# Patient Record
Sex: FEMALE | Race: BLACK OR AFRICAN AMERICAN | Employment: FULL TIME | URBAN - METROPOLITAN AREA
[De-identification: names, ages, dates, MRNs, and addresses within clinical notes are randomized per-mention and may not be internally consistent; named-entity substitution may affect disease eponyms.]

---

## 2020-07-27 ENCOUNTER — OFFICE VISIT (OUTPATIENT)
Dept: FAMILY MEDICINE CLINIC | Age: 60
End: 2020-07-27
Payer: COMMERCIAL

## 2020-07-27 ENCOUNTER — TELEPHONE (OUTPATIENT)
Dept: FAMILY MEDICINE CLINIC | Age: 60
End: 2020-07-27

## 2020-07-27 VITALS
BODY MASS INDEX: 33.37 KG/M2 | SYSTOLIC BLOOD PRESSURE: 120 MMHG | TEMPERATURE: 96.4 F | DIASTOLIC BLOOD PRESSURE: 76 MMHG | HEART RATE: 64 BPM | WEIGHT: 212.6 LBS | HEIGHT: 67 IN | RESPIRATION RATE: 20 BRPM

## 2020-07-27 PROBLEM — E78.5 DYSLIPIDEMIA: Status: ACTIVE | Noted: 2020-07-27

## 2020-07-27 PROBLEM — E66.811 OBESITY (BMI 30.0-34.9): Status: ACTIVE | Noted: 2020-07-27

## 2020-07-27 PROBLEM — I10 ESSENTIAL HYPERTENSION: Status: ACTIVE | Noted: 2020-07-27

## 2020-07-27 PROBLEM — Z86.19 HISTORY OF HEPATITIS C: Status: ACTIVE | Noted: 2020-07-27

## 2020-07-27 PROBLEM — E66.9 OBESITY (BMI 30.0-34.9): Status: ACTIVE | Noted: 2020-07-27

## 2020-07-27 PROBLEM — G47.33 OSA (OBSTRUCTIVE SLEEP APNEA): Status: ACTIVE | Noted: 2020-07-27

## 2020-07-27 PROBLEM — E55.9 VITAMIN D DEFICIENCY: Status: ACTIVE | Noted: 2020-07-27

## 2020-07-27 PROCEDURE — 3017F COLORECTAL CA SCREEN DOC REV: CPT | Performed by: FAMILY MEDICINE

## 2020-07-27 PROCEDURE — 99204 OFFICE O/P NEW MOD 45 MIN: CPT | Performed by: FAMILY MEDICINE

## 2020-07-27 PROCEDURE — G8417 CALC BMI ABV UP PARAM F/U: HCPCS | Performed by: FAMILY MEDICINE

## 2020-07-27 PROCEDURE — G8427 DOCREV CUR MEDS BY ELIG CLIN: HCPCS | Performed by: FAMILY MEDICINE

## 2020-07-27 PROCEDURE — 4004F PT TOBACCO SCREEN RCVD TLK: CPT | Performed by: FAMILY MEDICINE

## 2020-07-27 RX ORDER — CARVEDILOL 6.25 MG/1
6.25 TABLET ORAL 2 TIMES DAILY
COMMUNITY
Start: 2020-06-16 | End: 2020-08-12 | Stop reason: SDUPTHER

## 2020-07-27 RX ORDER — TRIAMCINOLONE ACETONIDE 1 MG/G
CREAM TOPICAL
Qty: 15 G | Refills: 1 | Status: SHIPPED | OUTPATIENT
Start: 2020-07-27 | End: 2020-07-27 | Stop reason: ALTCHOICE

## 2020-07-27 RX ORDER — CHLORTHALIDONE 25 MG/1
25 TABLET ORAL DAILY
COMMUNITY
Start: 2020-06-16 | End: 2020-11-30 | Stop reason: SDUPTHER

## 2020-07-27 RX ORDER — FLUTICASONE PROPIONATE 0.05 %
CREAM (GRAM) TOPICAL
Qty: 15 G | Refills: 1 | Status: SHIPPED | OUTPATIENT
Start: 2020-07-27 | End: 2020-08-26

## 2020-07-27 SDOH — ECONOMIC STABILITY: INCOME INSECURITY: HOW HARD IS IT FOR YOU TO PAY FOR THE VERY BASICS LIKE FOOD, HOUSING, MEDICAL CARE, AND HEATING?: SOMEWHAT HARD

## 2020-07-27 SDOH — ECONOMIC STABILITY: FOOD INSECURITY: WITHIN THE PAST 12 MONTHS, YOU WORRIED THAT YOUR FOOD WOULD RUN OUT BEFORE YOU GOT MONEY TO BUY MORE.: SOMETIMES TRUE

## 2020-07-27 SDOH — ECONOMIC STABILITY: FOOD INSECURITY: WITHIN THE PAST 12 MONTHS, THE FOOD YOU BOUGHT JUST DIDN'T LAST AND YOU DIDN'T HAVE MONEY TO GET MORE.: SOMETIMES TRUE

## 2020-07-27 ASSESSMENT — ENCOUNTER SYMPTOMS
ABDOMINAL PAIN: 0
CHEST TIGHTNESS: 0
BLOOD IN STOOL: 0
SHORTNESS OF BREATH: 0

## 2020-07-27 ASSESSMENT — PATIENT HEALTH QUESTIONNAIRE - PHQ9
SUM OF ALL RESPONSES TO PHQ9 QUESTIONS 1 & 2: 2
2. FEELING DOWN, DEPRESSED OR HOPELESS: 1
1. LITTLE INTEREST OR PLEASURE IN DOING THINGS: 1
SUM OF ALL RESPONSES TO PHQ QUESTIONS 1-9: 2
SUM OF ALL RESPONSES TO PHQ QUESTIONS 1-9: 2

## 2020-07-27 NOTE — PROGRESS NOTES
Subjective:      Patient ID: Kira Rose is a 61 y.o. female. Visit Information    Have you changed or started any medications since your last visit including any over-the-counter medicines, vitamins, or herbal medicines? no   Are you having any side effects from any of your medications? -  yes - TIGHTNESS IN ANKLE  Have you stopped taking any of your medications? Is so, why? -  NO    Have you seen any other physician or provider since your last visit? No  Have you had any other diagnostic tests since your last visit? No  Have you been seen in the emergency room and/or had an admission to a hospital since we last saw you? No  Have you had your routine dental cleaning in the past 6 months? no    Have you activated your Salir.com account? If not, what are your barriers? No:      Patient Care Team:  Julia Thakkar MD as PCP - General (Family Medicine)    Medical History Review  Past Medical, Family, and Social History reviewed and does contribute to the patient presenting condition    Health Maintenance   Topic Date Due    Potassium monitoring  1960    Creatinine monitoring  1960    Hepatitis C screen  1960    HIV screen  11/15/1975    DTaP/Tdap/Td vaccine (1 - Tdap) 11/15/1979    Lipid screen  11/15/2000    Breast cancer screen  11/15/2010    Shingles Vaccine (1 of 2) 11/15/2010    Colon cancer screen colonoscopy  11/15/2010    Flu vaccine (1) 09/01/2020    Hepatitis A vaccine  Aged Out    Hepatitis B vaccine  Aged Out    Hib vaccine  Aged Out    Meningococcal (ACWY) vaccine  Aged Out    Pneumococcal 0-64 years Vaccine  Aged Out     HPI    Patient is a 58-year-old obese black female who presents for her initial office visit here with multiple problems including hypertension, dyslipidemia, vitamin D deficiency, AURELIANO, history of hepatitis C.   She also complains of having an itchy rash on the dorsum of her hands,on her upper eyelids and the soles of her feet since April of this (KENALOG) 0.1 % cream     Sig: Apply topically 2 times daily.      Dispense:  15 g     Refill:  1     Continue routine medication  Patient referred GYN, GI, cardiology, dermatology, pulmonology  Follow-up in 4 to 5 months

## 2020-07-27 NOTE — TELEPHONE ENCOUNTER
Patient states the kenalog cream you had prescribe does not help. She already has it at home. Can you send something else?

## 2020-07-27 NOTE — PATIENT INSTRUCTIONS
FOOD RESOURCES      RESOURCE SERVICE PHONE [employer]   Outreach of Jean Schwarz 442 [phone]  Administrative www.[email or web address] org   Mustard [employer]o [phone]  Bradley Gonzales 47 - and Fax N/A   Payton aRmos of [facility] [phone] 4700 Lady Mayela Rosas [phone] [address] / Jessie Villagran [phone]  [place] [address]70 Assistance Programs [phone]  Collene Flavio. [place] [address] [place] [address] Feed Your Neighbor ([phone]) [phone]  Jaci Mcclellan 178 [phone]  [address] [facility] [address] [phone] [address] [phone]  Service-Intake www.lisaationarmynwohio. 5353 [facility] Emergency Food Pantry ([phone]) [phone]  Service-Intake www.[facility]. [facility] [address]. ([phone]) [phone]  Service-Intake www.stpaulscommunitycenter. [address] [place] [address] [phone]) [phone]  Administrative www. [facility] HillOpenera. [facility] [address] [address] Bank Back Pack Program [phone]) [phone]  Hortensia Reynolds. [address] Meals Program [phone] 450 [facility]3] and Shelter for men www.[facility]. [facility] FOR SURGERY for Social and 351 [facility] [phone] www. providencecentertoledo. [address]250 [address] [place] [address] [phone]  [facility] Ayan Cotton 187. com    Pent[employer]alism Women Big Lots Emergency Assistance [phone] [facility] Feed Your Neighbor [phone] Administrative N/A   [facility] 65 22 [phone]  [address] / [facility] [phone]  112 [facility] [phone]  300 [facility] Pantry / Meals [phone]  [address]  www. [facility]. [facility] 50 Pantry / [employer] [phone]  Administrative www. danisha. [facility] [phone]  [address] of 1501 W [facility] / [place] [phone]  1840 Wealthy [facility] Pantry and Grooming Supplies [phone]  [address]  [email or web address]    300 South [place]on [place]rough [phone]  1300 [facility] PASSAVANT-CRANBERRY-ER Positive Living Program (152) [phone] ext: 52 1783 [facility] www. Ikwa OrientaÃƒÂ§ÃƒÂ£o Profissional. Practice Ignition    Food for [address] 42-74-72-85 www. feedtoledo. [place] 50 Pantry ([phone] [phone]  [address] Assembly of [address] [phone] http://www. StaplestheTradeYaion. hdtMEDIA    775 [facility] [phone]  [address] [place] [address] Family Pantry and [place] [phone] nightingales-harvest.org    201 [facility] [phone]  Ul. [facility] www. [email or web address]    [facility] [phone]  Administrative factoledo. [facility] [address],15Th Floor [phone]  Toll Free www. CoworkingON    Body of 3 [facility] for [place] 1 Pantry (065) 110 [facility] [phone] www. [facility]. [employer]     Living [address] [phone]  [address] Morning Blessings [phone]  Administrative N/A   [facility]  of [facility]3 [facility] Feed Your Neighbor [phone]  [address] for the Poor Food Pantry [phone]  5 [facility]. org/   [facility] 30 Pantry  [phone] Morena Jacobs 13 Emergency Food and Hygiene Pantry [phone]  Administrative www. friendlyGreene Memorial [facility]. [facility] [address] ([phone]  2000 [facility] [phone]  [address] tv    Helping Hands of [facility] [address] / Pamla Sandhoff / Akanksha Dickerson [phone]  607 [facility]. [address] Pantry [facility] [phone]  [place] [address] [phone]  [address] [place] [address] Pant  [phone]  Usabilla.pt    125 [facility] Pantry [phone] [email or web address] Regional [email or web address]   400 [facility] Pantry  [phone] N/A   Food for 31 [facility] Hospital Pantry [phone] N/A     Updated 1/30/20

## 2020-07-27 NOTE — PROGRESS NOTES
FOOD RESOURCES      RESOURCE SERVICE PHONE [employer]   Outreach of Jean Schwarz 442 [phone]  Administrative www.[email or web address]. org   Mustard [employer]o [phone]  LenaAravind Christian 47 - and Fax N/A   Tonio Ochoa Count includes the [facility] [phone] 4700 Lady Mayela Rosas [phone] [address] / Lurena Old [phone]  [place] 2369 Assistance Programs [phone]  Dmitry Calderon. [place] [address] [place] [address] Feed Your Neighbor [phone]  Jaci Mcclellan 178 [phone]  [address] [phone]  [address] [phone]  Service-Intake www.lisaationarmynwohlan. 5353 [facility] Emergency Food Pantry ([phone]) [phone]  Service-Intake www.[facility]. [facility] Summer U. 91. ([phone]) [phone]  Service-Intake www.stpaulscommunitycenter. [address] [place] [address] [phone]) [phone]  Administrative www. [facility] Regional Healthascencion. [address] Bank Back Pack Program [phone]  [facility] Ashlee. [address] Meals Program [phone]  [address] [place] [address] and Shelter for men www.[facility]. [facility] FOR SURGERY for Social and 351 [facility] [phone] www. [facility] Community Hospitalecentertoezrao. 2250 [address] [place] [address] [phone]  [facility] Ayan Cotton 187. com    Baptist Women Big Lots Emergency Assistance [phone] [address] Feed Your Neighbor [phone] Administrative N/A   [facility] 65 22 [phone]  [address] / Karen Earnest [phone]  112 [facility] [phone]  300 [facility] Pantry / Meals [phone]  [address]  www. tenzinTippah [facility] Hospital. [facility] 50 Pantry / [employer] [phone]  Administrative www. danisha. [facility] [phone]  [address] of [address] [facility] / Joaquim [facility] Hospitalah[facility] [phone]  1840 Wealthy  Se Pantry and Grooming Supplies [phone]  [address]  http://[email or web address]/    300 South Mayo Zenon [phone]  1300 [facility] PASSAVANT-CRANBERRY-ER Positive Living Program [phone]) [phone] ext: 39 2543 [facility] www. New Travelcoo. Profyle    Food for [address] 42-30-72-78 www. feedtoledo. [place] 50 Pantry [phone]  [address] Assembly of [address] [phone] http://www. AscentistheShaveLogicion. Airbrite    779 [facility] [phone]  227 [facility] Family Pantry and [place] [phone] nightingales-harvest.org    201 [place] [facility] [phone]  Ul. [facility] www. [email or web address]    The Institute of Living [phone]  Administrative factoledo. 375 [facility],15Th Floor [phone]  Toll Free www. The Ratnakar Bank    Body of 3 [facility] for [place] 1 Pantry (563) 110 [facility] [place]asio [phone] www. [facility] Hospitalem[facility]. [employer]     Living [address] [phone]  [address] Morning Blessings [phone]  Administrative N/A   [facility]  of [facility]3 [facility] Feed Your Neighbor [phone] 100 [facility] for the Poor Food Pantry [phone]  852 [facility]. org/   [facility]iG[facility] 30 Pantry  [phone] Morena Aliceaen 13 Emergency Food and Hygiene Pantry [phone]  Administrative www. [facility]. [facility] [address] [phone]  2000 [facility] [phone]  [address] tv    Helping Hands of [address] / Henderson Golas / [place] [phone]  [address] [place] [address]. [address] [facility] [address] Pantry [facility] [phone]  [place] [address] [phone]  [address] [place] [address]  [phone]  Prior Knowledge.pt    125 [facility] Pantry [phone] http://[email or web address] University Medical [email or web address]   400 [facility] Pantry  [phone] N/A   Food for 31 [facility] Pantry [phone] N/A     Updated 1/30/20

## 2020-07-30 ENCOUNTER — TELEPHONE (OUTPATIENT)
Dept: ADMINISTRATIVE | Age: 60
End: 2020-07-30

## 2020-07-30 NOTE — TELEPHONE ENCOUNTER
Future Appointments   Date Time Provider Elizabeth Jenelle   8/27/2020  2:00 PM George Hernandez MD [facility]   10/5/2020 10:45 AM David Saini MD  derm [facility]   11/19/2020 10:30 AM Sabina Lai MD [facility]

## 2020-07-30 NOTE — TELEPHONE ENCOUNTER
Patient has never been seen here, can not advise on her condition - please follow office protocol for patients who need to be seen sooner - and please instruct [facility] to follow protocol as well,    Thanks,    Hunter Garza

## 2020-07-30 NOTE — TELEPHONE ENCOUNTER
Patient called to schedule new patient appointment with Dr Manisha Fu. Referral on file states dermatitis however, she is experiencing itching on her hands, ams, eyelids, rash on ring finger, eyelids puffy, swollen and peeling. Please contact her at earliest convenience to discuss. Thank you.

## 2020-07-30 NOTE — TELEPHONE ENCOUNTER
Patient called to schedule a np appt for both eye lids are puffy and itchy,  We have referral from Dr. Conner Smith, please advise

## 2020-08-01 ENCOUNTER — HOSPITAL ENCOUNTER (OUTPATIENT)
Age: 60
Discharge: HOME | End: 2020-08-01
Payer: COMMERCIAL

## 2020-08-01 LAB
ABSOLUTE EOS #: 0.25 K/UL (ref 0–0.4)
ABSOLUTE IMMATURE GRANULOCYTE: ABNORMAL K/UL (ref 0–0.3)
ABSOLUTE LYMPH #: 2.35 K/UL (ref 1–4.8)
ABSOLUTE MONO #: 0.29 K/UL (ref 0.1–1.3)
ALBUMIN SERPL-MCNC: 4 G/DL (ref 3.5–5.2)
ALBUMIN/GLOBULIN RATIO: ABNORMAL (ref 1–2.5)
ALP BLD-CCNC: 75 U/L (ref 35–104)
ALT SERPL-CCNC: 12 U/L (ref 5–33)
ANION GAP SERPL CALCULATED.3IONS-SCNC: 10 MMOL/L (ref 9–17)
AST SERPL-CCNC: 18 U/L
BASOPHILS # BLD: 0 % (ref 0–2)
BASOPHILS ABSOLUTE: 0 K/UL (ref 0–0.2)
BILIRUB SERPL-MCNC: 0.55 MG/DL (ref 0.3–1.2)
BUN BLDV-MCNC: 15 MG/DL (ref 6–20)
BUN/CREAT BLD: ABNORMAL (ref 9–20)
CALCIUM SERPL-MCNC: 9.4 MG/DL (ref 8.6–10.4)
CHLORIDE BLD-SCNC: 99 MMOL/L (ref 98–107)
CHOLESTEROL/HDL RATIO: 4.1
CHOLESTEROL: 178 MG/DL
CO2: 29 MMOL/L (ref 20–31)
CREAT SERPL-MCNC: 1 MG/DL (ref 0.5–0.9)
DIFFERENTIAL TYPE: ABNORMAL
EOSINOPHILS RELATIVE PERCENT: 5 % (ref 0–4)
GFR AFRICAN AMERICAN: >60 ML/MIN
GFR NON-AFRICAN AMERICAN: 57 ML/MIN
GFR SERPL CREATININE-BSD FRML MDRD: ABNORMAL ML/MIN/{1.73_M2}
GFR SERPL CREATININE-BSD FRML MDRD: ABNORMAL ML/MIN/{1.73_M2}
GLUCOSE BLD-MCNC: 96 MG/DL (ref 70–99)
HCT VFR BLD CALC: 41.8 % (ref 36–46)
HDLC SERPL-MCNC: 43 MG/DL
HEMOGLOBIN: 13.9 G/DL (ref 12–16)
IMMATURE GRANULOCYTES: ABNORMAL %
LDL CHOLESTEROL: 114 MG/DL (ref 0–130)
LYMPHOCYTES # BLD: 48 % (ref 24–44)
MAGNESIUM: 2 MG/DL (ref 1.6–2.6)
MCH RBC QN AUTO: 28.4 PG (ref 26–34)
MCHC RBC AUTO-ENTMCNC: 33.2 G/DL (ref 31–37)
MCV RBC AUTO: 85.6 FL (ref 80–100)
MONOCYTES # BLD: 6 % (ref 1–7)
MORPHOLOGY: NORMAL
NRBC AUTOMATED: ABNORMAL PER 100 WBC
PDW BLD-RTO: 13.6 % (ref 11.5–14.9)
PLATELET # BLD: 261 K/UL (ref 150–450)
PLATELET ESTIMATE: ABNORMAL
PMV BLD AUTO: 7.3 FL (ref 6–12)
POTASSIUM SERPL-SCNC: 3.8 MMOL/L (ref 3.7–5.3)
RBC # BLD: 4.88 M/UL (ref 4–5.2)
RBC # BLD: ABNORMAL 10*6/UL
SEG NEUTROPHILS: 41 % (ref 36–66)
SEGMENTED NEUTROPHILS ABSOLUTE COUNT: 2.01 K/UL (ref 1.3–9.1)
SODIUM BLD-SCNC: 138 MMOL/L (ref 135–144)
TOTAL PROTEIN: 8.2 G/DL (ref 6.4–8.3)
TRIGL SERPL-MCNC: 104 MG/DL
TSH SERPL DL<=0.05 MIU/L-ACNC: 2.34 MIU/L (ref 0.3–5)
VITAMIN D 25-HYDROXY: 35.9 NG/ML (ref 30–100)
VLDLC SERPL CALC-MCNC: NORMAL MG/DL (ref 1–30)
WBC # BLD: 4.9 K/UL (ref 3.5–11)
WBC # BLD: ABNORMAL 10*3/UL

## 2020-08-01 PROCEDURE — 82306 VITAMIN D 25 HYDROXY: CPT

## 2020-08-01 PROCEDURE — 84443 ASSAY THYROID STIM HORMONE: CPT

## 2020-08-01 PROCEDURE — 80061 LIPID PANEL: CPT

## 2020-08-01 PROCEDURE — 85025 COMPLETE CBC W/AUTO DIFF WBC: CPT

## 2020-08-01 PROCEDURE — 36415 COLL VENOUS BLD VENIPUNCTURE: CPT

## 2020-08-01 PROCEDURE — 83735 ASSAY OF MAGNESIUM: CPT

## 2020-08-01 PROCEDURE — 80053 COMPREHEN METABOLIC PANEL: CPT

## 2020-08-03 ENCOUNTER — TELEPHONE (OUTPATIENT)
Dept: FAMILY MEDICINE CLINIC | Age: 60
End: 2020-08-03

## 2020-08-03 NOTE — TELEPHONE ENCOUNTER
Patient called stating that when she went to get labs done on Saturday, she stumbled and not her right foot hurts in arch. Can I do a referral to Dr. Fadi Hines for her without being seen? Please advise.

## 2020-08-03 NOTE — TELEPHONE ENCOUNTER
Referring to Dr. Peterson Chang. Patient cannot get into Dr. Mateusz Canas office until October. Patient given Dr. Kendall Orosco information to call. Referral placed to Dr. Kendall Orosco.

## 2020-08-11 ENCOUNTER — TELEPHONE (OUTPATIENT)
Dept: FAMILY MEDICINE CLINIC | Age: 60
End: 2020-08-11

## 2020-08-11 NOTE — TELEPHONE ENCOUNTER
I called and discussed patient's lab results with her.   Please refer patient to GI for history of hepatitis C.

## 2020-08-12 ENCOUNTER — OFFICE VISIT (OUTPATIENT)
Dept: OBGYN CLINIC | Age: 60
End: 2020-08-12
Payer: COMMERCIAL

## 2020-08-12 VITALS
TEMPERATURE: 97.1 F | BODY MASS INDEX: 34.55 KG/M2 | DIASTOLIC BLOOD PRESSURE: 78 MMHG | WEIGHT: 215 LBS | HEIGHT: 66 IN | SYSTOLIC BLOOD PRESSURE: 124 MMHG

## 2020-08-12 PROCEDURE — 99214 OFFICE O/P EST MOD 30 MIN: CPT | Performed by: OBSTETRICS & GYNECOLOGY

## 2020-08-12 RX ORDER — CARVEDILOL 6.25 MG/1
6.25 TABLET ORAL 2 TIMES DAILY
COMMUNITY
Start: 2020-03-12

## 2020-08-12 RX ORDER — CHLORTHALIDONE 25 MG/1
25 TABLET ORAL DAILY
COMMUNITY
Start: 2020-01-20 | End: 2020-08-12 | Stop reason: SDUPTHER

## 2020-08-12 NOTE — PROGRESS NOTES
History and Physical  [address], [address] [facility] [address], [address] [facility] [address] [phone]   Fax [phone]  Swati Doran  2020              61 y.o. Chief Complaint   Patient presents with   Darylene Knuckles New Doctor    Annual Exam       No LMP recorded. Primary Care Physician: Baldo Barajas MD    HPI : Swati Doran is a 61 y.o. female     Patient had TLH/BSO in 2016 for endometriosis and continued abdominal and pelvic pain after menopause. States she has been having weight gain issues since menopause.    No vaginal bleeding since hysterectomy  Is due for mammogram  No complaints or issues today besides weight gain.   ________________________________________________________________________  OB History    Para Term  AB Living   1 0 0 0 1 0   SAB TAB Ectopic Molar Multiple Live Births   1 0 0 0 0 0      # Outcome Date GA Lbr Remigio/2nd Weight Sex Delivery Anes PTL Lv   1 SAB              Past Medical History:   Diagnosis Date    Hematuria     Hepatitis-C     Hypertension     Mitral valve regurgitation     Sleep apnea                                                                    Past Surgical History:   Procedure Laterality Date    CARPAL TUNNEL RELEASE Bilateral     COLONOSCOPY  2017    TUBULAR ADENOMA    HYSTERECTOMY, TOTAL ABDOMINAL       Family History   Problem Relation Age of Onset    High Blood Pressure Mother     Other Mother         Rhenda Oak    Cancer Father         BONE AND PROSTRATE    Prostate Cancer Brother      Social History     Socioeconomic History    Marital status: Single     Spouse name: Not on file    Number of children: Not on file    Years of education: Not on file    Highest education level: Not on file   Occupational History    Not on file   Social Needs    Financial resource strain: Somewhat hard    Food insecurity     Worry: Sometimes true     Inability: Sometimes true   Isa Garcia vision, Headache, Dizziness or trauma in last 12 months  ENT ROS: No hearing, Tinnitis, sinus or taste problems  Hematological and Lymphatic ROS:No Lymphoma, Von Willebrand's, Hemophillia or Bleeding History  Psych ROS: No Depression, Homicidal thoughts,suicidal thoughts, or anxiety  Breast ROS: No prior breast abnormalities or lumps  Respiratory ROS: No SOB, Pneumoniae,Cough, or Pulmonary Embolism History  Cardiovascular ROS: No Chest Pain with Exertion, Palpitations, Syncope, Edema, Arrhythmia  Gastrointestinal ROS: No Indigestion, Heartburn, Nausea, vomiting, Diarrhea, Constipation,or Bowel Changes; No Bloody Stools or melena  Genito-Urinary ROS: No Dysuria, Hematuria or Nocturia. No Urinary Incontinence or Vaginal Discharge  Musculoskeletal ROS: No Arthralgia, Arthritis,Gout,Osteoporosis or Rheumatism  Neurological ROS: No CVA, Migraines, Epilepsy, Seizure Hx, or Limb Weakness  Dermatological ROS: No Rash, Itching, Hives, Mole Changes or Cancer                                                                                                                                                                                                                                  PHYSICAL Exam:     Constitutional:  Vitals:    08/12/20 1101   BP: 124/78   Site: Right Upper Arm   Position: Sitting   Cuff Size: Large Adult   Temp: 97.1 °F (36.2 °C)   Weight: 215 lb (97.5 kg)   Height: 5' 6\" (1.676 m)         General Appearance: This  is a well Developed, well Nourished, well groomed female. Skin:  There was a Normal Inspection of the skin without rashes or lesions. Extremities: The patients extremities were without calf tenderness, edema, or varicosities. Abdomen: The abdomen was soft and non-tender. There were good bowel sounds in all quadrants and there was no guarding, rebound or rigidity. Psych:   The patient had a normal Orientation to: Time, Place, Person, and Situation  Breast:  (Chest)  normal appearance, no masses or tenderness  Pelvic Exam:  Vulva and vagina appear normal. Bimanual exam reveals normal vaginal cuff. Musculosk:  Normal Gait and station was noted. ASSESSMENT:      61 y.o. Annual   Diagnosis Orders   1. Well woman exam     2. Hematuria, unspecified type     3.  Encounter for screening mammogram for malignant neoplasm of breast  OLIVIER DIGITAL SCREEN W OR WO CAD BILATERAL          Chief Complaint   Patient presents with   Marissa Pierce Doctor    Annual Exam          Past Medical History:   Diagnosis Date    Hematuria     Hepatitis-C     Hypertension     Mitral valve regurgitation     Sleep apnea          Patient Active Problem List    Diagnosis Date Noted    Hematuria     Essential hypertension 07/27/2020    Dyslipidemia 07/27/2020    Obesity (BMI 30.0-34.9) 07/27/2020    Vitamin D deficiency 07/27/2020    AURELIANO (obstructive sleep apnea) 07/27/2020    History of hepatitis C 07/27/2020             PLAN:  Annual exam performed  rx mammogram  Discussed phytoestrogens and OTC weight management options  RTO one year annual exam    Orders Placed This Encounter   Procedures    OLIVIER DIGITAL SCREEN W OR WO CAD BILATERAL     Standing Status:   Future     Standing Expiration Date:   10/10/2021     Order Specific Question:   Reason for exam:     Answer:   screening for malignant neoplasm of breast

## 2020-08-14 ENCOUNTER — NURSE ONLY (OUTPATIENT)
Dept: FAMILY MEDICINE CLINIC | Age: 60
End: 2020-08-14

## 2020-08-14 VITALS
BODY MASS INDEX: 34.7 KG/M2 | WEIGHT: 215 LBS | SYSTOLIC BLOOD PRESSURE: 147 MMHG | TEMPERATURE: 96.8 F | DIASTOLIC BLOOD PRESSURE: 84 MMHG

## 2020-08-14 NOTE — PROGRESS NOTES
Pt is here today for a BP check. Vitals are as follows. Sitting   132/84 left arm-in office rakesh           Pt denies CP, HA or SOB. Pt cuff left arm: 147/84    Discussed with patient to make sure she is relaxed before taking BP at home. Document BP at home and bring to appointment in November. Patient voiced understanding.

## 2020-08-19 ENCOUNTER — HOSPITAL ENCOUNTER (OUTPATIENT)
Dept: WOMENS IMAGING | Age: 60
Discharge: HOME | End: 2020-08-21
Payer: COMMERCIAL

## 2020-08-19 PROCEDURE — 77063 BREAST TOMOSYNTHESIS BI: CPT

## 2020-08-20 ENCOUNTER — OFFICE VISIT (OUTPATIENT)
Dept: PODIATRY | Age: 60
End: 2020-08-20
Payer: COMMERCIAL

## 2020-08-20 VITALS — HEIGHT: 66 IN | WEIGHT: 215 LBS | BODY MASS INDEX: 34.55 KG/M2

## 2020-08-20 PROCEDURE — G8427 DOCREV CUR MEDS BY ELIG CLIN: HCPCS | Performed by: PODIATRIST

## 2020-08-20 PROCEDURE — 99203 OFFICE O/P NEW LOW 30 MIN: CPT | Performed by: PODIATRIST

## 2020-08-20 PROCEDURE — 1036F TOBACCO NON-USER: CPT | Performed by: PODIATRIST

## 2020-08-20 PROCEDURE — G8417 CALC BMI ABV UP PARAM F/U: HCPCS | Performed by: PODIATRIST

## 2020-08-20 PROCEDURE — 3017F COLORECTAL CA SCREEN DOC REV: CPT | Performed by: PODIATRIST

## 2020-08-20 ASSESSMENT — ENCOUNTER SYMPTOMS
DIARRHEA: 0
BACK PAIN: 0
SHORTNESS OF BREATH: 0
NAUSEA: 0
COLOR CHANGE: 0

## 2020-08-20 NOTE — PROGRESS NOTES
Ramos Rose is a 61 y.o. female who presents to the office today with chief complaint of pain to the arch of the right foot. Chief Complaint   Patient presents with    Foot Pain     right foot painful in arch from tripping 8/8/20    Ankle Pain     lump in the back of left ankle sometimes painful    Symptoms began about 2 week(s) ago. Patient complains of injury to the right foot. Patient states that she tripped two weeks ago and landed hard on her right foot. Patient states that she has had pain to the arch of the right foot ever since. Patient states that her pain is getting progressively better. Pain is rated 1 out of 10 at it's worst and is described as intermittent. Treatments prior to today's visit include: None. Patient states that she also has pain to the left achilles tendon. Patient rates this pain as an 8 out of 10 at it's worst. Patient denies any injury to the left achilles tendon. Patient went to a Podiatrist about one year ago for this and was in a CAM walker for awhile. Patient states that this helped, but her pain is back. Allergies   Allergen Reactions    Pcn [Penicillins] Hives       Past Medical History:   Diagnosis Date    Hematuria     Hepatitis-C     Hypertension     Mitral valve regurgitation     Sleep apnea        Prior to Admission medications    Medication Sig Start Date End Date Taking? Authorizing Provider   ciclopirox (PENLAC) 8 % solution Apply topically nightly. Remove once weekly with alcohol or nail polish remover. 8/20/20  Yes Mis Pike DPM   carvedilol (COREG) 6.25 MG tablet Take 6.25 mg by mouth 2 times daily 3/12/20  Yes Historical Provider, MD   chlorthalidone (HYGROTON) 25 MG tablet 25 mg daily 6/16/20  Yes Historical Provider, MD   fluticasone (CUTIVATE) 0.05 % cream Apply topically 2 times daily as needed.  7/27/20 8/26/20 Yes Africa Rouse MD       Past Surgical History:   Procedure Laterality Date    CARPAL TUNNEL RELEASE Bilateral     COLONOSCOPY  2017    TUBULAR ADENOMA    HYSTERECTOMY, TOTAL ABDOMINAL         Family History   Problem Relation Age of Onset    High Blood Pressure Mother     Other Mother         [place] Shape Cancer Father         BONE AND PROSTRATE    Prostate Cancer Brother        Social History     Tobacco Use    Smoking status: Former Smoker     Packs/day: 1.00     Years: 30.00     Pack years: 30.00     Types: Cigarettes     Last attempt to quit: 10/27/2019     Years since quittin.8    Smokeless tobacco: Never Used   Substance Use Topics    Alcohol use: Not Currently       Review of Systems   Constitutional: Negative for activity change, appetite change, chills, diaphoresis, fatigue and fever. Respiratory: Negative for shortness of breath. Cardiovascular: Negative for leg swelling. Gastrointestinal: Negative for diarrhea and nausea. Endocrine: Negative for cold intolerance, heat intolerance and polyuria. Musculoskeletal: Positive for arthralgias. Negative for back pain, gait problem, joint swelling and myalgias. Skin: Negative for color change, pallor, rash and wound. Allergic/Immunologic: Negative for environmental allergies and food allergies. Neurological: Negative for dizziness, weakness, light-headedness and numbness. Hematological: Does not bruise/bleed easily. Psychiatric/Behavioral: Negative for behavioral problems, confusion and self-injury. The patient is not nervous/anxious. Vitals: There were no vitals filed for this visit. General: AAO x 3 in NAD. Integument: There are no rashes, ulcers, or breaks in the skin noted to the bilateral lower extremities. There is no induration, subcutaneous nodules, or tightening of the skin noted to the bilateral.     Toenails 1-5 of the right foot do present with thickness, discoloration, brittleness, subungual debris. Toenails 1-5 of the left foot do present with thickness, discoloration, brittleness, subungual debris. There is pain with palpation of toenails 1-5 of the right foot and 1-5 of the left foot. Interdigital maceration absent to web spaces 1-4, Bilateral.     There are no preulcerative lesions noted to the right foot. There are no preulcerative lesions noted to the left foot. The skin to the bilateral feet is not thin and shiny. The skin to the bilateral feet is  warm, supple, and dry. Vascular: DP pulse of the right foot is  palpable. DP pulse of the left foot is  palpable. PT pulse of the right foot is  palpable. PT pulse of the left foot is  palpable. CFT is less than 3 secs to the digits of the right foot. CFT is less than 3 secs to the digits of the left foot. There is no edema noted to the bilateral foot or ankle. There is hair growth noted to the digits of the bilateral feet. There are no varicosities noted to the right foot/ankle. There are no varicosities noted to the left foot/ankle. Erythema is absent to the bilateral feet. Neurological: Reflexes are present to the right plantar foot and to the Achilles tendon. Reflexes are present to the left plantar foot and to the Achilles tendon. Epicritic sensation is  intact to the right foot. Epicritic sensation is  intact to the left foot. Musculoskeletal:  Muscle strength is +5/5 to all four muscle groups of the right lower extremity and +5/5 to all four muscle groups of the left lower extremity. There is mild pain with muscle strength evaluation of the left posterior muscle group. There are no areas of subluxation, dislocation, or laxity noted to either lower extremity. Range of motion to the right ankle is  free of pain or grinding. Range of motion to the left ankle is  free of pain or grinding. Range of motion to the right subtalar joint is  free of pain or grinding. Range of motion to the left subtalar joint is  free of pain or grinding.      No abnormalities, asymmetries, or misalignments are seen between the extremities. Weightbearing evaluation does reveal rearfoot eversion, medial prominence of the talar head, loss of the medial longitudinal arch height, and too many toes sign bilaterally. There is soft tissue resistance upon passive dorsiflexion of the bilateral ankles, left greater than right, with the knee(s) extended. However, this soft tissue resistance is not present with the knee(s) flexed. There is pain with palpation to the left achilles tendon at it's insertion on the calcaneus. There is no pain or crepitus with palpation to the watershed region of the left achilles tendon. There is no palpable defect noted to this tendon. There is no pain with palpation or manipulation of the arch of the right foot. The digits of the right foot are not contracted. The digits of the left foot are not contracted. There is no prominence noted to the first metatarsal head without abduction of the hallux of the right foot. There is no prominence noted to the first metatarsal head without abduction of the hallux of the left foot. Shoe examination was performed. Biomechanical Exam: normal bilaterally. Asessment: Patient is a 61 y.o. female with:    Diagnosis Orders   1. Tendonitis, Achilles, left  Ambulatory referral to Physical Therapy   2. Equinus contracture of left ankle  Ambulatory referral to Physical Therapy   3. Onychomycosis of toenail  ciclopirox (PENLAC) 8 % solution   4. Pain in left foot  Ambulatory referral to Physical Therapy   5. Pain of toes of both feet  ciclopirox (PENLAC) 8 % solution       Plan:  1. Clinical evaluation of the patient. 2. Patient given an order for physical therapy for evaluation and treatment of achilles tendonitis and equinus left. Patient given a prescription for Penlac nail solution for treatment of her fungal toenails. 3. Contact office with any questions/problems/concerns.   Return in about 5 weeks (around 9/24/2020) for evaluation of achilles tendon left.    8/20/2020      Vitaliy Hall DPM

## 2020-08-21 ENCOUNTER — TELEPHONE (OUTPATIENT)
Dept: FAMILY MEDICINE CLINIC | Age: 60
End: 2020-08-21

## 2020-08-21 NOTE — TELEPHONE ENCOUNTER
[id number] [place] [address] told her she needs a physical.  She is scheduled on 10-8-20.

## 2020-08-26 ENCOUNTER — OFFICE VISIT (OUTPATIENT)
Dept: GASTROENTEROLOGY | Age: 60
End: 2020-08-26
Payer: COMMERCIAL

## 2020-08-26 ENCOUNTER — HOSPITAL ENCOUNTER (OUTPATIENT)
Age: 60
Discharge: HOME | End: 2020-08-26
Payer: COMMERCIAL

## 2020-08-26 VITALS — RESPIRATION RATE: 18 BRPM | BODY MASS INDEX: 34.22 KG/M2 | WEIGHT: 212 LBS | TEMPERATURE: 97.2 F

## 2020-08-26 LAB
ABSOLUTE EOS #: 0.27 K/UL (ref 0–0.44)
ABSOLUTE IMMATURE GRANULOCYTE: <0.03 K/UL (ref 0–0.3)
ABSOLUTE LYMPH #: 2.52 K/UL (ref 1.1–3.7)
ABSOLUTE MONO #: 0.4 K/UL (ref 0.1–1.2)
AFP: 3.2 UG/L
ALBUMIN SERPL-MCNC: 4.2 G/DL (ref 3.5–5.2)
ALBUMIN/GLOBULIN RATIO: 1.1 (ref 1–2.5)
ALP BLD-CCNC: 74 U/L (ref 35–104)
ALT SERPL-CCNC: 15 U/L (ref 5–33)
ANION GAP SERPL CALCULATED.3IONS-SCNC: 12 MMOL/L (ref 9–17)
AST SERPL-CCNC: 20 U/L
BASOPHILS # BLD: 1 % (ref 0–2)
BASOPHILS ABSOLUTE: 0.03 K/UL (ref 0–0.2)
BILIRUB SERPL-MCNC: 0.51 MG/DL (ref 0.3–1.2)
BILIRUBIN DIRECT: 0.12 MG/DL
BILIRUBIN, INDIRECT: 0.39 MG/DL (ref 0–1)
BUN BLDV-MCNC: 12 MG/DL (ref 6–20)
BUN/CREAT BLD: NORMAL (ref 9–20)
CALCIUM SERPL-MCNC: 9.4 MG/DL (ref 8.6–10.4)
CHLORIDE BLD-SCNC: 100 MMOL/L (ref 98–107)
CO2: 27 MMOL/L (ref 20–31)
CREAT SERPL-MCNC: 0.83 MG/DL (ref 0.5–0.9)
DIFFERENTIAL TYPE: ABNORMAL
EOSINOPHILS RELATIVE PERCENT: 5 % (ref 1–4)
GFR AFRICAN AMERICAN: >60 ML/MIN
GFR NON-AFRICAN AMERICAN: >60 ML/MIN
GFR SERPL CREATININE-BSD FRML MDRD: NORMAL ML/MIN/{1.73_M2}
GFR SERPL CREATININE-BSD FRML MDRD: NORMAL ML/MIN/{1.73_M2}
GLOBULIN: NORMAL G/DL (ref 1.5–3.8)
GLUCOSE BLD-MCNC: 79 MG/DL (ref 70–99)
HAV IGM SER IA-ACNC: NONREACTIVE
HCT VFR BLD CALC: 43.8 % (ref 36.3–47.1)
HEMOGLOBIN: 14.5 G/DL (ref 11.9–15.1)
HEPATITIS B CORE IGM ANTIBODY: NONREACTIVE
HEPATITIS B CORE TOTAL ANTIBODY: NONREACTIVE
HEPATITIS B SURFACE ANTIGEN: NONREACTIVE
HEPATITIS C ANTIBODY: REACTIVE
IMMATURE GRANULOCYTES: 0 %
LYMPHOCYTES # BLD: 48 % (ref 24–43)
MCH RBC QN AUTO: 28 PG (ref 25.2–33.5)
MCHC RBC AUTO-ENTMCNC: 33.1 G/DL (ref 28.4–34.8)
MCV RBC AUTO: 84.6 FL (ref 82.6–102.9)
MONOCYTES # BLD: 8 % (ref 3–12)
NRBC AUTOMATED: 0 PER 100 WBC
PDW BLD-RTO: 12.8 % (ref 11.8–14.4)
PLATELET # BLD: 272 K/UL (ref 138–453)
PLATELET ESTIMATE: ABNORMAL
PMV BLD AUTO: 9.3 FL (ref 8.1–13.5)
POTASSIUM SERPL-SCNC: 3.8 MMOL/L (ref 3.7–5.3)
RBC # BLD: 5.18 M/UL (ref 3.95–5.11)
RBC # BLD: ABNORMAL 10*6/UL
SEG NEUTROPHILS: 38 % (ref 36–65)
SEGMENTED NEUTROPHILS ABSOLUTE COUNT: 2.01 K/UL (ref 1.5–8.1)
SODIUM BLD-SCNC: 139 MMOL/L (ref 135–144)
TOTAL PROTEIN: 8 G/DL (ref 6.4–8.3)
WBC # BLD: 5.2 K/UL (ref 3.5–11.3)
WBC # BLD: ABNORMAL 10*3/UL

## 2020-08-26 PROCEDURE — 85025 COMPLETE CBC W/AUTO DIFF WBC: CPT

## 2020-08-26 PROCEDURE — 3017F COLORECTAL CA SCREEN DOC REV: CPT | Performed by: INTERNAL MEDICINE

## 2020-08-26 PROCEDURE — 1036F TOBACCO NON-USER: CPT | Performed by: INTERNAL MEDICINE

## 2020-08-26 PROCEDURE — 80076 HEPATIC FUNCTION PANEL: CPT

## 2020-08-26 PROCEDURE — G8427 DOCREV CUR MEDS BY ELIG CLIN: HCPCS | Performed by: INTERNAL MEDICINE

## 2020-08-26 PROCEDURE — 36415 COLL VENOUS BLD VENIPUNCTURE: CPT

## 2020-08-26 PROCEDURE — 99203 OFFICE O/P NEW LOW 30 MIN: CPT | Performed by: INTERNAL MEDICINE

## 2020-08-26 PROCEDURE — 86704 HEP B CORE ANTIBODY TOTAL: CPT

## 2020-08-26 PROCEDURE — 87522 HEPATITIS C REVRS TRNSCRPJ: CPT

## 2020-08-26 PROCEDURE — G8417 CALC BMI ABV UP PARAM F/U: HCPCS | Performed by: INTERNAL MEDICINE

## 2020-08-26 PROCEDURE — 80048 BASIC METABOLIC PNL TOTAL CA: CPT

## 2020-08-26 PROCEDURE — 82105 ALPHA-FETOPROTEIN SERUM: CPT

## 2020-08-26 PROCEDURE — 80074 ACUTE HEPATITIS PANEL: CPT

## 2020-08-26 ASSESSMENT — ENCOUNTER SYMPTOMS
GASTROINTESTINAL NEGATIVE: 1
RESPIRATORY NEGATIVE: 1
ALLERGIC/IMMUNOLOGIC NEGATIVE: 1
EYES NEGATIVE: 1

## 2020-08-26 NOTE — PROGRESS NOTES
Subjective:      Patient ID: Esther Alves is a 61 y.o. female. HPI    Review of Systems   Constitutional: Negative. HENT: Negative. Eyes: Negative. Respiratory: Negative. Cardiovascular: Negative. Gastrointestinal: Negative. Endocrine: Negative. Genitourinary: Negative. Musculoskeletal: Negative. Allergic/Immunologic: Negative. Neurological: Negative. Hematological: Negative. Psychiatric/Behavioral: Negative. All other systems reviewed and are negative.       Objective:   Physical Exam    Assessment:            Plan:

## 2020-08-26 NOTE — PROGRESS NOTES
Reason for Referral:  Chronic Hepatitis C       Isaiah Vela, APRN - CNP  [address] [facility], [address] [place] [address]    Chief Complaint   Patient presents with   Chavira New Patient     Patient referred for hep c. Patient states she hasn't had blood work for this since 2014 at [facility]. She states being treated in 2005 and when treatment was done it was undetectable. In 2008 it came back. She did the same treatment again, it went away again. Patient states she is unsure if she has active hep c. HISTORY OF PRESENT ILLNESS: Rosa Duffy is a 61 y.o. female with a past history remarkable for chronic HCV, referred for evaluation of treatment. Chronic hepatitis C- patient is treatment experience, noncirrhotic, treatment experience with pegylated interferon and ribavirin x2 and has maintained SVR for several years now. Smoker: None  Drinking history: Socially  Abdominal surgeries: None reported  Prior Colonoscopy: Performed by outside physician identified to have small polyps repeat expected in 2021  Prior EGD: None  FH of GI issues: None reported      Past Medical,Family, and Social History reviewed and does contribute to the patient presentingcondition. Patient's PMH/PSH,SH,PSYCH Hx, MEDs, ALLERGIES, and ROS were all reviewed and updated in the appropriate sections. PAST MEDICAL HISTORY:  Past Medical History:   Diagnosis Date    Hematuria     Hepatitis-C     Hypertension     Mitral valve regurgitation     Sleep apnea        Past Surgical History:   Procedure Laterality Date    CARPAL TUNNEL RELEASE Bilateral     COLONOSCOPY  11/06/2017    TUBULAR ADENOMA    HYSTERECTOMY, TOTAL ABDOMINAL         CURRENT MEDICATIONS:    Current Outpatient Medications:     ciclopirox (PENLAC) 8 % solution, Apply topically nightly. Remove once weekly with alcohol or nail polish remover. , Disp: 1 Bottle, Rfl: 3    carvedilol (COREG) 6.25 MG tablet, Take 6.25 mg by mouth 2 times daily, Disp: , Rfl: recommends annual mammograms for women 40 years and older. Treatment 2005- Peg Ribavarin, completed 6 months of treatment. Second recurrence: VL was elevated in 2008 in [place]. Second treatment Peg and Riba, patient had shortened treatment due to depression. 2009: got blood work and VL was undectable    2013- [facility] in [place]. Lab was showed VL undect. Recent blood work at [facility], 2015, negative VL undect. IMPRESSION: Libby Ordonez is a 61 y.o. female with a past history remarkable for chronic HCV, referred for evaluation of treatment. Chronic hepatitis C- patient is treatment experience, noncirrhotic, treatment experience with pegylated interferon and ribavirin x2 and has maintained SVR for several years now. PLAN:    1) Chronic HCV- will send for VL.,  Basic labs, liver ultrasound, AFP and other screening neurological tests. It is unclear whether or not the patient will be criteria for treatment given the patient was treated twice more than 5 years ago and has maintained SVR since    2) Liver US- screening for hepatocellular carcinoma irrespective of prior HCV treatment to be continued. 3) Prior colonoscopy in 2017, small TA, repeat 2021. Thank you for allowing me to participate in the care of Ms. Rain. For any further questions please do not hesitate to contact me. I have reviewed and agree with the MA/LPN ROS please refer to their documentation from today's encounter on a separate note. Suzanne Dawson MD, MPH   [facility]  Office #: [phone]          this note is created with the assistance of a speech recognition program.  While intending to generate a document that actually reflects the content of the visit, the document can still have some errors including those of syntax and sound a like substitutions which may escape proof reading.   It such instances, actual meaning can be extrapolated by contextual diversion.

## 2020-08-27 ENCOUNTER — OFFICE VISIT (OUTPATIENT)
Dept: PULMONOLOGY | Age: 60
End: 2020-08-27
Payer: COMMERCIAL

## 2020-08-27 VITALS
SYSTOLIC BLOOD PRESSURE: 146 MMHG | RESPIRATION RATE: 20 BRPM | HEART RATE: 70 BPM | BODY MASS INDEX: 34.07 KG/M2 | WEIGHT: 212 LBS | DIASTOLIC BLOOD PRESSURE: 80 MMHG | HEIGHT: 66 IN | OXYGEN SATURATION: 98 % | TEMPERATURE: 97 F

## 2020-08-27 PROCEDURE — 99203 OFFICE O/P NEW LOW 30 MIN: CPT | Performed by: INTERNAL MEDICINE

## 2020-08-27 PROCEDURE — G8417 CALC BMI ABV UP PARAM F/U: HCPCS | Performed by: INTERNAL MEDICINE

## 2020-08-27 PROCEDURE — 3017F COLORECTAL CA SCREEN DOC REV: CPT | Performed by: INTERNAL MEDICINE

## 2020-08-27 PROCEDURE — 1036F TOBACCO NON-USER: CPT | Performed by: INTERNAL MEDICINE

## 2020-08-27 PROCEDURE — G8427 DOCREV CUR MEDS BY ELIG CLIN: HCPCS | Performed by: INTERNAL MEDICINE

## 2020-08-27 RX ORDER — AMMONIUM LACTATE 12 G/100G
LOTION TOPICAL
COMMUNITY
Start: 2020-08-13 | End: 2021-06-08 | Stop reason: CLARIF

## 2020-08-27 ASSESSMENT — SLEEP AND FATIGUE QUESTIONNAIRES
HOW LIKELY ARE YOU TO NOD OFF OR FALL ASLEEP WHEN YOU ARE A PASSENGER IN A CAR FOR AN HOUR WITHOUT A BREAK: 1
HOW LIKELY ARE YOU TO NOD OFF OR FALL ASLEEP WHILE SITTING AND READING: 3
HOW LIKELY ARE YOU TO NOD OFF OR FALL ASLEEP WHILE SITTING QUIETLY AFTER LUNCH WITHOUT ALCOHOL: 0
ESS TOTAL SCORE: 10
HOW LIKELY ARE YOU TO NOD OFF OR FALL ASLEEP IN A CAR, WHILE STOPPED FOR A FEW MINUTES IN TRAFFIC: 1
HOW LIKELY ARE YOU TO NOD OFF OR FALL ASLEEP WHILE LYING DOWN TO REST IN THE AFTERNOON WHEN CIRCUMSTANCES PERMIT: 0
HOW LIKELY ARE YOU TO NOD OFF OR FALL ASLEEP WHILE SITTING AND TALKING TO SOMEONE: 0
HOW LIKELY ARE YOU TO NOD OFF OR FALL ASLEEP WHILE WATCHING TV: 3
HOW LIKELY ARE YOU TO NOD OFF OR FALL ASLEEP WHILE SITTING INACTIVE IN A PUBLIC PLACE: 2

## 2020-08-27 NOTE — PROGRESS NOTES
REASON FOR THE CONSULTATION:  martin  HISTORY OF PRESENT ILLNESS:    Antonino Collins is a 61y.o. year old female here for evaluation of obstructive sleep apnea. Patient had a last sleep study 2014 and was given a CPAP of 11 cm water pressure. She has been using CPAP every night but her memory card has not been read since 2015. When she sleeps at night if her neck is straight she sleeps better without choking . She has full face mask . when she gets up in morning she feels sleepy and not rested   Even though she get 8 hrs sleep . 2014 - BMI 28.9 and now BMI is 34.2 , she has gained - from 185 lbs to 212 lbs. She tosses and turn during night   Uses bathroom 2 times   Falls asleep right away   Bed time - 11 pm   Wake up time - 8 am   Some times takes melatonin   Feels fatigued and tired during the day even though she has been using CPAP at night  Feels her memory is not as sharp  Sleep Medicine 8/27/2020   Sitting and reading 3   Watching TV 3   Sitting, inactive in a public place (e.g. a theatre or a meeting) 2   As a passenger in a car for an hour without a break 1   Lying down to rest in the afternoon when circumstances permit 0   Sitting and talking to someone 0   Sitting quietly after a lunch without alcohol 0   In a car, while stopped for a few minutes in traffic 1   Total score 10         LUNG CANCER SCREENING     1. CRITERIA MET    []     CT ORDERED  []      2. CRITERIA NOT MET   [x]      3. REFUSED                    []        REASON CRITERIA NOT MET     1. SMOKING LESS THAN 30 PY  []      2. AGE LESS THAN 55 or GREATER 77 YEARS  []      3. QUIT SMOKING 15 YEARS OR GREATER   []      4. RECENT CT WITH IN 11 MONTHS    []      5. LIFE EXPECTANCY < 5 YEARS   []      6. SIGNS  AND SYMPTOMS OF LUNG CANCER   []         Immunization     There is no immunization history on file for this patient.      Pneumococcal Vaccine     [] Up to date    [] Indicated   [x] Refused  [] Contraindicated       Influenza Vaccine [] Up to date    [] Indicated   [x] Refused  [] Contraindicated        Pulmonary Rehab   [] Completed   [] Indicated   [] Refused  [] Contraindicated   PAST MEDICAL HISTORY:       Diagnosis Date    Hematuria     Hepatitis-C     Hypertension     Mitral valve regurgitation     Sleep apnea          Family History:       Problem Relation Age of Onset    High Blood Pressure Mother     Other Mother         [place]    Cancer Father         BONE AND PROSTRATE    Prostate Cancer Brother        SURGICAL HISTORY:   Past Surgical History:   Procedure Laterality Date    CARPAL TUNNEL RELEASE Bilateral     COLONOSCOPY  2017    TUBULAR ADENOMA    HYSTERECTOMY, TOTAL ABDOMINAL             SOCIAL AND OCCUPATIONAL HEALTH:        Social History     Socioeconomic History    Marital status: Single     Spouse name: None    Number of children: None    Years of education: None    Highest education level: None   Occupational History    None   Social Needs    Financial resource strain: Somewhat hard    Food insecurity     Worry: Sometimes true     Inability: Sometimes true    Transportation needs     Medical: None     Non-medical: None   Tobacco Use    Smoking status: Former Smoker     Packs/day: 0.25     Years: 30.00     Pack years: 7.50     Types: Cigarettes     Last attempt to quit: 10/27/2019     Years since quittin.8    Smokeless tobacco: Never Used   Substance and Sexual Activity    Alcohol use: Not Currently    Drug use: Never    Sexual activity: Not Currently   Lifestyle    Physical activity     Days per week: None     Minutes per session: None    Stress: None   Relationships    Social connections     Talks on phone: None     Gets together: None     Attends [employer] service: None     Active member of club or organization: None     Attends meetings of clubs or organizations: None     Relationship status: None    Intimate partner violence     Fear of current or ex partner: None Emotionally abused: None     Physically abused: None     Forced sexual activity: None   Other Topics Concern    None   Social History Narrative    Shmuel Saxena has experienced a financial strain with resourcestrain: Food on July 27, 2020. Potential community resources and services have been provided in patient instructions by Ruth Ann Hood. TOBACCO:   reports that she quit smoking about 10 months ago. Her smoking use included cigarettes. She has a 7.50 pack-year smoking history. She has never used smokeless tobacco.  ETOH:   reports previous alcohol use. ALLERGIES:      Allergies   Allergen Reactions    Pcn [Penicillins] Hives         Home Meds:   Prior to Admission medications    Medication Sig Start Date End Date Taking? Authorizing Provider   ammonium lactate (LAC-HYDRIN) 12 % lotion APPLY LIBERALLY TO SKIN BID  ESPECIALLY AFTER SHOWER 8/13/20  Yes Historical Provider, MD   ciclopirox (PENLAC) 8 % solution Apply topically nightly. Remove once weekly with alcohol or nail polish remover.  8/20/20  Yes Dennise Lazo DPM   carvedilol (COREG) 6.25 MG tablet Take 6.25 mg by mouth 2 times daily 3/12/20  Yes Historical Provider, MD   chlorthalidone (HYGROTON) 25 MG tablet 25 mg daily 6/16/20  Yes Historical Provider, MD              REVIEW OF SYSTEMS:    CONSTITUTIONAL:  negative for  fevers, chills, sweats, fatigue, malaise, anorexia and weight loss  EYES:  negative for  double vision, blurred vision, dry eyes, eye discharge and redness  HEENT:  negative for  hearing loss, tinnitus, ear drainage, earaches and nasal congestion  RESPIRATORY: No cough no sputum no shortness of breath  CARDIOVASCULAR:  negative for  chest pain,, palpitations, orthopnea, PND  GASTROINTESTINAL:  negative for nausea, vomiting, change in bowel habits, diarrhea, constipation, abdominal pain, pruritus, abdominal mass and abdominal distention  GENITOURINARY:  negative for frequency, dysuria, nocturia, urinary incontinence and hesitancy  INTEGUMENT  negative for rash, skin lesion(s), dryness, skin color change, changes in lesion, pruritus and changes in hair  HEMATOLOGIC/LYMPHATIC:  negative for easy bruising, bleeding, lymphadenopathy, petechiae and swelling/edema  ALLERGIC/IMMUNOLOGIC:  negative for recurrent infections, urticaria and drug reactions  ENDOCRINE:  negative for heat intolerance, cold intolerance, tremor, weight changes and change in bowel habits    Vitals:  BP (!) 146/80   Pulse 70   Temp 97 °F (36.1 °C) (Infrared)   Resp 20   Ht 5' 6\" (1.676 m)   Wt 212 lb (96.2 kg)   SpO2 98% Comment: at rest on room air  BMI 34.22 kg/m²     PHYSICAL EXAM:  General Appearance:    Alert, cooperative, no distress, appears stated age   Head:    Normocephalic, without obvious abnormality, atraumatic      Eyes:    PERRL, conjunctiva/corneas clear, EOM's intact   Ears:    Normal  external ear canals, both ears   Nose:   Nares normal, septum midline, mucosa normal, no drainage        or sinus tenderness   Throat:   Lips, mucosa, and tongue normal; teeth and gums normal   Neck:   Short thick neck, low hanging soft palate, large tongue, large uvula. No adenopathy, no goiter,    Back:     Symmetric, no curvature, ROM normal, no CVA tenderness   Lungs:     Ventilating all lobes without any rales, rhonchi, wheezes or dullness. No bronchial breath sounds.   Chest expansion equal bilaterally    Chest Wall:    No tenderness or deformity      Heart:    Regular rate and rhythm, S1 and S2 normal, no murmur, rub        or gallop no rvh                           Abdomen:                                                 Pulses:                              Skin:                  Lymph nodes:                    Neurologic:                  Soft, non-tender, bowel sounds active all four quadrants,     no masses, no organomegaly         2+ and symmetric all extremities     Skin color, texture, turgor normal, no rashes or lesions       Cervical, supraclavicular not enlarged or matted or tender      CNII-XII intact, normal strength 5/5 . Clubbing No  Lower ext edema No  Upper ext edema No               CBC:   Recent Labs     08/26/20  1550   WBC 5.2   HGB 14.5        BMP:    Recent Labs     08/26/20  1550      K 3.8      CO2 27   BUN 12   CREATININE 0.83   GLUCOSE 79     Hepatic:   Recent Labs     08/26/20  1550   AST 20   ALT 15   BILITOT 0.51   ALKPHOS 74     Amylase: No results found for: AMYLASE  Lipase: No results found for: LIPASE  Troponin: No results for input(s): TROPONINI in the last 72 hours. BNP: No results for input(s): BNP in the last 72 hours. Lipids: No results for input(s): CHOL, HDL in the last 72 hours. Invalid input(s): LDLCALCU  ABGs: No results found for: PHART, PO2ART, [id number]  INR: No results for input(s): INR in the last 72 hours. Thyroid:   Lab Results   Component Value Date    TSH 2.34 08/01/2020     Urinalysis: No results for input(s): BACTERIA, BLOODU, CLARITYU, COLORU, PHUR, PROTEINU, RBCUA, SPECGRAV, BILIRUBINUR, NITRU, WBCUA, LEUKOCYTESUR, GLUCOSEU in the last 72 hours. IMPRESSION:     Diagnosis Orders   1. AURELIANO on CPAP  Baseline Diagnostic Sleep Study    Sleep Study with PAP Titration   2. Essential hypertension     3. Obesity (BMI 30.0-34.9)          :                PLAN:      Patient's last sleep study was in 2014 showed sever AURELIANO . Since then patient has gained weight and at present does not feel that her CPAP is effectively treating her sleep apnea . she feels unrested, unrefreshed in the morning, tired fatigued and has daytime somnolence.   Advised patient to proceed with weight loss  Will obtain diagnostic ambulatory titration study  Obtain compliance report from present memory card  Follow-up after studies      Requested Prescriptions      No prescriptions requested or ordered in this encounter       There are no discontinued medications. Burke Cartwright received counseling on the following healthy behaviors: nutrition, exercise and medication adherence    Patient given educational materials : see patient instruction       Discussed use, benefit, and side effects of prescribed medications. Barriers to medication compliance addressed. All patient questions answered. Pt voiced understanding. I hope this updates you on my evaluation and clinical thinking. Thank you for allowing me to participate in his care. Sincerely,      Electronically signed by Tiago Lema MD on 8/27/2020 at 2:27 PM     Please note that this chart was generated using voice recognition Dragon dictation software. Although every effort was made to ensure the accuracy of this automated transcription, some errors in transcription may have occurred.

## 2020-08-28 ENCOUNTER — HOSPITAL ENCOUNTER (OUTPATIENT)
Dept: ULTRASOUND IMAGING | Facility: CLINIC | Age: 60
Discharge: HOME | End: 2020-08-30
Payer: COMMERCIAL

## 2020-08-28 PROCEDURE — 76705 ECHO EXAM OF ABDOMEN: CPT

## 2020-09-01 ENCOUNTER — TELEPHONE (OUTPATIENT)
Dept: OBGYN CLINIC | Age: 60
End: 2020-09-01

## 2020-09-01 LAB
DIRECT EXAM: NORMAL
Lab: NORMAL
SPECIMEN DESCRIPTION: NORMAL

## 2020-09-02 ENCOUNTER — HOSPITAL ENCOUNTER (OUTPATIENT)
Dept: SLEEP CENTER | Age: 60
Discharge: HOME | End: 2020-09-04
Payer: COMMERCIAL

## 2020-09-02 VITALS
BODY MASS INDEX: 34.07 KG/M2 | OXYGEN SATURATION: 99 % | WEIGHT: 212 LBS | RESPIRATION RATE: 16 BRPM | HEIGHT: 66 IN | HEART RATE: 69 BPM | TEMPERATURE: 98.2 F

## 2020-09-02 PROCEDURE — 95810 POLYSOM 6/> YRS 4/> PARAM: CPT

## 2020-09-02 RX ORDER — CLOTRIMAZOLE AND BETAMETHASONE DIPROPIONATE 10; .64 MG/G; MG/G
CREAM TOPICAL
Qty: 1 TUBE | Refills: 1 | Status: SHIPPED | OUTPATIENT
Start: 2020-09-02 | End: 2020-10-05 | Stop reason: ALTCHOICE

## 2020-09-09 ENCOUNTER — TELEPHONE (OUTPATIENT)
Dept: GASTROENTEROLOGY | Age: 60
End: 2020-09-09

## 2020-09-09 NOTE — TELEPHONE ENCOUNTER
Writer called patient and left message to call writer back. She was asking if the HEP C is still not present  As she was treated in the past.    Hep C is not detected at this time. Writer spoke to patient  And let her know her liver looked good and Dr Patrick Yip will go over all the test results with hr at her follow up.

## 2020-09-14 LAB — STATUS: NORMAL

## 2020-09-16 ENCOUNTER — HOSPITAL ENCOUNTER (OUTPATIENT)
Dept: PREADMISSION TESTING | Age: 60
Discharge: HOME | End: 2020-09-20
Payer: COMMERCIAL

## 2020-09-16 PROCEDURE — U0003 INFECTIOUS AGENT DETECTION BY NUCLEIC ACID (DNA OR RNA); SEVERE ACUTE RESPIRATORY SYNDROME CORONAVIRUS 2 (SARS-COV-2) (CORONAVIRUS DISEASE [COVID-19]), AMPLIFIED PROBE TECHNIQUE, MAKING USE OF HIGH THROUGHPUT TECHNOLOGIES AS DESCRIBED BY CMS-2020-01-R: HCPCS

## 2020-09-17 LAB — SARS-COV-2, NAA: NOT DETECTED

## 2020-09-20 ENCOUNTER — HOSPITAL ENCOUNTER (OUTPATIENT)
Dept: SLEEP CENTER | Age: 60
Discharge: HOME | End: 2020-09-22
Payer: COMMERCIAL

## 2020-09-20 PROCEDURE — 95811 POLYSOM 6/>YRS CPAP 4/> PARM: CPT

## 2020-09-21 VITALS
TEMPERATURE: 97.6 F | BODY MASS INDEX: 34.07 KG/M2 | WEIGHT: 212 LBS | HEART RATE: 55 BPM | HEIGHT: 66 IN | OXYGEN SATURATION: 98 % | RESPIRATION RATE: 14 BRPM

## 2020-09-29 LAB — STATUS: NORMAL

## 2020-10-01 ENCOUNTER — OFFICE VISIT (OUTPATIENT)
Dept: GASTROENTEROLOGY | Age: 60
End: 2020-10-01
Payer: COMMERCIAL

## 2020-10-01 VITALS — DIASTOLIC BLOOD PRESSURE: 76 MMHG | SYSTOLIC BLOOD PRESSURE: 131 MMHG | BODY MASS INDEX: 34.06 KG/M2 | WEIGHT: 211 LBS

## 2020-10-01 PROCEDURE — G8417 CALC BMI ABV UP PARAM F/U: HCPCS | Performed by: INTERNAL MEDICINE

## 2020-10-01 PROCEDURE — 1036F TOBACCO NON-USER: CPT | Performed by: INTERNAL MEDICINE

## 2020-10-01 PROCEDURE — 99213 OFFICE O/P EST LOW 20 MIN: CPT | Performed by: INTERNAL MEDICINE

## 2020-10-01 PROCEDURE — 3017F COLORECTAL CA SCREEN DOC REV: CPT | Performed by: INTERNAL MEDICINE

## 2020-10-01 PROCEDURE — G8484 FLU IMMUNIZE NO ADMIN: HCPCS | Performed by: INTERNAL MEDICINE

## 2020-10-01 PROCEDURE — G8427 DOCREV CUR MEDS BY ELIG CLIN: HCPCS | Performed by: INTERNAL MEDICINE

## 2020-10-01 ASSESSMENT — ENCOUNTER SYMPTOMS
RESPIRATORY NEGATIVE: 1
ALLERGIC/IMMUNOLOGIC NEGATIVE: 1
EYES NEGATIVE: 1
GASTROINTESTINAL NEGATIVE: 1

## 2020-10-01 NOTE — PROGRESS NOTES
GI FOLLOW UP    INTERVAL HISTORY:     Evaluation for hepatitis C and potential treatment  Patient has undetectable viral load and is maintained SVR status post treatment    Chief Complaint   Patient presents with    Follow-up     Patient states previously being treated twice for Hep C. Was referred for hep C. Patients RNA Viral load is not detected. HISTORY OF PRESENT ILLNESS:Ms. Emmanuel Wright is a 61 y.o. female with a past history remarkable for chronic HCV, referred for evaluation of treatment.     Chronic hepatitis C- patient is treatment experience, noncirrhotic, treatment experience with pegylated interferon and ribavirin x2 and has maintained SVR for several years now.        Smoker: None  Drinking history: Socially  Abdominal surgeries: None reported  Prior Colonoscopy: Performed by outside physician identified to have small polyps repeat expected in 2021  Prior EGD: None  FH of GI issues: None reported    Past Medical,Family, and Social History reviewed and does contribute to the patient presenting condition. Patient's PMH/PSH,SH,PSYCH Hx, MEDs, ALLERGIES, and ROS were all reviewed and updated in the appropriate sections.     PAST MEDICAL HISTORY:  Past Medical History:   Diagnosis Date    Hematuria     Hepatitis-C     Hypertension     Mitral valve regurgitation     Sleep apnea        Past Surgical History:   Procedure Laterality Date    CARPAL TUNNEL RELEASE Bilateral     COLONOSCOPY  11/06/2017    TUBULAR ADENOMA    HYSTERECTOMY, TOTAL ABDOMINAL         CURRENT MEDICATIONS:    Current Outpatient Medications:     clotrimazole-betamethasone (LOTRISONE) 1-0.05 % cream, Apply to affected area bid externally x 4 days then daily for 3 days, Disp: 1 Tube, Rfl: 1    ammonium lactate (LAC-HYDRIN) 12 % lotion, APPLY LIBERALLY TO SKIN BID  ESPECIALLY AFTER SHOWER, Disp: , Rfl:   ciclopirox (PENLAC) 8 % solution, Apply topically nightly. Remove once weekly with alcohol or nail polish remover. , Disp: 1 Bottle, Rfl: 3    carvedilol (COREG) 6.25 MG tablet, Take 6.25 mg by mouth 2 times daily, Disp: , Rfl:     chlorthalidone (HYGROTON) 25 MG tablet, 25 mg daily, Disp: , Rfl:     ALLERGIES:   Allergies   Allergen Reactions    Pcn [Penicillins] Hives       FAMILY HISTORY:       Problem Relation Age of Onset    High Blood Pressure Mother     Other Mother         ALZHEIMERS    Cancer Father         BONE AND PROSTRATE    Prostate Cancer Brother          SOCIAL HISTORY:   Social History     Socioeconomic History    Marital status: Single     Spouse name: Not on file    Number of children: Not on file    Years of education: Not on file    Highest education level: Not on file   Occupational History    Not on file   Social Needs    Financial resource strain: Somewhat hard    Food insecurity     Worry: Sometimes true     Inability: Sometimes true    Transportation needs     Medical: Not on file     Non-medical: Not on file   Tobacco Use    Smoking status: Former Smoker     Packs/day: 0.25     Years: 30.00     Pack years: 7.50     Types: Cigarettes     Last attempt to quit: 10/27/2019     Years since quittin.9    Smokeless tobacco: Never Used   Substance and Sexual Activity    Alcohol use: Not Currently    Drug use: Never    Sexual activity: Not Currently   Lifestyle    Physical activity     Days per week: Not on file     Minutes per session: Not on file    Stress: Not on file   Relationships    Social connections     Talks on phone: Not on file     Gets together: Not on file     Attends Restorationism service: Not on file     Active member of club or organization: Not on file     Attends meetings of clubs or organizations: Not on file     Relationship status: Not on file    Intimate partner violence     Fear of current or ex partner: Not on file     Emotionally abused: Not on file     Physically abused: Not on file     Forced sexual activity: Not on file   Other Topics Concern    Not on file   Social History Narrative    Gurdeep Manuel has experienced a financial strain with resourcestrain: Food on July 27, 2020. Potential community resources and services have been provided in patient instructions by Ale Coffman. REVIEW OF SYSTEMS: A 12-point review of systems was obtained and pertinent positives and negatives were listed below. REVIEW OF SYSTEMS:     Constitutional: No fever, no chills, no lethargy, no weakness. HEENT:  No headache, otalgia, itchy eyes, nasal discharge or sore throat. Cardiac:  No chest pain, dyspnea, orthopnea or PND. Chest:   No cough, phlegm or wheezing. Abdomen:      Detailed by MA   Neuro:  No focal weakness, abnormal movements or seizure like activity. Skin:   No rashes, no itching. :   No hematuria, no pyuria, no dysuria, no flank pain. Extremities:  No swelling or joint pains. ROS was otherwise negative    Review of Systems   Constitutional: Negative. HENT: Negative. Eyes: Negative. Respiratory: Negative. Cardiovascular: Negative. Gastrointestinal: Negative. Endocrine: Negative. Genitourinary: Negative. Musculoskeletal: Negative. Allergic/Immunologic: Negative. Neurological: Negative. Hematological: Negative. Psychiatric/Behavioral: Negative. All other systems reviewed and are negative. PHYSICAL EXAMINATION: Vital signs reviewed per the nursing documentation. /76   Wt 211 lb (95.7 kg)   BMI 34.06 kg/m²   Body mass index is 34.06 kg/m². Physical Exam    Physical Exam   Constitutional: Patient is oriented to person, place, and time. Patient appears well-developed and well-nourished. HENT:   Head: Normocephalic and atraumatic. Eyes: Pupils are equal, round, and reactive to light. EOM are normal.   Neck: Normal range of motion. Neck supple. No JVD present.  No tracheal deviation present. No thyromegaly present. Cardiovascular: Normal rate, regular rhythm, normal heart sounds and intact distal pulses. Pulmonary/Chest: Effort normal and breath sounds normal. No stridor. No respiratory distress. He has no wheezes. He has no rales. He exhibits no tenderness. Abdominal: Soft. Bowel sounds are normal. He exhibits no distension and no mass. There is no tenderness. There is no rebound and no guarding. No hernia. Musculoskeletal: Normal range of motion. Lymphadenopathy:    Patient has no cervical adenopathy. Neurological: Patient is alert and oriented to person, place, and time. Psychiatric: Patient has a normal mood and affect. Patient behavior is normal.       LABORATORY DATA: Reviewed  Lab Results   Component Value Date    WBC 5.2 08/26/2020    HGB 14.5 08/26/2020    HCT 43.8 08/26/2020    MCV 84.6 08/26/2020     08/26/2020     08/26/2020    K 3.8 08/26/2020     08/26/2020    CO2 27 08/26/2020    BUN 12 08/26/2020    CREATININE 0.83 08/26/2020    LABALBU 4.2 08/26/2020    BILITOT 0.51 08/26/2020    ALKPHOS 74 08/26/2020    AST 20 08/26/2020    ALT 15 08/26/2020         Lab Results   Component Value Date    RBC 5.18 (H) 08/26/2020    HGB 14.5 08/26/2020    MCV 84.6 08/26/2020    MCH 28.0 08/26/2020    MCHC 33.1 08/26/2020    RDW 12.8 08/26/2020    MPV 9.3 08/26/2020    BASOPCT 1 08/26/2020    LYMPHSABS 2.52 08/26/2020    MONOSABS 0.40 08/26/2020    NEUTROABS 2.01 08/26/2020    EOSABS 0.27 08/26/2020    BASOSABS 0.03 08/26/2020         DIAGNOSTIC TESTING:     No results found. IMPRESSION: Kelby Najera is a 61 y.o. female with a past history remarkable for chronic HCV, referred for evaluation of treatment.     Chronic hepatitis C- patient is treatment experience, noncirrhotic, treatment experience with pegylated interferon and ribavirin x2 and has maintained SVR for several years now.       PLAN:    1) chronic hepatitis C-Undectable HCV, AFP within normal limits. LFTs within normal limits. Liver ultrasound unremarkable. No strong indication for treatment as patient has maintained SVR. Will need [address] surveillance     RTC 1 yrs. Thank you for allowing me to participate in the care of Ms. Rain. For any further questions please do not hesitate to contact me. I have reviewed and agree with the ROS entered by the MA/LPN from today's encounter documented in a separate note. Sukumar Wiggins MD, MPH   [facility]  Office #: [phone]    this note is created with the assistance of a speech recognition program.  While intending to generate a document that actually reflects the content of the visit, the document can still have some errors including those of syntax and sound a like substitutions which may escape proof reading. It such instances, actual meaning can be extrapolated by contextual diversion.

## 2020-10-05 ENCOUNTER — OFFICE VISIT (OUTPATIENT)
Dept: DERMATOLOGY | Age: 60
End: 2020-10-05
Payer: COMMERCIAL

## 2020-10-05 VITALS
TEMPERATURE: 97.3 F | SYSTOLIC BLOOD PRESSURE: 166 MMHG | HEIGHT: 66 IN | HEART RATE: 63 BPM | WEIGHT: 214.2 LBS | DIASTOLIC BLOOD PRESSURE: 99 MMHG | BODY MASS INDEX: 34.42 KG/M2 | OXYGEN SATURATION: 99 %

## 2020-10-05 PROCEDURE — 3017F COLORECTAL CA SCREEN DOC REV: CPT | Performed by: DERMATOLOGY

## 2020-10-05 PROCEDURE — G8417 CALC BMI ABV UP PARAM F/U: HCPCS | Performed by: DERMATOLOGY

## 2020-10-05 PROCEDURE — 99202 OFFICE O/P NEW SF 15 MIN: CPT | Performed by: DERMATOLOGY

## 2020-10-05 PROCEDURE — 1036F TOBACCO NON-USER: CPT | Performed by: DERMATOLOGY

## 2020-10-05 PROCEDURE — G8484 FLU IMMUNIZE NO ADMIN: HCPCS | Performed by: DERMATOLOGY

## 2020-10-05 PROCEDURE — G8428 CUR MEDS NOT DOCUMENT: HCPCS | Performed by: DERMATOLOGY

## 2020-10-05 RX ORDER — TRETINOIN 0.5 MG/G
CREAM TOPICAL
COMMUNITY
Start: 2020-08-27 | End: 2021-04-16

## 2020-10-05 RX ORDER — TACROLIMUS 0.3 MG/G
OINTMENT TOPICAL
Qty: 30 G | Refills: 2 | Status: SHIPPED | OUTPATIENT
Start: 2020-10-05 | End: 2020-12-30 | Stop reason: SDUPTHER

## 2020-10-05 NOTE — PROGRESS NOTES
Dermatology Patient Note  [id number]  [address]  Dept: [phone]  Dept Fax: [phone]      VISITDATE: 10/5/2020   REFERRING PROVIDER: Mini Espinoza MD      Elicia Garzon is a 61 y.o. female  who presents today in the office for:    New Patient (Pt states that she was having a lot of swelling and itching on her eyelids. She was given Tretinoin and Lidex on it. She is also itching on her arms on left thigh. Has no sign of the rash, she just itches. )      HISTORY OF PRESENT ILLNESS:  61 y.o. female presenting for itching  Location: eyelids, arms, legs  Duration: years  Symptoms: itchy  Course: persistent  Exacerbating factors: unusre  Prior treatments: lidex cream and tretinoin to eyelids. Lidex helped but she didn't want to continue using it given too strong for face  Has bumps on cheeks that have been there for a long time      CURRENT MEDICATIONS:   Current Outpatient Medications   Medication Sig Dispense Refill    fluocinonide (LIDEX) 0.05 % cream CARLOTTA SML AMT EXT AA BID FOR 2 WKS PRN. MAX PRF FLARES      tretinoin (RETIN-A) 0.05 % cream CARLOTTA SML AMT EXT AA QPM      camphor-menthol (SARNA) 0.5-0.5 % lotion Apply topically as needed for itching 1 Bottle 4    tacrolimus (PROTOPIC) 0.03 % ointment Apply to rash twice daily 30 g 2    ammonium lactate (LAC-HYDRIN) 12 % lotion APPLY LIBERALLY TO SKIN BID  ESPECIALLY AFTER SHOWER      ciclopirox (PENLAC) 8 % solution Apply topically nightly. Remove once weekly with alcohol or nail polish remover. 1 Bottle 3    carvedilol (COREG) 6.25 MG tablet Take 6.25 mg by mouth 2 times daily      chlorthalidone (HYGROTON) 25 MG tablet 25 mg daily       No current facility-administered medications for this visit.         ALLERGIES:   Allergies   Allergen Reactions    Pcn [Penicillins] Hives       SOCIAL HISTORY:  Social History     Tobacco Use    Smoking status: Former Smoker     Packs/day: 0.25     Years: 30.00     Pack years: 7.50     Types: Cigarettes     Last attempt to quit: 10/27/2019     Years since quittin.9    Smokeless tobacco: Never Used   Substance Use Topics    Alcohol use: Not Currently       REVIEW OF SYSTEMS:  Review of Systems  Skin: Denies any new changing, growing orbleeding lesions or rashes except as described in the HPI   Constitutional: Denies fevers, chills, and malaise. PHYSICAL EXAM:   BP (!) 166/99 (Site: Left Upper Arm, Position: Sitting, Cuff Size: Large Adult)   Pulse 63   Temp 97.3 °F (36.3 °C) (Axillary)   Ht 5' 6\" (1.676 m)   Wt 214 lb 3.2 oz (97.2 kg)   SpO2 99%   BMI 34.57 kg/m²     General Exam:  General Appearance: No acute distress, Well nourished     Neuro: Alert and oriented to person, place and time  Psych: Normal affect   Lymph Node: Not performed    Cutaneous Exam: Performed as documented in clinic note below. Sun-exposed skin, which includes the head/face, neck, both arms, digits and/or nails was examined. + arms and legs    Pertinent Physical Exam Findings:  Physical Exam  Eyelids with thin eczematous plaques  Bilateral cheeks and temples with monomorphic skin colored papules, unable to extract with comedone extractor  Normal skin of arms and legs    Photo surveillance performed: No    Medical Necessity of Exam Performed:   Distribution of patient concerns    Additional Diagnostic Testing performed during exam: Not performed ,  Not performed    ASSESSMENT:   Diagnosis Orders   1. Periorificial dermatitis  tacrolimus (PROTOPIC) 0.03 % ointment   2. Pruritus  camphor-menthol (SARNA) 0.5-0.5 % lotion       Plan of Action is as Follows:  Assessment   1. Periorificial dermatitis  - stop lidex and tretinoin  - start tacrolimus  - tacrolimus (PROTOPIC) 0.03 % ointment; Apply to rash twice daily  Dispense: 30 g; Refill: 2  - if papules are still present at f/u, consider biopsy (r/o granulomatous rosacea)    2.  Pruritus  - camphor-menthol (SARNA) 0.5-0.5 % lotion; Apply topically as needed for itching  Dispense: 1 Bottle; Refill: 4    RTC 6 weeks            Patient Instructions   *Periorificial dermatitis is a common facial skin problem characterised by groups of itchy or tender small red papules. It is given this name because the papules occur around the eyes, the nostrils, the mouth and occasionally, the genitals. - Apply Protopic (tacrolimus) twice daily to rash around face and mouth  - Apply Sarna Anti itch as needed to active itch  - CeraVe or Cetaphil facial cleanser daily  - Follow up in 6 weeks    It is possible your prescription(s) from today's visit will require a prior authorization. If your insurance requires a prior authorization or is too costly to fill, please notify our office as soon as possible so we may take the appropriate action. Follow-up: No follow-ups on file. This note was created with the assistance of a speech-recognition program.  Although the intention is to generate a document that actually reflects the content of the visit, no guarantees can be provided that every mistake has been identified and corrected byediting.     Electronically signed by David Leon MD on 10/5/20 at 11:02 AM EDT

## 2020-10-05 NOTE — PATIENT INSTRUCTIONS
*Periorificial dermatitis is a common facial skin problem characterised by groups of itchy or tender small red papules. It is given this name because the papules occur around the eyes, the nostrils, the mouth and occasionally, the genitals. - Apply Protopic (tacrolimus) twice daily to rash around face and mouth  - Apply Sarna Anti itch as needed to active itch  - CeraVe or Cetaphil facial cleanser daily  - Follow up in 6 weeks    It is possible your prescription(s) from today's visit will require a prior authorization. If your insurance requires a prior authorization or is too costly to fill, please notify our office as soon as possible so we may take the appropriate action.

## 2020-10-08 ENCOUNTER — OFFICE VISIT (OUTPATIENT)
Dept: FAMILY MEDICINE CLINIC | Age: 60
End: 2020-10-08
Payer: COMMERCIAL

## 2020-10-08 VITALS
HEIGHT: 66 IN | HEART RATE: 67 BPM | OXYGEN SATURATION: 97 % | TEMPERATURE: 97.2 F | SYSTOLIC BLOOD PRESSURE: 134 MMHG | WEIGHT: 214 LBS | DIASTOLIC BLOOD PRESSURE: 70 MMHG | BODY MASS INDEX: 34.39 KG/M2

## 2020-10-08 PROCEDURE — 99396 PREV VISIT EST AGE 40-64: CPT | Performed by: FAMILY MEDICINE

## 2020-10-08 PROCEDURE — G8484 FLU IMMUNIZE NO ADMIN: HCPCS | Performed by: FAMILY MEDICINE

## 2020-10-08 RX ORDER — TRIAMCINOLONE ACETONIDE 1 MG/G
CREAM TOPICAL
COMMUNITY
Start: 2020-10-02 | End: 2021-04-28

## 2020-10-08 ASSESSMENT — ENCOUNTER SYMPTOMS
SHORTNESS OF BREATH: 0
CHEST TIGHTNESS: 0
ABDOMINAL PAIN: 0
BLOOD IN STOOL: 0

## 2020-10-08 NOTE — PROGRESS NOTES
Subjective:      Patient ID: Ryland Echavarria is a 61 y.o. female. HPI  Visit Information    Have you changed or started any medications since your last visit including any over-the-counter medicines, vitamins, or herbal medicines? no   Have you stopped taking any of your medications? Is so, why? -  no  Are you having any side effects from any of your medications? - no    Have you seen any other physician or provider since your last visit? yes - ENT and dermatology   Have you had any other diagnostic tests since your last visit?  no   Have you been seen in the emergency room and/or had an admission in a hospital since we last saw you?  no   Have you had your routine dental cleaning in the past 6 months?  no     Do you have an active MyChart account? If no, what is the barrier? Yes    Patient Care Team:  Regine Cagle MD as PCP - General (Family Medicine)  Regine Cagle MD as PCP - [facility] Provider  Riri Soares MD as Consulting Physician (Gastroenterology)    Medical History Review  Past Medical, Family, and Social History reviewed and does contribute to the patient presenting condition    Health Maintenance   Topic Date Due    HIV screen  11/15/1975    DTaP/Tdap/Td vaccine (1 - Tdap) 11/15/1979    Shingles Vaccine (1 of 2) 11/15/2010    Flu vaccine (1) 09/01/2020    Colon cancer screen colonoscopy  11/06/2020    Potassium monitoring  08/26/2021    Creatinine monitoring  08/26/2021    Breast cancer screen  08/19/2022    Lipid screen  08/01/2025    Hepatitis A vaccine  Aged Out    Hepatitis B vaccine  Aged Out    Hib vaccine  Aged Out    Meningococcal (ACWY) vaccine  Aged Out    Pneumococcal 0-64 years Vaccine  Aged Out       Patient is a 63-year-old obese black female who presents for her annual physical exam.  Patient states she is taking and tolerating her routine medication. She denies any chest pain, abdominal pain, shortness of breath, fever or chills.   She states she has a good appetite and remains active. Review of Systems   Constitutional: Negative for chills and fever. HENT: Negative for congestion. Respiratory: Negative for chest tightness and shortness of breath. Cardiovascular: Negative for chest pain. Gastrointestinal: Negative for abdominal pain and blood in stool. Genitourinary: Negative for dysuria and hematuria. Skin: Negative for rash. Neurological: Negative for dizziness. Psychiatric/Behavioral: Negative for dysphoric mood. Objective:   Physical Exam  Vitals signs and nursing note reviewed. Constitutional:       General: She is not in acute distress. Appearance: She is well-developed. HENT:      Head: Normocephalic and atraumatic. Right Ear: Tympanic membrane, ear canal and external ear normal.      Left Ear: Tympanic membrane, ear canal and external ear normal.      Nose: Nose normal.      Mouth/Throat:      Mouth: Mucous membranes are moist.      Pharynx: Oropharynx is clear. Eyes:      General: No scleral icterus. Right eye: No discharge. Left eye: No discharge. Extraocular Movements: Extraocular movements intact. Conjunctiva/sclera: Conjunctivae normal.      Pupils: Pupils are equal, round, and reactive to light. Neck:      Musculoskeletal: Neck supple. Cardiovascular:      Rate and Rhythm: Normal rate and regular rhythm. Heart sounds: Normal heart sounds. Pulmonary:      Effort: Pulmonary effort is normal. No respiratory distress. Breath sounds: Normal breath sounds. No wheezing. Abdominal:      General: There is no distension. Palpations: Abdomen is soft. Tenderness: There is no abdominal tenderness. Skin:     General: Skin is warm and dry. Findings: No rash. Neurological:      General: No focal deficit present. Mental Status: She is alert and oriented to person, place, and time. Cranial Nerves: No cranial nerve deficit.       Deep Tendon

## 2020-10-09 ENCOUNTER — TELEPHONE (OUTPATIENT)
Dept: DERMATOLOGY | Age: 60
End: 2020-10-09

## 2020-11-27 ENCOUNTER — NURSE TRIAGE (OUTPATIENT)
Dept: OTHER | Facility: CLINIC | Age: 60
End: 2020-11-27

## 2020-11-27 NOTE — TELEPHONE ENCOUNTER
Concerns about wheezing and uncontrollable coughing. Evenings she gets an \"annoying headache at night for quiet a few weeks now\". She felt a pressure in her chest and then she took a pill. Felt like the pill got stuck. After breakfast her chest pressure went away. Reason for Disposition   Chest pain or 'angina' comes and goes and is happening more often (increasing in frequency) or getting worse (increasing in severity) (Exception: chest pains that last only a few seconds)    Answer Assessment - Initial Assessment Questions  1. LOCATION: \"Where does it hurt? \"        Center of breast bone, sometimes behind the left breast area. 2. RADIATION: \"Does the pain go anywhere else? \" (e.g., into neck, jaw, arms, back)      Sometimes radiates to left breast area, but no where else. 3. ONSET: \"When did the chest pain begin? \" (Minutes, hours or days)       Pressure started recently She does not feel it now. Fells like indigestion. 4. PATTERN \"Does the pain come and go, or has it been constant since it started? \"  \"Does it get worse with exertion? \"       Comes and goes. Does not notice any aggravating factors. 5. DURATION: \"How long does it last\" (e.g., seconds, minutes, hours)      This morning last about 15 minutes. 6. SEVERITY: \"How bad is the pain? \"  (e.g., Scale 1-10; mild, moderate, or severe)     - MILD (1-3): doesn't interfere with normal activities      - MODERATE (4-7): interferes with normal activities or awakens from sleep     - SEVERE (8-10): excruciating pain, unable to do any normal activities        This morning 7-8/10    7. CARDIAC RISK FACTORS: \"Do you have any history of heart problems or risk factors for heart disease? \" (e.g., angina, prior heart attack; diabetes, high blood pressure, high cholesterol, smoker, or strong family history of heart disease)      HTN, hx smoking,     8. PULMONARY RISK FACTORS: \"Do you have any history of lung disease? \"  (e.g., blood clots in lung, asthma, emphysema, birth control pills)      No     9. CAUSE: \"What do you think is causing the chest pain? \"      *No Answer*    10. OTHER SYMPTOMS: \"Do you have any other symptoms? \" (e.g., dizziness, nausea, vomiting, sweating, fever, difficulty breathing, cough)        Dry cough, wheezing, runny nose, sneezing, no lightheaded, no numbness,     11. PREGNANCY: \"Is there any chance you are pregnant? \" \"When was your last menstrual period? \"       No    Protocols used: CHEST PAIN-ADULT-[place]    Caller is not having the chest pressure at this time. Caller is checking BP at this time: 154/81    Thank you for allowing me to participate in the care of your patient. The patient was connected to triage in response to information provided to the E[facility]. Please do not respond through this encounter as the response is not directed to a shared pool. ECC giving caller urgent care address.

## 2020-11-30 ENCOUNTER — TELEMEDICINE (OUTPATIENT)
Dept: FAMILY MEDICINE CLINIC | Age: 60
End: 2020-11-30
Payer: COMMERCIAL

## 2020-11-30 PROCEDURE — 99214 OFFICE O/P EST MOD 30 MIN: CPT | Performed by: NURSE PRACTITIONER

## 2020-11-30 PROCEDURE — 3017F COLORECTAL CA SCREEN DOC REV: CPT | Performed by: NURSE PRACTITIONER

## 2020-11-30 PROCEDURE — G8427 DOCREV CUR MEDS BY ELIG CLIN: HCPCS | Performed by: NURSE PRACTITIONER

## 2020-11-30 RX ORDER — BENZONATATE 100 MG/1
100 CAPSULE ORAL 3 TIMES DAILY PRN
Qty: 30 CAPSULE | Refills: 0 | Status: SHIPPED | OUTPATIENT
Start: 2020-11-30 | End: 2020-12-10

## 2020-11-30 RX ORDER — OMEPRAZOLE 40 MG/1
40 CAPSULE, DELAYED RELEASE ORAL DAILY
COMMUNITY
Start: 2020-11-04 | End: 2021-09-17 | Stop reason: SDUPTHER

## 2020-11-30 RX ORDER — AZITHROMYCIN 250 MG/1
TABLET, FILM COATED ORAL
Qty: 1 PACKET | Refills: 0 | Status: SHIPPED | OUTPATIENT
Start: 2020-11-30 | End: 2020-12-10

## 2020-11-30 RX ORDER — CHLORTHALIDONE 50 MG/1
TABLET ORAL
Qty: 90 TABLET | Refills: 0 | Status: SHIPPED | OUTPATIENT
Start: 2020-11-30 | End: 2021-03-01

## 2020-11-30 RX ORDER — FEXOFENADINE HCL 180 MG/1
180 TABLET ORAL DAILY
COMMUNITY
Start: 2020-10-07 | End: 2021-07-29 | Stop reason: ALTCHOICE

## 2020-11-30 ASSESSMENT — ENCOUNTER SYMPTOMS
COUGH: 1
ABDOMINAL PAIN: 0
SORE THROAT: 1
RHINORRHEA: 1
SINUS PRESSURE: 1
SHORTNESS OF BREATH: 0
NAUSEA: 0

## 2020-11-30 NOTE — PROGRESS NOTES
fexofenadine (ALLEGRA) 180 MG tablet 180 mg daily  Historical Provider, MD   omeprazole (PRILOSEC) 40 MG delayed release capsule 40 mg daily  Historical Provider, MD   fluocinonide (LIDEX) 0.05 % cream CARLOTTA SML AMT EXT AA BID FOR 2 WKS PRN. MAX PRF FLARES  Historical Provider, MD   camphor-menthol (SARNA) 0.5-0.5 % lotion Apply topically as needed for itching  Patient not taking: Reported on 10/8/2020  Enriqueta Espinosa MD   ciclopirox (PENLAC) 8 % solution Apply topically nightly. Remove once weekly with alcohol or nail polish remover. Patient not taking: Reported on 2020  Nic Beatty DPM       Social History     Tobacco Use    Smoking status: Former Smoker     Packs/day: 0.25     Years: 30.00     Pack years: 7.50     Types: Cigarettes     Last attempt to quit: 10/27/2019     Years since quittin.0    Smokeless tobacco: Never Used   Substance Use Topics    Alcohol use: Not Currently    Drug use: Never        Vital Signs: (As obtained by patient/caregiver or practitioner observation)    Blood pressure- 155/74, 132/71 Heart rate- 68   Respiratory rate-    Temperature-  Pulse oximetry-     Constitutional: [x] Appears well-developed and well-nourished [x] No apparent distress      [] Abnormal-   Mental status  [x] Alert and awake  [x] Oriented to person/place/time []Able to follow commands      Eyes:  EOM    [x]  Normal  [] Abnormal-  Sclera  [x]  Normal  [] Abnormal -         Discharge []  None visible  [] Abnormal -    HENT:   [] Normocephalic, atraumatic.   [x] Abnormal maxillary tenderness  [] Mouth/Throat: Mucous membranes are moist.     External Ears [x] Normal  [] Abnormal-     Neck: [x] No visualized mass     Pulmonary/Chest: [x] Respiratory effort normal.  [x] No visualized signs of difficulty breathing or respiratory distress        [] Abnormal-      Musculoskeletal:   [x] Normal gait with no signs of ataxia         [x] Normal range of motion of neck        [] Abnormal- Neurological:        [x] No Facial Asymmetry (Cranial nerve 7 motor function) (limited exam to video visit)          [x] No gaze palsy        [] Abnormal-         Skin:        [x] No significant exanthematous lesions or discoloration noted on facial skin         [] Abnormal-            Psychiatric:       [x] Normal Affect [x] No Hallucinations        [] Abnormal-     Other pertinent observable physical exam findings-     ASSESSMENT/PLAN:  BP Readings from Last 3 Encounters:   10/08/20 134/70   10/05/20 (!) 166/99   10/01/20 131/76     There were no vitals taken for this visit. Lab Results   Component Value Date    WBC 5.2 08/26/2020    HGB 14.5 08/26/2020    HCT 43.8 08/26/2020     08/26/2020    CHOL 178 08/01/2020    TRIG 104 08/01/2020    HDL 43 08/01/2020    ALT 15 08/26/2020    AST 20 08/26/2020     08/26/2020    K 3.8 08/26/2020     08/26/2020    CREATININE 0.83 08/26/2020    BUN 12 08/26/2020    CO2 27 08/26/2020    TSH 2.34 08/01/2020     Lab Results   Component Value Date    CALCIUM 9.4 08/26/2020     Lab Results   Component Value Date    LDLCHOLESTEROL 114 08/01/2020       1. Uncontrolled Essential hypertension  - cont coreg and increase hygroten to 50 mg.   - chlorthalidone (HYGROTEN) 50 MG tablet; take 1 tab po daily. Dispense: 90 tablet; Refill: 0  - advised to keep bp log and call if bp remains elevated persistently    2. Acute non-recurrent maxillary sinusitis  - azithromycin (ZITHROMAX) 250 MG tablet; Take 2 tabs on day 1, then 1 tab on days 2-5  Dispense: 1 packet; Refill: 0    3. Cough  - benzonatate (TESSALON) 100 MG capsule; Take 1 capsule by mouth 3 times daily as needed for Cough  Dispense: 30 capsule; Refill: 0    4. Nonintractable headache, unspecified chronicity pattern, unspecified headache type  - cont allegra at home   - Increase warm fluids and rest. Call the office in 5-7 days if no improvement.      Requested Prescriptions     Signed Prescriptions Disp Refills  chlorthalidone (HYGROTEN) 50 MG tablet 90 tablet 0     Sig: take 1 tab po daily.  benzonatate (TESSALON) 100 MG capsule 30 capsule 0     Sig: Take 1 capsule by mouth 3 times daily as needed for Cough    azithromycin (ZITHROMAX) 250 MG tablet 1 packet 0     Sig: Take 2 tabs on day 1, then 1 tab on days 2-5       Medications Discontinued During This Encounter   Medication Reason    chlorthalidone (HYGROTON) 25 MG tablet REORDER     Discussed use, benefit, and side effects of prescribed medications. Barriers to medication compliance addressed. All patient questions answered. Pt voiced understanding. Return for HTN, HLD. Melany Rdz is a 61 y.o. female being evaluated by a Virtual Visit (video visit) encounter to address concerns as mentioned above. A caregiver was present when appropriate. Due to this being a TeleHealth encounter ([facility]), evaluation of the following organ systems was limited: Vitals/Constitutional/EENT/Resp/CV/GI//MS/Neuro/Skin/Heme-Lymph-Imm. Pursuant to the emergency declaration under the [address] [place] [address], [address] [place] [address] authority and the [employer] and [employer] Act, this Virtual Visit was conducted with patient's (and/or legal guardian's) consent, to reduce the patient's risk of exposure to COVID-19 and provide necessary medical care. The patient (and/or legal guardian) has also been advised to contact this office for worsening conditions or problems, and seek emergency medical treatment and/or call 911 if deemed necessary. Patient identification was verified at the start of the visit: Yes    Total time spent on this encounter: 25 minutes     Services were provided through a video synchronous discussion virtually to substitute for in-person clinic visit. Patient and provider were located at their individual homes.     An electronic signature was used to authenticate this note.

## 2020-12-04 ENCOUNTER — VIRTUAL VISIT (OUTPATIENT)
Dept: PULMONOLOGY | Age: 60
End: 2020-12-04
Payer: COMMERCIAL

## 2020-12-04 PROBLEM — Z99.89 OSA ON CPAP: Status: ACTIVE | Noted: 2020-12-04

## 2020-12-04 PROBLEM — G47.33 OSA ON CPAP: Status: ACTIVE | Noted: 2020-12-04

## 2020-12-04 PROCEDURE — G8417 CALC BMI ABV UP PARAM F/U: HCPCS | Performed by: INTERNAL MEDICINE

## 2020-12-04 PROCEDURE — G8484 FLU IMMUNIZE NO ADMIN: HCPCS | Performed by: INTERNAL MEDICINE

## 2020-12-04 PROCEDURE — 3017F COLORECTAL CA SCREEN DOC REV: CPT | Performed by: INTERNAL MEDICINE

## 2020-12-04 PROCEDURE — 99214 OFFICE O/P EST MOD 30 MIN: CPT | Performed by: INTERNAL MEDICINE

## 2020-12-04 PROCEDURE — G8427 DOCREV CUR MEDS BY ELIG CLIN: HCPCS | Performed by: INTERNAL MEDICINE

## 2020-12-04 PROCEDURE — 1036F TOBACCO NON-USER: CPT | Performed by: INTERNAL MEDICINE

## 2020-12-04 ASSESSMENT — SLEEP AND FATIGUE QUESTIONNAIRES
HOW LIKELY ARE YOU TO NOD OFF OR FALL ASLEEP WHILE SITTING AND READING: 2
HOW LIKELY ARE YOU TO NOD OFF OR FALL ASLEEP WHEN YOU ARE A PASSENGER IN A CAR FOR AN HOUR WITHOUT A BREAK: 0
HOW LIKELY ARE YOU TO NOD OFF OR FALL ASLEEP IN A CAR, WHILE STOPPED FOR A FEW MINUTES IN TRAFFIC: 0
HOW LIKELY ARE YOU TO NOD OFF OR FALL ASLEEP WHILE SITTING AND TALKING TO SOMEONE: 0
HOW LIKELY ARE YOU TO NOD OFF OR FALL ASLEEP WHILE SITTING INACTIVE IN A PUBLIC PLACE: 0
HOW LIKELY ARE YOU TO NOD OFF OR FALL ASLEEP WHILE WATCHING TV: 2
ESS TOTAL SCORE: 6
HOW LIKELY ARE YOU TO NOD OFF OR FALL ASLEEP WHILE LYING DOWN TO REST IN THE AFTERNOON WHEN CIRCUMSTANCES PERMIT: 1
HOW LIKELY ARE YOU TO NOD OFF OR FALL ASLEEP WHILE SITTING QUIETLY AFTER LUNCH WITHOUT ALCOHOL: 1

## 2020-12-04 NOTE — PROGRESS NOTES
TUNNEL RELEASE Bilateral     COLONOSCOPY  2017    TUBULAR ADENOMA    HYSTERECTOMY, TOTAL ABDOMINAL             SOCIAL AND OCCUPATIONAL HEALTH:    Social History     Socioeconomic History    Marital status: Single     Spouse name: None    Number of children: None    Years of education: None    Highest education level: None   Occupational History    None   Social Needs    Financial resource strain: Somewhat hard    Food insecurity     Worry: Sometimes true     Inability: Sometimes true    Transportation needs     Medical: None     Non-medical: None   Tobacco Use    Smoking status: Former Smoker     Packs/day: 0.25     Years: 30.00     Pack years: 7.50     Types: Cigarettes     Last attempt to quit: 10/27/2019     Years since quittin.1    Smokeless tobacco: Never Used   Substance and Sexual Activity    Alcohol use: Not Currently    Drug use: Never    Sexual activity: Not Currently   Lifestyle    Physical activity     Days per week: None     Minutes per session: None    Stress: None   Relationships    Social connections     Talks on phone: None     Gets together: None     Attends Scientologist service: None     Active member of club or organization: None     Attends meetings of clubs or organizations: None     Relationship status: None    Intimate partner violence     Fear of current or ex partner: None     Emotionally abused: None     Physically abused: None     Forced sexual activity: None   Other Topics Concern    None   Social History Narrative    Josselyn Baker has experienced a financial strain with resourcestrain: [employer] on 2020. Potential community resources and services have been provided in patient instructions by Krystina Barnes. TOBACCO:   reports that she quit smoking about 13 months ago. Her smoking use included cigarettes. She has a 7.50 pack-year smoking history. She has never used smokeless tobacco.  ETOH:   reports previous alcohol use.       ALLERGIES: Allergies   Allergen Reactions    Banana     Pcn [Penicillins] Hives         Home Meds:   Prior to Admission medications    Medication Sig Start Date End Date Taking? Authorizing Provider   fexofenadine (ALLEGRA) 180 MG tablet 180 mg daily 10/7/20  Yes Historical Provider, MD   omeprazole (PRILOSEC) 40 MG delayed release capsule 40 mg daily 11/4/20  Yes Historical Provider, MD   chlorthalidone (HYGROTEN) 50 MG tablet take 1 tab po daily. 11/30/20  Yes MIGUEL Mayberry CNP   benzonatate (TESSALON) 100 MG capsule Take 1 capsule by mouth 3 times daily as needed for Cough 11/30/20 12/10/20 Yes MIGUEL Mayberry CNP   azithromycin (ZITHROMAX) 250 MG tablet Take 2 tabs on day 1, then 1 tab on days 2-5 11/30/20 12/10/20 Yes MIGUEL Mayberry CNP   tacrolimus (PROTOPIC) 0.03 % ointment Apply to rash twice daily 10/5/20  Yes Wes Springer MD   ammonium lactate (LAC-HYDRIN) 12 % lotion APPLY LIBERALLY TO SKIN BID  ESPECIALLY AFTER SHOWER 8/13/20  Yes Historical Provider, MD   carvedilol (COREG) 6.25 MG tablet Take 6.25 mg by mouth 2 times daily TAKING 2 TABLETS bid SINCE 11/28/20 3/12/20  Yes Historical Provider, MD   triamcinolone (KENALOG) 0.1 % cream CARLOTTA EXT AA BID 10/2/20   Historical Provider, MD   fluocinonide (LIDEX) 0.05 % cream CARLOTTA SML AMT EXT AA BID FOR 2 WKS PRN. MAX PRF FLARES 8/27/20   Historical Provider, MD   tretinoin (RETIN-A) 0.05 % cream CARLOTTA SML AMT EXT AA QPM 8/27/20   Historical Provider, MD   camphor-menthol (SARNA) 0.5-0.5 % lotion Apply topically as needed for itching  Patient not taking: Reported on 10/8/2020 10/5/20   Wes Springer MD   ciclopirox (PENLAC) 8 % solution Apply topically nightly. Remove once weekly with alcohol or nail polish remover. Patient not taking: Reported on 11/30/2020 8/20/20   Amanda Cortes DPM            RECORD REVIEW: Previous medical records were reviewed at today's visit.     Wt Readings from Last 3 Encounters:   10/08/20 214 lb (97.1 kg)   10/05/20 214 lb 3.2 oz (97.2 kg)   10/01/20 211 lb (95.7 kg)       Results for orders placed or performed during the hospital encounter of 09/20/20   Sleep Study with PAP Titration   Result Value Ref Range    Status Interpreted        No results found. PHYSICAL EXAMINATION:  Due to this being a TeleHealth encounter, evaluation of the following organ systems is limited: Vitals/Constitutional/EENT/Resp/CV/GI//MS/Neuro/Skin/Heme-Lymph-Imm. Constitutional: [x] Appears well-developed and well-nourished. [] Abnormal  Mental status  [x] Alert and awake  [x] Oriented to person/place/time [x]Able to follow commands    [x] No apparent distress      Eyes:  EOM    [x]  Normal  [] Abnormal-  Sclera  [x]  Normal  [] Abnormal -         Discharge [x]  None visible  [] Abnormal -    HENT:   [x] Normocephalic, atraumatic. [] Abnormal shaped head   [x] Mouth/Throat: Mucous membranes are moist.     Ears [x] Normal  [] Abnormal-    Neck: [x] Normal range of motion [x] Supple [x] No visualized mass. Pulmonary/Chest: [x] Respiratory effort normal.  [x] No visualized signs of difficulty breathing or respiratory distress        [] Abnormal                     ASSESSMENT:   Diagnosis Orders   1. AURELIANO on CPAP     2. Essential hypertension           Plan:   CPAP at least 4 hrs qhs  Follow good sleep hygeine instructions  Use humidifier if needed  Questions answered pertaining to diagnosis and management explained importance of compliance with therapy   Compliance data reviewed and pt is compliant per insurance requirements   Advise maintain healthy lifestyle. Daily exercise for at least 30 minutes. Weight loss will immensely help patient    Follow-up in 6 months     Requested Prescriptions      No prescriptions requested or ordered in this encounter       There are no discontinued medications.     Calleen Kussmaul received counseling on the following healthy behaviors: nutrition, exercise and medication adherence    Patient given educational materials : see patient instruction       Discussed use, benefit, and side effects of prescribed medications. Barriers to medication compliance addressed. All patient questions answered. Pt voiced understanding. An  electronic signature was used to authenticate this note. --Adam Singh MD on 12/4/2020 at 2:49 PM      Please note that this chart was generated using voice recognition Dragon dictation software. Although every effort was made to ensure the accuracy of this automated transcription, some errors in transcription may have occurred. Pursuant to the emergency declaration under the [facility], [facility] waiver authority and the [employer] and [employer] Act, this Virtual  Visit was conducted, with patient's consent, to reduce the patient's risk of exposure to COVID-19 and provide continuity of care for an established patient. Services were provided through a video synchronous discussion virtually to substitute for in-person clinic visit.

## 2020-12-30 ENCOUNTER — TELEMEDICINE (OUTPATIENT)
Dept: DERMATOLOGY | Age: 60
End: 2020-12-30
Payer: COMMERCIAL

## 2020-12-30 PROCEDURE — 99213 OFFICE O/P EST LOW 20 MIN: CPT | Performed by: DERMATOLOGY

## 2020-12-30 PROCEDURE — G8428 CUR MEDS NOT DOCUMENT: HCPCS | Performed by: DERMATOLOGY

## 2020-12-30 PROCEDURE — 3017F COLORECTAL CA SCREEN DOC REV: CPT | Performed by: DERMATOLOGY

## 2020-12-30 RX ORDER — DOXYCYCLINE HYCLATE 100 MG/1
CAPSULE ORAL
Qty: 120 CAPSULE | Refills: 0 | Status: SHIPPED | OUTPATIENT
Start: 2020-12-30 | End: 2021-03-30

## 2020-12-30 RX ORDER — TACROLIMUS 0.3 MG/G
OINTMENT TOPICAL
Qty: 30 G | Refills: 2 | Status: SHIPPED | OUTPATIENT
Start: 2020-12-30 | End: 2021-04-28

## 2020-12-30 NOTE — PROGRESS NOTES
TELEHEALTH EVALUATION -- Audio/Visual (During [facility] public health emergency)    Dermatology Patient Note  [id number]  [address]  Dept: [phone]  Dept Fax: [phone]      VISIT DATE: 12/30/2020   REFERRING PROVIDER: No ref. provider found      Chelsey Wagoner is a 61 y.o. female  who presents today in the office for:    No chief complaint on file. HISTORY OF PRESENT ILLNESS:  Patient presents for f/u periorificial dermatitis (previous lidex use around eyes)  Interval history: slight improvement with protopic but not taking the bumps away completely. Still has bumps around eyes  Current treatment and adherence: protopic ointment twice daily  Prior treatments tried: see initial HPI    Also complains of itchy bump on leg      CURRENT MEDICATIONS:   Current Outpatient Medications   Medication Sig Dispense Refill    doxycycline hyclate (VIBRAMYCIN) 100 MG capsule Take 1 capsule by mouth 2 times daily for 30 days, THEN 1 capsule daily. 120 capsule 0    tacrolimus (PROTOPIC) 0.03 % ointment Apply to rash twice daily 30 g 2    fexofenadine (ALLEGRA) 180 MG tablet 180 mg daily      omeprazole (PRILOSEC) 40 MG delayed release capsule 40 mg daily      chlorthalidone (HYGROTEN) 50 MG tablet take 1 tab po daily. 90 tablet 0    triamcinolone (KENALOG) 0.1 % cream CARLOTTA EXT AA BID      fluocinonide (LIDEX) 0.05 % cream CARLOTTA SML AMT EXT AA BID FOR 2 WKS PRN.  MAX PRF FLARES      tretinoin (RETIN-A) 0.05 % cream CARLOTTA SML AMT EXT AA QPM      camphor-menthol (SARNA) 0.5-0.5 % lotion Apply topically as needed for itching (Patient not taking: Reported on 10/8/2020) 1 Bottle 4    ammonium lactate (LAC-HYDRIN) 12 % lotion APPLY LIBERALLY TO SKIN BID  ESPECIALLY AFTER SHOWER  ciclopirox (PENLAC) 8 % solution Apply topically nightly. Remove once weekly with alcohol or nail polish remover. (Patient not taking: Reported on 2020) 1 Bottle 3    carvedilol (COREG) 6.25 MG tablet Take 6.25 mg by mouth 2 times daily TAKING 2 TABLETS bid SINCE 20       No current facility-administered medications for this visit. ALLERGIES:   Allergies   Allergen Reactions    Banana     Pcn [Penicillins] Hives       SOCIAL HISTORY:  Social History     Tobacco Use    Smoking status: Former Smoker     Packs/day: 0.25     Years: 30.00     Pack years: 7.50     Types: Cigarettes     Quit date: 10/27/2019     Years since quittin.1    Smokeless tobacco: Never Used   Substance Use Topics    Alcohol use: Not Currently       REVIEW OF SYSTEMS:  Review of Systems  Skin:Denies any new changing, growing or bleeding lesions or rashes except as described in the HPI     PHYSICAL EXAM:   There were no vitals taken for this visit. General Exam:  General Appearance: No acute distress, Well nourished     Neuro: Alert and oriented to person, place and time  Psych: Normal affect   Lymph Node: Not performed    Cutaneous Exam: Performed as documented in clinic note below. Sun-exposed skin,which includes the head/face, neck, both arms, digits and/or nails was examined. + leg    Due to this being a TeleHealth encounter, evaluation of the following organ systems is limited: Vitals/Constitutional/EENT/Resp/CV/GI//MS/Neuro/Skin/Heme-Lymph-Imm. In particular examination of the skin is limited by video quality and patient available technology.     Pertinent Physical Exam Findings:  Physical Exam  Bilateral zygoma/perioral cheeks and tmples with skin colored monomorphic papules  Leg with brown papule (difficult to appreciate in photographs)    Medical Necessity of Exam Performed:   Distribution of patient concerns    Additional Diagnostic Testing performed during exam: Not performed ,  Not performed

## 2021-02-25 ENCOUNTER — PATIENT MESSAGE (OUTPATIENT)
Dept: DERMATOLOGY | Age: 61
End: 2021-02-25

## 2021-02-25 DIAGNOSIS — L71.0 PERIORIFICIAL DERMATITIS: Primary | ICD-10-CM

## 2021-02-25 NOTE — TELEPHONE ENCOUNTER
Nancy Hagan,     I hope your well. I needed to contact you today, I have followed the regimen that you have me on with the Protopic & Doxycycline caps since you called in the script back in December. I have not   seen any improvement my skin those bumps are still present, and I get headaches most likely the antibiotics, I attached 2 pics I took minutes ago. I'm not scheduled to set you until April.  Pleade is there something else we can try for my face it looks awful?     Sincerely,  Karen Flor

## 2021-02-26 NOTE — TELEPHONE ENCOUNTER
Oh yes I clearly understand & I appreciate your assistance in helping me. I've had this issue ( a couple of years now ) with my skin prior to going in to see Dr. Chelsie Carbajal last summer. I only met with his NP at that time, and she prescribed Fluocinonide. I think she did not know how to treat my skin and just gave me what she thought might have been a quick fix. At that time, I had used it maybe 2 or 3 times and then read up about it and stopped usage. The Fluticasone  Propionate Creme 0.5% Dr. Rekha Long ( my primary) prescribed for my right hand ring finger that I get those tiny bumps on.  That creme I only used when my finger breaks out and does help calm down the itch.      Thanks

## 2021-02-28 DIAGNOSIS — I10 ESSENTIAL HYPERTENSION: ICD-10-CM

## 2021-03-01 RX ORDER — CHLORTHALIDONE 50 MG/1
TABLET ORAL
Qty: 90 TABLET | Refills: 0 | Status: SHIPPED | OUTPATIENT
Start: 2021-03-01 | End: 2021-10-11

## 2021-03-18 ENCOUNTER — PATIENT MESSAGE (OUTPATIENT)
Dept: DERMATOLOGY | Age: 61
End: 2021-03-18

## 2021-03-18 DIAGNOSIS — L30.1 DYSHIDROTIC ECZEMA: Primary | ICD-10-CM

## 2021-03-18 RX ORDER — FLUTICASONE PROPIONATE 0.05 %
CREAM (GRAM) TOPICAL
Qty: 30 G | Refills: 5 | Status: SHIPPED | OUTPATIENT
Start: 2021-03-18 | End: 2021-04-17

## 2021-03-18 NOTE — TELEPHONE ENCOUNTER
Nancy Mcmahan,     My right right hand finger has broke out again. Would you be able to call in a tube of Fluticasone Propionate Creme 0.5%. Dr. Edvin Perez had wrote a script for me last summer an I don't see him until mid April. My finger started to itch an get dry in that one area on my finger. An then it cracks like the pic, I had to stop with non latex gloves there were causing my hands to get irritated. I dont see you until mid April.  Kindly let me know        Thanks,  Kristen Torres

## 2021-04-07 ENCOUNTER — HOSPITAL ENCOUNTER (OUTPATIENT)
Age: 61
Discharge: HOME | End: 2021-04-09
Payer: COMMERCIAL

## 2021-04-07 ENCOUNTER — HOSPITAL ENCOUNTER (OUTPATIENT)
Dept: GENERAL RADIOLOGY | Age: 61
Discharge: HOME | End: 2021-04-09
Payer: COMMERCIAL

## 2021-04-07 ENCOUNTER — HOSPITAL ENCOUNTER (OUTPATIENT)
Age: 61
Discharge: HOME | End: 2021-04-07
Payer: COMMERCIAL

## 2021-04-07 DIAGNOSIS — H15.012 ANTERIOR SCLERITIS OF EYE, LEFT: ICD-10-CM

## 2021-04-07 DIAGNOSIS — M06.9 RHEUMATOID ARTHRITIS, INVOLVING UNSPECIFIED SITE, UNSPECIFIED WHETHER RHEUMATOID FACTOR PRESENT (HCC): ICD-10-CM

## 2021-04-07 LAB
ABSOLUTE EOS #: 0.22 K/UL (ref 0–0.44)
ABSOLUTE IMMATURE GRANULOCYTE: <0.03 K/UL (ref 0–0.3)
ABSOLUTE LYMPH #: 1.91 K/UL (ref 1.1–3.7)
ABSOLUTE MONO #: 0.33 K/UL (ref 0.1–1.2)
ANGIOTENSIN-CONVERTING ENZYME: 30 U/L (ref 8–52)
ANION GAP SERPL CALCULATED.3IONS-SCNC: 17 MMOL/L (ref 9–17)
BASOPHILS # BLD: 0 % (ref 0–2)
BASOPHILS ABSOLUTE: <0.03 K/UL (ref 0–0.2)
BUN BLDV-MCNC: 10 MG/DL (ref 8–23)
BUN/CREAT BLD: ABNORMAL (ref 9–20)
CALCIUM SERPL-MCNC: 9.1 MG/DL (ref 8.6–10.4)
CHLORIDE BLD-SCNC: 107 MMOL/L (ref 98–107)
CO2: 19 MMOL/L (ref 20–31)
CREAT SERPL-MCNC: 0.84 MG/DL (ref 0.5–0.9)
DIFFERENTIAL TYPE: ABNORMAL
EOSINOPHILS RELATIVE PERCENT: 5 % (ref 1–4)
GFR AFRICAN AMERICAN: >60 ML/MIN
GFR NON-AFRICAN AMERICAN: >60 ML/MIN
GFR SERPL CREATININE-BSD FRML MDRD: ABNORMAL ML/MIN/{1.73_M2}
GFR SERPL CREATININE-BSD FRML MDRD: ABNORMAL ML/MIN/{1.73_M2}
GLUCOSE BLD-MCNC: 122 MG/DL (ref 70–99)
HCT VFR BLD CALC: 38.6 % (ref 36.3–47.1)
HEMOGLOBIN: 12.7 G/DL (ref 11.9–15.1)
IMMATURE GRANULOCYTES: 0 %
LYMPHOCYTES # BLD: 42 % (ref 24–43)
MCH RBC QN AUTO: 27.9 PG (ref 25.2–33.5)
MCHC RBC AUTO-ENTMCNC: 32.9 G/DL (ref 28.4–34.8)
MCV RBC AUTO: 84.6 FL (ref 82.6–102.9)
MONOCYTES # BLD: 7 % (ref 3–12)
NRBC AUTOMATED: 0 PER 100 WBC
PDW BLD-RTO: 12.8 % (ref 11.8–14.4)
PLATELET # BLD: 261 K/UL (ref 138–453)
PLATELET ESTIMATE: ABNORMAL
PMV BLD AUTO: 10 FL (ref 8.1–13.5)
POTASSIUM SERPL-SCNC: 4.1 MMOL/L (ref 3.7–5.3)
RBC # BLD: 4.56 M/UL (ref 3.95–5.11)
RBC # BLD: ABNORMAL 10*6/UL
RHEUMATOID FACTOR: 15.3 IU/ML
SEG NEUTROPHILS: 46 % (ref 36–65)
SEGMENTED NEUTROPHILS ABSOLUTE COUNT: 2.07 K/UL (ref 1.5–8.1)
SODIUM BLD-SCNC: 143 MMOL/L (ref 135–144)
T. PALLIDUM, IGG: NONREACTIVE
WBC # BLD: 4.6 K/UL (ref 3.5–11.3)
WBC # BLD: ABNORMAL 10*3/UL

## 2021-04-07 PROCEDURE — 36415 COLL VENOUS BLD VENIPUNCTURE: CPT

## 2021-04-07 PROCEDURE — 86431 RHEUMATOID FACTOR QUANT: CPT

## 2021-04-07 PROCEDURE — 71046 X-RAY EXAM CHEST 2 VIEWS: CPT

## 2021-04-07 PROCEDURE — 85549 MURAMIDASE: CPT

## 2021-04-07 PROCEDURE — 83516 IMMUNOASSAY NONANTIBODY: CPT

## 2021-04-07 PROCEDURE — 86038 ANTINUCLEAR ANTIBODIES: CPT

## 2021-04-07 PROCEDURE — 86780 TREPONEMA PALLIDUM: CPT

## 2021-04-07 PROCEDURE — 85025 COMPLETE CBC W/AUTO DIFF WBC: CPT

## 2021-04-07 PROCEDURE — 86480 TB TEST CELL IMMUN MEASURE: CPT

## 2021-04-07 PROCEDURE — 80048 BASIC METABOLIC PNL TOTAL CA: CPT

## 2021-04-07 PROCEDURE — 82164 ANGIOTENSIN I ENZYME TEST: CPT

## 2021-04-08 LAB
ANCA MYELOPEROXIDASE: <0.3 AU/ML (ref 0–3.5)
ANCA PROTEINASE 3: <0.7 AU/ML (ref 0–2)
ANTI-NUCLEAR ANTIBODY (ANA): NEGATIVE
T PALLIDUM ANTIBODIES (TP-PA): NONREACTIVE

## 2021-04-09 ENCOUNTER — OFFICE VISIT (OUTPATIENT)
Dept: FAMILY MEDICINE CLINIC | Age: 61
End: 2021-04-09
Payer: COMMERCIAL

## 2021-04-09 VITALS
HEART RATE: 72 BPM | BODY MASS INDEX: 35.35 KG/M2 | TEMPERATURE: 97.2 F | WEIGHT: 219 LBS | SYSTOLIC BLOOD PRESSURE: 126 MMHG | RESPIRATION RATE: 18 BRPM | DIASTOLIC BLOOD PRESSURE: 84 MMHG

## 2021-04-09 DIAGNOSIS — Z12.11 COLON CANCER SCREENING: Primary | ICD-10-CM

## 2021-04-09 DIAGNOSIS — E66.9 OBESITY (BMI 30-39.9): ICD-10-CM

## 2021-04-09 DIAGNOSIS — E78.5 DYSLIPIDEMIA: ICD-10-CM

## 2021-04-09 DIAGNOSIS — E55.9 VITAMIN D DEFICIENCY: ICD-10-CM

## 2021-04-09 DIAGNOSIS — I10 ESSENTIAL HYPERTENSION: Primary | ICD-10-CM

## 2021-04-09 PROCEDURE — 3017F COLORECTAL CA SCREEN DOC REV: CPT | Performed by: FAMILY MEDICINE

## 2021-04-09 PROCEDURE — G8427 DOCREV CUR MEDS BY ELIG CLIN: HCPCS | Performed by: FAMILY MEDICINE

## 2021-04-09 PROCEDURE — G8417 CALC BMI ABV UP PARAM F/U: HCPCS | Performed by: FAMILY MEDICINE

## 2021-04-09 PROCEDURE — 99214 OFFICE O/P EST MOD 30 MIN: CPT | Performed by: FAMILY MEDICINE

## 2021-04-09 PROCEDURE — 1036F TOBACCO NON-USER: CPT | Performed by: FAMILY MEDICINE

## 2021-04-09 ASSESSMENT — ENCOUNTER SYMPTOMS
CHEST TIGHTNESS: 0
ABDOMINAL PAIN: 0
BLOOD IN STOOL: 0
SHORTNESS OF BREATH: 0

## 2021-04-09 ASSESSMENT — PATIENT HEALTH QUESTIONNAIRE - PHQ9: SUM OF ALL RESPONSES TO PHQ QUESTIONS 1-9: 2

## 2021-04-09 NOTE — PROGRESS NOTES
Subjective:      Patient ID: Solomon Kim is a 61 y.o. female. HYPERTENSION visit     BP Readings from Last 3 Encounters:   10/08/20 134/70   10/05/20 (!) 166/99   10/01/20 131/76       LDL Cholesterol (mg/dL)   Date Value   08/01/2020 114     HDL (mg/dL)   Date Value   08/01/2020 43     BUN (mg/dL)   Date Value   04/07/2021 10     CREATININE (mg/dL)   Date Value   04/07/2021 0.84     Glucose (mg/dL)   Date Value   04/07/2021 122 (H)              Have you changed or started any medications since your last visit including any over-the-counter medicines, vitamins, or herbal medicines? no   Have you stopped taking any of your medications? Is so, why? -  no  Are you having any side effects from any of your medications? - no  How often do you miss doses of your medication? no      Have you seen any other physician or provider since your last visit? yes -    Have you had any other diagnostic tests since your last visit? yes -    Have you been seen in the emergency room and/or had an admission in a hospital since we last saw you?  no   Have you had your routine dental cleaning in the past 6 months?  no     Do you have an active MyChart account? If no, what is the barrier?   Yes    Patient Care Team:  Bogdan Polanco MD as PCP - General (Family Medicine)  Bogdan Polanco MD as PCP - [facility] Provider  Maday Doe MD as Consulting Physician (Gastroenterology)    Medical History Review  Past Medical, Family, and Social History reviewed and does contribute to the patient presenting condition    Health Maintenance   Topic Date Due    HIV screen  Never done    COVID-19 Vaccine (1) Never done    Hepatitis B vaccine (1 of 3 - Risk 3-dose series) Never done    DTaP/Tdap/Td vaccine (1 - Tdap) Never done    Diabetes screen  Never done    Shingles Vaccine (1 of 2) Never done    Colon cancer screen colonoscopy  11/06/2020    Flu vaccine (Season Ended) 12/04/2021 (Originally 9/1/2021)    Potassium normal. No respiratory distress. Breath sounds: Normal breath sounds. No wheezing. Abdominal:      General: There is no distension. Palpations: Abdomen is soft. Tenderness: There is no abdominal tenderness. Skin:     General: Skin is warm and dry. Findings: No rash. Neurological:      Mental Status: She is alert and oriented to person, place, and time. Psychiatric:         Mood and Affect: Mood normal.         Behavior: Behavior normal.         Assessment:       Diagnosis Orders   1. Essential hypertension  Comprehensive Metabolic Panel    Lipid Panel    Magnesium   2. Dyslipidemia  Comprehensive Metabolic Panel    Lipid Panel   3. Vitamin D deficiency  Vitamin D 25 Hydroxy   4. Obesity (BMI 30-39. 9)             Plan:      Orders Placed This Encounter   Procedures    Comprehensive Metabolic Panel     Fasting     Standing Status:   Future     Standing Expiration Date:   4/9/2022    Lipid Panel     Standing Status:   Future     Standing Expiration Date:   4/9/2022     Order Specific Question:   Is Patient Fasting?/# of Hours     Answer:    Fast 8-10 hours    Magnesium     Standing Status:   Future     Standing Expiration Date:   4/9/2022    Vitamin D 25 Hydroxy     Standing Status:   Future     Standing Expiration Date:   4/9/2022      Decrease chlorthalidone to 25 mg daily since patient only occasionally takes the 50 mg tablet   Continue other routine medication  Follow-up in 4 months

## 2021-04-10 LAB
QUANTI TB GOLD PLUS: NEGATIVE
QUANTI TB1 MINUS NIL: 0 IU/ML (ref 0–0.34)
QUANTI TB2 MINUS NIL: 0 IU/ML (ref 0–0.34)
QUANTIFERON MITOGEN: 7.41 IU/ML
QUANTIFERON NIL: 0.01 IU/ML

## 2021-04-12 ENCOUNTER — OFFICE VISIT (OUTPATIENT)
Dept: DERMATOLOGY | Age: 61
End: 2021-04-12
Payer: COMMERCIAL

## 2021-04-12 ENCOUNTER — HOSPITAL ENCOUNTER (OUTPATIENT)
Age: 61
Setting detail: SPECIMEN
Discharge: HOME | End: 2021-04-12
Payer: COMMERCIAL

## 2021-04-12 VITALS
DIASTOLIC BLOOD PRESSURE: 80 MMHG | HEIGHT: 66 IN | SYSTOLIC BLOOD PRESSURE: 142 MMHG | HEART RATE: 56 BPM | OXYGEN SATURATION: 98 % | BODY MASS INDEX: 34.91 KG/M2 | WEIGHT: 217.2 LBS

## 2021-04-12 DIAGNOSIS — L30.9 HAND DERMATITIS: ICD-10-CM

## 2021-04-12 DIAGNOSIS — L30.8 OTHER SPECIFIED DERMATITIS: Primary | ICD-10-CM

## 2021-04-12 LAB — LYSOZYME: 0.96 UG/ML

## 2021-04-12 PROCEDURE — G8417 CALC BMI ABV UP PARAM F/U: HCPCS | Performed by: DERMATOLOGY

## 2021-04-12 PROCEDURE — 3017F COLORECTAL CA SCREEN DOC REV: CPT | Performed by: DERMATOLOGY

## 2021-04-12 PROCEDURE — 1036F TOBACCO NON-USER: CPT | Performed by: DERMATOLOGY

## 2021-04-12 PROCEDURE — 11104 PUNCH BX SKIN SINGLE LESION: CPT | Performed by: DERMATOLOGY

## 2021-04-12 PROCEDURE — G8427 DOCREV CUR MEDS BY ELIG CLIN: HCPCS | Performed by: DERMATOLOGY

## 2021-04-12 PROCEDURE — 99214 OFFICE O/P EST MOD 30 MIN: CPT | Performed by: DERMATOLOGY

## 2021-04-12 RX ORDER — LIDOCAINE HYDROCHLORIDE AND EPINEPHRINE 10; 10 MG/ML; UG/ML
0.5 INJECTION, SOLUTION INFILTRATION; PERINEURAL ONCE
Status: COMPLETED | OUTPATIENT
Start: 2021-04-12 | End: 2021-04-12

## 2021-04-12 RX ADMIN — LIDOCAINE HYDROCHLORIDE AND EPINEPHRINE 0.5 ML: 10; 10 INJECTION, SOLUTION INFILTRATION; PERINEURAL at 11:45

## 2021-04-12 NOTE — PATIENT INSTRUCTIONS
- Ask ophthalmologist about possible sarcoidosis  - We will call you with biopsy results and treat accordingly  - Apply fluocinonide ointment twice daily to hands     BIOPSY WOUND CARE    A biopsy is where a small piece of skin tissue is removed and examined by a pathologist.  When a biopsy is done, there is a small wound site that requires proper care to prevent infection and scarring. Some biopsies require sutures and their removal.    How to Care for Biopsy Wound    A.  Leave band-aid or dressing on for 24 hours. B. Wash two times a day with soap and water. C.  Let the wound air dry, then apply Vaseline ointment and cover with a Band-Aid       unless otherwise instructed by your provider. D. If there is slight discomfort, you may give acetaminophen or ibuprofen. When To Call the Doctor    Call the Dermatology Clinic or your doctor if any of the following occur:    A. Redness and swelling  B. Tenderness and warm to touch  C.  Drainage from wound  D. Fever    Biopsy Results    Biopsy results are usually available in 1-2 weeks. We provide biopsy results in letters for begin results or we will call for any concerning results. If you have not heard from our staff please call the office within 2 weeks. Please call our office with any concerns at [phone].

## 2021-04-12 NOTE — PROGRESS NOTES
for itching (Patient not taking: Reported on 2021) 1 Bottle 4     No current facility-administered medications for this visit. ALLERGIES:   Allergies   Allergen Reactions    Banana     Pcn [Penicillins] Hives       SOCIAL HISTORY:  Social History     Tobacco Use    Smoking status: Former Smoker     Packs/day: 0.25     Years: 30.00     Pack years: 7.50     Types: Cigarettes     Quit date: 10/27/2019     Years since quittin.4    Smokeless tobacco: Never Used   Substance Use Topics    Alcohol use: Not Currently       Pertinent ROS:  Review of Systems  Skin: Denies any new changing, growing or bleeding lesions or rashes except as described in the HPI   Constitutional: Denies fevers, chills, and malaise. PHYSICAL EXAM:   BP (!) 142/80 (Site: Left Upper Arm, Position: Sitting, Cuff Size: Large Adult)   Pulse 56   Ht 5' 6\" (1.676 m)   Wt 217 lb 3.2 oz (98.5 kg)   SpO2 98%   BMI 35.06 kg/m²     The patient is generally well appearing, well nourished, alert and conversational. Affect is normal.    Cutaneous Exam:  Physical Exam  Sun-exposed skin, which includes the head/face, neck, both arms, digits and/or nails was examined. Diagnoses/exam findings/medical history pertinent to this visit are listed below:    Assessment and Plan:  Assessment   1. Skin colored monomorphic papules of face, nonresponsive to treatment for perioral dermatitis. 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 Ddx includes cutaneous sarcoidosis, granulomatous rosacea, other. Biopsy today, left temple  - chronic illness with progression and/or exacerbation  Punch Biopsy: The procedure and its risks were explained including but not limited to pain, bleeding, infection, permanent scar, permanent pigment alteration and need for an additional procedure. Consent to proceed with the procedure was obtained from the patient or the parent.  After cleaning with alcohol the lesion was anesthetized with 1% lidocaine with epinephrine and a 3 mm punch was performed. Hemostasis was achieved with suture closure of the defect with 5-0 gut and Vaseline and a bandage were applied.  - Surgical Pathology; Future  - TX PUNCH BIOPSY SKIN SINGLE LESION  - lidocaine-EPINEPHrine 1 %-1:[id number] injection 0.5 mL    2. Hand dermatitis  - stable chronic illness  - fluocinonide (LIDEX) 0.05 % cream; Apply to rash on hands twice daily  Dispense: 60 g; Refill: 2          RTC pending path    Patient Instructions   - Ask ophthalmologist about possible sarcoidosis  - We will call you with biopsy results and treat accordingly  - Apply fluocinonide ointment twice daily to hands     BIOPSY WOUND CARE    A biopsy is where a small piece of skin tissue is removed and examined by a pathologist.  When a biopsy is done, there is a small wound site that requires proper care to prevent infection and scarring. Some biopsies require sutures and their removal.    How to Care for Biopsy Wound    A.  Leave band-aid or dressing on for 24 hours. B. Wash two times a day with soap and water. C.  Let the wound air dry, then apply Vaseline ointment and cover with a Band-Aid       unless otherwise instructed by your provider. D. If there is slight discomfort, you may give acetaminophen or ibuprofen. When To Call the Doctor    Call the Dermatology Clinic or your doctor if any of the following occur:    A. Redness and swelling  B. Tenderness and warm to touch  C.  Drainage from wound  D. Fever    Biopsy Results    Biopsy results are usually available in 1-2 weeks. We provide biopsy results in letters for begin results or we will call for any concerning results. If you have not heard from our staff please call the office within 2 weeks. Please call our office with any concerns at [phone].           This note was created with the assistance of a speech-recognition program.  Although the intention is to generate a document that actually reflects the content of the visit, no guarantees can be provided that every mistake has been identified and corrected byediting.     Electronically signed by Tanna Snell MD on 4/12/21 at 10:46 AM EDT

## 2021-04-14 LAB — DERMATOLOGY PATHOLOGY REPORT: NORMAL

## 2021-04-15 ENCOUNTER — TELEPHONE (OUTPATIENT)
Dept: DERMATOLOGY | Age: 61
End: 2021-04-15

## 2021-04-15 DIAGNOSIS — L81.9 DYSPIGMENTATION: Primary | ICD-10-CM

## 2021-04-15 NOTE — TELEPHONE ENCOUNTER
Patient is asking if a chemical peel will help? She's also mentioning dark spots and pigmentation as to why she is also interested in the peel. She's mentioning Dovonex for pigmentation, can she use that for her pigmentation on her face? She's also asking what causes these syringoma's?

## 2021-04-15 NOTE — TELEPHONE ENCOUNTER
Please inform patient that the biopsy showed a benign, harmless skin lesion called a syringoma. There is NO evidence of sarcoidosis or of anything dangerous. Unfortunately, no creams or pills will make the syringomas disappear. They are considered a cosmetic concern, and treatment is best performed with laser cosmetic surgery, which I don't do here. She may consider consulting a cosmetic dermatologist if she would like them removed, but it is not necessary to remove them if she would prefer to do nothing.

## 2021-04-16 NOTE — TELEPHONE ENCOUNTER
Spoke to patient and discussed options for treatment cosmetically. She is most concerned about dyspigmentation, there being several colors on her face. I recommended trying tretinoin cream, using it slowly but surely (start twice weekly) to avoid irritation.  rx sent    6 mo f/u

## 2021-04-16 NOTE — TELEPHONE ENCOUNTER
Pt scheduled and advised the tretinoin is not covered by insurance.  Instructed her to use a goodrx coupon

## 2021-04-20 ENCOUNTER — HOSPITAL ENCOUNTER (OUTPATIENT)
Age: 61
Discharge: HOME | End: 2021-04-20
Payer: COMMERCIAL

## 2021-04-20 DIAGNOSIS — I10 ESSENTIAL HYPERTENSION: ICD-10-CM

## 2021-04-20 DIAGNOSIS — E78.5 DYSLIPIDEMIA: ICD-10-CM

## 2021-04-20 DIAGNOSIS — E55.9 VITAMIN D DEFICIENCY: ICD-10-CM

## 2021-04-20 LAB
ALBUMIN SERPL-MCNC: 4.1 G/DL (ref 3.5–5.2)
ALBUMIN/GLOBULIN RATIO: 1.1 (ref 1–2.5)
ALP BLD-CCNC: 71 U/L (ref 35–104)
ALT SERPL-CCNC: 13 U/L (ref 5–33)
ANION GAP SERPL CALCULATED.3IONS-SCNC: 6 MMOL/L (ref 9–17)
AST SERPL-CCNC: 18 U/L
BILIRUB SERPL-MCNC: 0.45 MG/DL (ref 0.3–1.2)
BUN BLDV-MCNC: 18 MG/DL (ref 8–23)
BUN/CREAT BLD: ABNORMAL (ref 9–20)
CALCIUM SERPL-MCNC: 9.2 MG/DL (ref 8.6–10.4)
CHLORIDE BLD-SCNC: 97 MMOL/L (ref 98–107)
CHOLESTEROL/HDL RATIO: 3.8
CHOLESTEROL: 183 MG/DL
CO2: 33 MMOL/L (ref 20–31)
CREAT SERPL-MCNC: 0.81 MG/DL (ref 0.5–0.9)
GFR AFRICAN AMERICAN: >60 ML/MIN
GFR NON-AFRICAN AMERICAN: >60 ML/MIN
GFR SERPL CREATININE-BSD FRML MDRD: ABNORMAL ML/MIN/{1.73_M2}
GFR SERPL CREATININE-BSD FRML MDRD: ABNORMAL ML/MIN/{1.73_M2}
GLUCOSE BLD-MCNC: 87 MG/DL (ref 70–99)
HDLC SERPL-MCNC: 48 MG/DL
LDL CHOLESTEROL: 108 MG/DL (ref 0–130)
MAGNESIUM: 2 MG/DL (ref 1.6–2.6)
POTASSIUM SERPL-SCNC: 3.8 MMOL/L (ref 3.7–5.3)
SODIUM BLD-SCNC: 136 MMOL/L (ref 135–144)
TOTAL PROTEIN: 8 G/DL (ref 6.4–8.3)
TRIGL SERPL-MCNC: 135 MG/DL
VITAMIN D 25-HYDROXY: 38.8 NG/ML (ref 30–100)
VLDLC SERPL CALC-MCNC: NORMAL MG/DL (ref 1–30)

## 2021-04-20 PROCEDURE — 36415 COLL VENOUS BLD VENIPUNCTURE: CPT

## 2021-04-20 PROCEDURE — 82306 VITAMIN D 25 HYDROXY: CPT

## 2021-04-20 PROCEDURE — 80053 COMPREHEN METABOLIC PANEL: CPT

## 2021-04-20 PROCEDURE — 83735 ASSAY OF MAGNESIUM: CPT

## 2021-04-20 PROCEDURE — 80061 LIPID PANEL: CPT

## 2021-04-28 ENCOUNTER — OFFICE VISIT (OUTPATIENT)
Dept: DERMATOLOGY | Age: 61
End: 2021-04-28
Payer: COMMERCIAL

## 2021-04-28 VITALS
HEIGHT: 66 IN | BODY MASS INDEX: 35.03 KG/M2 | TEMPERATURE: 96.7 F | WEIGHT: 218 LBS | SYSTOLIC BLOOD PRESSURE: 138 MMHG | DIASTOLIC BLOOD PRESSURE: 78 MMHG | HEART RATE: 63 BPM | OXYGEN SATURATION: 98 %

## 2021-04-28 DIAGNOSIS — L66.9 CICATRICIAL ALOPECIA: Primary | ICD-10-CM

## 2021-04-28 DIAGNOSIS — L30.9 HAND DERMATITIS: ICD-10-CM

## 2021-04-28 PROCEDURE — 99214 OFFICE O/P EST MOD 30 MIN: CPT | Performed by: DERMATOLOGY

## 2021-04-28 PROCEDURE — G8417 CALC BMI ABV UP PARAM F/U: HCPCS | Performed by: DERMATOLOGY

## 2021-04-28 PROCEDURE — 3017F COLORECTAL CA SCREEN DOC REV: CPT | Performed by: DERMATOLOGY

## 2021-04-28 PROCEDURE — G8427 DOCREV CUR MEDS BY ELIG CLIN: HCPCS | Performed by: DERMATOLOGY

## 2021-04-28 PROCEDURE — 1036F TOBACCO NON-USER: CPT | Performed by: DERMATOLOGY

## 2021-04-28 RX ORDER — TACROLIMUS 0.3 MG/G
OINTMENT TOPICAL
Qty: 30 G | Refills: 2 | Status: SHIPPED | OUTPATIENT
Start: 2021-04-28 | End: 2021-06-08 | Stop reason: CLARIF

## 2021-04-28 NOTE — PROGRESS NOTES
Dermatology Patient Note  [facility] [address]  [address]  [address]  Dept: [phone]  Dept Fax: [phone]      VISITDATE: 4/28/2021   REFERRING PROVIDER: No ref. provider found      Nohemy Angulo is a 61 y.o. female  who presents today in the office for:    Follow-up (flare up on the rt ring finger. )      PERTINENT HISTORY NOT LISTED ABOVE:  Patient presents for f/u  - she is having fissures on her fingers with dry skin and it is very painful, The lidex cream doesn't work  - she does was dishes frequently and doesn't always wear gloves  - she also wants to discuss hair loss today. She sometimes gets painful bumps in her scalp. Her hairline has been receding for many years. She had permanent makeup of eyebrows and states she really hasn't grown eyebrows since then    Facial papules, previously treated as periorificial dermatitis, was biopsied and showed syringoma    CURRENT MEDICATIONS:   Current Outpatient Medications   Medication Sig Dispense Refill    fluocinonide (LIDEX) 0.05 % cream Apply to rash on hands twice daily, and scalp daily 60 g 5    tacrolimus (PROTOPIC) 0.03 % ointment Apply to rash on hands as needed 30 g 2    chlorthalidone (HYGROTEN) 50 MG tablet TAKE 1 TABLET BY MOUTH DAILY 90 tablet 0    fexofenadine (ALLEGRA) 180 MG tablet 180 mg daily      omeprazole (PRILOSEC) 40 MG delayed release capsule 40 mg daily      ammonium lactate (LAC-HYDRIN) 12 % lotion APPLY LIBERALLY TO SKIN BID  ESPECIALLY AFTER SHOWER      carvedilol (COREG) 6.25 MG tablet Take 6.25 mg by mouth 2 times daily TAKING 2 TABLETS bid SINCE 11/28/20      tretinoin (RETIN-A) 0.025 % cream Apply pea sized amount to face nightly (Patient not taking: Reported on 4/28/2021) 45 g 3     No current facility-administered medications for this visit.         ALLERGIES:   Allergies   Allergen Reactions    Banana     Pcn [Penicillins] Hives       SOCIAL HISTORY:  Social History     Tobacco Use    Smoking status: Former Smoker     Packs/day: 0.25     Years: 30.00     Pack years: 7.50     Types: Cigarettes     Quit date: 10/27/2019     Years since quittin.5    Smokeless tobacco: Never Used   Substance Use Topics    Alcohol use: Not Currently       Pertinent ROS:  Review of Systems  Skin: Denies any new changing, growing or bleeding lesions or rashes except as described in the HPI   Constitutional: Denies fevers, chills, and malaise. PHYSICAL EXAM:   /78 (Site: Right Upper Arm, Position: Sitting, Cuff Size: Large Adult)   Pulse 63   Temp 96.7 °F (35.9 °C)   Ht 5' 6\" (1.676 m)   Wt 218 lb (98.9 kg)   SpO2 98%   BMI 35.19 kg/m²     The patient is generally well appearing, well nourished, alert and conversational. Affect is normal.    Cutaneous Exam:  Physical Exam  Sun-exposed skin, which includes the head/face, neck, both arms, digits and/or nails was examined. Diagnoses/exam findings/medical history pertinent to this visit are listed below:    Assessment and Plan:  Assessment   1. Hand dermatitis with tightness and fissuring  - chronic illness with progression and/or exacerbation  - I did consider possible scleroderma, but patient recently had negative MEG and describes no other symptoms  - I believe chronic dishwashing/handwashing is contributing  - start soak and smear at night  - add protopic  - consider dupxient in the future  - can \"superglue\" the fissures  - fluocinonide (LIDEX) 0.05 % cream; Apply to rash on hands twice daily, and scalp daily  Dispense: 60 g; Refill: 5  - tacrolimus (PROTOPIC) 0.03 % ointment; Apply to rash on hands as needed  Dispense: 30 g; Refill: 2    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 Ddx includes CCCA.  The facial papules may be part of FFA if biopsy showed incidental and unrelated syringoma  - fluocinonide (LIDEX) 0.05 % cream; Apply to rash on hands twice daily, and scalp daily  Dispense: 60 g; Refill: 5 RTC 3 months    Patient Instructions   - start applying protopic (steroid sparing agent) to the hands and continue applying hand cream after each hand washing  - Apply lidex to the scalp daily  - Start soak and smear-lukewarm water in basin and soak x 5 min then put on lidex ointment while hands are damp. Cover with white cotton gloves and sleep like that. Ok to use sure glue on the fissures on the fingers  - Follow up in the office in       This note was created with the assistance of a speech-recognition program.  Although the intention is to generate a document that actually reflects the content of the visit, no guarantees can be provided that every mistake has been identified and corrected byediting.     Electronically signed by Alfred Hoang MD on 4/28/21 at 7:52 PM EDT

## 2021-04-28 NOTE — PATIENT INSTRUCTIONS
- start applying protopic (steroid sparing agent) to the hands and continue applying hand cream after each hand washing  - Apply lidex to the scalp daily  - Start soak and smear-lukewarm water in basin and soak x 5 min then put on lidex ointment while hands are damp. Cover with white cotton gloves and sleep like that.  Ok to use sure glue on the fissures on the fingers  - Follow up in the office in

## 2021-05-04 ENCOUNTER — HOSPITAL ENCOUNTER (OUTPATIENT)
Dept: CT IMAGING | Age: 61
Discharge: HOME | End: 2021-05-06
Payer: COMMERCIAL

## 2021-05-04 VITALS
RESPIRATION RATE: 16 BRPM | SYSTOLIC BLOOD PRESSURE: 126 MMHG | HEART RATE: 53 BPM | DIASTOLIC BLOOD PRESSURE: 66 MMHG | OXYGEN SATURATION: 100 %

## 2021-05-04 DIAGNOSIS — R94.31 NONSPECIFIC ABNORMAL ELECTROCARDIOGRAM (ECG) (EKG): ICD-10-CM

## 2021-05-04 DIAGNOSIS — R07.89 OTHER CHEST PAIN: ICD-10-CM

## 2021-05-04 DIAGNOSIS — R94.39 ABNORMAL FINDING ON CARDIOVASCULAR STRESS TEST: ICD-10-CM

## 2021-05-04 DIAGNOSIS — I10 HYPERTENSION, ESSENTIAL: ICD-10-CM

## 2021-05-04 PROCEDURE — 2580000003 HC RX 258: Performed by: INTERNAL MEDICINE

## 2021-05-04 PROCEDURE — 75574 CT ANGIO HRT W/3D IMAGE: CPT

## 2021-05-04 PROCEDURE — 6360000004 HC RX CONTRAST MEDICATION: Performed by: INTERNAL MEDICINE

## 2021-05-04 PROCEDURE — 6370000000 HC RX 637 (ALT 250 FOR IP): Performed by: INTERNAL MEDICINE

## 2021-05-04 RX ORDER — SODIUM CHLORIDE 0.9 % (FLUSH) 0.9 %
10 SYRINGE (ML) INJECTION PRN
Status: DISCONTINUED | OUTPATIENT
Start: 2021-05-04 | End: 2021-05-07 | Stop reason: HOSPADM

## 2021-05-04 RX ORDER — 0.9 % SODIUM CHLORIDE 0.9 %
90 INTRAVENOUS SOLUTION INTRAVENOUS ONCE
Status: COMPLETED | OUTPATIENT
Start: 2021-05-04 | End: 2021-05-04

## 2021-05-04 RX ORDER — NITROGLYCERIN 0.4 MG/1
0.4 TABLET SUBLINGUAL ONCE
Status: COMPLETED | OUTPATIENT
Start: 2021-05-04 | End: 2021-05-04

## 2021-05-04 RX ADMIN — SODIUM CHLORIDE, PRESERVATIVE FREE 10 ML: 5 INJECTION INTRAVENOUS at 12:53

## 2021-05-04 RX ADMIN — SODIUM CHLORIDE 90 ML: 9 INJECTION, SOLUTION INTRAVENOUS at 12:53

## 2021-05-04 RX ADMIN — IOPAMIDOL 95 ML: 755 INJECTION, SOLUTION INTRAVENOUS at 12:53

## 2021-05-04 RX ADMIN — NITROGLYCERIN 0.4 MG: 0.4 TABLET, ORALLY DISINTEGRATING SUBLINGUAL at 12:36

## 2021-05-04 NOTE — PROGRESS NOTES
Coronary CTA complete. HR 55-67. Pt tolerated procedure well. IV taken out and pt discharged ambulatory.

## 2021-06-04 ENCOUNTER — TELEPHONE (OUTPATIENT)
Dept: PULMONOLOGY | Age: 61
End: 2021-06-04

## 2021-06-04 NOTE — TELEPHONE ENCOUNTER
The patient called the office complaining of coughing and gasping for air while using cpap machine.  I have called [address] [place] [address] for a compliance report and scheduled her to see the NP on Tuesday in the office

## 2021-06-07 NOTE — PROGRESS NOTES
Subjective:      Patient ID: Rip Rodriguez is a 61 y.o. female. HPI  Patient here for follow-up for AURELIANO. Patient was last seen as a telehealth visit in December 2020 per Dr. Tyrone Mckeon. 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 She did see rheumatology in April 2021 and per rheumatology note is holding off on methotrexate. Compliance download 3/6/2021 through 6/3/2021. 81 out of 90 days used, 81 days for more than 4 hours putting her at 85.5% compliant. Residual AHI of 1.79 Patient is on CPAP 11 cm H2O    Patient is endorsing a dry irritative nonproductive cough. She does have a history of acid reflux and is on omeprazole. She is a former smoker who quit in 2019. She was smoking at the end 1/2 pack/day. Her pack-year history is 30 and patient is eligible for CT lung screening. She is noticing that her cough is more associated with fragrances, lying flat at night. Suggested to patient to have the head of the bed elevated, small more frequent meals and to remain upright 90 minutes after eating in order to prevent acid reflux. Lengthy conversation with patient regarding common etiologies for cough including acid reflux, asthma, postnasal drip and infection. Patient also endorses sinus drainage and is on Allegra 180 mg daily. Will order CT lung screening and pulmonary function test.    Medications:   Albuterol HFA    PRIOR WORKUP:  PFT:  None on chart    CT Imaging:  None on chart    Sleep Study:  Sleep study 9/2/2020: Patient had a total of 11 apneas (10 obstructive, one central and zero mixed), and 43 hypopneas. Apnea hypopnea index for the entire night was 10.1. AHI was 10.4 during NREM sleep compared to an AHI of 5.3 during REM sleep. Minimum SPO2 was 84%. Patient had a total of zero leg movements for a PLM index of 0.00 associated arousals.      Titration study 9/2/2020: Patient was titrated onto CPAP with optimal pressure of 11 cm H2O with zero residual apneas or hypopneas. And the minimum SPO2 of 98%. He had a total of zero leg movements throughout the night with a PLM index of 0.0 with zero associated arousals. Laboratory Evaluation:  MEG 4/7/2021: Negative  Angiotensin-converting enzyme 4/7/2021: 30  ANCA myeloperoxidase 4/7/2021:<0.3  ANCA proteinase 34/7/2021:<0.7  QuantiFERON Gold 4/7/2021: Negative  Rheumatoid Factor 4/7/2021: Elevated 15.3    Immunizations: There is no immunization history on file for this patient. Sleep Medicine 12/4/2020 8/27/2020   Sitting and reading 2 3   Watching TV 2 3   Sitting, inactive in a public place (e.g. a theatre or a meeting) 0 2   As a passenger in a car for an hour without a break 0 1   Lying down to rest in the afternoon when circumstances permit 1 0   Sitting and talking to someone 0 0   Sitting quietly after a lunch without alcohol 1 0   In a car, while stopped for a few minutes in traffic 0 1   Total score 6 10       /78 (Site: Right Upper Arm, Position: Sitting, Cuff Size: Medium Adult)   Pulse 60   Temp 97.6 °F (36.4 °C) (Temporal)   Resp 18   Ht 5' 5\" (1.651 m)   Wt 217 lb 6.4 oz (98.6 kg)   SpO2 98% Comment: room air at rest  BMI 36.18 kg/m²     Past Medical History:   Diagnosis Date    Essential hypertension 7/27/2020    Hematuria     Hepatitis-C     Hypertension     Mitral valve regurgitation     Obesity (BMI 30-39. 9) 7/27/2020    AURELIANO (obstructive sleep apnea) 7/27/2020    AURELIANO on CPAP 12/4/2020    Sleep apnea      Past Surgical History:   Procedure Laterality Date    CARPAL TUNNEL RELEASE Bilateral     COLONOSCOPY  11/06/2017    TUBULAR ADENOMA    HYSTERECTOMY, TOTAL ABDOMINAL       Family History   Problem Relation Age of Onset    High Blood Pressure Mother     Other Mother         Sherrel Artist Cancer Father         BONE AND PROSTRATE    Prostate Cancer Brother        Social History     Socioeconomic History    Marital status: Single     Spouse name: Not on file    Number of children: Not on file    Years of education: Not on file    Highest education level: Not on file   Occupational History    Not on file   Tobacco Use    Smoking status: Former Smoker     Packs/day: 1.00     Years: 30.00     Pack years: 30.00     Types: Cigarettes     Quit date: 10/27/2019     Years since quittin.6    Smokeless tobacco: Never Used   Vaping Use    Vaping Use: Never used   Substance and Sexual Activity    Alcohol use: Not Currently    Drug use: Never    Sexual activity: Not Currently   Other Topics Concern    Not on file   Social History Narrative    Irena Elizondo has experienced a financial strain with resourcestrain: Food on 2020. Potential community resources and services have been provided in patient instructions by Avis Tan. Social Determinants of Health     Financial Resource Strain: Medium Risk    Difficulty of Paying Living Expenses: Somewhat hard   Food Insecurity: Food Insecurity Present    Worried About Running Out of Food in the Last Year: Sometimes true    Madi of Food in the Last Year: Sometimes true   Transportation Needs:     Lack of Transportation (Medical):  Lack of Transportation (Non-Medical):    Physical Activity:     Days of Exercise per Week:     Minutes of Exercise per Session:    Stress:     Feeling of Stress :    Social Connections:     Frequency of Communication with Friends and Family:     Frequency of Social Gatherings with Friends and Family:     Attends Denominational Services:     Active Member of Clubs or Organizations:     Attends Club or Organization Meetings:     Marital Status:    Intimate Partner Violence:     Fear of Current or Ex-Partner:     Emotionally Abused:     Physically Abused:     Sexually Abused:        Review of Systems   Constitutional: Negative. HENT:        Dry irritated nonproductive cough, sinus drainage   Eyes: Negative.     Respiratory:        Denies shortness of breath Comprehensive Metabolic Panel   Result Value Ref Range    Glucose 87 70 - 99 mg/dL    BUN 18 8 - 23 mg/dL    CREATININE 0.81 0.50 - 0.90 mg/dL    Bun/Cre Ratio NOT REPORTED 9 - 20    Calcium 9.2 8.6 - 10.4 mg/dL    Sodium 136 135 - 144 mmol/L    Potassium 3.8 3.7 - 5.3 mmol/L    Chloride 97 (L) 98 - 107 mmol/L    CO2 33 (H) 20 - 31 mmol/L    Anion Gap 6 (L) 9 - 17 mmol/L    Alkaline Phosphatase 71 35 - 104 U/L    ALT 13 5 - 33 U/L    AST 18 <32 U/L    Total Bilirubin 0.45 0.3 - 1.2 mg/dL    Total Protein 8.0 6.4 - 8.3 g/dL    Albumin 4.1 3.5 - 5.2 g/dL    Albumin/Globulin Ratio 1.1 1.0 - 2.5    GFR Non-African American >60 >60 mL/min    GFR African American >60 >60 mL/min    GFR Comment          GFR Staging NOT REPORTED        No results found. Assessment:      1. Former heavy tobacco smoker    2. AURELIANO on CPAP    3. Cough    4. Obesity (BMI 30.0-34.9)    5. Rheumatoid arthritis with positive rheumatoid factor, involving unspecified site [facility])          Plan:      1. Medications reviewed- Albuterol HFA  2. Educated and clarified the medication use. 3. Recommend flu vaccination in the fall annually. Patient refuses  4. Patient recommended for pneumococcal and Covid vaccine. Patient considering Covid vaccine-hesitant to obtain  5. Maintain an active lifestyle. 6. Patient's questions were answered to her satisfaction. 7. Former smoker with an approximate 30-pack-year history. Patient is eligible for CT lung screening. Ordered today. 8. Pulmonary function tests ordered  9. CPAP/BiPAP to be used for at least 4 hours every night. Use humidifier if needed. Weight loss was recommended and discussed. Recommended following good sleep hygiene instructions. Sleep hygiene instructions given to the patient. Explained importance of compliance with treatment of sleep apnea. Compliance data was reviewed and the patient is  compliant per insurance requirement.   10. We'll see the patient back in 3

## 2021-06-08 ENCOUNTER — OFFICE VISIT (OUTPATIENT)
Dept: PULMONOLOGY | Age: 61
End: 2021-06-08
Payer: COMMERCIAL

## 2021-06-08 VITALS
OXYGEN SATURATION: 98 % | HEART RATE: 60 BPM | SYSTOLIC BLOOD PRESSURE: 136 MMHG | BODY MASS INDEX: 36.22 KG/M2 | RESPIRATION RATE: 18 BRPM | WEIGHT: 217.4 LBS | HEIGHT: 65 IN | DIASTOLIC BLOOD PRESSURE: 78 MMHG | TEMPERATURE: 97.6 F

## 2021-06-08 DIAGNOSIS — M05.9 RHEUMATOID ARTHRITIS WITH POSITIVE RHEUMATOID FACTOR, INVOLVING UNSPECIFIED SITE (HCC): ICD-10-CM

## 2021-06-08 DIAGNOSIS — G47.33 OSA ON CPAP: ICD-10-CM

## 2021-06-08 DIAGNOSIS — E66.9 OBESITY (BMI 30.0-34.9): ICD-10-CM

## 2021-06-08 DIAGNOSIS — Z87.891 FORMER HEAVY TOBACCO SMOKER: Primary | ICD-10-CM

## 2021-06-08 DIAGNOSIS — R05.9 COUGH: ICD-10-CM

## 2021-06-08 DIAGNOSIS — Z99.89 OSA ON CPAP: ICD-10-CM

## 2021-06-08 PROCEDURE — G8417 CALC BMI ABV UP PARAM F/U: HCPCS | Performed by: NURSE PRACTITIONER

## 2021-06-08 PROCEDURE — 3017F COLORECTAL CA SCREEN DOC REV: CPT | Performed by: NURSE PRACTITIONER

## 2021-06-08 PROCEDURE — 99213 OFFICE O/P EST LOW 20 MIN: CPT | Performed by: NURSE PRACTITIONER

## 2021-06-08 PROCEDURE — G8427 DOCREV CUR MEDS BY ELIG CLIN: HCPCS | Performed by: NURSE PRACTITIONER

## 2021-06-08 PROCEDURE — 1036F TOBACCO NON-USER: CPT | Performed by: NURSE PRACTITIONER

## 2021-06-08 RX ORDER — ALBUTEROL SULFATE 90 UG/1
2 AEROSOL, METERED RESPIRATORY (INHALATION) 4 TIMES DAILY PRN
Qty: 1 INHALER | Refills: 1 | Status: SHIPPED | OUTPATIENT
Start: 2021-06-08 | End: 2021-10-11

## 2021-06-08 ASSESSMENT — ENCOUNTER SYMPTOMS
EYES NEGATIVE: 1
ALLERGIC/IMMUNOLOGIC NEGATIVE: 1

## 2021-06-14 ENCOUNTER — TELEPHONE (OUTPATIENT)
Dept: ONCOLOGY | Age: 61
End: 2021-06-14

## 2021-06-14 ENCOUNTER — TELEPHONE (OUTPATIENT)
Dept: GASTROENTEROLOGY | Age: 61
End: 2021-06-14

## 2021-06-14 NOTE — LETTER
6/14/2021        [address] [place] [address]    Dear Ariel Segovia: Your healthcare provider has ordered a low dose CT scan of the chest for lung cancer screening. You will find enclosed, information about CT lung screening. Please review the statement of understanding, you will be asked to sign a copy of this at the time of your CT scan    If you have not already been contacted to make the appointment for your scan, please call our scheduling department at [phone]    Keep in mind that CT lung screening does not take the place of smoking cessation. If you are a current smoker, you will find enclosed smoking cessation resources. Please do not hesitate to contact me if you have any questions or concerns.     [address] [facility],      [facility] Lung Screening [facility]  [phone]

## 2021-06-14 NOTE — TELEPHONE ENCOUNTER
Left message asking pt to call office to rs appt for 9/10/21 due to provider out of office. Letter sent as well. Appt has been cancelled.

## 2021-06-15 ENCOUNTER — PATIENT MESSAGE (OUTPATIENT)
Dept: GASTROENTEROLOGY | Age: 61
End: 2021-06-15

## 2021-06-15 DIAGNOSIS — Z86.19 HISTORY OF HEPATITIS C: Primary | ICD-10-CM

## 2021-06-21 ENCOUNTER — HOSPITAL ENCOUNTER (OUTPATIENT)
Dept: CT IMAGING | Age: 61
Discharge: HOME | End: 2021-06-23
Payer: COMMERCIAL

## 2021-06-21 DIAGNOSIS — Z87.891 FORMER HEAVY TOBACCO SMOKER: ICD-10-CM

## 2021-06-21 PROCEDURE — 71271 CT THORAX LUNG CANCER SCR C-: CPT

## 2021-06-29 ENCOUNTER — NURSE TRIAGE (OUTPATIENT)
Dept: OTHER | Facility: CLINIC | Age: 61
End: 2021-06-29

## 2021-06-29 ENCOUNTER — TELEPHONE (OUTPATIENT)
Dept: FAMILY MEDICINE CLINIC | Age: 61
End: 2021-06-29

## 2021-06-29 NOTE — TELEPHONE ENCOUNTER
Received call from [address] Jaci Olvera  at [facility]  with [email or web address]. Brief description of triage: The patient is having severe pain in the back of her left  kneecap- going on for a week. Triage indicates for patient to be seen today. UCC or the ED suggested if no PCP appointments. Care advice provided, patient verbalizes understanding; denies any other questions or concerns; instructed to call back for any new or worsening symptoms. Writer provided warm transfer to [facility]   At [facility]  for appointment scheduling. Attention Provider: Thank you for allowing me to participate in the care of your patient. The patient was connected to triage in response to information provided to the ECC. Please do not respond through this encounter as the response is not directed to a shared pool. Reason for Disposition   SEVERE pain (e.g., excruciating, unable to walk)    Answer Assessment - Initial Assessment Questions  1. LOCATION and RADIATION: \"Where is the pain located? \"       Behind the kneecap on the left knee  Extending her leg gets uncomfortable    2. QUALITY: \"What does the pain feel like? \"  (e.g., sharp, dull, aching, burning)      Throbbing and tightness      3. SEVERITY: \"How bad is the pain? \" \"What does it keep you from doing? \"   (Scale 1-10; or mild, moderate, severe)    -  MILD (1-3): doesn't interfere with normal activities     -  MODERATE (4-7): interferes with normal activities (e.g., work or school) or awakens from sleep, limping     -  SEVERE (8-10): excruciating pain, unable to do any normal activities, unable to walk        Throbbing and tightness   Sitting down 7/10  Extending the leg to go down the stairs is 10/10 with taking a side step and going one step at time    4. ONSET: \"When did the pain start? \" \"Does it come and go, or is it there all the time? \"      For one week    5. RECURRENT: \"Have you had this pain before? \" If so, ask: \"When, and what happened then? \" denies    6. SETTING: \"Has there been any recent work, exercise or other activity that involved that part of the body? \"         Denies    7. AGGRAVATING FACTORS: \"What makes the knee pain worse? \" (e.g., walking, climbing stairs, running)        Unknown     8. ASSOCIATED SYMPTOMS: \"Is there any swelling or redness of the knee? \"        Puffiness she states on the kneecap  No redness noted  Felt warm to the touch last night- put an ice pack on it    9. OTHER SYMPTOMS: \"Do you have any other symptoms? \" (e.g., chest pain, difficulty breathing, fever, calf pain)        Denies CP or SOB  Reports headache on Sunday night and continued on into yesterday in the temple area. No headache  No fever  Occasional tightness to the left calf area and left ankle        10. PREGNANCY: \"Is there any chance you are pregnant? \" \"When was your last menstrual period? \"        N/a age    Protocols used: KNEE PAIN-ADULT-[place]    See above documentation

## 2021-06-29 NOTE — TELEPHONE ENCOUNTER
FYI:  [facility] and Nurse T[facility]. She is having severe pain in left knee cap and around back of knee. Some swelling, hot to touch last night bust used ice pack and swelling went down. Hard to walk. Patient was told to go to the ER and she agreed to be evaluated.

## 2021-07-02 ENCOUNTER — TELEPHONE (OUTPATIENT)
Dept: DERMATOLOGY | Age: 61
End: 2021-07-02

## 2021-07-02 DIAGNOSIS — L30.9 HAND DERMATITIS: Primary | ICD-10-CM

## 2021-07-02 NOTE — TELEPHONE ENCOUNTER
Pt called, asked if you would consider calling in anything for her dermatitis. Pt now c/o itching on both arms. She has not changed anything in her skin care routine and has continued to use the pro-topics and medication prescribed w/o any relief.

## 2021-07-05 RX ORDER — CLOBETASOL PROPIONATE 0.5 MG/G
CREAM TOPICAL
Qty: 60 G | Refills: 0 | Status: SHIPPED | OUTPATIENT
Start: 2021-07-05 | End: 2021-07-29 | Stop reason: SDUPTHER

## 2021-07-05 NOTE — TELEPHONE ENCOUNTER
I'm sending in a tube of clobetasol, a stronger cream, to try before our follow-up this month. Then we may need to consider systemic treatment and/or patch testing.

## 2021-07-08 ENCOUNTER — TELEPHONE (OUTPATIENT)
Dept: FAMILY MEDICINE CLINIC | Age: 61
End: 2021-07-08

## 2021-07-08 DIAGNOSIS — M71.22 BAKER'S CYST OF KNEE, LEFT: Primary | ICD-10-CM

## 2021-07-08 NOTE — TELEPHONE ENCOUNTER
THE PATIENT SAW DR KIMBERLY Monterroso TODAY WHO RECOMMENDED THAT THE PATIENT BE REFERRED TO AN ORTHOPEDIC SURGEON DUE TO BAKER'S CYST BEHIND LEFT KNEE. PLEASE ADVISE.

## 2021-07-22 DIAGNOSIS — M25.562 LEFT KNEE PAIN, UNSPECIFIED CHRONICITY: Primary | ICD-10-CM

## 2021-07-22 DIAGNOSIS — I10 ESSENTIAL HYPERTENSION: Primary | ICD-10-CM

## 2021-07-22 DIAGNOSIS — M54.50 BILATERAL LOW BACK PAIN WITHOUT SCIATICA, UNSPECIFIED CHRONICITY: ICD-10-CM

## 2021-07-26 ENCOUNTER — HOSPITAL ENCOUNTER (OUTPATIENT)
Age: 61
Setting detail: SPECIMEN
Discharge: HOME | End: 2021-07-26
Payer: COMMERCIAL

## 2021-07-26 ENCOUNTER — OFFICE VISIT (OUTPATIENT)
Dept: ORTHOPEDIC SURGERY | Age: 61
End: 2021-07-26
Payer: COMMERCIAL

## 2021-07-26 VITALS
BODY MASS INDEX: 36.15 KG/M2 | WEIGHT: 217 LBS | DIASTOLIC BLOOD PRESSURE: 82 MMHG | HEIGHT: 65 IN | HEART RATE: 67 BPM | SYSTOLIC BLOOD PRESSURE: 130 MMHG | RESPIRATION RATE: 12 BRPM

## 2021-07-26 DIAGNOSIS — M17.0 PRIMARY OSTEOARTHRITIS OF BOTH KNEES: Primary | ICD-10-CM

## 2021-07-26 DIAGNOSIS — M25.462 EFFUSION OF LEFT KNEE JOINT: ICD-10-CM

## 2021-07-26 LAB
DIRECT EXAM: ABNORMAL
Lab: ABNORMAL
SPECIMEN DESCRIPTION: ABNORMAL

## 2021-07-26 PROCEDURE — 1036F TOBACCO NON-USER: CPT | Performed by: FAMILY MEDICINE

## 2021-07-26 PROCEDURE — G8427 DOCREV CUR MEDS BY ELIG CLIN: HCPCS | Performed by: FAMILY MEDICINE

## 2021-07-26 PROCEDURE — G8417 CALC BMI ABV UP PARAM F/U: HCPCS | Performed by: FAMILY MEDICINE

## 2021-07-26 PROCEDURE — 3017F COLORECTAL CA SCREEN DOC REV: CPT | Performed by: FAMILY MEDICINE

## 2021-07-26 PROCEDURE — 99203 OFFICE O/P NEW LOW 30 MIN: CPT | Performed by: FAMILY MEDICINE

## 2021-07-26 PROCEDURE — 20610 DRAIN/INJ JOINT/BURSA W/O US: CPT | Performed by: FAMILY MEDICINE

## 2021-07-26 RX ORDER — TRIAMCINOLONE ACETONIDE 40 MG/ML
40 INJECTION, SUSPENSION INTRA-ARTICULAR; INTRAMUSCULAR ONCE
Status: COMPLETED | OUTPATIENT
Start: 2021-07-26 | End: 2021-07-26

## 2021-07-26 RX ORDER — BUPIVACAINE HYDROCHLORIDE 5 MG/ML
4 INJECTION, SOLUTION PERINEURAL ONCE
Status: COMPLETED | OUTPATIENT
Start: 2021-07-26 | End: 2021-07-26

## 2021-07-26 RX ADMIN — BUPIVACAINE HYDROCHLORIDE 20 MG: 5 INJECTION, SOLUTION PERINEURAL at 11:02

## 2021-07-26 RX ADMIN — TRIAMCINOLONE ACETONIDE 40 MG: 40 INJECTION, SUSPENSION INTRA-ARTICULAR; INTRAMUSCULAR at 11:03

## 2021-07-26 NOTE — PROGRESS NOTES
Sports Medicine Consultation     CHIEF COMPLAINT:  Knee Pain (Bilateral L>R - pain x1 month, no injury)      HPI:  Ryland Echavarria is a 61y.o. year old female who is a new patient being seen for regarding new problem bilateral knee pain. The pain has been present for 1 month(s). The patient recalls a no injury. The patient has tried ice, tylenol, ibu, indomethicin, voltaren gel without improvement. The pain is described as sharp. There is  pain on weightbearing. The knee does swell. There is is  painful popping and clicking. The knee does not catch or lock. It has not given out. It is  stiff upon arising from sitting. It is  painful to go up and down stairs and sit for a prolonged period of time. she has a past medical history of Essential hypertension, Hematuria, Hepatitis-C, Hypertension, Mitral valve regurgitation, Obesity (BMI 30-39.9), AURELIANO (obstructive sleep apnea), AURELIANO on CPAP, and Sleep apnea. she has a past surgical history that includes Hysterectomy, total abdominal; Carpal tunnel release (Bilateral); and Colonoscopy (2017). family history includes Cancer in her father; High Blood Pressure in her mother; Other in her mother; Prostate Cancer in her brother.     Social History     Socioeconomic History    Marital status: Single     Spouse name: Not on file    Number of children: Not on file    Years of education: Not on file    Highest education level: Not on file   Occupational History    Not on file   Tobacco Use    Smoking status: Former Smoker     Packs/day: 1.00     Years: 30.00     Pack years: 30.00     Types: Cigarettes     Quit date: 10/27/2019     Years since quittin.7    Smokeless tobacco: Never Used   Vaping Use    Vaping Use: Never used   Substance and Sexual Activity    Alcohol use: Not Currently    Drug use: Never    Sexual activity: Not Currently   Other Topics Concern    Not on file   Social History Narrative    Connie Weir has experienced a financial strain with resourcestrain: Food on July 27, 2020. Potential community resources and services have been provided in patient instructions by Stefanie Weaver Social Determinants of Health     Financial Resource Strain: Medium Risk    Difficulty of Paying Living Expenses: Somewhat hard   Food Insecurity: Food Insecurity Present    Worried About Running Out of Food in the Last Year: Sometimes true    Madi of Food in the Last Year: Sometimes true   Transportation Needs:     Lack of Transportation (Medical):      Lack of Transportation (Non-Medical):    Physical Activity:     Days of Exercise per Week:     Minutes of Exercise per Session:    Stress:     Feeling of Stress :    Social Connections:     Frequency of Communication with Friends and Family:     Frequency of Social Gatherings with Friends and Family:     Attends Orthodoxy Services:     Active Member of Clubs or Organizations:     Attends Club or Organization Meetings:     Marital Status:    Intimate Partner Violence:     Fear of Current or Ex-Partner:     Emotionally Abused:     Physically Abused:     Sexually Abused:        Current Outpatient Medications   Medication Sig Dispense Refill    clobetasol (TEMOVATE) 0.05 % cream Apply topically 2 times daily (not face, armpit, or groin) 60 g 0    albuterol sulfate HFA (VENTOLIN HFA) 108 (90 Base) MCG/ACT inhaler Inhale 2 puffs into the lungs 4 times daily as needed for Wheezing 1 Inhaler 1    fluocinonide (LIDEX) 0.05 % cream Apply to rash on hands twice daily, and scalp daily 60 g 5    tretinoin (RETIN-A) 0.025 % cream Apply pea sized amount to face nightly 45 g 3    chlorthalidone (HYGROTEN) 50 MG tablet TAKE 1 TABLET BY MOUTH DAILY 90 tablet 0    fexofenadine (ALLEGRA) 180 MG tablet 180 mg daily      omeprazole (PRILOSEC) 40 MG delayed release capsule 40 mg daily      carvedilol (COREG) 6.25 MG tablet Take 6.25 mg by mouth 2 times daily TAKING 2 TABLETS bid SINCE 11/28/20       No current facility-administered medications for this visit. Allergies:  sheis allergic to banana and pcn [penicillins]. ROS:  CV:  Denies chest pain; palpitations; shortness of breath; swelling of feet, ankles; and loss of consciousness. CON: Denies fever and dizziness. ENT:  Denies hearing loss / ringing, ear infections hoarseness, and swallowing problems. RESP:  Denies chronic cough, spitting up blood, and asthma/wheezing. GI: Denies abdominal pain, change in bowel habits, nausea or vomiting, and blood in stools. :  Denies frequent urination, burning or painful urination, blood in the urine, and bladder incontinence. NEURO:  Denies headache, memory loss, sleep disturbance, and tremor or movement disorder. PHYSICAL EXAM:   /82   Pulse 67   Resp 12   Ht 5' 5\" (1.651 m)   Wt 217 lb (98.4 kg)   BMI 36.11 kg/m²   GENERAL: Ryland Echavarria is a 61 y.o. female who is alert and oriented and sitting comfortably in our office. SKIN:  Intact without rashes, lesions or ulcerations. NEURO: Sensation to the extremity is intact. VASC:  Capillary refill is less than 3 seconds. Distal pulses are palpable. There is no lymphadenopathy. Knee Exam  Musculoskeletal/Neurologic:  Inspection-Swelling: significant and left mild on right, Ecchymosis: no  Palpation-Tenderness:diffuse pf  Pain with patellar grind: yes  ROM- 0-125  Strength- WNL  Sensation-normal to light touch    Special Tests-  Varus Laxity: negative   Valgus Laxity:  negative   Anterior Drawer: negative   Posterior Drawer: negative  Lachman's: negative  Katherine's:negative    Gait: antalgic    PSYCH:  Good fund of knowledge and displays understanding of exam.    RADIOLOGY: No results found. 4 views of the left and one view of right knee were ordered, independently visualized by me, and discussed with patient.   Findings: Radiographs of both knees demonstrate degenerative changes primarily in the left patellofemoral compartment with joint effusion left greater than right there are no acute osseous abnormalities no obvious fractures or dislocations are noted on plain film radiograph    Impression: Degenerative changes primarily patellofemoral compartment left greater than right joint effusions of both knees    IMPRESSION:     1. Primary osteoarthritis of both knees    2. Effusion of left knee joint          PLAN:   We discussed some of the etiologies and natural histories of     ICD-10-CM    1. Primary osteoarthritis of both knees  M17.0 XR KNEE LEFT (MIN 4 VIEWS)     XR KNEE RIGHT (1-2 VIEWS)     triamcinolone acetonide (KENALOG-40) injection 40 mg     bupivacaine (MARCAINE) 0.5 % injection 20 mg     MAJOR JOINT INJECTION     Body Fluid Crystal     Gram Stain   2. Effusion of left knee joint  M25.462    . We discussed the various treatment alternatives including anti-inflammatory medications, physical therapy, injections, further imaging studies and as a last resort surgery. Point patient has a significant joint effusion on the left side for which I think therapeutic aspiration cortisone injection is appropriate and after the risk benefits alternatives of procedure itself were discussed patient's left knee was prepped in a sterile manner with Betadine. The skin was then cooled with cold spray the mechanical fall prep. I introduced an 18-gauge needle aspirated approximately 30 mL of cloudy joint fluid from the superior lateral compartment that was sent for crystals and cell count along with Gram stain. We switch out the syringe injected 1 mL of 40 mg Kenalog and 4 mL 0.5% Marcaine patient tolerated procedure well felt immediate pain relief she was given a compression sleeve will follow up with us in 6 weeks    Return to clinic in No follow-ups on file. Gm Cartwright     Please be aware portions of this note were completed using voice recognition software and unforeseen errors may have occurred    Electronically signed by Ricci Bess DO, FAOASM  on 7/26/21 at 10:10 AM EDT        No orders of the defined types were placed in this encounter.

## 2021-07-27 LAB
CRYSTALS, FLUID: NEGATIVE
SPECIMEN TYPE: NORMAL

## 2021-07-29 ENCOUNTER — OFFICE VISIT (OUTPATIENT)
Dept: DERMATOLOGY | Age: 61
End: 2021-07-29
Payer: COMMERCIAL

## 2021-07-29 VITALS
BODY MASS INDEX: 36.29 KG/M2 | DIASTOLIC BLOOD PRESSURE: 83 MMHG | HEIGHT: 65 IN | WEIGHT: 217.8 LBS | SYSTOLIC BLOOD PRESSURE: 170 MMHG | HEART RATE: 57 BPM | OXYGEN SATURATION: 96 % | TEMPERATURE: 98.2 F

## 2021-07-29 DIAGNOSIS — D23.30 SYRINGOMA OF FACE: ICD-10-CM

## 2021-07-29 DIAGNOSIS — L66.9 CICATRICIAL ALOPECIA: ICD-10-CM

## 2021-07-29 DIAGNOSIS — L30.9 HAND DERMATITIS: Primary | ICD-10-CM

## 2021-07-29 PROCEDURE — 99214 OFFICE O/P EST MOD 30 MIN: CPT | Performed by: DERMATOLOGY

## 2021-07-29 PROCEDURE — 1036F TOBACCO NON-USER: CPT | Performed by: DERMATOLOGY

## 2021-07-29 PROCEDURE — G8427 DOCREV CUR MEDS BY ELIG CLIN: HCPCS | Performed by: DERMATOLOGY

## 2021-07-29 PROCEDURE — 3017F COLORECTAL CA SCREEN DOC REV: CPT | Performed by: DERMATOLOGY

## 2021-07-29 PROCEDURE — 11900 INJECT SKIN LESIONS </W 7: CPT | Performed by: DERMATOLOGY

## 2021-07-29 PROCEDURE — G8417 CALC BMI ABV UP PARAM F/U: HCPCS | Performed by: DERMATOLOGY

## 2021-07-29 RX ORDER — CLOBETASOL PROPIONATE 0.5 MG/G
CREAM TOPICAL
Qty: 60 G | Refills: 2 | Status: SHIPPED | OUTPATIENT
Start: 2021-07-29 | End: 2021-11-02 | Stop reason: SDUPTHER

## 2021-07-29 RX ORDER — CLOBETASOL PROPIONATE 0.5 MG/G
CREAM TOPICAL
Qty: 60 G | Refills: 2 | Status: SHIPPED | OUTPATIENT
Start: 2021-07-29 | End: 2021-07-29

## 2021-07-29 NOTE — PROGRESS NOTES
Pt bp elevated in the office-she will monitor at home and call PCP. She does c/o headache but no vision change, SOB, CP.  Pt will go to ER if starts with any other symptoms before talking with PCP

## 2021-07-29 NOTE — PATIENT INSTRUCTIONS
For chemical peels: [facility], recommended Dr. Quiroga More at [facility], [facility]

## 2021-07-29 NOTE — PROGRESS NOTES
Dermatology Patient Note  [facility] [address]  [address]  [address]  Dept: [phone]  Dept Fax: [phone]      VISITDATE: 7/29/2021   REFERRING PROVIDER: No ref. provider found      Parker Lind is a 61 y.o. female  who presents today in the office for:    Follow-up (3 month follow up on alopecia- stopped about 2 weeks ago fluocinonide crm. Feels like it was not working. Hand derm- using clobetasol (tacrolimus-not using, fluocinonide- not using). Mole on L foot that is itchy.)      HISTORY OF PRESENT ILLNESS:  As above. States that clobetasol on hand is helping. Has been using lidex on scalp. Stopped using two weeks ago as she felt it was no longer working.     MEDICAL PROBLEMS:  Patient Active Problem List    Diagnosis Date Noted    AURELIANO on CPAP 12/04/2020    Hematuria     Essential hypertension 07/27/2020    Dyslipidemia 07/27/2020    Obesity (BMI 30-39.9) 07/27/2020    Vitamin D deficiency 07/27/2020    AURELIANO (obstructive sleep apnea) 07/27/2020    History of hepatitis C 07/27/2020       CURRENT MEDICATIONS:   Current Outpatient Medications   Medication Sig Dispense Refill    clobetasol (TEMOVATE) 0.05 % cream Apply topically 2 times daily (not face, armpit, or groin) 60 g 0    albuterol sulfate HFA (VENTOLIN HFA) 108 (90 Base) MCG/ACT inhaler Inhale 2 puffs into the lungs 4 times daily as needed for Wheezing 1 Inhaler 1    chlorthalidone (HYGROTEN) 50 MG tablet TAKE 1 TABLET BY MOUTH DAILY 90 tablet 0    omeprazole (PRILOSEC) 40 MG delayed release capsule 40 mg daily      carvedilol (COREG) 6.25 MG tablet Take 6.25 mg by mouth 2 times daily TAKING 2 TABLETS bid SINCE 11/28/20      fluocinonide (LIDEX) 0.05 % cream Apply to rash on hands twice daily, and scalp daily (Patient not taking: Reported on 7/29/2021) 60 g 5    tretinoin (RETIN-A) 0.025 % cream Apply pea sized amount to face nightly (Patient not taking: Reported on 7/29/2021) 45 g 3     No current facility-administered medications for this visit. ALLERGIES:   Allergies   Allergen Reactions    Banana     Pcn [Penicillins] Hives       SOCIAL HISTORY:  Social History     Tobacco Use    Smoking status: Former Smoker     Packs/day: 1.00     Years: 30.00     Pack years: 30.00     Types: Cigarettes     Quit date: 10/27/2019     Years since quittin.7    Smokeless tobacco: Never Used   Substance Use Topics    Alcohol use: Not Currently       Pertinent ROS:  Review of Systems  Skin: Denies any new changing, growing or bleeding lesions or rashes except as described in the HPI   Constitutional: Denies fevers, chills, and malaise. PHYSICAL EXAM:   BP (!) 150/86   Pulse 57   Temp 98.2 °F (36.8 °C)   Ht 5' 5\" (1.651 m)   Wt 217 lb 12.8 oz (98.8 kg)   SpO2 96%   BMI 36.24 kg/m²     The patient is generally well appearing, well nourished, alert and conversational. Affect is normal.    Cutaneous Exam:  Physical Exam  Focused exam of scalp, face, hand, foot was performed    Facial covering was removed during examination. Diagnoses/exam findings/medical history pertinent to this visit are listed below:    Assessment:   Diagnosis Orders   1. Hand dermatitis     2. 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 Ddx includes traction/ccca  - chronic illness, responding to treatment but not yet at goal   - switch to clobetasol for scalp  - plaquenil in reserve  Intralesional Injection: After discussion of risks including atrophy and dyspigmentation, patient gives verbal consent to proceed. After cleansing with alcohol the alopecic patches on the scalp were injected with 0.3 ml of Kenalog 10 mg/mL   - start Minoxidil 5% 1-2 daily. Counseled appropriate use. Will take up to 6 months to see results. May have initial telogen effluvium.  Results will be reversed if use is discontinued. Switch to women's strength (2%) if increased facial hair growth noted. Multiple syringomas  - consider treatment with cosmetic dermatologist      RT 4-6 julio    Future Appointments   Date Time Provider Elizabeth Almanzar   8/12/2021 12:00 PM Suzy Cuello, [address] [facility]   8/16/2021 12:00 PM Stephanie Langley   8/16/2021  2:30 PM Deanna Rashid MD sv gr lks [facility]   9/7/2021  9:30 AM Josseline Borrego, [facility]   9/17/2021  2:00 PM Alexander Cespedes MD Resp Spec [email or web address]   10/21/2021  9:30 AM Lea Eaton MD  derm [facility]         Patient Instructions   For chemical peels: [facility], [facility]      This note was created with the assistance of a speech-recognition program.  Although the intention is to generate a document that actually reflects the content of the visit, no guarantees can be provided that every mistake has been identified and corrected by editing. I, Dr. Stevo Mitchell, personally performed the services described in this documentation, as scribed by [facility] in my presence, and it is both accurate and complete.      Electronically signed by Lea Eaton MD on 7/29/21 at 10:42 AM EDT

## 2021-08-02 ENCOUNTER — TELEPHONE (OUTPATIENT)
Dept: ORTHOPEDIC SURGERY | Age: 61
End: 2021-08-02

## 2021-08-02 NOTE — TELEPHONE ENCOUNTER
Knees became more painful over the weekend. We discussed hx of arthritis she is feeling the achy pain arthritis. Cortisone flare, baker's cyst negative labs and reasons for and increase in pain in the knees following the injections. Etienne Bains

## 2021-08-07 ENCOUNTER — HOSPITAL ENCOUNTER (OUTPATIENT)
Age: 61
Discharge: HOME | End: 2021-08-07
Payer: COMMERCIAL

## 2021-08-07 DIAGNOSIS — I10 ESSENTIAL HYPERTENSION: ICD-10-CM

## 2021-08-07 DIAGNOSIS — Z86.19 HISTORY OF HEPATITIS C: ICD-10-CM

## 2021-08-07 DIAGNOSIS — M54.50 BILATERAL LOW BACK PAIN WITHOUT SCIATICA, UNSPECIFIED CHRONICITY: ICD-10-CM

## 2021-08-07 LAB
ABSOLUTE EOS #: 0.15 K/UL (ref 0–0.44)
ABSOLUTE IMMATURE GRANULOCYTE: <0.03 K/UL (ref 0–0.3)
ABSOLUTE LYMPH #: 2.57 K/UL (ref 1.1–3.7)
ABSOLUTE MONO #: 0.54 K/UL (ref 0.1–1.2)
ALBUMIN SERPL-MCNC: 3.6 G/DL (ref 3.5–5.2)
ALBUMIN/GLOBULIN RATIO: 0.9 (ref 1–2.5)
ALP BLD-CCNC: 85 U/L (ref 35–104)
ALT SERPL-CCNC: 12 U/L (ref 5–33)
AST SERPL-CCNC: 15 U/L
BASOPHILS # BLD: 0 % (ref 0–2)
BASOPHILS ABSOLUTE: 0.03 K/UL (ref 0–0.2)
BILIRUB SERPL-MCNC: 0.51 MG/DL (ref 0.3–1.2)
BILIRUBIN DIRECT: 0.1 MG/DL
BILIRUBIN URINE: NEGATIVE
BILIRUBIN, INDIRECT: 0.41 MG/DL (ref 0–1)
BUN BLDV-MCNC: 14 MG/DL (ref 8–23)
COLOR: YELLOW
COMMENT UA: NORMAL
CREAT SERPL-MCNC: 0.92 MG/DL (ref 0.5–0.9)
DIFFERENTIAL TYPE: NORMAL
EOSINOPHILS RELATIVE PERCENT: 2 % (ref 1–4)
GFR AFRICAN AMERICAN: >60 ML/MIN
GFR NON-AFRICAN AMERICAN: >60 ML/MIN
GFR SERPL CREATININE-BSD FRML MDRD: ABNORMAL ML/MIN/{1.73_M2}
GFR SERPL CREATININE-BSD FRML MDRD: ABNORMAL ML/MIN/{1.73_M2}
GLOBULIN: ABNORMAL G/DL (ref 1.5–3.8)
GLUCOSE URINE: NEGATIVE
HCT VFR BLD CALC: 41.8 % (ref 36.3–47.1)
HEMOGLOBIN: 13.4 G/DL (ref 11.9–15.1)
IMMATURE GRANULOCYTES: 0 %
KETONES, URINE: NEGATIVE
LEUKOCYTE ESTERASE, URINE: NEGATIVE
LYMPHOCYTES # BLD: 33 % (ref 24–43)
MCH RBC QN AUTO: 27.5 PG (ref 25.2–33.5)
MCHC RBC AUTO-ENTMCNC: 32.1 G/DL (ref 28.4–34.8)
MCV RBC AUTO: 85.8 FL (ref 82.6–102.9)
MONOCYTES # BLD: 7 % (ref 3–12)
NITRITE, URINE: NEGATIVE
NRBC AUTOMATED: 0 PER 100 WBC
PDW BLD-RTO: 13.2 % (ref 11.8–14.4)
PH UA: 6.5 (ref 5–8)
PLATELET # BLD: 318 K/UL (ref 138–453)
PLATELET ESTIMATE: NORMAL
PMV BLD AUTO: 9.7 FL (ref 8.1–13.5)
PROTEIN UA: NEGATIVE
RBC # BLD: 4.87 M/UL (ref 3.95–5.11)
RBC # BLD: NORMAL 10*6/UL
SEG NEUTROPHILS: 58 % (ref 36–65)
SEGMENTED NEUTROPHILS ABSOLUTE COUNT: 4.49 K/UL (ref 1.5–8.1)
SPECIFIC GRAVITY UA: 1.02 (ref 1–1.03)
TOTAL PROTEIN: 7.5 G/DL (ref 6.4–8.3)
TURBIDITY: CLEAR
URINE HGB: NEGATIVE
UROBILINOGEN, URINE: NORMAL
WBC # BLD: 7.8 K/UL (ref 3.5–11.3)
WBC # BLD: NORMAL 10*3/UL

## 2021-08-07 PROCEDURE — 36415 COLL VENOUS BLD VENIPUNCTURE: CPT

## 2021-08-07 PROCEDURE — 81003 URINALYSIS AUTO W/O SCOPE: CPT

## 2021-08-07 PROCEDURE — 87522 HEPATITIS C REVRS TRNSCRPJ: CPT

## 2021-08-07 PROCEDURE — 82565 ASSAY OF CREATININE: CPT

## 2021-08-07 PROCEDURE — 85025 COMPLETE CBC W/AUTO DIFF WBC: CPT

## 2021-08-07 PROCEDURE — 84520 ASSAY OF UREA NITROGEN: CPT

## 2021-08-07 PROCEDURE — 80076 HEPATIC FUNCTION PANEL: CPT

## 2021-08-09 LAB
DIRECT EXAM: NORMAL
Lab: NORMAL
SPECIMEN DESCRIPTION: NORMAL

## 2021-08-16 ENCOUNTER — OFFICE VISIT (OUTPATIENT)
Dept: GASTROENTEROLOGY | Age: 61
End: 2021-08-16
Payer: COMMERCIAL

## 2021-08-16 VITALS — WEIGHT: 211 LBS | DIASTOLIC BLOOD PRESSURE: 85 MMHG | SYSTOLIC BLOOD PRESSURE: 154 MMHG | BODY MASS INDEX: 35.11 KG/M2

## 2021-08-16 DIAGNOSIS — Z86.19 HISTORY OF HEPATITIS C: Primary | ICD-10-CM

## 2021-08-16 PROCEDURE — 1036F TOBACCO NON-USER: CPT | Performed by: INTERNAL MEDICINE

## 2021-08-16 PROCEDURE — G8417 CALC BMI ABV UP PARAM F/U: HCPCS | Performed by: INTERNAL MEDICINE

## 2021-08-16 PROCEDURE — 3017F COLORECTAL CA SCREEN DOC REV: CPT | Performed by: INTERNAL MEDICINE

## 2021-08-16 PROCEDURE — 99213 OFFICE O/P EST LOW 20 MIN: CPT | Performed by: INTERNAL MEDICINE

## 2021-08-16 PROCEDURE — G8427 DOCREV CUR MEDS BY ELIG CLIN: HCPCS | Performed by: INTERNAL MEDICINE

## 2021-08-16 ASSESSMENT — ENCOUNTER SYMPTOMS
EYES NEGATIVE: 1
NAUSEA: 1
RESPIRATORY NEGATIVE: 1
ALLERGIC/IMMUNOLOGIC NEGATIVE: 1

## 2021-08-16 NOTE — PROGRESS NOTES
Laterality Date    CARPAL TUNNEL RELEASE Bilateral     COLONOSCOPY  2017    TUBULAR ADENOMA    HYSTERECTOMY, TOTAL ABDOMINAL         CURRENT MEDICATIONS:    Current Outpatient Medications:     clobetasol (TEMOVATE) 0.05 % cream, Apply topically daily to frontal hairline and to hands as needed for dermatitis, Disp: 60 g, Rfl: 2    albuterol sulfate HFA (VENTOLIN HFA) 108 (90 Base) MCG/ACT inhaler, Inhale 2 puffs into the lungs 4 times daily as needed for Wheezing, Disp: 1 Inhaler, Rfl: 1    fluocinonide (LIDEX) 0.05 % cream, Apply to rash on hands twice daily, and scalp daily, Disp: 60 g, Rfl: 5    tretinoin (RETIN-A) 0.025 % cream, Apply pea sized amount to face nightly, Disp: 45 g, Rfl: 3    chlorthalidone (HYGROTEN) 50 MG tablet, TAKE 1 TABLET BY MOUTH DAILY, Disp: 90 tablet, Rfl: 0    omeprazole (PRILOSEC) 40 MG delayed release capsule, 40 mg daily, Disp: , Rfl:     carvedilol (COREG) 6.25 MG tablet, Take 6.25 mg by mouth 2 times daily TAKING 2 TABLETS bid SINCE 20, Disp: , Rfl:     ALLERGIES:   Allergies   Allergen Reactions    Banana     Pcn [Penicillins] Hives       FAMILY HISTORY:       Problem Relation Age of Onset    High Blood Pressure Mother     Other Mother         ALZHEIMERS    Cancer Father         BONE AND PROSTRATE    Prostate Cancer Brother          SOCIAL HISTORY:   Social History     Socioeconomic History    Marital status: Single     Spouse name: Not on file    Number of children: Not on file    Years of education: Not on file    Highest education level: Not on file   Occupational History    Not on file   Tobacco Use    Smoking status: Former Smoker     Packs/day: 1.00     Years: 30.00     Pack years: 30.00     Types: Cigarettes     Quit date: 10/27/2019     Years since quittin.8    Smokeless tobacco: Never Used   Vaping Use    Vaping Use: Never used   Substance and Sexual Activity    Alcohol use: Not Currently    Drug use: Never    Sexual activity: Not Currently   Other Topics Concern    Not on file   Social History Narrative    Gavin Spine has experienced a financial strain with resourcestrain: Food on July 27, 2020. Potential community resources and services have been provided in patient instructions by Sury Mccormack. Social Determinants of Health     Financial Resource Strain:     Difficulty of Paying Living Expenses:    Food Insecurity:     Worried About Running Out of Food in the Last Year:     [address] in the Last Year:    Transportation Needs:     Lack of Transportation (Medical):  Lack of Transportation (Non-Medical):    Physical Activity:     Days of Exercise per Week:     Minutes of Exercise per Session:    Stress:     Feeling of Stress :    Social Connections:     Frequency of Communication with Friends and Family:     Frequency of Social Gatherings with Friends and Family:     Attends Jain Services:     Active Member of Clubs or Organizations:     Attends Club or Organization Meetings:     Marital Status:    Intimate Partner Violence:     Fear of Current or Ex-Partner:     Emotionally Abused:     Physically Abused:     Sexually Abused:        REVIEW OF SYSTEMS: A 12-point review of systems was obtained and pertinent positives and negatives were listed below. REVIEW OF SYSTEMS:     Constitutional: No fever, no chills, no lethargy, no weakness. HEENT:  No headache, otalgia, itchy eyes, nasal discharge or sore throat. Cardiac:  No chest pain, dyspnea, orthopnea or PND. Chest:   No cough, phlegm or wheezing. Abdomen:      Detailed by MA   Neuro:  No focal weakness, abnormal movements or seizure like activity. Skin:   No rashes, no itching. :   No hematuria, no pyuria, no dysuria, no flank pain. Extremities:  No swelling or joint pains. ROS was otherwise negative    Review of Systems   Constitutional: Negative. HENT: Negative. Eyes: Negative. Respiratory: Negative.     Cardiovascular: Negative. Gastrointestinal: Positive for nausea. Endocrine: Negative. Genitourinary: Negative. Musculoskeletal: Negative. Allergic/Immunologic: Negative. Neurological: Negative. Hematological: Negative. Psychiatric/Behavioral: Negative. All other systems reviewed and are negative. PHYSICAL EXAMINATION: Vital signs reviewed per the nursing documentation. BP (!) 150/81   Wt 211 lb (95.7 kg)   BMI 35.11 kg/m²   Body mass index is 35.11 kg/m². Physical Exam    Physical Exam   Constitutional: Patient is oriented to person, place, and time. Patient appears well-developed and well-nourished. HENT:   Head: Normocephalic and atraumatic. Eyes: Pupils are equal, round, and reactive to light. EOM are normal.   Neck: Normal range of motion. Neck supple. No JVD present. No tracheal deviation present. No thyromegaly present. Cardiovascular: Normal rate, regular rhythm, normal heart sounds and intact distal pulses. Pulmonary/Chest: Effort normal and breath sounds normal. No stridor. No respiratory distress. He has no wheezes. He has no rales. He exhibits no tenderness. Abdominal: Soft. Bowel sounds are normal. He exhibits no distension and no mass. There is no tenderness. There is no rebound and no guarding. No hernia. Musculoskeletal: Normal range of motion. Lymphadenopathy:    Patient has no cervical adenopathy. Neurological: Patient is alert and oriented to person, place, and time. Psychiatric: Patient has a normal mood and affect.  Patient behavior is normal.       LABORATORY DATA: Reviewed  Lab Results   Component Value Date    WBC 7.8 08/07/2021    HGB 13.4 08/07/2021    HCT 41.8 08/07/2021    MCV 85.8 08/07/2021     08/07/2021     04/20/2021    K 3.8 04/20/2021    CL 97 (L) 04/20/2021    CO2 33 (H) 04/20/2021    BUN 14 08/07/2021    CREATININE 0.92 (H) 08/07/2021    LABALBU 3.6 08/07/2021    BILITOT 0.51 08/07/2021    ALKPHOS 85 08/07/2021    AST 15 08/07/2021    ALT 12 08/07/2021         Lab Results   Component Value Date    RBC 4.87 08/07/2021    HGB 13.4 08/07/2021    MCV 85.8 08/07/2021    MCH 27.5 08/07/2021    MCHC 32.1 08/07/2021    RDW 13.2 08/07/2021    MPV 9.7 08/07/2021    BASOPCT 0 08/07/2021    LYMPHSABS 2.57 08/07/2021    MONOSABS 0.54 08/07/2021    NEUTROABS 4.49 08/07/2021    EOSABS 0.15 08/07/2021    BASOSABS 0.03 08/07/2021         DIAGNOSTIC TESTING:     XR KNEE RIGHT (1-2 VIEWS)    Result Date: 7/26/2021  4 views of the left and one view of right knee were ordered, independently visualized by me, and discussed with patient. Findings: Radiographs of both knees demonstrate degenerative changes primarily in the left patellofemoral compartment with joint effusion left greater than right there are no acute osseous abnormalities no obvious fractures or dislocations are noted on plain film radiograph   Impression: Degenerative changes primarily patellofemoral compartment left greater than right joint effusions of both knees    XR KNEE LEFT (MIN 4 VIEWS)    Result Date: 7/26/2021  4 views of the left and one view of right knee were ordered, independently visualized by me, and discussed with patient.  Findings: Radiographs of both knees demonstrate degenerative changes primarily in the left patellofemoral compartment with joint effusion left greater than right there are no acute osseous abnormalities no obvious fractures or dislocations are noted on plain film radiograph   Impression: Degenerative changes primarily patellofemoral compartment left greater than right joint effusions of both knees             IMPRESSION: Ms.Donna SITA Rain is a 61 y.o. female with a past history remarkable for chronic HCV, referred for evaluation of treatment.     Chronic hepatitis C patient is treatment experience, noncirrhotic, treatment experience with pegylated interferon and ribavirin x2 and has maintained SVR for several years now.       Undetectable viral load on recent lab work performed in August 2021  LFTs unremarkable and within normal limits  No obvious relationship between gallbladder/liver and patient's plantar pruritus. Assessment  1. History of hepatitis C        PLAN:    1) chronic otitis C status post treatment with pegylated interferon and ribavirinmaintained SVR on recent lab work. We will repeat liver US for [facility] [address] screening and surveillance    2) Colonoscopy needs to be scheduled. Await call from patient, last performed in 2017 at outlying facility. Repeat recommendation not fully known, patient to call in the office and provide further detail from outside report    3) RTC in 3 months. Recommend patient follow-up with her primary care physician with regards to her plantar pruritus \"foot itching\". Non-GI related    Thank you for allowing me to participate in the care of Ms. Rain. For any further questions please do not hesitate to contact me. I have reviewed and agree with the ROS entered by the MA/LPN from today's encounter documented in a separate note. Batool Salazar MD, MPH   [facility]  Office #: [phone]    this note is created with the assistance of a speech recognition program.  While intending to generate a document that actually reflects the content of the visit, the document can still have some errors including those of syntax and sound a like substitutions which may escape proof reading. It such instances, actual meaning can be extrapolated by contextual diversion.

## 2021-08-18 ENCOUNTER — TELEPHONE (OUTPATIENT)
Dept: GASTROENTEROLOGY | Age: 61
End: 2021-08-18

## 2021-08-18 NOTE — TELEPHONE ENCOUNTER
Per Dr Dale Alonso note on 8/16/21:   2) Colonoscopy needs to be scheduled. Await call from patient, last performed in 2017 at outlying facility. Repeat recommendation not fully known, patient to call in the office and provide further detail from outside report      Rec'd records from colon completed on 11/6/17; pt had 2 polyps. Pt needs to be contacted to schedule colon ordered on 8/16/21.

## 2021-08-21 NOTE — TELEPHONE ENCOUNTER
Writer returned phone call and spoke to pt in regards to scheduling colon proc. Per pt she is not on any blood thinners and has not been vaccinated for the Covid-19 virus. Writer was instructing pt she will need a  and she states she would be arriving by medical transportation. Writer informed pt she will need an adult to accompany her to proc due to the medical  cannot be responsible for pt after she has had anesthesia. Pt states she will have to call back to sched; she has to find someone to come with her on proc day.

## 2021-08-25 NOTE — TELEPHONE ENCOUNTER
Writer called patient to schedule procedure. No answer left vm to call us back to schedule procedure.

## 2021-08-27 NOTE — TELEPHONE ENCOUNTER
Writer called and spoke with patient, Colonosocpy scheduled Monday [email or web address] with Karlee Jolly test to be done at [email or web address].  Patient given verbal prep instructions over the phone. Patient give verbal understanding.

## 2021-09-07 ENCOUNTER — OFFICE VISIT (OUTPATIENT)
Dept: ORTHOPEDIC SURGERY | Age: 61
End: 2021-09-07
Payer: COMMERCIAL

## 2021-09-07 VITALS — DIASTOLIC BLOOD PRESSURE: 84 MMHG | HEART RATE: 61 BPM | SYSTOLIC BLOOD PRESSURE: 145 MMHG

## 2021-09-07 DIAGNOSIS — M17.0 PRIMARY OSTEOARTHRITIS OF BOTH KNEES: Primary | ICD-10-CM

## 2021-09-07 PROCEDURE — 3017F COLORECTAL CA SCREEN DOC REV: CPT | Performed by: FAMILY MEDICINE

## 2021-09-07 PROCEDURE — G8427 DOCREV CUR MEDS BY ELIG CLIN: HCPCS | Performed by: FAMILY MEDICINE

## 2021-09-07 PROCEDURE — 1036F TOBACCO NON-USER: CPT | Performed by: FAMILY MEDICINE

## 2021-09-07 PROCEDURE — 99213 OFFICE O/P EST LOW 20 MIN: CPT | Performed by: FAMILY MEDICINE

## 2021-09-07 PROCEDURE — G8417 CALC BMI ABV UP PARAM F/U: HCPCS | Performed by: FAMILY MEDICINE

## 2021-09-07 RX ORDER — MELOXICAM 15 MG/1
15 TABLET ORAL DAILY
Qty: 30 TABLET | Refills: 3 | Status: SHIPPED | OUTPATIENT
Start: 2021-09-07 | End: 2022-02-21

## 2021-09-07 NOTE — PROGRESS NOTES
Sports Medicine Consultation     CHIEF COMPLAINT:  Knee Pain (b/l knee f/u. 10 days - 2wks relief from inject. increase pain last night couldnt sleep)      HPI:  Huang Osborn is a 61y.o. year old female who is a  established patient being seen for regarding follow up of a pre-existing problem bilateral knee pain. The pain has been present for 8 week(s). The patient recalls a no new injury. The patient has tried cortisone and asp with improvement. The pain is described as achy. There is  pain on weightbearing. The knee does swell. There is is not painful popping and clicking. The knee does not catch or lock. It has not given out. It is  stiff upon arising from sitting. It is  painful to go up and down stairs and sit for a prolonged period of time. she has a past medical history of Essential hypertension, Hematuria, Hepatitis-C, Hypertension, Mitral valve regurgitation, Obesity (BMI 30-39.9), AURELIANO (obstructive sleep apnea), AURELIANO on CPAP, and Sleep apnea. she has a past surgical history that includes Hysterectomy, total abdominal; Carpal tunnel release (Bilateral); and Colonoscopy (2017). family history includes Cancer in her father; High Blood Pressure in her mother; Other in her mother; Prostate Cancer in her brother.     Social History     Socioeconomic History    Marital status: Single     Spouse name: Not on file    Number of children: Not on file    Years of education: Not on file    Highest education level: Not on file   Occupational History    Not on file   Tobacco Use    Smoking status: Former Smoker     Packs/day: 1.00     Years: 30.00     Pack years: 30.00     Types: Cigarettes     Quit date: 10/27/2019     Years since quittin.8    Smokeless tobacco: Never Used   Vaping Use    Vaping Use: Never used   Substance and Sexual Activity    Alcohol use: Not Currently    Drug use: Never    Sexual activity: Not Currently   Other Topics Concern    Not on file Social History Narrative    Pasha Patton has experienced a financial strain with resourcestrain: Food on July 27, 2020. Potential community resources and services have been provided in patient instructions by Hortencia Fan. Social Determinants of Health     Financial Resource Strain:     Difficulty of Paying Living Expenses:    Food Insecurity:     Worried About Running Out of Food in the Last Year:     [address] in the Last Year:    Transportation Needs:     Lack of Transportation (Medical):      Lack of Transportation (Non-Medical):    Physical Activity:     Days of Exercise per Week:     Minutes of Exercise per Session:    Stress:     Feeling of Stress :    Social Connections:     Frequency of Communication with Friends and Family:     Frequency of Social Gatherings with Friends and Family:     Attends [employer] Services:     Active Member of Clubs or Organizations:     Attends Club or Organization Meetings:     Marital Status:    Intimate Partner Violence:     Fear of Current or Ex-Partner:     Emotionally Abused:     Physically Abused:     Sexually Abused:        Current Outpatient Medications   Medication Sig Dispense Refill    meloxicam (MOBIC) 15 MG tablet Take 1 tablet by mouth daily 30 tablet 3    clobetasol (TEMOVATE) 0.05 % cream Apply topically daily to frontal hairline and to hands as needed for dermatitis 60 g 2    albuterol sulfate HFA (VENTOLIN HFA) 108 (90 Base) MCG/ACT inhaler Inhale 2 puffs into the lungs 4 times daily as needed for Wheezing 1 Inhaler 1    fluocinonide (LIDEX) 0.05 % cream Apply to rash on hands twice daily, and scalp daily 60 g 5    tretinoin (RETIN-A) 0.025 % cream Apply pea sized amount to face nightly 45 g 3    chlorthalidone (HYGROTEN) 50 MG tablet TAKE 1 TABLET BY MOUTH DAILY 90 tablet 0    omeprazole (PRILOSEC) 40 MG delayed release capsule 40 mg daily      carvedilol (COREG) 6.25 MG tablet Take 6.25 mg by mouth 2 times daily TAKING 2 TABLETS bid SINCE 11/28/20       Current Facility-Administered Medications   Medication Dose Route Frequency Provider Last Rate Last Admin    triamcinolone acetonide (KENALOG) injection 5 mg  5 mg Intra-Lesional Once Lopez Conway MD           Allergies:  sheis allergic to banana and pcn [penicillins]. ROS:  CV:  Denies chest pain; palpitations; shortness of breath; swelling of feet, ankles; and loss of consciousness. CON: Denies fever and dizziness. ENT:  Denies hearing loss / ringing, ear infections hoarseness, and swallowing problems. RESP:  Denies chronic cough, spitting up blood, and asthma/wheezing. GI: Denies abdominal pain, change in bowel habits, nausea or vomiting, and blood in stools. :  Denies frequent urination, burning or painful urination, blood in the urine, and bladder incontinence. NEURO:  Denies headache, memory loss, sleep disturbance, and tremor or movement disorder. PHYSICAL EXAM:   BP (!) 143/62   Pulse 62   GENERAL: Marisol Cooley is a 61 y.o. female who is alert and oriented and sitting comfortably in our office. SKIN:  Intact without rashes, lesions or ulcerations. NEURO: Sensation to the extremity is intact. VASC:  Capillary refill is less than 3 seconds. Distal pulses are palpable. There is no lymphadenopathy. Knee Exam  Musculoskeletal/Neurologic:  Inspection-Swelling: pop cyst and mild effusion on left, Ecchymosis: no  Palpation-Tenderness: pf posterior on left  Pain with patellar grind: yes  ROM- 0-120 on left and 0-130 on right  Strength- WNL  Sensation-normal to light touch    Special Tests-  Varus Laxity: negative   Valgus Laxity:  negative   Anterior Drawer: negative   Posterior Drawer: negative  Lachman's: negative  Katherine's:negative    Gait: normal    PSYCH:  Good fund of knowledge and displays understanding of exam.    RADIOLOGY: No results found. IMPRESSION:     1.  Primary osteoarthritis of both knees          PLAN:   We discussed some of the etiologies and natural histories of     ICD-10-CM    1. Primary osteoarthritis of both knees  M17.0 Ambulatory referral to Physical Therapy   . We discussed the various treatment alternatives including anti-inflammatory medications, physical therapy, injections, further imaging studies and as a last resort surgery. At this point I do think physical therapy is logical part of the patient care and we will set her up for a course of formal physical therapy on top of that we will also try patellofemoral control brace and oral anti-inflammatories reevaluate in 8 weeks. Return to clinic in No follow-ups on file. Tania Beal Please be aware portions of this note were completed using voice recognition software and unforeseen errors may have occurred    Electronically signed by Eduardo Yousif DO, FAOASM  on 9/7/21 at 9:50 AM EDT        No orders of the defined types were placed in this encounter.

## 2021-09-15 ENCOUNTER — HOSPITAL ENCOUNTER (OUTPATIENT)
Dept: PHYSICAL THERAPY | Facility: CLINIC | Age: 61
Setting detail: THERAPIES SERIES
Discharge: HOME | End: 2021-09-15
Payer: COMMERCIAL

## 2021-09-15 PROCEDURE — 97110 THERAPEUTIC EXERCISES: CPT

## 2021-09-15 PROCEDURE — 97161 PT EVAL LOW COMPLEX 20 MIN: CPT

## 2021-09-15 NOTE — CONSULTS
[] [address] [facility] [facility]  [facility]rapy  [address]  [phone]  F: [phone] [x] [address]  [facility]   [address]  P: [phone]  F: [phone] [] [address]  Therapy  [address]  P: [phone]  F: [phone] [] [address]  P: [phone]  F: [phone] [] [address]  [address] [address] [facility] [address]   [address]   [place]: [phone]  F: [phone]    Physical Therapy Lower Extremity Evaluation    Date:  9/15/2021  Patient: Emmanuel Wright  : 1960  MRN: [id number]  Physician: Marissa Juarez DO   Insurance: [address] (30 v/yr)  Medical Diagnosis: M17.0- Primary osteoarthritis of both knees  Rehab Codes: M17.0, M25.66, M25.56, M25.46, R26.89, M62.81  Onset date: 2021   Next 's appt.: 21    Subjective:   CC/HPI: 61 y.o. female presents to physical therapy with bilateral knee pain that began in May 2021. Pt does not recall traumatic injury leading to onset of knee pain. Pt insidious onset of pain one day upon waking up in L knee first but gradually began to increase in R knee as it began compensating for LLE. Symptoms have been improving within the past month. Pt notes that knee pain will increase initially upon waking up, with excess activity and at the end of the evening. Pt reports swelling primarily in L knee that will increase with activity as well. Pt reports occasional buckling and feeling as if L knee is going to give out on her. Pt reports that pain will increase when standing and walking for longer periods of time, stair climbing, squatting.  Pt received an aspiration and cortisone injection to L knee at appointment with Dr. Kade Santamaria  On 21 and reports relief in sharp pain and swelling following but continues to notice dull ache through anterior knee. Pt was given a compression sleeve which she has been wearing while ambulating for the past 2 weeks which has improved symptoms in L knee. Pt will occasionally ice and take pain medications to help manage the pain with temporary relief in symptoms.        PMHx: [] Unremarkable [] Diabetes [x] HTN  [] Pacemaker   [x] MI/Heart Problems [] Cancer [x] Arthritis   [] Other:              [x] Refer to full medical chart  In EPIC     Tests: [x] X-Ray:   Degenerative changes primarily patellofemoral compartment left    greater than right joint effusions of both knees      [] MRI:    [] Other:     Comorbidities:   [] Obesity [] Dialysis  [] N/A   [] Asthma/COPD [] Dementia [] Other:   [] Stroke [x] Sleep apnea [] Other:   [] Vascular disease [] Rheumatic disease [] Other:       Medications:  [x] Refer to full medical record [] None [] Other:  Allergies:       [x] Refer to full medical record [] None [] Other:    ADL/IADL Previous level of function Current level of function Comments   Bathing  [x] Independent  [] Assist [x] Independent  [] Assist    Dress/grooming [x] Independent  [] Assist [x] Modified Independent  [] Assist    Transfer/mobility [x] Independent  [] Assist [x] Independent  [] Assist    Feeding [x] Independent  [] Assist [x] Independent  [] Assist    Toileting [x] Independent  [] Assist [x] Independent  [] Assist    Driving [x] Independent  [] Assist [x] Independent  [] Assist    Housekeeping [x] Independent  [] Assist [x] Modified Independent  [] Assist    Grocery shop/meal prep [x] Independent  [] Assist [x] Modified Independent  [] Assist Carrying groceries up the stairs very difficult     Gait Prior level of function Current level of function    [x] Independent  [] Assist [x] Independent  [] Assist   Device: [x] Independent [x] Independent    [] Straight Cane [] Quad cane [] Straight Cane [] Quad cane    [] Standard walker [] Rolling walker   [] 4 wheeled walker [] Standard walker [] Rolling walker   [] 4 wheeled walker    [] Wheelchair [] Wheelchair       Marital Status Lives Alone   Home type Apartment-2nd floor   Stairs from outside 2 flights bilateral railings   Stairs inside N/A   Employement N/A   Job status Unemployed   Work Activities/duties  [address]- losing weight        Pain present? Yes   Location Bilateral knees- L > R, globally    Pain Rating currently 0/10   Pain at worse 10/10   Pain at best 0/10   Description of pain Constant, dull, ache   Altered Sensation None   What makes it worse Standing, walking, stair climbing, squatting    What makes it better Medication, rest, activity modification   Symptom progression Improving    Sleep Disturbed- is improving              Objective:    STRENGTH    Left Right   Hip Flex 4 5   Ext 4 4   ABD 4 4   ADD     Knee Flex 4+ 5   Ext 4 5   Ankle DF 5 5   PF 5 5   INV     EVER              ROM  ° A/P    Left Right   Hip Flex     Ext     ER     IR     ABD     ADD     Knee Flex 110 105   Ext 0 lacking 2   Ankle DF     PF     INV     EVER            SLR 65 degrees L, 79 degrees R  Ely's 95 degrees L, 100 degrees R     TESTS (+/-) Left Right Not Tested   Ant. Drawer - - []   Post. Drawer - - []   Lachmans - - []   Valgus Stress - - []   Varus Stress - - []   Katherines - - []   Apleys Comp.    [x]   Apleys Dist.   [x]   Hip Scouring   [x]   TONIs   [x]   Piriformis   [x]   Carrillos   [x]   Talor Tilt   [x]   Pat-Fem Nicolas Sella   [x]       OBSERVATION No Deficit Deficit Not Tested Comments   Posture       Forward Head [] [x] []    Rounded Shoulders [] [x] []    Kyphosis [x] [] []    Lordosis [x] [] []    Lateral Shift [x] [] []    Scoliosis [x] [] []    Iliac Crest [x] [] []    PSIS [x] [] []    ASIS [x] [] []    Genu Valgus [] [x] [] bilaterally   Genu Varus [x] [] []    Genu Recurvatum [x] [] []    Pronation [] [x] [] bilaterally   Supination [x] [] []    Leg Length Discrp [x] [] []    Slumped Sitting [] [] []    Palpation [] [x] [] R PFJ- increased tenderness to pes anserine region  L PFJ- increased tenderness diffusely through anterior and medial knee    Sensation [x] [] []    Edema [] [x] [] L PFJ 1 cm larger diameter at joint line     Neurological [x] [] []    Patellar Mobility [] [x] [] Bilateral patellar hypomobility globally    Patellar Orientation [x] [] []    Gait [] [x] [] Analysis: Pt ambulates with decreased gait speed, bilateral genu valgus, excess ER at LLE > RLE, decreased stance time LLE         FUNCTION Normal Difficult Unable   Sitting [x] [] []   Standing [] [x] []   Ambulation [] [x] []   Groom/Dress [x] [] []   Lift/Carry [] [x] []   Stairs [] [x] []   Bending [] [x] []   Squat [] [x] []   Kneel [] [] [x]         BALANCE/PROPRIOCEPTION              [] Not tested   Single leg stance       R                     L                                PAIN   Eyes open                 15        Sec. 10         Sec                  . [x]    Eyes closed                          Sec. Sec                  . []       Increased instability with SLS on LLE       FUNCTIONAL TESTS PAIN NO PAIN COMMENTS   Step Test 4 [] []    6 [] []    8 [] []    Squat [x] [] Reduced range of motion, bilateral genu valgus collapse with excessive anterior tibial translation           Flexibility Normal Left tight Right tight   Hip flexor [] [x] [x]   quad [] [x] [x]   HS [] [x] [x]   piriformis [] [] []   ITB [] [] []   gastroc [] [x] [x]   Soleus  [] [] []    [] [] []    [] [] []        Somatic Dysfunctions Normal Deficit Details   Cervical   [x] []    Thoracic   [x] []    Rib   [x] []    Pelvis   [x] []    Lumbar [x] []    SI   [x] []        Functional Test: LEFI Score: 42.5% functionally impaired       Comments:      Assessment:    61 y.o. female presents to physical therapy with bilateral knee pain and effusion, reduced L knee range of motion, reduced bilateral hip and L knee strength, and bilateral knee instability and gait deficits. Pt demonstrates increased instability with SLS on LLE when compared to RLE. Pt is currently ambulating independently with several gait deficits including decreased gait speed, bilateral genu valgus, excess ER at LLE > RLE, and decreased stance time LLE. These impairments are limiting the patient's ability to stand and walk prolonged, ambulate stairs, and perform squatting activities. These signs and symptoms are consistent with medical diagnosis of bilateral knee osteoarthritis. Pt demonstrates good ligamentous stability with negative ligamentous testing. Patient would benefit from skilled physical therapy services in order to: reduce bilateral knee pain and swelling, restore BLE strength and mobility, and improve BLE stability to improve gait and return patient to prior level of function. Problems:    [x] ? Pain: up to 10/10 pain bilateral knees L > R  [x] ? ROM reduced L knee ROM globally, reduced BLE flexibility   [x] ? Strength: reduced bilateral hip and L knee strength globally   [x] ? Function- impaired function indicated by LEFI score of 42.5% impaired   [x] Other: impaired gait and balance described above    STG: (to be met in 8 treatments)  1. ? Pain: Decrease bilateral knee pain levels to 4/10 or less to improve tolerance to therapeutic exercise progressions. 2. ? ROM: Increase L knee AROM to 0-110 degrees to reduce difficulty with ADLs. a. Pt to demonstrate improved BLE flexibility with passive SLR to at least 80 degrees bilaterally, and ely's to at least 110 degrees. 3. ? Strength: Increase bilateral hip strength to at least 4+/5 and L knee strength to 5/5 to improve performance of stair ambulation, squatting, and prolonged ambulation. 4. ? Function: pt to demonstrate improved bilateral knee stability with ability to perform SLS for 30 seconds without increased knee pain.    5. Pt to be independent and compliant with Home Exercise Programs    LTG: (to be met in 12 treatments)  1. Improve score on assessment tool LEFI from 42.5% impaired to 25% impaired or less. 2. Reduce bilateral knee pain levels to 0-1/10 to improve tolerance to all daily activities including recreational exercise. 3. Pt to be able to ascend and descend a full flight of stairs independently with reciprocal stepping pattern and without increased bilateral knee pain. Patient goals: \"reduce knee pain and return to exercise to lose weight\"    Rehab Potential:  [x] Good  [] Fair  [] Poor   Suggested Professional Referral:  [x] No  [] Yes:  Barriers to Goal Achievement[de-identified]  [x] No  [] Yes:  Domestic Concerns:  [x] No  [] Yes:    Pt. Education:  [x] Plans/Goals, Risks/Benefits discussed  [x] Home exercise program    Method of Education: [x] Verbal  [x] Demo  [x] Written   9/16/21- HEP of charted exercises, [employer] #[id number]  Comprehension of Education:  [x] Verbalizes understanding. [x] Demonstrates understanding. [x] Needs Review. [] Demonstrates/verbalizes understanding of HEP/Ed previously given. Treatment Plan:  [x] Therapeutic Exercise   87008  [] Iontophoresis: 4 mg/mL Dexamethasone Sodium Phosphate  mAmin  61514   [] Therapeutic Activity  59395 [x] Vasopneumatic cold with compression  71779    [x] Gait Training   63908 [] Ultrasound   36107   [x] Neuromuscular Re-education  35013 [] Electrical Stimulation Unattended  10000   [x] Manual Therapy  25019 [] Electrical Stimulation Attended  04069   [x] Instruction in HEP  [] Lumbar/Cervical Traction  35564   [] Aquatic Therapy   39878 [x] Cold/hotpack    [] Massage   88699      [] Dry Needling, 1 or 2 muscles  39326   [] Biofeedback, first 15 minutes   02182  [] Biofeedback, additional 15 minutes   52990 [] Dry Needling, 3 or more muscles  87480            [x]  Medication allergies reviewed for use of    Dexamethasone Sodium Phosphate 4mg/ml     with iontophoresis treatments.    Pt is not allergic. Frequency:  2 x/week for 12 visits    Todays Treatment:    Exercises:  Exercise     Reps/ Time Weight/ Level Comments         Modified Esteban Stretch 1' ea     Seated HS stretch 3x30\"     Supine HS stretch 3x30\"     Hooklying iso clamshell 2x10     SLR 10x                                                           Other:    Specific Instructions for next treatment: progress bilateral hip and knee strengthening globally, hamstring/quad flexibility, restore knee ROM, progress bilateral LE stability and gait     Evaluation Complexity:  History (Personal factors, comorbidities) [] 0 [] 1-2 [x] 3+   Exam (limitations, restrictions) [] 1-2 [] 3 [x] 4+   Clinical presentation (progression) [x] Stable [] Evolving  [] Unstable   Decision Making [x] Low [] Moderate [] High    [x] Low Complexity [] Moderate Complexity [] High Complexity       Treatment Charges: Mins Units   [x] Evaluation       [x]  Low       []  Moderate       []  High 40 1   []  Modalities     [x]  Ther Exercise 10 1   []  Manual Therapy     []  Ther Activities     []  Aquatics     []  Vasocompression     []  Other       TOTAL TREATMENT TIME: 50 minutes     Time in: 6:05 pm   Time Out: 6:55 pm    Electronically signed by: Ryan Nick PT        Physician Signature:________________________________Date:__________________  By signing above or cosigning this note, I have reviewed this plan of care and certify a need for medically necessary rehabilitation services.      *PLEASE SIGN ABOVE AND FAX BACK ALL PAGES*

## 2021-09-17 ENCOUNTER — OFFICE VISIT (OUTPATIENT)
Dept: PULMONOLOGY | Age: 61
End: 2021-09-17
Payer: COMMERCIAL

## 2021-09-17 VITALS
DIASTOLIC BLOOD PRESSURE: 78 MMHG | SYSTOLIC BLOOD PRESSURE: 140 MMHG | HEART RATE: 64 BPM | HEIGHT: 65 IN | TEMPERATURE: 97.8 F | RESPIRATION RATE: 15 BRPM | OXYGEN SATURATION: 99 % | WEIGHT: 178 LBS | BODY MASS INDEX: 29.66 KG/M2

## 2021-09-17 DIAGNOSIS — R91.1 INCIDENTAL LUNG NODULE, LESS THAN OR EQUAL TO 3MM: ICD-10-CM

## 2021-09-17 DIAGNOSIS — J43.2 CENTRILOBULAR EMPHYSEMA (HCC): ICD-10-CM

## 2021-09-17 DIAGNOSIS — K21.9 LARYNGOPHARYNGEAL REFLUX (LPR): ICD-10-CM

## 2021-09-17 DIAGNOSIS — J30.1 SEASONAL ALLERGIC RHINITIS DUE TO POLLEN: ICD-10-CM

## 2021-09-17 DIAGNOSIS — Z99.89 OSA ON CPAP: Primary | ICD-10-CM

## 2021-09-17 DIAGNOSIS — K44.9 HIATAL HERNIA WITH GERD: ICD-10-CM

## 2021-09-17 DIAGNOSIS — G47.33 OSA ON CPAP: Primary | ICD-10-CM

## 2021-09-17 DIAGNOSIS — K21.9 HIATAL HERNIA WITH GERD: ICD-10-CM

## 2021-09-17 PROCEDURE — G8417 CALC BMI ABV UP PARAM F/U: HCPCS | Performed by: INTERNAL MEDICINE

## 2021-09-17 PROCEDURE — 1036F TOBACCO NON-USER: CPT | Performed by: INTERNAL MEDICINE

## 2021-09-17 PROCEDURE — G8427 DOCREV CUR MEDS BY ELIG CLIN: HCPCS | Performed by: INTERNAL MEDICINE

## 2021-09-17 PROCEDURE — 3017F COLORECTAL CA SCREEN DOC REV: CPT | Performed by: INTERNAL MEDICINE

## 2021-09-17 PROCEDURE — 99214 OFFICE O/P EST MOD 30 MIN: CPT | Performed by: INTERNAL MEDICINE

## 2021-09-17 PROCEDURE — 3023F SPIROM DOC REV: CPT | Performed by: INTERNAL MEDICINE

## 2021-09-17 PROCEDURE — G8926 SPIRO NO PERF OR DOC: HCPCS | Performed by: INTERNAL MEDICINE

## 2021-09-17 RX ORDER — FLUTICASONE PROPIONATE 50 MCG
1 SPRAY, SUSPENSION (ML) NASAL DAILY
Qty: 1 EACH | Refills: 2 | Status: SHIPPED | OUTPATIENT
Start: 2021-09-17

## 2021-09-17 RX ORDER — OMEPRAZOLE 40 MG/1
40 CAPSULE, DELAYED RELEASE ORAL DAILY
Qty: 30 CAPSULE | Refills: 2 | Status: SHIPPED | OUTPATIENT
Start: 2021-09-17 | End: 2022-02-07 | Stop reason: ALTCHOICE

## 2021-09-17 ASSESSMENT — SLEEP AND FATIGUE QUESTIONNAIRES
HOW LIKELY ARE YOU TO NOD OFF OR FALL ASLEEP WHEN YOU ARE A PASSENGER IN A CAR FOR AN HOUR WITHOUT A BREAK: 1
HOW LIKELY ARE YOU TO NOD OFF OR FALL ASLEEP WHILE SITTING AND READING: 0
HOW LIKELY ARE YOU TO NOD OFF OR FALL ASLEEP WHILE SITTING AND TALKING TO SOMEONE: 0
HOW LIKELY ARE YOU TO NOD OFF OR FALL ASLEEP WHILE LYING DOWN TO REST IN THE AFTERNOON WHEN CIRCUMSTANCES PERMIT: 3
HOW LIKELY ARE YOU TO NOD OFF OR FALL ASLEEP IN A CAR, WHILE STOPPED FOR A FEW MINUTES IN TRAFFIC: 2
ESS TOTAL SCORE: 11
HOW LIKELY ARE YOU TO NOD OFF OR FALL ASLEEP WHILE WATCHING TV: 3
HOW LIKELY ARE YOU TO NOD OFF OR FALL ASLEEP WHILE SITTING QUIETLY AFTER LUNCH WITHOUT ALCOHOL: 0
HOW LIKELY ARE YOU TO NOD OFF OR FALL ASLEEP WHILE SITTING INACTIVE IN A PUBLIC PLACE: 2

## 2021-09-17 NOTE — PATIENT INSTRUCTIONS
IF you do not get your Covid vaccination prior to your follow up appointment, please call our office one week prior so we can schedule a Covid swab.    Babetta Apgar

## 2021-09-18 ENCOUNTER — HOSPITAL ENCOUNTER (OUTPATIENT)
Dept: ULTRASOUND IMAGING | Age: 61
Discharge: HOME | End: 2021-09-20
Payer: COMMERCIAL

## 2021-09-18 DIAGNOSIS — Z86.19 HISTORY OF HEPATITIS C: ICD-10-CM

## 2021-09-18 PROCEDURE — 76705 ECHO EXAM OF ABDOMEN: CPT

## 2021-09-19 NOTE — PROGRESS NOTES
REASON FOR THE CONSULTATION:  martin  HISTORY OF PRESENT ILLNESS:    Saratha Schaumann is a 61y.o. year old female   Poor compliance with CPAP therapy. She also complains of seasonal allergic rhinitis during fall season has nasal congestion. Has been seen by ENT, has hiatal hernia and GERD which is leading to chronic cough    CT lung screening reviewed with patient has mild emphysema    Advised to abstain from smoking obstructive sleep apnea. Patient had a last sleep study 2014 and was given a CPAP of 11 cm water pressure. She has been using CPAP every night but her memory card has not been read since 2015. When she sleeps at night if her neck is straight she sleeps better without choking . She has full face mask . when she gets up in morning she feels sleepy and not rested   Even though she get 8 hrs sleep . 2014 - BMI 28.9 and now BMI is 34.2 , she has gained - from 185 lbs to 212 lbs. She tosses and turn during night   Uses bathroom 2 times   Falls asleep right away   Bed time - 11 pm   Wake up time - 8 am   Some times takes melatonin   Feels fatigued and tired during the day even though she has been using CPAP at night  Feels her memory is not as sharp  Sleep Medicine 9/17/2021 12/4/2020 8/27/2020   Sitting and reading 0 2 3   Watching TV 3 2 3   Sitting, inactive in a public place (e.g. a theatre or a meeting) 2 0 2   As a passenger in a car for an hour without a break 1 0 1   Lying down to rest in the afternoon when circumstances permit 3 1 0   Sitting and talking to someone 0 0 0   Sitting quietly after a lunch without alcohol 0 1 0   In a car, while stopped for a few minutes in traffic 2 0 1   Total score 11 6 10         LUNG CANCER SCREENING     1. CRITERIA MET    []     CT ORDERED  []      2. CRITERIA NOT MET   [x]      3. REFUSED                    []        REASON CRITERIA NOT MET     1. SMOKING LESS THAN 30 PY  []      2. AGE LESS THAN 55 or GREATER 77 YEARS  []      3.  QUIT SMOKING 15 YEARS OR GREATER   []      4. RECENT CT WITH IN 11 MONTHS    []      5. LIFE EXPECTANCY < 5 YEARS   []      6. SIGNS  AND SYMPTOMS OF LUNG CANCER   []         Immunization     There is no immunization history on file for this patient. Pneumococcal Vaccine     [] Up to date    [] Indicated   [x] Refused  [] Contraindicated       Influenza Vaccine   [] Up to date    [] Indicated   [x] Refused  [] Contraindicated        Pulmonary Rehab   [] Completed   [] Indicated   [] Refused  [] Contraindicated   PAST MEDICAL HISTORY:       Diagnosis Date    Essential hypertension 2020    Hematuria     Hepatitis-C     Hypertension     Mitral valve regurgitation     Obesity (BMI 30-39. 9) 2020    AURELIANO (obstructive sleep apnea) 2020    AURELIANO on CPAP 2020    Sleep apnea          Family History:       Problem Relation Age of Onset    High Blood Pressure Mother     Other Mother         ALZHEIMERS    Cancer Father         BONE AND PROSTRATE    Prostate Cancer Brother        SURGICAL HISTORY:   Past Surgical History:   Procedure Laterality Date    CARPAL TUNNEL RELEASE Bilateral     COLONOSCOPY  2017    TUBULAR ADENOMA    HYSTERECTOMY, TOTAL ABDOMINAL             SOCIAL AND OCCUPATIONAL HEALTH:        Social History     Socioeconomic History    Marital status: Single     Spouse name: None    Number of children: None    Years of education: None    Highest education level: None   Occupational History    None   Tobacco Use    Smoking status: Former Smoker     Packs/day: 1.00     Years: 30.00     Pack years: 30.00     Types: Cigarettes     Quit date: 10/27/2019     Years since quittin.8    Smokeless tobacco: Never Used   Vaping Use    Vaping Use: Never used   Substance and Sexual Activity    Alcohol use: Not Currently    Drug use: Never    Sexual activity: Not Currently   Other Topics Concern    None   Social History Narrative    Kristie Reyes has experienced a financial strain with resourcestrain: Food on July 27, 2020. Potential community resources and services have been provided in patient instructions by Olga Carnes. Social Determinants of Health     Financial Resource Strain:     Difficulty of Paying Living Expenses:    Food Insecurity:     Worried About Running Out of Food in the Last Year:     [address] in the Last Year:    Transportation Needs:     Lack of Transportation (Medical):  Lack of Transportation (Non-Medical):    Physical Activity:     Days of Exercise per Week:     Minutes of Exercise per Session:    Stress:     Feeling of Stress :    Social Connections:     Frequency of Communication with Friends and Family:     Frequency of Social Gatherings with Friends and Family:     Attends Mandaen Services:     Active Member of Clubs or Organizations:     Attends Club or Organization Meetings:     Marital Status:    Intimate Partner Violence:     Fear of Current or Ex-Partner:     Emotionally Abused:     Physically Abused:     Sexually Abused:         TOBACCO:   reports that she quit smoking about 22 months ago. Her smoking use included cigarettes. She has a 30.00 pack-year smoking history. She has never used smokeless tobacco.  ETOH:   reports previous alcohol use. ALLERGIES:      Allergies   Allergen Reactions    Banana     Pcn [Penicillins] Hives         Home Meds:   Prior to Admission medications    Medication Sig Start Date End Date Taking?  Authorizing Provider   omeprazole (PRILOSEC) 40 MG delayed release capsule Take 1 capsule by mouth daily 9/17/21  Yes Ria Dakins, MD   fluticasone (FLONASE) 50 MCG/ACT nasal spray 1 spray by Each Nostril route daily 9/17/21  Yes Ria Dakins, MD   meloxicam (MOBIC) 15 MG tablet Take 1 tablet by mouth daily 9/7/21  Yes Alicia Zimmerman DO   clobetasol (TEMOVATE) 0.05 % cream Apply topically daily to frontal hairline and to hands as needed for dermatitis 7/29/21  Yes Abbie Carreon MD   albuterol sulfate HFA (VENTOLIN HFA) 108 (90 Base) MCG/ACT inhaler Inhale 2 puffs into the lungs 4 times daily as needed for Wheezing 6/8/21  Yes MIGUEL Helton - CNP   tretinoin (RETIN-A) 0.025 % cream Apply pea sized amount to face nightly 4/16/21  Yes Eli Echeverria MD   carvedilol (COREG) 6.25 MG tablet Take 6.25 mg by mouth 2 times daily TAKING 2 TABLETS bid SINCE 11/28/20 3/12/20  Yes Historical Provider, MD   fluocinonide (LIDEX) 0.05 % cream Apply to rash on hands twice daily, and scalp daily 4/28/21   Eli Echeverria MD   chlorthalidone (HYGROTEN) 50 MG tablet TAKE 1 TABLET BY MOUTH DAILY  Patient not taking: Reported on 9/17/2021 3/1/21   Matt Cunha MD              REVIEW OF SYSTEMS:  Review of Systems -  General ROS: negative for - chills, fatigue, fever or weight loss  ENT ROS: negative for - headaches, oral lesions or sore throat  Cardiovascular ROS: no chest pain , orthopnea or pnd   Gastrointestinal ROS: no abdominal pain, change in bowel habits, or black or bloody stools  Skin - no rash   Neuro - no blurry vision , no loc . No focal weakness   msk - no jt tenderness or swelling    Vascular - no claudication , rest completed and negative   Lymphatic - complete and negative   Hematology - oncology - complete and negative   Allergy immunology - complete and negative    no burning or hematuria      Vitals:  BP (!) 140/78 (Site: Left Lower Arm, Position: Sitting)   Pulse 64   Temp 97.8 °F (36.6 °C)   Resp 15   Ht 5' 5\" (1.651 m)   Wt 178 lb (80.7 kg)   SpO2 99%   BMI 29.62 kg/m²     PHYSICAL EXAM:  Head and neck atraumatic, normocephalic    Lymph nodes-no cervical, supraclavicular lymphadenopathy    Neck-no JVP elevation    Lungs - Ventilating all lobes without any rales, rhonchi, wheezes or dullness. No bronchial breath sounds. Chest expansion equal bilaterally    CVS- S1, S2 regular. No S3 no S4, no murmurs    Abdomen-nontender, nondistended. Bowel sounds are present.   No organomegaly    Lower extremity-no edema    Upper extremity-no edema    Neurological-grossly normal cranial nerves. No overt motor deficit                                                     IMPRESSION:     Diagnosis Orders   1. AURELIANO on CPAP     2. Laryngopharyngeal reflux (LPR)  omeprazole (PRILOSEC) 40 MG delayed release capsule   3. Hiatal hernia with GERD  omeprazole (PRILOSEC) 40 MG delayed release capsule   4. Centrilobular emphysema (HCC) mild     5. Seasonal allergic rhinitis due to pollen  fluticasone (FLONASE) 50 MCG/ACT nasal spray   6. Incidental lung nodule, less than or equal to 3mm          :                PLAN:      Cough is due to hiatal hernia and reflux. At her request prescription for omeprazole given till she is able to follow-up with her ENT physician  Advised CPAP compliance  Weight loss is encouraged  Has seasonal allergic rhinitis during fall. Will treat with Flonase  CT lung screening in 1 year  Advise abstain from smoking  PFT can be done at next visit  Follow-up in 6 months      Requested Prescriptions     Signed Prescriptions Disp Refills    omeprazole (PRILOSEC) 40 MG delayed release capsule 30 capsule 2     Sig: Take 1 capsule by mouth daily    fluticasone (FLONASE) 50 MCG/ACT nasal spray 1 each 2     Si spray by Each Nostril route daily       Medications Discontinued During This Encounter   Medication Reason    omeprazole (PRILOSEC) 40 MG delayed release capsule REORDER       Lesvia Ly received counseling on the following healthy behaviors: nutrition, exercise and medication adherence    Patient given educational materials : see patient instruction       Discussed use, benefit, and side effects of prescribed medications. Barriers to medication compliance addressed. All patient questions answered. Pt voiced understanding. I hope this updates you on my evaluation and clinical thinking. Thank you for allowing me to participate in his care.      Sincerely,      Electronically signed by Lauren Zaman MD JUWAN on 9/19/2021 at 4:34 PM     Please note that this chart was generated using voice recognition Dragon dictation software. Although every effort was made to ensure the accuracy of this automated transcription, some errors in transcription may have occurred.

## 2021-09-23 ENCOUNTER — HOSPITAL ENCOUNTER (OUTPATIENT)
Dept: PHYSICAL THERAPY | Facility: CLINIC | Age: 61
Setting detail: THERAPIES SERIES
Discharge: HOME | End: 2021-09-23
Payer: COMMERCIAL

## 2021-09-23 PROCEDURE — 97110 THERAPEUTIC EXERCISES: CPT

## 2021-09-23 NOTE — FLOWSHEET NOTE
[] [facility]) - [facility] &  Therapy  [address]  [phone]  F: [phone] [x] [address]  [facility]   [address]  P: [phone]  F: [phone] [] [address] &  Therapy  [address]  P: [phone]  F: [phone] [] [address]  P: [phone]  F: [phone] [] [address]  [facility]   [address]   [place]: [phone]  F: [phone]      Physical Therapy Daily Treatment Note    Date:  2021  Patient Name:  Parker Herrera    :  1960  MRN: [id number]  Patient: Parker Herrera              : 1960                    MRN: [id number]  Physician: Anupama Johnson DO                                Insurance: [facility] (30 v/yr)  Medical Diagnosis: M17.0- Primary osteoarthritis of both knees                Rehab Codes: M17.0, M25.66, M25.56, M25.46, R26.89, M62.81  Onset date: 2021                          Next 's appt.: 21  Visit# / total visits: 2/12; Cancels/No Shows: 0/0    Subjective:    Pain:  [x] Yes  [] No Location: bilateral knees Pain Rating: (0-10 scale) 0/10  Pain altered Tx:  [x] No  [] Yes  Action:  Comments: pt arrives without pain at this time. Pt notes increased soreness this morning when first waking up and midway through her workday. Pt did not wear her brace and thinks this may have increased discomfort.      Objective:  Modalities:   Precautions:  Exercises:  Exercises:  Exercise       Reps/ Time Weight/ Level Comments    Nustep  5'  L1           Modified Esteban Stretch 1' ea       Seated HS stretch 3x30\"       Supine HS stretch 3x30\"       Hooklying iso clamshell 2x10  orange     Quad sets  10x5\"     SLR 10x       Heel Slides 15x  A     Ball Squeezes  20x3\"       Bridges   10x              Sidelying          clamshells  15x  orange                Seated          LAQ  10x3\"ea  A      HS curls  10xea  Lime     Other:     Specific Instructions for next treatment: progress bilateral hip and knee strengthening globally, hamstring/quad flexibility, restore knee ROM, progress bilateral LE stability and gait          Treatment Charges: Mins Units   []  Modalities     [x]  Ther Exercise 45 3   []  Manual Therapy     []  Ther Activities     []  Aquatics     []  Vasocompression     []  Other     Total Treatment time 45 3       Assessment: [x] Progressing toward goals. Initiated session with nustep warm up followed by review of HEP. Pt demonstrates good compliance to HEP with ability to perform exercises with minimal cueing for appropriate technique. Progressed hip and knee strengthening exercises indicated within chart. Advised pt to remain within pain-free range as able. Pt with fair tolerance to all exercises and progressions this date. Pt does complain of some tightness in L knee during end range heel slides and tightness through bilateral hip flexors with SLR, otherwise good tolerance. [] No change. [] Other:  [x] Patient would continue to benefit from skilled physical therapy services in order to: reduce bilateral knee pain and swelling, restore BLE strength and mobility, and improve BLE stability to improve gait and return patient to prior level of function. STG/LTG  Problems:    [x]? ? Pain: up to 10/10 pain bilateral knees L > R  [x]? ? ROM reduced L knee ROM globally, reduced BLE flexibility   [x]? ? Strength: reduced bilateral hip and L knee strength globally   [x]? ? Function- impaired function indicated by LEFI score of 42.5% impaired   [x]?  Other: impaired gait and balance described above     STG: (to be met in 8 treatments)  1. ? Pain: Decrease bilateral knee pain levels to 4/10 or less to improve tolerance to therapeutic exercise

## 2021-09-28 ENCOUNTER — HOSPITAL ENCOUNTER (OUTPATIENT)
Dept: PHYSICAL THERAPY | Facility: CLINIC | Age: 61
Setting detail: THERAPIES SERIES
Discharge: HOME | End: 2021-09-28
Payer: COMMERCIAL

## 2021-09-28 PROCEDURE — 97016 VASOPNEUMATIC DEVICE THERAPY: CPT

## 2021-09-28 PROCEDURE — 97110 THERAPEUTIC EXERCISES: CPT

## 2021-09-28 RX ORDER — POLYETHYLENE GLYCOL 3350 17 G/17G
POWDER, FOR SOLUTION ORAL
Qty: 238 G | Refills: 0 | Status: SHIPPED | OUTPATIENT
Start: 2021-09-28 | End: 2022-02-07 | Stop reason: ALTCHOICE

## 2021-09-28 RX ORDER — BISACODYL 5 MG
TABLET, DELAYED RELEASE (ENTERIC COATED) ORAL
Qty: 4 TABLET | Refills: 0 | Status: SHIPPED | OUTPATIENT
Start: 2021-09-28 | End: 2022-02-07 | Stop reason: ALTCHOICE

## 2021-09-28 NOTE — FLOWSHEET NOTE
[] [facility]) - [facility] &  Therapy  [address]  [phone]  F: [phone] [x] [address] [facility] [address]   [address]  P: [phone]  F: [phone] [] [address] &  Therapy  [address]  P: [phone]  F: [phone] [] [address]  P: [phone]  F: [phone] [] [address]  [facility]   [address]   [place]: [phone]  F: [phone]      Physical Therapy Daily Treatment Note    Date:  2021  Patient Name:  Manas Mcclellan    :  1960  MRN: [id number]  Patient: Manas Mcclellan              : 1960                    MRN: [id number]  Physician: Pedro Starks DO                                Insurance: [facility] (30 v/yr)  Medical Diagnosis: M17.0- Primary osteoarthritis of both knees                Rehab Codes: M17.0, M25.66, M25.56, M25.46, R26.89, M62.81  Onset date: 2021                          Next 's appt.: 21  Visit# / total visits: 3/12; Cancels/No Shows: 0/0    Subjective:    Pain:  [x] Yes  [] No Location: bilateral knees Pain Rating: (0-10 scale) 6/10  Pain altered Tx:  [x] No  [x] Yes  Action: minimal progressions, trial vaso   Comments: pt arrives with increased soreness this date at 6/10 but notes it was 9-10/10 after work last night. Pt notes that it has been more swollen and she felt she was limping again last night. It improved slightly with tylenol.      Objective:  Modalities: vasocompression x 10 minutes, low pressure, 34 degrees  Precautions:  Exercises:  Exercises:  Exercise       Reps/ Time Weight/ Level Comments    Nustep  5'  L1           Modified Esteban Stretch 1' ea       Seated HS stretch 3x30\"       Supine HS stretch 3x30\"     Hooklying iso clamshell 2x10  orange (not today)   Quad sets  15x5\"     SLR 10x       Heel Slides 15x  A     Ball Squeezes  20x3\"       Bridges   10x5\"              SIDELYING          clamshells  15x  orange      hip abduction  10x  orange           SEATED         LAQ 10x3\"ea  A     Marches 20xea      HS curls 10xea  Lime     Other:  Manual:  to L lateral quad, ITB x 5 minutes (supine with LEs elevated on wedge)     Specific Instructions for next treatment: progress bilateral hip and knee strengthening globally, hamstring/quad flexibility, restore knee ROM, progress bilateral LE stability and gait          Treatment Charges: Mins Units   []  Modalities     [x]  Ther Exercise 44 3   [x]  Manual Therapy 5 --   []  Ther Activities     []  Aquatics     [x]  Vasocompression 10 1   []  Other     Total Treatment time 59 4       Assessment: [x] Progressing toward goals. Initiated session with nustep warm up followed by manual charted above due to palpable increased tone and tenderness to L lateral quad and ITB. Positive relief in tissue tightness following manual. Continued with charted exercises adding sidelying hip abduction and seated marches to further improve hip strength. Progressed repetitions of previous exercises per pt tolerance. Pt had difficulty completing supine HS stretch this date due to increased L knee pain. Trialed vaso this date due to increased effusion and pain at L knee this date. Positive relief of symptoms following vaso. [] No change. [] Other:  [x] Patient would continue to benefit from skilled physical therapy services in order to: reduce bilateral knee pain and swelling, restore BLE strength and mobility, and improve BLE stability to improve gait and return patient to prior level of function. STG/LTG  Problems:    [x]? ? Pain: up to 10/10 pain bilateral knees L > R  [x]? ? ROM reduced L knee ROM globally, reduced BLE flexibility   [x]? ?  Strength: reduced bilateral hip and L knee strength globally   [x]? ? Function- impaired function indicated by LEFI score of 42.5% impaired   [x]? Other: impaired gait and balance described above     STG: (to be met in 8 treatments)  1. ? Pain: Decrease bilateral knee pain levels to 4/10 or less to improve tolerance to therapeutic exercise progressions. 2. ? ROM: Increase L knee AROM to 0-110 degrees to reduce difficulty with ADLs. a. Pt to demonstrate improved BLE flexibility with passive SLR to at least 80 degrees bilaterally, and ely's to at least 110 degrees. 3. ? Strength: Increase bilateral hip strength to at least 4+/5 and L knee strength to 5/5 to improve performance of stair ambulation, squatting, and prolonged ambulation. 4. ? Function: pt to demonstrate improved bilateral knee stability with ability to perform SLS for 30 seconds without increased knee pain. 5. Pt to be independent and compliant with Home Exercise Programs     LTG: (to be met in 12 treatments)  1. Improve score on assessment tool LEFI from 42.5% impaired to 25% impaired or less. 2. Reduce bilateral knee pain levels to 0-1/10 to improve tolerance to all daily activities including recreational exercise. 3. Pt to be able to ascend and descend a full flight of stairs independently with reciprocal stepping pattern and without increased bilateral knee pain.                     Patient goals: \"reduce knee pain and return to exercise to lose weight\"     Pt. Education:  [x] Yes  [] No  [x] Reviewed Prior HEP/Ed  Method of Education: [x] Verbal  [x] Demo  [] Written  Comprehension of Education:  [] Verbalizes understanding. [] Demonstrates understanding. [] Needs review. [x] Demonstrates/verbalizes HEP/Ed previously given. Plan: [x] Continue current frequency toward long and short term goals. [x] Specific Instructions for subsequent treatments: progress as able       Time In: 5:58 pm            Time Out: 7:02 pm    Electronically signed by:   Bneton Diallo PT

## 2021-09-30 ENCOUNTER — HOSPITAL ENCOUNTER (OUTPATIENT)
Dept: LAB | Age: 61
Setting detail: SPECIMEN
Discharge: HOME | End: 2021-09-30
Payer: COMMERCIAL

## 2021-09-30 DIAGNOSIS — Z20.822 COVID-19 RULED OUT BY LABORATORY TESTING: Primary | ICD-10-CM

## 2021-09-30 PROCEDURE — U0005 INFEC AGEN DETEC AMPLI PROBE: HCPCS

## 2021-09-30 PROCEDURE — U0003 INFECTIOUS AGENT DETECTION BY NUCLEIC ACID (DNA OR RNA); SEVERE ACUTE RESPIRATORY SYNDROME CORONAVIRUS 2 (SARS-COV-2) (CORONAVIRUS DISEASE [COVID-19]), AMPLIFIED PROBE TECHNIQUE, MAKING USE OF HIGH THROUGHPUT TECHNOLOGIES AS DESCRIBED BY CMS-2020-01-R: HCPCS

## 2021-10-01 LAB
SARS-COV-2: NORMAL
SARS-COV-2: NOT DETECTED
SOURCE: NORMAL

## 2021-10-03 ENCOUNTER — ANESTHESIA EVENT (OUTPATIENT)
Dept: ENDOSCOPY | Age: 61
End: 2021-10-03
Payer: COMMERCIAL

## 2021-10-04 ENCOUNTER — HOSPITAL ENCOUNTER (OUTPATIENT)
Age: 61
Setting detail: OUTPATIENT SURGERY
Discharge: HOME | End: 2021-10-04
Attending: INTERNAL MEDICINE | Admitting: INTERNAL MEDICINE
Payer: COMMERCIAL

## 2021-10-04 ENCOUNTER — ANESTHESIA (OUTPATIENT)
Dept: ENDOSCOPY | Age: 61
End: 2021-10-04
Payer: COMMERCIAL

## 2021-10-04 VITALS
OXYGEN SATURATION: 100 % | SYSTOLIC BLOOD PRESSURE: 131 MMHG | WEIGHT: 211 LBS | BODY MASS INDEX: 33.91 KG/M2 | HEART RATE: 51 BPM | RESPIRATION RATE: 15 BRPM | TEMPERATURE: 96.5 F | DIASTOLIC BLOOD PRESSURE: 68 MMHG | HEIGHT: 66 IN

## 2021-10-04 VITALS
DIASTOLIC BLOOD PRESSURE: 65 MMHG | OXYGEN SATURATION: 100 % | RESPIRATION RATE: 19 BRPM | SYSTOLIC BLOOD PRESSURE: 123 MMHG

## 2021-10-04 PROCEDURE — 7100000011 HC PHASE II RECOVERY - ADDTL 15 MIN: Performed by: INTERNAL MEDICINE

## 2021-10-04 PROCEDURE — 3700000001 HC ADD 15 MINUTES (ANESTHESIA): Performed by: INTERNAL MEDICINE

## 2021-10-04 PROCEDURE — 2580000003 HC RX 258: Performed by: INTERNAL MEDICINE

## 2021-10-04 PROCEDURE — 6360000002 HC RX W HCPCS: Performed by: NURSE ANESTHETIST, CERTIFIED REGISTERED

## 2021-10-04 PROCEDURE — 45385 COLONOSCOPY W/LESION REMOVAL: CPT | Performed by: INTERNAL MEDICINE

## 2021-10-04 PROCEDURE — 2580000003 HC RX 258: Performed by: NURSE ANESTHETIST, CERTIFIED REGISTERED

## 2021-10-04 PROCEDURE — 2709999900 HC NON-CHARGEABLE SUPPLY: Performed by: INTERNAL MEDICINE

## 2021-10-04 PROCEDURE — 2500000003 HC RX 250 WO HCPCS: Performed by: NURSE ANESTHETIST, CERTIFIED REGISTERED

## 2021-10-04 PROCEDURE — 3700000000 HC ANESTHESIA ATTENDED CARE: Performed by: INTERNAL MEDICINE

## 2021-10-04 PROCEDURE — 3609010600 HC COLONOSCOPY POLYPECTOMY SNARE/COLD BIOPSY: Performed by: INTERNAL MEDICINE

## 2021-10-04 PROCEDURE — 88305 TISSUE EXAM BY PATHOLOGIST: CPT

## 2021-10-04 PROCEDURE — 7100000010 HC PHASE II RECOVERY - FIRST 15 MIN: Performed by: INTERNAL MEDICINE

## 2021-10-04 PROCEDURE — 45380 COLONOSCOPY AND BIOPSY: CPT | Performed by: INTERNAL MEDICINE

## 2021-10-04 PROCEDURE — 3609010300 HC COLONOSCOPY W/BIOPSY SINGLE/MULTIPLE: Performed by: INTERNAL MEDICINE

## 2021-10-04 RX ORDER — SODIUM CHLORIDE 9 MG/ML
INJECTION, SOLUTION INTRAVENOUS ONCE
Status: COMPLETED | OUTPATIENT
Start: 2021-10-04 | End: 2021-10-04

## 2021-10-04 RX ORDER — PROPOFOL 10 MG/ML
INJECTION, EMULSION INTRAVENOUS PRN
Status: DISCONTINUED | OUTPATIENT
Start: 2021-10-04 | End: 2021-10-04 | Stop reason: SDUPTHER

## 2021-10-04 RX ORDER — SODIUM CHLORIDE, SODIUM LACTATE, POTASSIUM CHLORIDE, CALCIUM CHLORIDE 600; 310; 30; 20 MG/100ML; MG/100ML; MG/100ML; MG/100ML
INJECTION, SOLUTION INTRAVENOUS CONTINUOUS PRN
Status: DISCONTINUED | OUTPATIENT
Start: 2021-10-04 | End: 2021-10-04 | Stop reason: SDUPTHER

## 2021-10-04 RX ORDER — LIDOCAINE HYDROCHLORIDE 10 MG/ML
INJECTION, SOLUTION EPIDURAL; INFILTRATION; INTRACAUDAL; PERINEURAL PRN
Status: DISCONTINUED | OUTPATIENT
Start: 2021-10-04 | End: 2021-10-04 | Stop reason: SDUPTHER

## 2021-10-04 RX ADMIN — SODIUM CHLORIDE: 9 INJECTION, SOLUTION INTRAVENOUS at 08:08

## 2021-10-04 RX ADMIN — LIDOCAINE HYDROCHLORIDE 50 MG: 10 INJECTION, SOLUTION EPIDURAL; INFILTRATION; INTRACAUDAL; PERINEURAL at 09:13

## 2021-10-04 RX ADMIN — PROPOFOL 30 MG: 10 INJECTION, EMULSION INTRAVENOUS at 09:22

## 2021-10-04 RX ADMIN — PROPOFOL 30 MG: 10 INJECTION, EMULSION INTRAVENOUS at 09:31

## 2021-10-04 RX ADMIN — SODIUM CHLORIDE, POTASSIUM CHLORIDE, SODIUM LACTATE AND CALCIUM CHLORIDE: 600; 310; 30; 20 INJECTION, SOLUTION INTRAVENOUS at 09:09

## 2021-10-04 RX ADMIN — PROPOFOL 30 MG: 10 INJECTION, EMULSION INTRAVENOUS at 09:18

## 2021-10-04 RX ADMIN — PROPOFOL 20 MG: 10 INJECTION, EMULSION INTRAVENOUS at 09:35

## 2021-10-04 RX ADMIN — PROPOFOL 30 MG: 10 INJECTION, EMULSION INTRAVENOUS at 09:26

## 2021-10-04 RX ADMIN — PROPOFOL 30 MG: 10 INJECTION, EMULSION INTRAVENOUS at 09:14

## 2021-10-04 ASSESSMENT — PAIN SCALES - GENERAL: PAINLEVEL_OUTOF10: 0

## 2021-10-04 ASSESSMENT — PAIN SCALES - WONG BAKER: WONGBAKER_NUMERICALRESPONSE: 0

## 2021-10-04 ASSESSMENT — PAIN - FUNCTIONAL ASSESSMENT: PAIN_FUNCTIONAL_ASSESSMENT: 0-10

## 2021-10-04 NOTE — ANESTHESIA POSTPROCEDURE EVALUATION
Department of Anesthesiology  Postprocedure Note    Patient: Tarsha Ledesma  MRN: [id number]  YOB: 1960  Date of evaluation: 10/4/2021  Time:  10:39 AM     Procedure Summary     Date: 10/04/21 Room / Location: [address]    Anesthesia Start: 0911 Anesthesia Stop: 0114    Procedures:       COLONOSCOPY POLYPECTOMY SNARE/COLD BIOPSY (N/A )      COLONOSCOPY WITH BIOPSY Diagnosis: (HISTORY OF HEPATITIS C)    Surgeons: Lazarus Sayers, MD Responsible Provider: Kim Marinelli MD    Anesthesia Type: MAC ASA Status: 3          Anesthesia Type: MAC    Kevin Phase I: Kevin Score: 10    Kevin Phase II: Kevin Score: 9    Last vitals: Reviewed and per EMR flowsheets.        Anesthesia Post Evaluation    Patient location during evaluation: PACU  Patient participation: complete - patient participated  Level of consciousness: awake  Pain score: 1  Airway patency: patent  Nausea & Vomiting: no nausea and no vomiting  Complications: no  Cardiovascular status: blood pressure returned to baseline and hemodynamically stable  Respiratory status: acceptable  Hydration status: euvolemic

## 2021-10-04 NOTE — H&P
History and Physical    Pt Name: Judi Jason  MRN: [id number]  YOB: 1960  Date of evaluation: 10/4/2021    SUBJECTIVE:   History of Chief Complaint:    Patient presents preprocedure for colonoscopy. She has had one in the past, was found to have colon polyps according to the patient. She says that she believes that this was in 2017. She has now been scheduled for colonoscopy. She denies current GI complaints. Past Medical History    has a past medical history of Essential hypertension, Hematuria, Hepatitis-C, History of colon polyps, Hypertension, Mitral valve regurgitation, Obesity (BMI 30-39.9), and AURELIANO on CPAP. Past Surgical History   has a past surgical history that includes Hysterectomy, total abdominal; Carpal tunnel release (Bilateral); and Colonoscopy (11/06/2017). Medications  Prior to Admission medications    Medication Sig Start Date End Date Taking?  Authorizing Provider   bisacodyl (BISACODYL) 5 MG EC tablet Please follow instructions given to you by your provider 9/28/21  Yes Lynn Bravo MD   polyethylene glycol [facility]) 17 GM/SCOOP powder Please follow instructions given to you by your provider 9/28/21  Yes Lynn Bravo MD   omeprazole (PRILOSEC) 40 MG delayed release capsule Take 1 capsule by mouth daily 9/17/21  Yes Eloisa Vaughn MD   fluticasone (FLONASE) 50 MCG/ACT nasal spray 1 spray by Each Nostril route daily 9/17/21  Yes Eloisa Vaughn MD   clobetasol (TEMOVATE) 0.05 % cream Apply topically daily to frontal hairline and to hands as needed for dermatitis 7/29/21  Yes Jennifer Gallego MD   tretinoin (RETIN-A) 0.025 % cream Apply pea sized amount to face nightly 4/16/21  Yes Jennifer Gallego MD   carvedilol (COREG) 6.25 MG tablet Take 6.25 mg by mouth 2 times daily TAKING 2 TABLETS bid SINCE 11/28/20 3/12/20  Yes Historical Provider, MD   meloxicam (MOBIC) 15 MG tablet Take 1 tablet by mouth daily 9/7/21   Mariam Covarrubias DO   albuterol sulfate HFA (VENTOLIN HFA) 108 (90 Base) MCG/ACT inhaler Inhale 2 puffs into the lungs 4 times daily as needed for Wheezing 6/8/21   MIGUEL Myers - CNP   fluocinonide (LIDEX) 0.05 % cream Apply to rash on hands twice daily, and scalp daily 4/28/21   Mark Anthony Bravo MD   chlorthalidone (HYGROTEN) 50 MG tablet TAKE 1 TABLET BY MOUTH DAILY  Patient not taking: Reported on 9/17/2021 3/1/21   Nito Goyal MD     Allergies  is allergic to banana and pcn [penicillins]. Family History  family history includes Cancer in her father; High Blood Pressure in her mother; Other in her mother; Prostate Cancer in her brother. Social History   reports that she quit smoking about 23 months ago. Her smoking use included cigarettes. She has a 30.00 pack-year smoking history. She has never used smokeless tobacco.   reports previous alcohol use. reports no history of drug use. Marital Status single  Occupation staffing     REVIEW OF SYSTEMS    Constitutional: [] fever  [] chills  [] weight loss  []weakness [] negative  Eyes:  [] photophobia  [] discharge [] acuity change   [] Diplopia   [] negative  HENT:  [] sore throat  [] ear pain [] Tinnitus   [] negative  Respiratory:  [] Cough  [] Shortness of breath   [] Sputum   [] negative  Cardiac: []Chest pain   []Palpitations []Edema  []PND  [] negative  GI:  []Abdominal pain   []Nausea  []Vomiting  []Diarrhea  [] negative  :  [] Dysuria   []Frequency  []Hematuria  []Discharge  [] negative  Musculoskeletal:  []Back pain  []Neck pain  []Recent Injury [] negative  Skin:  []Rash  [] Itching  [] negative  Neurologic:  [] Headache  [] Focal weakness  [] Sensory changes []negative  Endocrine:  [] Polyuria  [] Polydipsia  [] Hair Loss  [] negative  Lymphatic:   [] Swollen glands [] negative  Psychiatric:  [] Depression  [] Suicidal ideation  [] Homicidal ideation  [] Anxiety [] negative  All systems negative except as marked. OBJECTIVE:   VITALS:  height is 5' 6\" (1.676 m) and weight is 211 lb (95.7 kg).  Her temporal temperature is 95.9 °F (35.5 °C). Her blood pressure is 126/98 (abnormal). Her respiration is 14 and oxygen saturation is 96%. CONSTITUTIONAL:alert & cooperative, no acute distress. Very pleasant. SKIN:  Warm and dry, no rashes on exposed areas of skin. HEAD:  Normocephalic, atraumatic. EYES: EOMs intact. EARS:  Hearing grossly WNL. NOSE:  Nares patent. No rhinorrhea. MOUTH/THROAT:  benign  NECK:supple, no lymphadenopathy  LUNGS: Clear to auscultation bilaterally, no wheezes. CARDIOVASCULAR: Heart sounds are normal.  Regular rate and rhythm without murmur. ABDOMEN: soft, non tender, non distended. EXTREMITIES: no edema bilateral lower extremities. IMPRESSIONS:   1. History of colon polyps, history of hepatitis C  2.  has a past medical history of Essential hypertension (07/27/2020), Hematuria, Hepatitis-C, History of colon polyps, Hypertension, Mitral valve regurgitation, Obesity (BMI 30-39.9) (07/27/2020), and AURELIANO on CPAP (12/04/2020).    PLANS:   1. colonoscopy    ELVIS Lowery PA-C  Electronically signed 10/4/2021 at 9:02 AM

## 2021-10-04 NOTE — PROGRESS NOTES
DISCHARGE CRITERIA MET INSTRUCTIONS GIVEN AWAKE ALERT DENIES PAIN ABDOMEN SOFT IV DISCONTINUED SITE CLEAR 200 ML DISCARDED

## 2021-10-04 NOTE — ANESTHESIA PRE PROCEDURE
Department of Anesthesiology  Preprocedure Note       Name:  Tiny Hdez   Age:  61 y.o.  :  1960                                          MRN:  [id number]         Date:  10/4/2021      Surgeon: Oswald Glass):  Richard Ramirez MD    Procedure: Procedure(s):  COLONOSCOPY DIAGNOSTIC    Medications prior to admission:   Prior to Admission medications    Medication Sig Start Date End Date Taking? Authorizing Provider   bisacodyl (BISACODYL) 5 MG EC tablet Please follow instructions given to you by your provider 21   Richard Ramirez MD   polyethylene glycol [facility]) 17 GM/SCOOP powder Please follow instructions given to you by your provider 21   Richard Ramirez MD   omeprazole (PRILOSEC) 40 MG delayed release capsule Take 1 capsule by mouth daily 21   Patience Palafox MD   fluticasone (FLONASE) 50 MCG/ACT nasal spray 1 spray by Each Nostril route daily 21   Patience Palafox MD   meloxicam (MOBIC) 15 MG tablet Take 1 tablet by mouth daily 21   Tori Calderon DO   clobetasol (TEMOVATE) 0.05 % cream Apply topically daily to frontal hairline and to hands as needed for dermatitis 21   Sruthi Cerna MD   albuterol sulfate HFA (VENTOLIN HFA) 108 (90 Base) MCG/ACT inhaler Inhale 2 puffs into the lungs 4 times daily as needed for Wheezing 21   MIGUEL Flynn CNP   fluocinonide (LIDEX) 0.05 % cream Apply to rash on hands twice daily, and scalp daily 21   Tatiana Rubio MD   tretinoin (RETIN-A) 0.025 % cream Apply pea sized amount to face nightly 21   Tatiana Rubio MD   chlorthalidone (HYGROTEN) 50 MG tablet TAKE 1 TABLET BY MOUTH DAILY  Patient not taking: Reported on 2021 3/1/21   Roseline Padilla MD   carvedilol (COREG) 6.25 MG tablet Take 6.25 mg by mouth 2 times daily TAKING 2 TABLETS bid SINCE 11/28/20 3/12/20   Historical Provider, MD       Current medications:    No current facility-administered medications for this encounter. Allergies:     Allergies Allergen Reactions    Banana     Pcn [Penicillins] Hives       Problem List:    Patient Active Problem List   Diagnosis Code    Essential hypertension I10    Dyslipidemia E78.5    Obesity (BMI 30-39. 9) E66.9    Vitamin D deficiency E55.9    AURELIANO (obstructive sleep apnea) G47.33    History of hepatitis C Z86.19    Hematuria R31.9    AURELIANO on CPAP G47.33, Z99.89       Past Medical History:        Diagnosis Date    Essential hypertension 2020    Hematuria     Hepatitis-C     Hypertension     Mitral valve regurgitation     Obesity (BMI 30-39. 9) 2020    AURELIANO (obstructive sleep apnea) 2020    AURELIANO on CPAP 2020    Sleep apnea        Past Surgical History:        Procedure Laterality Date    CARPAL TUNNEL RELEASE Bilateral     COLONOSCOPY  2017    TUBULAR ADENOMA    HYSTERECTOMY, TOTAL ABDOMINAL         Social History:    Social History     Tobacco Use    Smoking status: Former Smoker     Packs/day: 1.00     Years: 30.00     Pack years: 30.00     Types: Cigarettes     Quit date: 10/27/2019     Years since quittin.9    Smokeless tobacco: Never Used   Substance Use Topics    Alcohol use: Not Currently                                Counseling given: Not Answered      Vital Signs (Current):   Vitals:    10/04/21 0742   BP: (!) 126/98   Resp: 14   Temp: 95.9 °F (35.5 °C)   TempSrc: Temporal   SpO2: 96%   Weight: 211 lb (95.7 kg)   Height: 5' 6\" (1.676 m)                                              BP Readings from Last 3 Encounters:   10/04/21 (!) 126/98   21 (!) 140/78   21 (!) 145/84       NPO Status: Time of last liquid consumption: 625                        Time of last solid consumption:                         Date of last liquid consumption: 10/03/21                        Date of last solid food consumption: 10/02/21    BMI:   Wt Readings from Last 3 Encounters:   10/04/21 211 lb (95.7 kg)   21 178 lb (80.7 kg)   21 211 lb (95.7 kg) Body mass index is 34.06 kg/m². CBC:   Lab Results   Component Value Date    WBC 7.8 08/07/2021    RBC 4.87 08/07/2021    HGB 13.4 08/07/2021    HCT 41.8 08/07/2021    MCV 85.8 08/07/2021    RDW 13.2 08/07/2021     08/07/2021       CMP:   Lab Results   Component Value Date     04/20/2021    K 3.8 04/20/2021    CL 97 04/20/2021    CO2 33 04/20/2021    BUN 14 08/07/2021    CREATININE 0.92 08/07/2021    GFRAA >60 08/07/2021    LABGLOM >60 08/07/2021    GLUCOSE 87 04/20/2021    PROT 7.5 08/07/2021    CALCIUM 9.2 04/20/2021    BILITOT 0.51 08/07/2021    ALKPHOS 85 08/07/2021    AST 15 08/07/2021    ALT 12 08/07/2021       POC Tests: No results for input(s): POCGLU, POCNA, POCK, POCCL, POCBUN, POCHEMO, POCHCT in the last 72 hours. Coags: No results found for: PROTIME, INR, APTT    HCG (If Applicable): No results found for: PREGTESTUR, PREGSERUM, HCG, HCGQUANT     ABGs: No results found for: PHART, PO2ART, IJV9FOO, DIS3YCF, BEART, W8ONVXYM     Type & Screen (If Applicable):  No results found for: LABABO, LABRH    Drug/Infectious Status (If Applicable):  Lab Results   Component Value Date    HEPCAB REACTIVE 08/26/2020       COVID-19 Screening (If Applicable):   Lab Results   Component Value Date    COVID19 Not Detected 09/30/2021    COVID19 Not Detected 09/16/2020           Anesthesia Evaluation  Patient summary reviewed no history of anesthetic complications:   Airway: Mallampati: III        Dental:          Pulmonary:normal exam    (+) sleep apnea: on CPAP,                             Cardiovascular:    (+) hypertension:, valvular problems/murmurs: MR,         Rhythm: regular  Rate: normal                    Neuro/Psych:   Negative Neuro/Psych ROS              GI/Hepatic/Renal:   (+) hepatitis: C, liver disease:,           Endo/Other: Negative Endo/Other ROS                    Abdominal:             Vascular: negative vascular ROS.          Other Findings:             Anesthesia Plan      MAC     ASA 3 Induction: intravenous. Anesthetic plan and risks discussed with patient. Plan discussed with CRNA. Left ventricular ejection fraction 78.2%.       Beny [place] MD   10/4/2021

## 2021-10-04 NOTE — OP NOTE
Operative Note      Patient: Nolan Gibson  YOB: 1960  MRN: [id number]    Date of Procedure: 10/4/2021    Pre-Op Diagnosis: HISTORY OF HEPATITIS C    Post-Op Diagnosis: Ascending colon polyp, rectosigmoid and sigmoid polyp       Procedure(s):  COLONOSCOPY POLYPECTOMY SNARE/COLD BIOPSY  COLONOSCOPY WITH BIOPSY    Surgeon(s):  Prince Olson MD    Assistant:   First Assistant: Ronal Hilario RN    Anesthesia: Monitor Anesthesia Care    Estimated Blood Loss (mL): Minimal    Complications: None    Specimens:   ID Type Source Tests Collected by Time Destination   A : Ascending Colon Polyp Tissue Colon-Ascending SURGICAL PATHOLOGY Prince Olson MD 10/4/2021 9383    B : Sigmoid Colon Polyp Tissue Sigmoid Colon SURGICAL PATHOLOGY Prince Olson MD 10/4/2021 0933    C : Recto Sigmoid Polyp Tissue Recto sigmoid SURGICAL PATHOLOGY Prince Olson MD 10/4/2021 6833        Implants:  * No implants in log *      Drains: * No LDAs found *              [facility]     [facility] ENDOSCOPY     COLONOSCOPY    PROCEDURE DATE: 10/04/21    REFERRING PHYSICIAN: No ref. provider found     PRIMARY CARE PROVIDER: Brendan Orellana MD    ATTENDING PHYSICIAN: Prince Olson MD     HISTORY: Ms. Nolan Gibson is a 61 y.o. female who presents to the [facility] endoscopy unit for colonoscopy. The patient's clinical history is remarkable for prior history of HCV, obesity, AURELIANO, referred for screening colonoscopy. She is currently medically stable and appropriate for the planned procedure. PREOPERATIVE DIAGNOSIS: screening for colon cancer. PROCEDURES:   Transanal Colonoscopy with polypectomy (snare cautery), polypectomy (cold biopsy).      POSTPROCEDURE DIAGNOSIS:    FAIR PREP     1) Rectosigmoid polyp, 8mm s/p hot snare polypectomy and removal  2) Sigmoid polyp s/p cold biopsy and removal, 6mm, hyperplastic appearing  3) 8 mm ascending colon polyp s/p hot snare polypectomy and removal  4) Small external hemorrhoids    MEDICATIONS:     MAC per anesthesia     EBL <10cc      INSTRUMENT: Olympus CF-H180 AL Pediatric flexible Colonoscope. PREPARATION: The nature and character of the procedure as well as risks, benefits, and alternatives were discussed with the patient and informed consent was obtained. Complications were said to include, but were not limited to: medication allergy, medication reaction, cardiovascular and respiratory problems, bleeding, perforation, infection, and/or missed diagnosis. Following arrival in the endoscopy room, the patient was placed in the left lateral decubitus position and final time-out accomplished in the presence of the nursing staff. Baseline vital signs were obtained and reviewed, and IV sedation was subsequently initiated. FINDINGS: Rectal examination demonstrated no significant visible external abnormality and digital palpation was unremarkable. Following adequate conscious sedation the colonoscope was introduced and advanced under direct visualization to the cecum, which was identified by the ileocecal valve and appendiceal orifice. The bowel preparation was felt to be FAIR. This included moderate amounts of green stool that was mostly able to be adequately irrigated and aspirated. Cecal intubation time was 8 minutes. Once maximally inserted, the endoscope was withdrawn and the mucosa was carefully inspected. The mucosal exam was revealed polyps (described below). Retroflexion was performed in the rectum and hemorrhoids seen. Withdrawal time was 15 minutes. IMPRESSION:     FAIR PREP     1) Rectosigmoid polyp, 8mm s/p hot snare polypectomy and removal  2) Sigmoid polyp s/p cold biopsy and removal, 6mm, hyperplastic appearing  3) 8 mm ascending colon polyp s/p hot snare polypectomy and removal  4) Small external hemorrhoids      RECOMMENDATIONS:   1) Follow up with referring provider, as previously scheduled. 2) Repeat Colonoscopy in 3-5 yrs.  Follow up path in GI clinic       Earl De Luna MD  [facility]   10/04/21    This note is created with the assistance of a speech recognition program.  While intending to generate a document that actually reflects the content of the visit, the document can still have some errors including those of syntax and sound a like substitutions which may escape proof reading. It such instances, actual meaning can be extrapolated by contextual diversion. The patient was counseled at length about the risks of cesar Covid-19 during their perioperative period and any recovery window from their procedure. The patient was made aware that cesar Covid-19  may worsen their prognosis for recovering from their procedure  and lend to a higher morbidity and/or mortality risk. All material risks, benefits, and reasonable alternatives including postponing the procedure were discussed. The patient DOES wish to proceed with the procedure at this time.         Electronically signed by Earl De Luna MD on 10/4/2021 at 9:55 AM

## 2021-10-05 ENCOUNTER — HOSPITAL ENCOUNTER (OUTPATIENT)
Dept: PHYSICAL THERAPY | Facility: CLINIC | Age: 61
Setting detail: THERAPIES SERIES
Discharge: HOME | End: 2021-10-05
Payer: COMMERCIAL

## 2021-10-05 LAB — SURGICAL PATHOLOGY REPORT: NORMAL

## 2021-10-05 NOTE — FLOWSHEET NOTE
[] [facility]) [facility]  [address]    [phone]  F: [phone]   [x] [address]  [facility] [address]   [address]  P: [phone]  F: [phone]  [] [address]  [address]  P: [phone]  F: [phone] [] [address]  P: [phone]  F: [phone]  [] [address] [facility] [place]   [address]   [place]: [phone]  F: [phone]   [] [place] [address] [facility] [address]  [place]: [phone]   F: [phone]     Physical Therapy Cancel/No Show note    Date: 10/5/2021  Patient: Johanna Diane  : 1960  MRN: [id number]    Cancels/No Shows to date:     For today's appointment patient:    [x]  Cancelled    [] Rescheduled appointment    [] No-show     Reason given by patient:    [x]  Patient ill    []  Conflicting appointment    [] No transportation      [] Conflict with work    [] No reason given    [] Weather related    [] [id number]    [] Other:      Comments:        [x] Next appointment was confirmed    Electronically signed by: Mercy Forte PTA

## 2021-10-11 ENCOUNTER — OFFICE VISIT (OUTPATIENT)
Dept: FAMILY MEDICINE CLINIC | Age: 61
End: 2021-10-11
Payer: COMMERCIAL

## 2021-10-11 VITALS
DIASTOLIC BLOOD PRESSURE: 86 MMHG | HEIGHT: 66 IN | BODY MASS INDEX: 34.75 KG/M2 | HEART RATE: 60 BPM | WEIGHT: 216.2 LBS | SYSTOLIC BLOOD PRESSURE: 138 MMHG | TEMPERATURE: 97 F

## 2021-10-11 DIAGNOSIS — Z12.31 SCREENING MAMMOGRAM FOR BREAST CANCER: ICD-10-CM

## 2021-10-11 DIAGNOSIS — Z12.72 SCREENING FOR VAGINAL CANCER: Primary | ICD-10-CM

## 2021-10-11 DIAGNOSIS — Z00.00 ANNUAL PHYSICAL EXAM: Primary | ICD-10-CM

## 2021-10-11 DIAGNOSIS — Z78.0 POSTMENOPAUSE: ICD-10-CM

## 2021-10-11 PROCEDURE — 99396 PREV VISIT EST AGE 40-64: CPT | Performed by: NURSE PRACTITIONER

## 2021-10-11 RX ORDER — CHLORTHALIDONE 25 MG/1
TABLET ORAL
Qty: 90 TABLET | Refills: 1 | Status: SHIPPED | OUTPATIENT
Start: 2021-10-11 | End: 2022-09-28

## 2021-10-11 SDOH — ECONOMIC STABILITY: FOOD INSECURITY: WITHIN THE PAST 12 MONTHS, THE FOOD YOU BOUGHT JUST DIDN'T LAST AND YOU DIDN'T HAVE MONEY TO GET MORE.: NEVER TRUE

## 2021-10-11 SDOH — ECONOMIC STABILITY: INCOME INSECURITY: HOW HARD IS IT FOR YOU TO PAY FOR THE VERY BASICS LIKE FOOD, HOUSING, MEDICAL CARE, AND HEATING?: NOT HARD AT ALL

## 2021-10-11 SDOH — ECONOMIC STABILITY: FOOD INSECURITY: WITHIN THE PAST 12 MONTHS, YOU WORRIED THAT YOUR FOOD WOULD RUN OUT BEFORE YOU GOT MONEY TO BUY MORE.: NEVER TRUE

## 2021-10-11 SDOH — ECONOMIC STABILITY: TRANSPORTATION INSECURITY
IN THE PAST 12 MONTHS, HAS THE LACK OF TRANSPORTATION KEPT YOU FROM MEDICAL APPOINTMENTS OR FROM GETTING MEDICATIONS?: NO

## 2021-10-11 SDOH — ECONOMIC STABILITY: TRANSPORTATION INSECURITY
IN THE PAST 12 MONTHS, HAS LACK OF TRANSPORTATION KEPT YOU FROM MEETINGS, WORK, OR FROM GETTING THINGS NEEDED FOR DAILY LIVING?: NO

## 2021-10-11 ASSESSMENT — ENCOUNTER SYMPTOMS
SHORTNESS OF BREATH: 0
BLOOD IN STOOL: 0
ABDOMINAL PAIN: 0

## 2021-10-11 NOTE — PATIENT INSTRUCTIONS
Patient Education        Eating Healthy Foods: Care Instructions  Your Care Instructions     Eating healthy foods can help lower your risk for disease. Healthy food gives you energy and keeps your heart strong, your brain active, your muscles working, and your bones strong. A healthy diet includes a variety of foods from the basic food groups: grains, vegetables, fruits, milk and milk products, and meat and beans. Some people may eat more of their favorite foods from only one food group and, as a result, miss getting the nutrients they need. So, it is important to pay attention not only to what you eat but also to what you are missing from your diet. You can eat a healthy, balanced diet by making a few small changes. Follow-up care is a key part of your treatment and safety. Be sure to make and go to all appointments, and call your doctor if you are having problems. It's also a good idea to know your test results and keep a list of the medicines you take. How can you care for yourself at home? Look at what you eat  · Keep a food diary for a week or two and record everything you eat or drink. Track the number of servings you eat from each food group. · For a balanced diet every day, eat a variety of:  ? 6 or more ounce-equivalents of grains, such as cereals, breads, crackers, rice, or pasta, every day. An ounce-equivalent is 1 slice of bread, 1 cup of ready-to-eat cereal, or ½ cup of cooked rice, cooked pasta, or cooked cereal.  ? 2½ cups of vegetables, especially:  § Dark-green vegetables such as broccoli and spinach. § Orange vegetables such as carrots and sweet potatoes. § Dry beans (such as davison and kidney beans) and peas (such as lentils). ? 2 cups of fresh, frozen, or canned fruit. A small apple or 1 banana or orange equals 1 cup. ? 3 cups of nonfat or low-fat milk, yogurt, or other milk products. ? 5½ ounces of meat and beans, such as chicken, fish, lean meat, beans, nuts, and seeds.  One egg, 1 tablespoon of peanut butter, ½ ounce nuts or seeds, or ¼ cup of cooked beans equals 1 ounce of meat. · Learn how to read food labels for serving sizes and ingredients. Fast-food and convenience-food meals often contain few or no fruits or vegetables. Make sure you eat some fruits and vegetables to make the meal more nutritious. · Look at your food diary. For each food group, add up what you have eaten and then divide the total by the number of days. This will give you an idea of how much you are eating from each food group. See if you can find some ways to change your diet to make it more healthy. Start small  · Do not try to make dramatic changes to your diet all at once. You might feel that you are missing out on your favorite foods and then be more likely to fail. · Start slowly, and gradually change your habits. Try some of the following:  ? Use whole wheat bread instead of white bread. ? Use nonfat or low-fat milk instead of whole milk. ? Eat brown rice instead of white rice, and eat whole wheat pasta instead of white-flour pasta. ? Try low-fat cheeses and low-fat yogurt. ? Add more fruits and vegetables to meals and have them for snacks. ? Add lettuce, tomato, cucumber, and onion to sandwiches. ? Add fruit to yogurt and cereal.  Enjoy food  · You can still eat your favorite foods. You just may need to eat less of them. If your favorite foods are high in fat, salt, and sugar, limit how often you eat them, but do not cut them out entirely. · Eat a wide variety of foods. Make healthy choices when eating out  · The type of restaurant you choose can help you make healthy choices. Even fast-food chains are now offering more low-fat or healthier choices on the menu. · Choose smaller portions, or take half of your meal home. · When eating out, try:  ? A veggie pizza with a whole wheat crust or grilled chicken (instead of sausage or pepperoni).   ? Pasta with roasted vegetables, grilled chicken, or marinara sauce instead of cream sauce. ? A vegetable wrap or grilled chicken wrap. ? Broiled or poached food instead of fried or breaded items. Make healthy choices easy  · Buy packaged, prewashed, ready-to-eat fresh vegetables and fruits, such as baby carrots, salad mixes, and chopped or shredded broccoli and cauliflower. · Buy packaged, presliced fruits, such as melon or pineapple. · Choose 100% fruit or vegetable juice instead of soda. Limit juice intake to 4 to 6 oz (½ to ¾ cup) a day. · Blend low-fat yogurt, fruit juice, and canned or frozen fruit to make a smoothie for breakfast or a snack. Where can you learn more? Go to https://[employer]. org and sign in to your [employer] account. Enter X002 in the [employer] box to learn more about \"Eating Healthy Foods: Care Instructions. \"     If you do not have an account, please click on the \"Sign Up Now\" link. Current as of: December 17, 2020               Content Version: 13.0  © 4429-2740 Healthwise, Incorporated. Care instructions adapted under license by [facility] ([facility]). If you have questions about a medical condition or this instruction, always ask your healthcare professional. [address] any warranty or liability for your use of this information.

## 2021-10-11 NOTE — PROGRESS NOTES
Subjective:      Patient ID: Benna Essex is a 61 y.o. female. Visit Information    Have you changed or started any medications since your last visit including any over-the-counter medicines, vitamins, or herbal medicines? no   Are you having any side effects from any of your medications? -  no  Have you stopped taking any of your medications? Is so, why? -  no    Have you seen any other physician or provider since your last visit? No  Have you had any other diagnostic tests since your last visit? No  Have you been seen in the emergency room and/or had an admission to a hospital since we last saw you? No  Have you had your routine dental cleaning in the past 6 months? no    Have you activated your [employer] account? If not, what are your barriers? Yes     Patient Care Team:  Bryson Seip, MD as PCP - General (Family Medicine)  Bryson Seip, MD as PCP - [facility]  Marcia Joseph MD as Consulting Physician (Gastroenterology)    Medical History Review  Past Medical, Family, and Social History reviewed and does not contribute to the patient presenting condition    Health Maintenance   Topic Date Due    Pneumococcal 0-64 years Vaccine (1 of 2 - PPSV23) Never done    COVID-19 Vaccine (1) Never done    HIV screen  Never done    Hepatitis B vaccine (1 of 3 - Risk 3-dose series) Never done    DTaP/Tdap/Td vaccine (1 - Tdap) Never done    Shingles Vaccine (1 of 2) Never done    Flu vaccine (1) Never done    Potassium monitoring  04/20/2022    Low dose CT lung screening  06/21/2022    Creatinine monitoring  08/07/2022    Breast cancer screen  08/19/2022    Colon cancer screen colonoscopy  10/04/2024    Lipid screen  04/20/2026    Hepatitis A vaccine  Aged Out    Hib vaccine  Aged Out    Meningococcal (ACWY) vaccine  Aged Out     HPI  61year old female presents with annual physical. Hx of HTN and it is stable with dual therapy. States she ran out of hygroton and needs refill.  Pt is also concerned about her weight and would like some information for weight management. Hx of left knee pain and follow up with Dr. Berto Ospina. Review of Systems   Constitutional: Negative for chills and fever. Eyes: Negative for visual disturbance. Respiratory: Negative for shortness of breath. Cardiovascular: Negative for chest pain. Gastrointestinal: Negative for abdominal pain and blood in stool. Endocrine: Negative for polydipsia and polyuria. Genitourinary: Negative for dysuria and hematuria. Musculoskeletal: Negative for arthralgias and myalgias. Neurological: Negative for dizziness, weakness and numbness. Psychiatric/Behavioral: Negative for agitation and behavioral problems. Objective:   Physical Exam  Vitals and nursing note reviewed. Constitutional:       General: She is not in acute distress. Appearance: Normal appearance. She is obese. HENT:      Right Ear: External ear normal.      Left Ear: External ear normal.      Nose: Nose normal.   Eyes:      Conjunctiva/sclera: Conjunctivae normal.   Cardiovascular:      Rate and Rhythm: Normal rate and regular rhythm. Heart sounds: Normal heart sounds. Pulmonary:      Effort: Pulmonary effort is normal. No respiratory distress. Breath sounds: Normal breath sounds. Abdominal:      Palpations: Abdomen is soft. Abdomen is not rigid. Tenderness: There is no abdominal tenderness. Musculoskeletal:         General: No tenderness. Normal range of motion. Cervical back: Neck supple. Lymphadenopathy:      Cervical: No cervical adenopathy. Skin:     General: Skin is warm and dry. Neurological:      Mental Status: She is alert. Cranial Nerves: No cranial nerve deficit. Psychiatric:         Behavior: Behavior normal.         Assessment:      1. Annual physical exam    2. Screening mammogram for breast cancer    3.  Postmenopause            Plan:      BP Readings from Last 3 Encounters:   10/11/21 138/86 10/04/21 123/65   10/04/21 131/68     /86   Pulse 60   Temp 97 °F (36.1 °C) (Infrared)   Ht 5' 6\" (1.676 m)   Wt 216 lb 3.2 oz (98.1 kg)   BMI 34.90 kg/m²   Lab Results   Component Value Date    WBC 7.8 08/07/2021    HGB 13.4 08/07/2021    HCT 41.8 08/07/2021     08/07/2021    CHOL 183 04/20/2021    TRIG 135 04/20/2021    HDL 48 04/20/2021    ALT 12 08/07/2021    AST 15 08/07/2021     04/20/2021    K 3.8 04/20/2021    CL 97 (L) 04/20/2021    CREATININE 0.92 (H) 08/07/2021    BUN 14 08/07/2021    CO2 33 (H) 04/20/2021    TSH 2.34 08/01/2020     Lab Results   Component Value Date    CALCIUM 9.2 04/20/2021     Lab Results   Component Value Date    LDLCHOLESTEROL 108 04/20/2021         1. Annual physical exam  - Lipid Panel; Future  - Basic Metabolic Panel; Future   - extensive discussion with pt about his current diet and exercise habits, and personalized advice was provided regarding recommended lifestyle changes, diet and exercise. 2. Screening mammogram for breast cancer  - OLIVIER DIGITAL SCREEN W OR WO CAD BILATERAL; Future    3. Postmenopause  - DEXA BONE DENSITY AXIAL SKELETON; Future        Requested Prescriptions     Signed Prescriptions Disp Refills    chlorthalidone (HYGROTON) 25 MG tablet 90 tablet 1     Sig: TAKE 1 TABLET BY MOUTH DAILY       Medications Discontinued During This Encounter   Medication Reason    chlorthalidone (HYGROTEN) 50 MG tablet LIST CLEANUP    fluocinonide (LIDEX) 0.05 % cream LIST CLEANUP    albuterol sulfate HFA (VENTOLIN HFA) 108 (90 Base) MCG/ACT inhaler LIST CLEANUP       Discussed use, benefit, and side effects of prescribed medications. Barriers to medication compliance addressed. All patient questions answered. Pt voiced understanding. Return for HTN, obesity.

## 2021-10-12 ENCOUNTER — HOSPITAL ENCOUNTER (OUTPATIENT)
Dept: PHYSICAL THERAPY | Facility: CLINIC | Age: 61
Setting detail: THERAPIES SERIES
Discharge: HOME | End: 2021-10-12
Payer: COMMERCIAL

## 2021-10-12 NOTE — FLOWSHEET NOTE
[] [facility]) - [facility] &  Therapy  [address]    [phone]  F: [phone]   [x] [address] [facility] [address]   [address]  P: [phone]  F: [phone]  [] [address] &  Therapy  [address] [facility]  P: [phone]  F: [phone] [] [address]  P: [phone]  F: [phone]  [] [address]  [facility]   [address]   [place]: [phone]  F: [phone]   [] [facility]  [address] [facility] [address]  [place]: [phone]   F: [phone]     Physical Therapy Cancel/No Show note    Date: 10/12/2021  Patient: Kalie Schaffer  : 1960  MRN: [id number]    Cancels/No Shows to date:     For today's appointment patient:    [x]  Cancelled    [] Rescheduled appointment    [] No-show     Reason given by patient:    [x]  Patient ill    []  Conflicting appointment    [] No transportation      [] Conflict with work    [] No reason given    [] Weather related    [] COVID-19    [] Other:      Comments:        [x] Next appointment was confirmed    Electronically signed by:  Sebas Coates PT

## 2021-10-18 ENCOUNTER — HOSPITAL ENCOUNTER (OUTPATIENT)
Dept: PHYSICAL THERAPY | Facility: CLINIC | Age: 61
Setting detail: THERAPIES SERIES
Discharge: HOME | End: 2021-10-18
Payer: COMMERCIAL

## 2021-10-18 PROCEDURE — 97016 VASOPNEUMATIC DEVICE THERAPY: CPT

## 2021-10-18 PROCEDURE — 97110 THERAPEUTIC EXERCISES: CPT

## 2021-10-18 NOTE — FLOWSHEET NOTE
[] [facility]) - [facility] &  Therapy  [address]  [phone]  F: [phone] [x] [address]  [facility] 36   [address]  P: [phone]  F: [phone] [] [address] &  Therapy  [address] [facility]  P: [phone]  F: [phone] [] [address]  P: [phone]  F: [phone] [] [address]  [facility]   [address]   [place]: [phone]  F: [phone]      Physical Therapy Daily Treatment Note    Date:  10/18/2021  Patient Name:  Thad Gil    :  1960  MRN: [id number]  Patient: Thad Gil              : 1960                    MRN: [id number]  Physician: Steffany Vasquez DO                                Insurance: [address] [facility] (30 v/yr)  Medical Diagnosis: M17.0- Primary osteoarthritis of both knees                Rehab Codes: M17.0, M25.66, M25.56, M25.46, R26.89, M62.81  Onset date: 2021                          Next 's appt.: 21  Visit# / total visits: 4/12; Cancels/No Shows: 2/0     Subjective:    Pain:  [x] Yes  [] No Location: bilateral knees Pain Rating: (0-10 scale) (no number given)/10  Pain altered Tx:  [x] No  [] Yes  Action:   Comments: Pt arrives with brace on L knee.  Pt states she is having a \"good day\" and does not have complaints of pain upon arrival.       Objective:  Modalities: vasocompression x 10 minutes, low pressure, 34 degrees  Precautions:  Exercises:  Exercises: Bold completed 10/18  Exercise       Reps/ Time Weight/ Level Comments    Nustep  5'  L1           Modified Esteban Stretch 1' ea       Seated HS stretch 3x30\"       Supine HS stretch 3x30\"       Hooklying clamshell 2x10  blue Progressed 10/18   Quad sets  15x5\"     SLR 10x2    Inc reps 10/18 Heel Slides 15x  A     Ball Squeezes  20x3\"       Bridges   10x5\"              SIDELYING          clamshells  15x blue Inc resistance 10/18     hip abduction  15x blue progressed 10/18         SEATED         LAQ 10x3\"ea 2# Progressed 10/18    Marches 20xea 2# Progressed 10/18    HS curls 10xea blue Progressed 10/18         STANDING    Added 10/18   gastroc stretch on slant board 3x30\"     Step ups  10xea 6\"    Other:  Manual:  to L lateral quad, ITB x 5 minutes (supine with LEs elevated on wedge)- not today      Specific Instructions for next treatment: progress bilateral hip and knee strengthening globally, hamstring/quad flexibility, restore knee ROM, progress bilateral LE stability and gait          Treatment Charges: Mins Units   []  Modalities     [x]  Ther Exercise 47 3   []  Manual Therapy     []  Ther Activitiesvc     []  Aquatics     [x]  Vasocompression 10 1   []  Other     Total Treatment time 57 4        Assessment: [x] Progressing toward goals. Initiated session with nustep warm up followed by supine HS stretch to reduce tightness with some discomfort noted in the L knee. Pt able to complete mat exercises with verbal cueing to perform with correct technique with good carry over. Able to make several progression with increasing reps and resistance to progress strength with details listed above. Ended with vaso for edema and pain control. Will continue to progress bilateral LE stability and gait as able. [] No change. [] Other:  [x] Patient would continue to benefit from skilled physical therapy services in order to: reduce bilateral knee pain and swelling, restore BLE strength and mobility, and improve BLE stability to improve gait and return patient to prior level of function. STG/LTG  Problems:    [x]? ? Pain: up to 10/10 pain bilateral knees L > R  [x]? ? ROM reduced L knee ROM globally, reduced BLE flexibility   [x]? ?  Strength: reduced bilateral hip and L knee strength globally   [x]? ? Function- impaired function indicated by LEFI score of 42.5% impaired   [x]? Other: impaired gait and balance described above     STG: (to be met in 8 treatments)  1. ? Pain: Decrease bilateral knee pain levels to 4/10 or less to improve tolerance to therapeutic exercise progressions. 2. ? ROM: Increase L knee AROM to 0-110 degrees to reduce difficulty with ADLs. a. Pt to demonstrate improved BLE flexibility with passive SLR to at least 80 degrees bilaterally, and ely's to at least 110 degrees. 3. ? Strength: Increase bilateral hip strength to at least 4+/5 and L knee strength to 5/5 to improve performance of stair ambulation, squatting, and prolonged ambulation. 4. ? Function: pt to demonstrate improved bilateral knee stability with ability to perform SLS for 30 seconds without increased knee pain. 5. Pt to be independent and compliant with Home Exercise Programs     LTG: (to be met in 12 treatments)  1. Improve score on assessment tool LEFI from 42.5% impaired to 25% impaired or less. 2. Reduce bilateral knee pain levels to 0-1/10 to improve tolerance to all daily activities including recreational exercise. 3. Pt to be able to ascend and descend a full flight of stairs independently with reciprocal stepping pattern and without increased bilateral knee pain.                     Patient goals: \"reduce knee pain and return to exercise to lose weight\"      Pt. Education:  [x] Yes  [] No  [x] Reviewed Prior HEP/Ed  Method of Education: [x] Verbal  [x] Demo  [] Written  Comprehension of Education:  [] Verbalizes understanding. [] Demonstrates understanding. [] Needs review. [x] Demonstrates/verbalizes HEP/Ed previously given. Plan: [x] Continue current frequency toward long and short term goals.     [x] Specific Instructions for subsequent treatments: progress as able       Time In: 5:58 pm            Time Out: 7:00 pm    Electronically signed by:  Lucie Tatum PTA

## 2021-10-27 ENCOUNTER — HOSPITAL ENCOUNTER (OUTPATIENT)
Dept: PHYSICAL THERAPY | Facility: CLINIC | Age: 61
Setting detail: THERAPIES SERIES
Discharge: HOME | End: 2021-10-27
Payer: COMMERCIAL

## 2021-10-27 PROCEDURE — 97016 VASOPNEUMATIC DEVICE THERAPY: CPT

## 2021-10-27 PROCEDURE — 97110 THERAPEUTIC EXERCISES: CPT

## 2021-10-27 NOTE — FLOWSHEET NOTE
[] [facility] TWELVESTEP [facility] &  Therapy  [address]  [phone]  F: [phone] [x] [address] [facility] 36   [address]  P: [phone]  F: [phone] [] [address] &  Therapy  [address] [facility]  P: [phone]  F: [phone] [] [address]  P: [phone]  F: [phone] [] [address]  [facility]   [address]   [place]: [phone]  F: [phone]      Physical Therapy Daily Treatment Note    Date:  10/27/2021  Patient Name:  Paris Salas    :  1960  MRN: [id number]  Patient: Paris Salas              : 1960                    MRN: [id number]  Physician: Catherine Schaffer,                                 Insurance: [address] [employer] (30 v/yr)  Medical Diagnosis: M17.0- Primary osteoarthritis of both knees                Rehab Codes: M17.0, M25.66, M25.56, M25.46, R26.89, M62.81  Onset date: 2021                          Next 's appt.: 21  Visit# / total visits: ; Cancels/No Shows: 2/0     Subjective:    Pain:  [x] Yes  [] No Location: bilateral knees Pain Rating: (0-10 scale) (no number given)/10  Pain altered Tx:  [x] No  [] Yes  Action:   Comments: Pt arrives with minimal pain this date. Pt notes that she has not worn her brace the past two days and noted slight medial knee pain last night. Overall has been feeling improvements.        Objective:  Modalities: vasocompression x 10 minutes, low pressure, 34 degrees  Precautions:  Exercises:  Exercises: Farheen Garcia completed 10/27/21  Exercise       Reps/ Time Weight/ Level Comments    Nustep  5'  L1           Modified Esteban Stretch 1' ea       Seated HS stretch 3x30\"       Supine HS stretch 3x30\"       Hooklying clamshell 2x10  blue Progressed 10/18   Quad sets  15x5\"     SLR 10x2    Inc reps 10/18   Heel Slides 15x  A     Ball Squeezes  20x3\"       Bridges   12x5\"  blue            SIDELYING          clamshells  15x blue Inc resistance 10/18     hip abduction  15x blue progressed 10/18         SEATED         LAQ 20x3\"ea 2# Progressed 10/18    Marches 20xea 2# Progressed 10/18    HS curls 10xea blue Progressed 10/18         STANDING       gastroc stretch on slant board 3x30\"     Side Stepping 2x30\" Lime proximal to knees Added 10/27   Step ups  10xea 6\"    3 way hip 10xea orange Added 10/27   Mini Squats  10x  Added 10/27   Other:  Manual:  to L lateral quad, ITB x 5 minutes (supine with LEs elevated on wedge)- not today      Specific Instructions for next treatment: progress bilateral hip and knee strengthening globally, hamstring/quad flexibility, restore knee ROM, progress bilateral LE stability and gait          Treatment Charges: Mins Units   []  Modalities     [x]  Ther Exercise 52 3   []  Manual Therapy     []  Ther Activitiesvc     []  Aquatics     [x]  Vasocompression 10 1   []  Other     Total Treatment time 62 4        Assessment: [x] Progressing toward goals. Initiated session with nustep warm up followed by charted exercises. Progressed with standing exercises this date including side stepping, 3 way hip, and mini squats. Slight ache in medial L knee with side stepping that improved with rest. Patient with occasional cueing to avoid compensation at trunk with 3 way hip exercises, cues for weight shift into heels and posterior hip hinge with squats to encourage activation of posterior chain with good carryover following cues. Overall good tolerance to all exercises and progressions this date. Ended session with vaso to L knee to reduce pain and swelling. [] No change.      [] Other:  [x] Patient would continue to benefit from skilled physical therapy services in order to: reduce bilateral knee pain and swelling, restore BLE strength and mobility, and improve BLE stability to improve gait and return patient to prior level of function. STG/LTG  Problems:    [x]? ? Pain: up to 10/10 pain bilateral knees L > R  [x]? ? ROM reduced L knee ROM globally, reduced BLE flexibility   [x]? ? Strength: reduced bilateral hip and L knee strength globally   [x]? ? Function- impaired function indicated by LEFI score of 42.5% impaired   [x]? Other: impaired gait and balance described above     STG: (to be met in 8 treatments)  1. ? Pain: Decrease bilateral knee pain levels to 4/10 or less to improve tolerance to therapeutic exercise progressions. 2. ? ROM: Increase L knee AROM to 0-110 degrees to reduce difficulty with ADLs. a. Pt to demonstrate improved BLE flexibility with passive SLR to at least 80 degrees bilaterally, and ely's to at least 110 degrees. 3. ? Strength: Increase bilateral hip strength to at least 4+/5 and L knee strength to 5/5 to improve performance of stair ambulation, squatting, and prolonged ambulation. 4. ? Function: pt to demonstrate improved bilateral knee stability with ability to perform SLS for 30 seconds without increased knee pain. 5. Pt to be independent and compliant with Home Exercise Programs     LTG: (to be met in 12 treatments)  1. Improve score on assessment tool LEFI from 42.5% impaired to 25% impaired or less. 2. Reduce bilateral knee pain levels to 0-1/10 to improve tolerance to all daily activities including recreational exercise. 3. Pt to be able to ascend and descend a full flight of stairs independently with reciprocal stepping pattern and without increased bilateral knee pain.                     Patient goals: \"reduce knee pain and return to exercise to lose weight\"      Pt. Education:  [x] Yes  [] No  [x] Reviewed Prior HEP/Ed  Method of Education: [x] Verbal  [x] Demo  [] Written  Comprehension of Education:  [] Verbalizes understanding. [] Demonstrates understanding.   [] Needs review. [x] Demonstrates/verbalizes HEP/Ed previously given. Plan: [x] Continue current frequency toward long and short term goals. [x] Specific Instructions for subsequent treatments: progress as able       Time In: 5:28 pm            Time Out: 6:35 pm    Electronically signed by:   All Allen PT

## 2021-10-28 ENCOUNTER — TELEPHONE (OUTPATIENT)
Dept: GASTROENTEROLOGY | Age: 61
End: 2021-10-28

## 2021-10-28 DIAGNOSIS — K64.9 HEMORRHOIDS, UNSPECIFIED HEMORRHOID TYPE: Primary | ICD-10-CM

## 2021-10-28 RX ORDER — DIAPER,BRIEF,INFANT-TODD,DISP
EACH MISCELLANEOUS
Qty: 30 G | Refills: 1 | Status: SHIPPED | OUTPATIENT
Start: 2021-10-28 | End: 2021-11-04

## 2021-10-28 RX ORDER — DOCUSATE SODIUM 100 MG/1
100 CAPSULE, LIQUID FILLED ORAL DAILY PRN
Qty: 60 CAPSULE | Refills: 0 | Status: SHIPPED | OUTPATIENT
Start: 2021-10-28 | End: 2022-03-14

## 2021-10-28 NOTE — TELEPHONE ENCOUNTER
Pt called and lvm that she wants to be sooner. There is nothing available at this time. Pt is concerned that she has been having a light bleeding after every bowel movement since her colon. Pt wants to talk to clinical about this since we can not move her up sooner.

## 2021-10-28 NOTE — TELEPHONE ENCOUNTER
Spoke with patient. Informed her of pathology results from procedure and internal hemorrhoids- which may be the cause of bleeding. Patient states straining sometimes with bowel movements. Writer sending rectal cream and stool softeners to patients pharmacy. Patient thanked writer for return call.

## 2021-11-02 ENCOUNTER — TELEMEDICINE (OUTPATIENT)
Dept: DERMATOLOGY | Age: 61
End: 2021-11-02
Payer: COMMERCIAL

## 2021-11-02 ENCOUNTER — HOSPITAL ENCOUNTER (OUTPATIENT)
Dept: PHYSICAL THERAPY | Facility: CLINIC | Age: 61
Setting detail: THERAPIES SERIES
Discharge: HOME | End: 2021-11-02
Payer: COMMERCIAL

## 2021-11-02 DIAGNOSIS — L66.9 CICATRICIAL ALOPECIA: ICD-10-CM

## 2021-11-02 DIAGNOSIS — L30.9 HAND DERMATITIS: ICD-10-CM

## 2021-11-02 PROCEDURE — 97016 VASOPNEUMATIC DEVICE THERAPY: CPT

## 2021-11-02 PROCEDURE — 97110 THERAPEUTIC EXERCISES: CPT

## 2021-11-02 PROCEDURE — 3017F COLORECTAL CA SCREEN DOC REV: CPT | Performed by: DERMATOLOGY

## 2021-11-02 PROCEDURE — G8428 CUR MEDS NOT DOCUMENT: HCPCS | Performed by: DERMATOLOGY

## 2021-11-02 PROCEDURE — 99214 OFFICE O/P EST MOD 30 MIN: CPT | Performed by: DERMATOLOGY

## 2021-11-02 RX ORDER — CLOBETASOL PROPIONATE 0.5 MG/G
CREAM TOPICAL
Qty: 60 G | Refills: 5 | Status: SHIPPED | OUTPATIENT
Start: 2021-11-02 | End: 2022-02-07 | Stop reason: SDUPTHER

## 2021-11-02 NOTE — PROGRESS NOTES
Future Appointments   Date Time Provider Elizabeth Jenelle   11/2/2021  5:30 PM Maggie Flores, PT [facility] PT [facility]   11/4/2021  5:30 PM Maggie Flores, PT RENETTA  PT [facility]   11/9/2021  9:00 AM Ayden Gillespie DO Sports Med [email or web address]   11/13/2021 10:00 AM STC DIGITAL RM [facility] MAMMO [facility]   11/13/2021 10:30 AM STC DEXA RM ST[facility] MAMMO [facility] Radiolog   1/3/2022  1:30 PM Irene Ramos MD sv gr lks [facility]   2/7/2022 10:00 AM Kev García MD  derm [facility]   3/14/2022 10:30 AM Lalo Berry MD Resp Spec [email or web address]   4/11/2022  8:30 AM Tiara Hartman, APRN - [address]     PT wanted me to leave a message with you: PT states she is thinking/considering the medication you both discussed at her last appointment. She said she needs a little more time but will let us know her decision.

## 2021-11-02 NOTE — PROGRESS NOTES
TELEHEALTH EVALUATION -- Audio/Visual (During RUSIT-64 public health emergency)    Dermatology Patient Note  [id number]  [address]  Dept: [phone]  Dept Fax: [phone]      VISITDATE: 11/2/2021   REFERRING PROVIDER: No ref. provider found      Radha Heard is a 61 y.o. female  who presents today in the office for:    No chief complaint on file.       PERTINENT HISTORY NOT LISTED ABOVE:  Patient presents for f/u FFA and hand dermatitis  - hair loss has not progressed, but has not had regrowth  - hands are much improved, no more cracking and pain or tightness    CURRENT MEDICATIONS:   Current Outpatient Medications   Medication Sig Dispense Refill    clobetasol (TEMOVATE) 0.05 % cream Apply topically daily to frontal hairline and to hands as needed for dermatitis 60 g 5    hydrocortisone (ALA-JUVENTINO) 1 % cream Apply topically 2 times daily as needed 30 g 1    docusate sodium (COLACE) 100 MG capsule Take 1 capsule by mouth daily as needed for Constipation 60 capsule 0    chlorthalidone (HYGROTON) 25 MG tablet TAKE 1 TABLET BY MOUTH DAILY 90 tablet 1    bisacodyl (BISACODYL) 5 MG EC tablet Please follow instructions given to you by your provider 4 tablet 0    polyethylene glycol (MIRALAX) 17 GM/SCOOP powder Please follow instructions given to you by your provider 238 g 0    omeprazole (PRILOSEC) 40 MG delayed release capsule Take 1 capsule by mouth daily 30 capsule 2    fluticasone (FLONASE) 50 MCG/ACT nasal spray 1 spray by Each Nostril route daily 1 each 2    meloxicam (MOBIC) 15 MG tablet Take 1 tablet by mouth daily 30 tablet 3    tretinoin (RETIN-A) 0.025 % cream Apply pea sized amount to face nightly 45 g 3    carvedilol (COREG) 6.25 MG tablet Take 6.25 mg by mouth 2 times daily TAKING 2 TABLETS bid SINCE 11/28/20       Current Facility-Administered Medications   Medication Dose Route Frequency Provider Last Rate Last Admin  triamcinolone acetonide (KENALOG) injection 5 mg  5 mg Intra-LESional Once Sara Hirsch MD           ALLERGIES:   Allergies   Allergen Reactions    Banana     Pcn [Penicillins] Hives       SOCIAL HISTORY:  Social History     Tobacco Use    Smoking status: Former Smoker     Packs/day: 1.00     Years: 30.00     Pack years: 30.00     Types: Cigarettes     Quit date: 10/27/2019     Years since quittin.0    Smokeless tobacco: Never Used   Substance Use Topics    Alcohol use: Not Currently       Pertinent ROS:  Review of Systems  Skin: Denies any new changing, growing or bleeding lesions or rashes except as described in the HPI   Constitutional: Denies fevers, chills, and malaise. PHYSICAL EXAM:     Due to this being a TeleHealth encounter, evaluation of the following organ systems is limited: Vitals/Constitutional/EENT/Resp/CV/GI//MS/Neuro/Skin/Heme-Lymph-Imm. In particular examination of the skin is limited by video quality and patient available technology. There were no vitals taken for this visit. The patient is generally well appearing, well nourished, alert and conversational. Affect is normal.    Cutaneous Exam:  Physical Exam  Face, neck, and hands were examined. Diagnoses/exam findings/medical history pertinent to this visit are listed below:    Assessment and Plan:  Assessment   1. Hand dermatitis  - stable chronic illness  - clobetasol (TEMOVATE) 0.05 % cream; Apply topically daily to frontal hairline and to hands as needed for dermatitis  Dispense: 60 g; Refill: 5    2. 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 - Discussed risks of hydroxychloroquine including retinopathy, skin discoloration, transaminitis, GI upset, and rarely cardiomyopathy.   - patient would like to stick with just clobetasol for now  - clobetasol (TEMOVATE) 0.05 % cream; Apply topically daily to frontal hairline and to hands as needed for dermatitis  Dispense: 60 g; Refill: 5          RTC 3-4 months in person    There are no Patient Instructions on file for this visit. This note was created with the assistance of a speech-recognition program.  Although the intention is to generate a document that actually reflects the content of the visit, no guarantees can be provided that every mistake has been identified and corrected byediting. Pursuant to the emergency declaration under the [address] [place] [address], [facility] waiver authority and the [employer] Act, this Virtual  Visit was conducted, with patient's consent, to reduce the patient's risk of exposure to COVID-19 and provide continuity of care for an established patient. Services were provided through a video synchronous discussion virtually to substitute for in-person clinic visit.      Electronically signed by Sara Hirsch MD on 11/2/21 at 7:54 AM EDT

## 2021-11-02 NOTE — FLOWSHEET NOTE
clamshell 2x10  blue Progressed 10/18   Quad sets  15x5\"     SLR 10x2 1#  Inc reps 10/18   SAQ 10x3\" 1# 3 second hold with eccentric lowering, added 11/2   Heel Slides 15x  A     Ball Squeezes  20x3\"       Bridges   12x5\"  blue           PRONE      Quad Stretch 3x30\"  Added 11/2   HS curl  Lime  Added 11/2                SIDELYING          clamshells  15x blue Inc resistance 10/18     hip abduction  15x blue progressed 10/18         SEATED         LAQ 20x3\"ea 2# Progressed 10/18    Marches 20xea 2# Progressed 10/18    HS curls 10xea blue Progressed 10/18         STANDING       gastroc stretch on slant board 3x30\"     Side Stepping 2x30\" Lime proximal to knees Added 10/27   Step ups  10xea 6\"    3 way hip 10xea orange Added 10/27   Mini Squats  10x  Added 10/27   Other:  Manual:  to L lateral quad, ITB x 5 minutes (supine with LEs elevated on wedge)- not today      Specific Instructions for next treatment: progress bilateral hip and knee strengthening globally, hamstring/quad flexibility, restore knee ROM, progress bilateral LE stability and gait          Treatment Charges: Mins Units   []  Modalities     [x]  Ther Exercise 51 3   []  Manual Therapy     []  Ther Activities     []  Aquatics     [x]  Vasocompression 10 1   []  Other     Total Treatment time 61 4        Assessment: [x] Progressing toward goals. Initiated session with nustep warm up followed by charted exercises. Progressed strength and mobility with SAQ, prone quad stretch, and prone hamstring curl. Pt notes slight increase in stiffness following SAQ but did improve with continued activity. Otherwise good tolerance to all exercises and progressions made this date. Held standing exercises this date due to limited time but plan to resume at next session. Ended session with vaso to bilateral knees to reduce soreness at end of session. [] No change.      [] Other:  [x] Patient would continue to benefit from skilled physical therapy services in order to: reduce bilateral knee pain and swelling, restore BLE strength and mobility, and improve BLE stability to improve gait and return patient to prior level of function. STG/LTG  Problems:    [x]? ? Pain: up to 10/10 pain bilateral knees L > R  [x]? ? ROM reduced L knee ROM globally, reduced BLE flexibility   [x]? ? Strength: reduced bilateral hip and L knee strength globally   [x]? ? Function- impaired function indicated by LEFI score of 42.5% impaired   [x]? Other: impaired gait and balance described above     STG: (to be met in 8 treatments)  1. ? Pain: Decrease bilateral knee pain levels to 4/10 or less to improve tolerance to therapeutic exercise progressions. 2. ? ROM: Increase L knee AROM to 0-110 degrees to reduce difficulty with ADLs. a. Pt to demonstrate improved BLE flexibility with passive SLR to at least 80 degrees bilaterally, and ely's to at least 110 degrees. 3. ? Strength: Increase bilateral hip strength to at least 4+/5 and L knee strength to 5/5 to improve performance of stair ambulation, squatting, and prolonged ambulation. 4. ? Function: pt to demonstrate improved bilateral knee stability with ability to perform SLS for 30 seconds without increased knee pain. 5. Pt to be independent and compliant with Home Exercise Programs     LTG: (to be met in 12 treatments)  1. Improve score on assessment tool LEFI from 42.5% impaired to 25% impaired or less. 2. Reduce bilateral knee pain levels to 0-1/10 to improve tolerance to all daily activities including recreational exercise. 3. Pt to be able to ascend and descend a full flight of stairs independently with reciprocal stepping pattern and without increased bilateral knee pain.                     Patient goals: \"reduce knee pain and return to exercise to lose weight\"      Pt.  Education:  [x] Yes  [] No  [x] Reviewed Prior HEP/Ed  Method of Education: [x] Verbal  [x] Demo  [] Written  11/2- added prone HS curl to HEP  Comprehension of Education:  [x] Verbalizes understanding. [x] Demonstrates understanding. [] Needs review. [x] Demonstrates/verbalizes HEP/Ed previously given. Plan: [x] Continue current frequency toward long and short term goals. [x] Specific Instructions for subsequent treatments: progress as able       Time In: 5:34 pm             Time Out: 6:40 pm    Electronically signed by:   Anthony Courser, PT

## 2021-11-04 ENCOUNTER — HOSPITAL ENCOUNTER (OUTPATIENT)
Dept: PHYSICAL THERAPY | Facility: CLINIC | Age: 61
Setting detail: THERAPIES SERIES
Discharge: HOME | End: 2021-11-04
Payer: COMMERCIAL

## 2021-11-04 PROCEDURE — 97016 VASOPNEUMATIC DEVICE THERAPY: CPT

## 2021-11-04 PROCEDURE — 97110 THERAPEUTIC EXERCISES: CPT

## 2021-11-04 NOTE — FLOWSHEET NOTE
[] [facility]) - [facility] &  Therapy  [address]  [phone]  F: [phone] [x] [address]  [facility]   [address]  P: [phone]  F: [phone] [] [address] &  Therapy  [address] [facility]  P: [phone]  F: [phone] [] [address]  P: [phone]  F: [phone] [] [address]  [facility]   [address]   [place]: [phone]  F: [phone]      Physical Therapy Daily Treatment Note    Date:  2021  Patient Name:  Lexie Degree    :  1960  MRN: [id number]  Patient: Lexie Degree              : 1960                    MRN: [id number]  Physician: Melania Aguilera,                                 Insurance: [facility] (30 v/yr)  Medical Diagnosis: M17.0- Primary osteoarthritis of both knees                Rehab Codes: M17.0, M25.66, M25.56, M25.46, R26.89, M62.81  Onset date: 2021                          Next 's appt.: 21  Visit# / total visits: ; Cancels/No Shows: 2/0     Subjective:    Pain:  [] Yes  [x] No Location: bilateral knees Pain Rating: (0-10 scale) 2/10  Pain altered Tx:  [x] No  [] Yes  Action:   Comments: Pt arrives with minimal pain this date. Patient reports that she has been wearing her brace less frequently. Patient initially rates pain at 5/10 but reduces to 2/10 after reviewing visual pain scale.        Objective:  Modalities: vasocompression x 10 minutes, low pressure, 34 degrees bilateral knees   Precautions:  Exercises:  Exercises: Parisa Eldridge completed 21  Exercise       Reps/ Time Weight/ Level Comments    Nustep  5'  L1           Modified Esteban Stretch 1' ea       Seated HS stretch 3x30\"       Supine HS stretch 3x30\"       Hooklying clamshell 2x10  blue Progressed 10/18   Quad sets  15x5\"     SLR 10x2 1#  Inc reps 10/18   SAQ 10x3\" 1# 3 second hold with eccentric lowering, added 11/2   Heel Slides 15x  A     Ball Squeezes  20x3\"       Bridges   12x5\"  blue           PRONE      Quad Stretch 3x30\"  Added 11/2   HS curl  Lime  Added 11/2                SIDELYING          clamshells  15x blue Inc resistance 10/18     hip abduction  15x blue progressed 10/18         SEATED         LAQ 20x3\"ea 2# Progressed 10/18    Marches 20xea 2# Progressed 10/18    HS curls 10xea blue Progressed 10/18         STANDING       gastroc stretch on slant board 3x30\"     Side Stepping 3L Lime at ankles  Added 10/27   Step ups  10xea 6\" Added lateral 11/4   3 way hip 10xea Lime  Added 10/27   Mini Squats  10x Lime Added 10/27   SLS 1x30\"ea  Added 11/4    Other:  Manual:  to L lateral quad, ITB x 5 minutes (supine with LEs elevated on wedge)- not today      Specific Instructions for next treatment: progress bilateral hip and knee strengthening globally, hamstring/quad flexibility, restore knee ROM, progress bilateral LE stability and gait      Reassessed by primary PT 11/4 STRENGTH     Left Right   Hip Flex 5 5   Ext 4 4+   ABD 4 4+   ADD       Knee Flex 5 5   Ext 5 5   Ankle DF 5 5   PF 5 5   INV       EVER                 Reassessed by primary PT 11/4 ROM  ° A/P     Left Right   Hip Flex       Ext       ER       IR       ABD       ADD       Knee Flex 110 110   Ext 0 0   Ankle DF       PF       INV       EVER                  SLR 80 degrees L, 80 degrees R  Ely's 100 degrees L, 100 degrees R            Treatment Charges: Mins Units   []  Modalities     [x]  Ther Exercise 55 3   []  Manual Therapy     []  Ther Activities     []  Aquatics     [x]  Vasocompression 10 1   []  Other     Total Treatment time 65 4        Assessment: [x] Progressing toward goals. Initiated session with nustep warm up then reassessed progress towards goals.  Patient is demonstrating significant progress towards all short term goals. Patient demonstrates improvements in bilateral knee pain, BLE flexibility, hip and knee strength, and knee stability with ability to maintain SLS. Patient continues to be most limited by intermittent L knee pain and slight effusion, L hip extension and abduction strength compared to RLE. Patient reports most difficulty with ambulating stairs during the day due to stiffness in the morning and increased pain and swelling after standing/walking excessively throughout the day. Continued with charted exercises emphasizing standing/functional strengthening this date with fair tolerance, intermittent ache in L medial aspect of PFJ during knee SLR and step ups. Ended session with vaso to bilateral knees. [] No change. [] Other:  [x] Patient would continue to benefit from skilled physical therapy services in order to: reduce bilateral knee pain and swelling, restore BLE strength and mobility, and improve BLE stability to improve gait and return patient to prior level of function. STG/LTG  Problems:    [x]? ? Pain: up to 10/10 pain bilateral knees L > R  [x]? ? ROM reduced L knee ROM globally, reduced BLE flexibility   [x]? ? Strength: reduced bilateral hip and L knee strength globally   [x]? ? Function- impaired function indicated by LEFI score of 42.5% impaired   [x]? Other: impaired gait and balance described above     STG: (to be met in 8 treatments) Reassessed by primary PT 11/4/21  1. ? Pain: Decrease bilateral knee pain levels to 4/10 or less to improve tolerance to therapeutic exercise progressions. MET- pain increased up to 4/10 at most    2. ? ROM: Increase L knee AROM to 0-110 degrees to reduce difficulty with ADLs. MET   a. Pt to demonstrate improved BLE flexibility with passive SLR to at least 80 degrees bilaterally, and ely's to at least 110 degrees. Partially Met- elys limited and painful at 100 degrees  3. ? Strength:  Increase bilateral hip strength to at least 4+/5 and L knee strength to 5/5 to improve performance of stair ambulation, squatting, and prolonged ambulation. Partially met- most limited in L hip abduction and extension   4. ? Function: pt to demonstrate improved bilateral knee stability with ability to perform SLS for 30 seconds without increased knee pain. MET   5. Pt to be independent and compliant with Home Exercise Programs Partially MET- pt complies to HEP approximately 1-2x/ week       LTG: (to be met in 12 treatments)  1. Improve score on assessment tool LEFI from 42.5% impaired to 25% impaired or less. 2. Reduce bilateral knee pain levels to 0-1/10 to improve tolerance to all daily activities including recreational exercise. 3. Pt to be able to ascend and descend a full flight of stairs independently with reciprocal stepping pattern and without increased bilateral knee pain.                     Patient goals: \"reduce knee pain and return to exercise to lose weight\"      Pt. Education:  [x] Yes  [] No  [x] Reviewed Prior HEP/Ed  Method of Education: [x] Verbal  [x] Demo  [x] Written  11/2- added prone HS curl to HEP   Access Code: [email or web address]  URL: [email or web address]  Date: 11/04/2021  Prepared by: Lizzy Standing    Exercises  Clamshell - 1 x daily - 7 x weekly - 2 sets - 10 reps  Supine Bridge - 1 x daily - 7 x weekly - 2 sets - 10 reps  Sidelying Hip Abduction - 1 x daily - 7 x weekly - 2 sets - 10 reps  Supine Hamstring Stretch with Strap - 1 x daily - 7 x weekly - 3 sets - 30\" hold  Prone Quadriceps Stretch with Strap - 1 x daily - 7 x weekly - 3 sets - 10 reps  Side Stepping with Resistance at Ankles - 1 x daily - 7 x weekly - 3 sets - 10 reps    Comprehension of Education:  [x] Verbalizes understanding. [x] Demonstrates understanding. [x] Needs review. [] Demonstrates/verbalizes HEP/Ed previously given. Plan: [x] Continue current frequency toward long and short term goals.     [x] Specific Instructions for subsequent treatments: progress as able       Time In: 5:30 pm             Time Out: 6:40 pm    Electronically signed by:   Ferdinand Hardy PT

## 2021-11-05 DIAGNOSIS — L65.9 HAIR LOSS: Primary | ICD-10-CM

## 2021-11-09 ENCOUNTER — OFFICE VISIT (OUTPATIENT)
Dept: ORTHOPEDIC SURGERY | Age: 61
End: 2021-11-09
Payer: COMMERCIAL

## 2021-11-09 VITALS — BODY MASS INDEX: 34.55 KG/M2 | WEIGHT: 215 LBS | HEIGHT: 66 IN

## 2021-11-09 DIAGNOSIS — M17.12 PRIMARY OSTEOARTHRITIS OF LEFT KNEE: ICD-10-CM

## 2021-11-09 DIAGNOSIS — M17.0 PRIMARY OSTEOARTHRITIS OF BOTH KNEES: Primary | ICD-10-CM

## 2021-11-09 DIAGNOSIS — Z98.890 HISTORY OF CARPAL TUNNEL RELEASE OF BOTH WRISTS: ICD-10-CM

## 2021-11-09 PROCEDURE — 1036F TOBACCO NON-USER: CPT | Performed by: FAMILY MEDICINE

## 2021-11-09 PROCEDURE — 99214 OFFICE O/P EST MOD 30 MIN: CPT | Performed by: FAMILY MEDICINE

## 2021-11-09 PROCEDURE — 3017F COLORECTAL CA SCREEN DOC REV: CPT | Performed by: FAMILY MEDICINE

## 2021-11-09 PROCEDURE — G8484 FLU IMMUNIZE NO ADMIN: HCPCS | Performed by: FAMILY MEDICINE

## 2021-11-09 PROCEDURE — G8427 DOCREV CUR MEDS BY ELIG CLIN: HCPCS | Performed by: FAMILY MEDICINE

## 2021-11-09 PROCEDURE — 20610 DRAIN/INJ JOINT/BURSA W/O US: CPT | Performed by: FAMILY MEDICINE

## 2021-11-09 PROCEDURE — G8417 CALC BMI ABV UP PARAM F/U: HCPCS | Performed by: FAMILY MEDICINE

## 2021-11-09 RX ORDER — TRIAMCINOLONE ACETONIDE 40 MG/ML
40 INJECTION, SUSPENSION INTRA-ARTICULAR; INTRAMUSCULAR ONCE
Status: COMPLETED | OUTPATIENT
Start: 2021-11-09 | End: 2021-11-09

## 2021-11-09 RX ORDER — BUPIVACAINE HYDROCHLORIDE 5 MG/ML
4 INJECTION, SOLUTION PERINEURAL ONCE
Status: COMPLETED | OUTPATIENT
Start: 2021-11-09 | End: 2021-11-09

## 2021-11-09 RX ADMIN — TRIAMCINOLONE ACETONIDE 40 MG: 40 INJECTION, SUSPENSION INTRA-ARTICULAR; INTRAMUSCULAR at 14:36

## 2021-11-09 RX ADMIN — BUPIVACAINE HYDROCHLORIDE 20 MG: 5 INJECTION, SOLUTION PERINEURAL at 14:36

## 2021-11-09 NOTE — PROGRESS NOTES
Sports Medicine Consultation     CHIEF COMPLAINT:  Knee Pain (B/L) and Wrist Pain (L>R)      HPI:  Shilpa Trevino is a 61y.o. year old female who is a  established patient being seen for regarding follow up of a pre-existing problem left knee pain. The pain has been present for 5 month(s). The patient recalls a non injury. The patient has tried Tylenol, Mobic with improvement. The pain is described as achy. There is  pain on weightbearing. The knee does swell. There is is  painful popping and clicking. The knee does catch or lock. It has given out. It is  stiff upon arising from sitting. It is  painful to go up and down stairs and sit for a prolonged period of time. Patient was given a patellofemoral control brace and wears in intermittently. Patient is currently in PT and report mild improvement. B/l wrist pain   Also seen today for  B/l wrist pain . The patient is a right hand dominant female who has had bilaterally hand/wrist pain for one  months. As far as trauma to the hand the patient indicates non traumatic. The following medications/interventions have been tried: Carpal tunnel surgery 4 years ago without complications, did not do rehab, sent home with exercises with benefit. Patient went back to work 2 weeks after and problem has become gradually worse. Does perform repetitive movements and works a desk job on the computer. she has a past medical history of Essential hypertension, Hematuria, Hepatitis-C, History of colon polyps, Hypertension, Mitral valve regurgitation, Obesity (BMI 30-39.9), and AURELIANO on CPAP. she has a past surgical history that includes Hysterectomy, total abdominal; Carpal tunnel release (Bilateral); Colonoscopy (11/06/2017); Colonoscopy (N/A, 10/4/2021); and Colonoscopy (10/4/2021). family history includes Cancer in her father; High Blood Pressure in her mother; Other in her mother; Prostate Cancer in her brother.     Social History Socioeconomic History    Marital status: Single     Spouse name: Not on file    Number of children: Not on file    Years of education: Not on file    Highest education level: Not on file   Occupational History    Not on file   Tobacco Use    Smoking status: Former Smoker     Packs/day: 1.00     Years: 30.00     Pack years: 30.00     Types: Cigarettes     Quit date: 10/27/2019     Years since quittin.0    Smokeless tobacco: Never Used   Vaping Use    Vaping Use: Never used   Substance and Sexual Activity    Alcohol use: Not Currently    Drug use: Never    Sexual activity: Not Currently   Other Topics Concern    Not on file   Social History Narrative    Diana Latif has experienced a financial strain with resourcestrain: Food on 2020. Potential community resources and services have been provided in patient instructions by James Watson. Social Determinants of Health     Financial Resource Strain: Low Risk     Difficulty of Paying Living Expenses: Not hard at all   Food Insecurity: No Food Insecurity    Worried About Running Out of Food in the Last Year: Never true    Madi of Food in the Last Year: Never true   Transportation Needs: No Transportation Needs    Lack of Transportation (Medical): No    Lack of Transportation (Non-Medical):  No   Physical Activity:     Days of Exercise per Week: Not on file    Minutes of Exercise per Session: Not on file   Stress:     Feeling of Stress : Not on file   Social Connections:     Frequency of Communication with Friends and Family: Not on file    Frequency of Social Gatherings with Friends and Family: Not on file    Attends Orthodoxy Services: Not on file    Active Member of Clubs or Organizations: Not on file    Attends Club or Organization Meetings: Not on file    Marital Status: Not on file   Intimate Partner Violence:     Fear of Current or Ex-Partner: Not on file    Emotionally Abused: Not on file    Physically Abused: Not on file    Sexually Abused: Not on file   Housing Stability:     Unable to Pay for Housing in the Last Year: Not on file    Number of Places Lived in the Last Year: Not on file    Unstable Housing in the Last Year: Not on file       Current Outpatient Medications   Medication Sig Dispense Refill    clobetasol (TEMOVATE) 0.05 % cream Apply topically daily to frontal hairline and to hands as needed for dermatitis 60 g 5    docusate sodium (COLACE) 100 MG capsule Take 1 capsule by mouth daily as needed for Constipation 60 capsule 0    chlorthalidone (HYGROTON) 25 MG tablet TAKE 1 TABLET BY MOUTH DAILY 90 tablet 1    bisacodyl (BISACODYL) 5 MG EC tablet Please follow instructions given to you by your provider 4 tablet 0    omeprazole (PRILOSEC) 40 MG delayed release capsule Take 1 capsule by mouth daily 30 capsule 2    fluticasone (FLONASE) 50 MCG/ACT nasal spray 1 spray by Each Nostril route daily 1 each 2    meloxicam (MOBIC) 15 MG tablet Take 1 tablet by mouth daily 30 tablet 3    tretinoin (RETIN-A) 0.025 % cream Apply pea sized amount to face nightly 45 g 3    carvedilol (COREG) 6.25 MG tablet Take 6.25 mg by mouth 2 times daily TAKING 2 TABLETS bid SINCE 11/28/20      polyethylene glycol (MIRALAX) 17 GM/SCOOP powder Please follow instructions given to you by your provider 238 g 0     Current Facility-Administered Medications   Medication Dose Route Frequency Provider Last Rate Last Admin    triamcinolone acetonide (KENALOG) injection 5 mg  5 mg Intra-LESional Once Jennifer Gallego MD           Allergies:  sheis allergic to banana and pcn [penicillins]. ROS:  CV:  Denies chest pain; palpitations; shortness of breath; swelling of feet, ankles; and loss of consciousness. CON: Denies fever and dizziness. ENT:  Denies hearing loss / ringing, ear infections hoarseness, and swallowing problems. RESP:  Denies chronic cough, spitting up blood, and asthma/wheezing.   GI: Denies abdominal pain, change in bowel habits, nausea or vomiting, and blood in stools. :  Denies frequent urination, burning or painful urination, blood in the urine, and bladder incontinence. NEURO:  Denies headache, memory loss, sleep disturbance, and tremor or movement disorder. PHYSICAL EXAM:   Ht 5' 6\" (1.676 m)   Wt 215 lb (97.5 kg)   BMI 34.70 kg/m²   GENERAL: Viviane Rubio is a 61 y.o. female who is alert and oriented and sitting comfortably in our office. SKIN:  Intact without rashes, lesions or ulcerations. NEURO: Sensation to the extremity is intact. VASC:  Capillary refill is less than 3 seconds. Distal pulses are palpable. There is no lymphadenopathy. Knee Exam  Musculoskeletal/Neurologic:  Inspection-Swelling: mild left only, Ecchymosis: no  Palpation-Tenderness:none  Pain with patellar grind: yes  ROM- 0-115 B/L  Strength- WNL  Sensation-normal to light touch    Special Tests-  Varus Laxity: negative   Valgus Laxity:  negative   Anterior Drawer: negative   Posterior Drawer: negative  Lachman's: negative  Katherine's:negative    Hand/Wrist Exam  ROM:  Full flexor and extensor tendon function intact   Finger, hand, and wrist range of motion are Reduced resulting in pain. Inspection-Deformity: no  Palpation-Tenderness: no  There is is not thenar and is interosseous atrophy. Strength- Reduced resulting in pain  NEURO: Radial, ulnar, and median nerves are intact to motor and sensory testing. Two point discrimination is less than six mm. There is decreased sensation to light touch and pinprick in the median and ulnar nerve distribution. CTS: Tinel's test at the wrist is positive. Flexion compression test negative  Phalen's test is negative. Finkelstein's test is negative. Elbow:  Range of motion and strength about the elbow is intact. Tinel's test is positive at the elbow.     Cerv:  Full pain free range of motion with a negative Spurling's test.      PSYCH:  Good fund of knowledge and displays understanding of exam.    RADIOLOGY: No results found. 4 views of the left  knee were ordered, independently visualized by me, and discussed with patient. Findings:   Radiographs of both knees demonstrate degenerative changes    primarily in the left patellofemoral compartment with joint effusion left    greater than right there are no acute osseous abnormalities no obvious    fractures or dislocations are noted on plain film radiograph       Impression:   Degenerative changes primarily patellofemoral compartment left    greater than right joint effusions of both knees         IMPRESSION:     1. Primary osteoarthritis of both knees    2. History of carpal tunnel release of both wrists          PLAN:   We discussed some of the etiologies and natural histories of     ICD-10-CM    1. Primary osteoarthritis of both knees  M17.0    2. History of carpal tunnel release of both wrists  Z98.890    . We discussed the various treatment alternatives including anti-inflammatory medications, physical therapy, injections, further imaging studies and as a last resort surgery. At this point patient has continued issues with osteoarthritis in her left knee she did have a cortisone over 3 months ago and I do think another one is reasonable 1 was administered in the manner as typed in the chart to her left knee only patient tired procedure well felt immediate pain relief in regards to bilateral wrist issues in a patient does feel this is similar to her previous carpal tunnel and I do think that evaluation with an EMG is appropriate and one was ordered in the office today we will see her back after the EMG in 2 months for her knees and her wrists    Return to clinic in [place] in about 2 months (around 1/9/2022). .    Please be aware portions of this note were completed using voice recognition software and unforeseen errors may have occurred    Electronically signed by Asa Hernández DO, FAOASM  on 11/9/21 at 9:09 AM EST    KNEE INJECTION PROCEDURE NOTE:  The patient was identified. The left knee was confirmed with the patient. Consent was obtained and a time out was performed. After a sterile prep with Betadine followed by an alcohol wipe the knee was injected using a bent knee lateral joint line approach with a mixture of 4 mL of 0.5% Marcaine and 40 mg of Kenalog. Patient tolerated the procedure well without post injection complications. I instructed the patient to call our office immediately if they have any swelling or increased pain at the injection site. No orders of the defined types were placed in this encounter.

## 2021-11-13 ENCOUNTER — HOSPITAL ENCOUNTER (OUTPATIENT)
Age: 61
Discharge: HOME | End: 2021-11-13
Payer: COMMERCIAL

## 2021-11-13 ENCOUNTER — HOSPITAL ENCOUNTER (OUTPATIENT)
Dept: WOMENS IMAGING | Age: 61
Discharge: HOME | End: 2021-11-15
Payer: COMMERCIAL

## 2021-11-13 DIAGNOSIS — Z12.31 SCREENING MAMMOGRAM FOR BREAST CANCER: ICD-10-CM

## 2021-11-13 DIAGNOSIS — Z78.0 POSTMENOPAUSE: ICD-10-CM

## 2021-11-13 DIAGNOSIS — L65.9 HAIR LOSS: ICD-10-CM

## 2021-11-13 DIAGNOSIS — Z00.00 ANNUAL PHYSICAL EXAM: ICD-10-CM

## 2021-11-13 LAB
ANION GAP SERPL CALCULATED.3IONS-SCNC: 8 MMOL/L (ref 9–17)
BUN BLDV-MCNC: 11 MG/DL (ref 8–23)
BUN/CREAT BLD: ABNORMAL (ref 9–20)
CALCIUM SERPL-MCNC: 9.4 MG/DL (ref 8.6–10.4)
CHLORIDE BLD-SCNC: 101 MMOL/L (ref 98–107)
CHOLESTEROL/HDL RATIO: 3.8
CHOLESTEROL: 200 MG/DL
CO2: 28 MMOL/L (ref 20–31)
CREAT SERPL-MCNC: 0.88 MG/DL (ref 0.5–0.9)
GFR AFRICAN AMERICAN: >60 ML/MIN
GFR NON-AFRICAN AMERICAN: >60 ML/MIN
GFR SERPL CREATININE-BSD FRML MDRD: ABNORMAL ML/MIN/{1.73_M2}
GFR SERPL CREATININE-BSD FRML MDRD: ABNORMAL ML/MIN/{1.73_M2}
GLUCOSE BLD-MCNC: 88 MG/DL (ref 70–99)
HDLC SERPL-MCNC: 53 MG/DL
LDL CHOLESTEROL: 130 MG/DL (ref 0–130)
POTASSIUM SERPL-SCNC: 4.4 MMOL/L (ref 3.7–5.3)
SODIUM BLD-SCNC: 137 MMOL/L (ref 135–144)
TRIGL SERPL-MCNC: 86 MG/DL
TSH SERPL DL<=0.05 MIU/L-ACNC: 2.09 MIU/L (ref 0.3–5)
VLDLC SERPL CALC-MCNC: ABNORMAL MG/DL (ref 1–30)

## 2021-11-13 PROCEDURE — 84443 ASSAY THYROID STIM HORMONE: CPT

## 2021-11-13 PROCEDURE — 77080 DXA BONE DENSITY AXIAL: CPT

## 2021-11-13 PROCEDURE — 36415 COLL VENOUS BLD VENIPUNCTURE: CPT

## 2021-11-13 PROCEDURE — 77063 BREAST TOMOSYNTHESIS BI: CPT

## 2021-11-13 PROCEDURE — 80061 LIPID PANEL: CPT

## 2021-11-13 PROCEDURE — 80048 BASIC METABOLIC PNL TOTAL CA: CPT

## 2021-11-15 ENCOUNTER — TELEPHONE (OUTPATIENT)
Dept: PULMONOLOGY | Age: 61
End: 2021-11-15

## 2021-11-15 ENCOUNTER — HOSPITAL ENCOUNTER (OUTPATIENT)
Dept: PHYSICAL THERAPY | Facility: CLINIC | Age: 61
Setting detail: THERAPIES SERIES
Discharge: HOME | End: 2021-11-15
Payer: COMMERCIAL

## 2021-11-15 DIAGNOSIS — G47.33 OSA ON CPAP: Primary | ICD-10-CM

## 2021-11-15 DIAGNOSIS — Z99.89 OSA ON CPAP: Primary | ICD-10-CM

## 2021-11-15 PROCEDURE — 97110 THERAPEUTIC EXERCISES: CPT

## 2021-11-15 PROCEDURE — 97016 VASOPNEUMATIC DEVICE THERAPY: CPT

## 2021-11-15 NOTE — FLOWSHEET NOTE
[] [facility]) - [facility] &  Therapy  [address]  [phone]  F: [phone] [x] [address]  [facility]   [address]  P: [phone]  F: [phone] [] [address] &  Therapy  [address] [facility]  P: [phone]  F: [phone] [] [address]  P: [phone]  F: [phone] [] [address]  [facility]   [address]   [place]: [phone]  F: [phone]      Physical Therapy Daily Treatment Note    Date:  11/15/2021  Patient Name:  Ival Roll    :  1960  MRN: [id number]  Patient: Ival Roll              : 1960                    MRN: [id number]  Physician: Jaylan Tabor DO                                [facility]urance: [facility] ([facility])  Medical Diagnosis: M17.0- Primary osteoarthritis of both knees                Rehab Codes: M17.0, M25.66, M25.56, M25.46, R26.89, M62.81  Onset date: 2021                          Next 's appt.: 21  Visit# / total visits: ; Cancels/No Shows: 2/0     Subjective:    Pain:  [] Yes  [x] No Location: L knees Pain Rating: (0-10 scale) 2/10  Pain altered Tx:  [x] No  [] Yes  Action:   Comments: Pt arrives with some soreness in L knee this date, pt notes it hasn't improved much since cortisone injection. Patient thinks the achiness may be from colder weather.        Objective:  Modalities: vasocompression x 10 minutes, low pressure, 34 degrees bilateral knees   Precautions:  Exercises:  Exercises: Cindie Kocher completed 11/15/21  Exercise       Reps/ Time Weight/ Level Comments    Nustep  8'  L1           Modified Esteban Stretch 1' ea       Seated HS stretch 3x30\"       Supine HS stretch 3x30\"       Hooklying clamshell 2x10  blue Progressed 10/18   Quad sets  15x5\"     SLR 10x2 1#  Inc reps 10/18   SAQ 10x3\" 1# 3 second hold with eccentric lowering, added 11/2   Heel Slides 15x  A     Ball Squeezes  20x3\"       Bridges   15x5\"  blue           PRONE      Quad Stretch 3x30\"  Added 11/2   HS curl  15x blue Added 11/2                SIDELYING          clamshells  15x blue Inc resistance 10/18     hip abduction  15x blue progressed 10/18         SEATED         LAQ 20x3\"ea 2# Progressed 10/18    Marches 20xea 2# Progressed 10/18    HS curls 10xea blue Progressed 10/18         STANDING       gastroc stretch on slant board 3x30\"     Side Stepping 3L Blue at ankles  Added 10/27   Monster Walks  3L Blue ankles Added 11/15- fwd, lateral   Step ups  10xea 6\" Added lateral 11/4   3 way hip 10xea Lime  Added 10/27   Mini Squats  10x Lime Added 10/27   HS curls 2x10     SLS 1x30\"ea  Added 11/4    Other:  Manual:  to L lateral quad, ITB x 5 minutes (supine with LEs elevated on wedge)- not today      Specific Instructions for next treatment: progress bilateral hip and knee strengthening globally, [id number]/quad flexibility, restore knee ROM, progress bilateral LE stability and gait      Reassessed by primary PT 11/4 STRENGTH     Left Right   Hip Flex 5 5   Ext 4 4+   ABD 4 4+   ADD       Knee Flex 5 5   Ext 5 5   Ankle DF 5 5   PF 5 5   INV       EVER                 Reassessed by primary PT 11/4 ROM  ° A/P     Left Right   Hip Flex       Ext       ER       IR       ABD       ADD       Knee Flex 110 110   Ext 0 0   Ankle DF       PF       INV       EVER                  SLR 80 degrees L, 80 degrees R  Ely's 100 degrees L, 100 degrees R            Treatment Charges: Mins Units   []  Modalities     [x]  Ther Exercise 52 3   []  Manual Therapy     []  Ther Activities     []  Aquatics     [x]  Vasocompression 10 1   []  Other     Total Treatment time 62 4        Assessment: [x] Progressing toward goals. Initiated session with nustep warm up followed by charted exercises. Pt presents with increased stiffness with stretches this date but does admit she has not been compliant with HEP within the past week. Encouraged patient to continue with HEP regularly to promote improvements in strength and mobility through BLEs. Progressed exercises with monster walks forward and retro and standing hamstring curls this date. Pt with slight increased ache with step ups but otherwise good tolerance to exercises. Added patellar mobs at end of session and educated pt to perform at home to relieve stiffness through L patella. Ended session with vaso to bilateral knees to relieve soreness. [] No change. [] Other:  [x] Patient would continue to benefit from skilled physical therapy services in order to: reduce bilateral knee pain and swelling, restore BLE strength and mobility, and improve BLE stability to improve gait and return patient to prior level of function. STG/LTG  Problems:    [x]? ? Pain: up to 10/10 pain bilateral knees L > R  [x]? ? ROM reduced L knee ROM globally, reduced BLE flexibility   [x]? ? Strength: reduced bilateral hip and L knee strength globally   [x]? ? Function- impaired function indicated by LEFI score of 42.5% impaired   [x]? Other: impaired gait and balance described above     STG: (to be met in 8 treatments) Reassessed by primary PT 11/4/21  1. ? Pain: Decrease bilateral knee pain levels to 4/10 or less to improve tolerance to therapeutic exercise progressions. MET- pain increased up to 4/10 at most    2. ? ROM: Increase L knee AROM to 0-110 degrees to reduce difficulty with ADLs. MET   a. Pt to demonstrate improved BLE flexibility with passive SLR to at least 80 degrees bilaterally, and ely's to at least 110 degrees. Partially Met- elys limited and painful at 100 degrees  3. ? Strength: Increase bilateral hip strength to at least 4+/5 and L knee strength to 5/5 to improve performance of stair ambulation, squatting, and prolonged ambulation.  Partially met- most limited in L hip abduction and extension   4. ? Function: pt to demonstrate improved bilateral knee stability with ability to perform SLS for 30 seconds without increased knee pain. MET   5. Pt to be independent and compliant with Home Exercise Programs Partially MET- pt complies to HEP approximately 1-2x/ week       LTG: (to be met in 12 treatments)  1. Improve score on assessment tool LEFI from 42.5% impaired to 25% impaired or less. 2. Reduce bilateral knee pain levels to 0-1/10 to improve tolerance to all daily activities including recreational exercise. 3. Pt to be able to ascend and descend a full flight of stairs independently with reciprocal stepping pattern and without increased bilateral knee pain.                     Patient goals: \"reduce knee pain and return to exercise to lose weight\"      Pt. Education:  [x] Yes  [] No  [x] Reviewed Prior HEP/Ed  Method of Education: [x] Verbal  [x] Demo  [] Written  11/2- added prone HS curl to HEP   Access Code: [email or web address]  URL: [email or web address]  Date: 11/04/2021  Prepared by: Deleta Gens    Exercises  Clamshell - 1 x daily - 7 x weekly - 2 sets - 10 reps  Supine Bridge - 1 x daily - 7 x weekly - 2 sets - 10 reps  Sidelying Hip Abduction - 1 x daily - 7 x weekly - 2 sets - 10 reps  Supine Hamstring Stretch with Strap - 1 x daily - 7 x weekly - 3 sets - 30\" hold  Prone Quadriceps Stretch with Strap - 1 x daily - 7 x weekly - 3 sets - 10 reps  Side Stepping with Resistance at Ankles - 1 x daily - 7 x weekly - 3 sets - 10 reps    11/15- verbalized adding patellar mobs to HEP  Comprehension of Education:  [x] Verbalizes understanding. [x] Demonstrates understanding. [] Needs review. [] Demonstrates/verbalizes HEP/Ed previously given. Plan: [x] Continue current frequency toward long and short term goals.     [x] Specific Instructions for subsequent treatments: progress as able       Time In: 5:00 pm        Time Out: 6:10 pm    Electronically signed by:  Priti Cui, PT

## 2021-11-18 ENCOUNTER — HOSPITAL ENCOUNTER (OUTPATIENT)
Dept: PHYSICAL THERAPY | Facility: CLINIC | Age: 61
Setting detail: THERAPIES SERIES
Discharge: HOME | End: 2021-11-18
Payer: COMMERCIAL

## 2021-11-18 PROCEDURE — 97016 VASOPNEUMATIC DEVICE THERAPY: CPT

## 2021-11-18 PROCEDURE — 97110 THERAPEUTIC EXERCISES: CPT

## 2021-11-18 NOTE — TELEPHONE ENCOUNTER
Noted. Faxed DME order w/dictation to [address] [place] [address] #[phone]. Called patient and left a message to use OTC cough syrup.

## 2021-11-18 NOTE — FLOWSHEET NOTE
[] [facility]) - [facility] &  Therapy  [address]  [phone]  F: [phone] [x] [address]  [facility] [address]   [address]  P: [phone]  F: [phone] [] [address] &  Therapy  [address]  P: [phone]  F: [phone] [] [address]  P: [phone]  F: [phone] [] [address]  [facility]   [address]   [place]: [phone]  F: [phone]      Physical Therapy Daily Treatment Note    Date:  2021  Patient Name:  Kong Vale    :  1960  MRN: [id number]  Patient: Kong Vale              : 1960                    MRN: [id number]  Physician: Ayden Gillespie DO                                Insurance: [facility] (30 v/yr)  Medical Diagnosis: M17.0- Primary osteoarthritis of both knees                Rehab Codes: M17.0, M25.66, M25.56, M25.46, R26.89, M62.81  Onset date: 2021                          Next 's appt.: 21  Visit# / total visits: ; Cancels/No Shows: 2/0     Subjective:    Pain:  [] Yes  [x] No Location: L knees Pain Rating: (0-10 scale) 2/10  Pain altered Tx:  [x] No  [] Yes  Action:   Comments: Pt arrives reporting she \"feels okay\" but reporting no new changes.       Objective:  Modalities: vasocompression x 10 minutes, low pressure, 34 degrees bilateral knees   Precautions:  Exercises:  Exercises: Liza Mays completed 21  Exercise       Reps/ Time Weight/ Level Comments    Nustep  8'  L1           Modified Esteban Stretch 1' ea       Seated HS stretch 3x30\"       Supine HS stretch 3x30\"       Hooklying clamshell 2x10  blue Progressed 10/18   Quad sets  15x5\"     SLR 10x2 1#  Inc reps 10/18   SAQ 10x3\" 1# 3 second hold with eccentric lowering, 2. Reduce bilateral knee pain levels to 0-1/10 to improve tolerance to all daily activities including recreational exercise. 3. Pt to be able to ascend and descend a full flight of stairs independently with reciprocal stepping pattern and without increased bilateral knee pain.                     Patient goals: \"reduce knee pain and return to exercise to lose weight\"      Pt. Education:  [x] Yes  [] No  [x] Reviewed Prior HEP/Ed  Method of Education: [x] Verbal  [x] Demo  [] Written  11/2- added prone HS curl to HEP   Access Code: [email or web address]  URL: [email or web address]/  Date: 11/04/2021  Prepared by: Monica Mendoza    Exercises  Clamshell - 1 x daily - 7 x weekly - 2 sets - 10 reps  Supine Bridge - 1 x daily - 7 x weekly - 2 sets - 10 reps  Sidelying Hip Abduction - 1 x daily - 7 x weekly - 2 sets - 10 reps  Supine Hamstring Stretch with Strap - 1 x daily - 7 x weekly - 3 sets - 30\" hold  Prone Quadriceps Stretch with Strap - 1 x daily - 7 x weekly - 3 sets - 10 reps  Side Stepping with Resistance at Ankles - 1 x daily - 7 x weekly - 3 sets - 10 reps    11/15- verbalized adding patellar mobs to HEP  Comprehension of Education:  [x] Verbalizes understanding. [x] Demonstrates understanding. [] Needs review. [] Demonstrates/verbalizes HEP/Ed previously given. Plan: [x] Continue current frequency toward long and short term goals.     [x] Specific Instructions for subsequent treatments: progress as able       Time In: 5:00 pm        Time Out: 6:15 pm    Electronically signed by:  Gweneth Cabot, PTA

## 2021-11-29 ENCOUNTER — TELEPHONE (OUTPATIENT)
Dept: DERMATOLOGY | Age: 61
End: 2021-11-29

## 2021-11-29 DIAGNOSIS — L91.8 INFLAMED ACROCHORDON: Primary | ICD-10-CM

## 2021-12-03 ENCOUNTER — IMMUNIZATION (OUTPATIENT)
Dept: FAMILY MEDICINE CLINIC | Age: 61
End: 2021-12-03
Payer: COMMERCIAL

## 2021-12-03 PROCEDURE — 91300 COVID-19, PFIZER VACCINE 30MCG/0.3ML DOSE: CPT | Performed by: INTERNAL MEDICINE

## 2021-12-03 PROCEDURE — 0001A COVID-19, PFIZER VACCINE 30MCG/0.3ML DOSE: CPT | Performed by: INTERNAL MEDICINE

## 2021-12-10 ENCOUNTER — TELEPHONE (OUTPATIENT)
Dept: PRIMARY CARE CLINIC | Age: 61
End: 2021-12-10

## 2021-12-13 ENCOUNTER — TELEPHONE (OUTPATIENT)
Dept: PRIMARY CARE CLINIC | Age: 61
End: 2021-12-13

## 2021-12-13 NOTE — TELEPHONE ENCOUNTER
----- Message from Katelyn Gabriel sent at 12/10/2021  3:48 PM EST -----  Subject: Message to Provider    QUESTIONS  Information for Provider? pt doesnt need apt anymore skin tag came off   ---------------------------------------------------------------------------  --------------  [address]  What is the best way for the office to contact you? OK to leave message on   voicemail  Preferred Call Back Phone Number? [phone]  ---------------------------------------------------------------------------  --------------  SCRIPT ANSWERS  Relationship to Patient?  Self

## 2021-12-14 ENCOUNTER — PATIENT MESSAGE (OUTPATIENT)
Dept: DERMATOLOGY | Age: 61
End: 2021-12-14

## 2021-12-14 DIAGNOSIS — L30.9 HAND DERMATITIS: Primary | ICD-10-CM

## 2021-12-15 RX ORDER — FLUTICASONE PROPIONATE 0.05 %
CREAM (GRAM) TOPICAL
Qty: 60 G | Refills: 2 | Status: SHIPPED | OUTPATIENT
Start: 2021-12-15 | End: 2022-01-14

## 2021-12-15 NOTE — TELEPHONE ENCOUNTER
From: Rachell Bell  To: Dr. Carlos Smith: 12/14/2021 5:30 PM EST  Subject: Refill    Hello  and or staff,    I am in need of a refill for the Fluticasone Propionate 0.05% creme for my finger it has flared up. Also, I have been experiencing hair follicle pimples in various areas of my scalp. ( I wouldn't know if it Folliculitis) I use the Clobetasol Propionate Creme 0.05% when needed. I have always kept my scalp/berman clean and I still get a pimple or two is there anything else that can help me?  I'm pretty much at my wits     Thanks,    Ana Sifuentes Subjective:       Patient ID: Alyssa Sanchez is a 56 y.o. female.    Chief Complaint: Back Pain    Back Pain   Pertinent negatives include no abdominal pain, chest pain, fever, numbness or weakness. Headaches:  occipital area.   Mid-back Pain   Pertinent negatives include no abdominal pain, arthralgias, chest pain, chills, fatigue, fever, joint swelling, myalgias, numbness, rash or weakness. Headaches:  occipital area.     Mrs. Sanchez returns  to clinic for f/u for mid thoracic pain.   LCV 03/26/17.  She has been relatively better, taking from time to time  Tramadol prn, until recently when she needed more pain meds.  NO new injury, nor new pain reported.  It was a time for her to come back to clinic..  Today, she reports that mid thoracic pain with radiation to front to stomach, on Right side, is greatly improved.   She feels much better.  She was found to have a scoliosis in thoracic spine, and MRI of Cervical and Thoracic spine showed syrinx from C2 through T6,   that is most likely explanation of her pain.  She saw Neurosurgeon, who initially recommended surgical procedure, that she refused.  He  repeated MRI of Cervical and , Thoracic spine  (2015) showed a stable spine, no increase in syrinx size.  Taking Nexium OTC, improves stomach pain, but not back pain.   Current  Mid back Pain is 4, the worst pain is 8.   Pain is worse at night, cannot fall asleep.   She states that she twist and turn and cannot fall asleep.   Pain in back is dull as soreness, and ache,   Pain becomes sharp when it radiates to front to stomach , sometimes it feels as tingling.   She cannot find any position to make pain better. No alleviating  factors.   No pain radiation to buttocks, nor to legs.    No leg weakness ,no difficulties walking.   No BB incontinence.   Opiates do not help, does not take Hydrocodone.  Tramadol helps but she cannot tolerate it 2/2 to stomach problem.  Lyrica, Butran were not covered by her  insurance.  Gabapentin helps, but gives her a side problems, makes her breast swell and hurt, so she takes only as needed for severe pain.  She takes low dose (cannot tolerate higher dose,) for neuropathic pain at bedtime, prn.  Muscle relaxant helps the best. Takes Flexeril at bedtime, prn.  But failed Robaxin, and Zanaflex,made her too drowsy.  Takes Ativan prn severe muscle spasms, and anxiety.  Pertinent negatives include no abdominal pain, bladder incontinence, bowel incontinence, chest pain, dysuria, fever, headaches, leg pain, numbness, paresis, paresthesias, pelvic pain, perianal numbness, tingling, weakness or weight loss  Here for follow up, re evaluation and medications adjustment.  Past Medical History:   Diagnosis Date    Allergy     Fibrocystic breast     Hypertension     Keloid cicatrix     Keloid scar     left ear    Liver cyst 2/3/2015    Scoliosis of thoracolumbar spine 9/6/2016       Past Surgical History:   Procedure Laterality Date    BREAST BIOPSY      both breast-at least x3 different biopsies    COLONOSCOPY      COLONOSCOPY N/A 11/25/2013    Performed by [facility]uf [facility] at [facility] ENDO (4TH FLR)    ESOPHAGOGASTRODUODENOSCOPY (EGD) N/A 1/22/2016    Performed by Bethel Gomez MD at [facility] ENDO (4TH FLR)    EXCISION-KELOID left ear helix and posterior aspect, reconstruction using post auricular flap 90 min  (Radiation to follow--Dr. Headley) Left 7/22/2014    Performed by Boston Melendez MD at [facility] OR 2ND FLR    FLAP left post auricular Left 7/22/2014    Performed by Boston Melendez [facility] at [facility] OR 2ND FLR    HYSTERECTOMY  2011    ANGEL (Fibroids, AUB), MH, ovaries remain       Family History   Problem Relation Age of Onset    Hypertension Mother     Diabetes Father     Diabetes Sister     Diabetes Brother     Colon cancer Maternal Grandmother 82    Diabetes Paternal Grandmother     Breast cancer Neg Hx     Ovarian cancer Neg Hx        Social History      Socioeconomic History    Marital status: Single     Spouse name: None    Number of children: None    Years of education: None    Highest education level: None   Occupational History    None   Social Needs    Financial resource strain: None    Food insecurity:     Worry: None     Inability: None    Transportation needs:     Medical: None     Non-medical: None   Tobacco Use    Smoking status: Never Smoker    Smokeless tobacco: Never Used    Tobacco comment: Single.  .  Two kids.     Substance and Sexual Activity    Alcohol use: No     Alcohol/week: 0.0 oz     Comment: very rare    Drug use: No    Sexual activity: Yes     Partners: Male     Birth control/protection: None, Post-menopausal, See Surgical Hx   Lifestyle    Physical activity:     Days per week: None     Minutes per session: None    Stress: None   Relationships    Social connections:     Talks on phone: None     Gets together: None     Attends [employer] service: None     Active member of club or organization: None     Attends meetings of clubs or organizations: None     Relationship status: None    Intimate partner violence:     Fear of current or ex partner: None     Emotionally abused: None     Physically abused: None     Forced sexual activity: None   Other Topics Concern    Are you pregnant or think you may be? No    Breast-feeding No   Social History Narrative    None       Current Outpatient Medications   Medication Sig Dispense Refill    amitriptyline (ELAVIL) 10 MG tablet Take 1 tablet (10 mg total) by mouth nightly as needed for Insomnia or Pain. 30 tablet 5    ascorbic acid, vitamin C, 500 mg Cap Take by mouth.      cetirizine (ZYRTEC) 5 MG tablet Take 5 mg by mouth once daily.        cyclobenzaprine (FLEXERIL) 10 MG tablet Take 1 tablet (10 mg total) by mouth nightly as needed for Muscle spasms. May cause sleepiness , drowsiness. 90 tablet 2    esomeprazole (NEXIUM 24HR) 20 MG capsule Take by  mouth.      fish oil-dha-epa 1,200-144-216 mg Cap Take 1 tablet by mouth once daily.      hydroCHLOROthiazide (HYDRODIURIL) 12.5 MG Tab Take 1 tablet (12.5 mg total) by mouth once daily. 90 tablet 1    lisinopril-hydrochlorothiazide (PRINZIDE,ZESTORETIC) 10-12.5 mg per tablet Take 1 tablet by mouth once daily. 90 tablet 1    methocarbamol (ROBAXIN) 500 MG Tab Take 1 tablet (500 mg total) by mouth every 8 (eight) hours as needed. 90 tablet 1    multivitamin (ONE DAILY MULTIVITAMIN) per tablet Take 1 tablet by mouth once daily.      traMADol (ULTRAM) 50 mg tablet Take 1 tablet (50 mg total) by mouth every 8 (eight) hours as needed for Pain. 90 tablet 2    TROLAMINE SALICYLATE (ASPERCREME TOP) Apply topically as needed.       No current facility-administered medications for this visit.        Review of patient's allergies indicates:   Allergen Reactions    Ibuprofen Rash     Review of Systems   Constitutional: Negative.  Negative for appetite change, chills, fatigue, fever and unexpected weight change.   HENT: Negative for drooling, trouble swallowing and voice change.    Eyes: Negative.  Negative for pain and visual disturbance.   Respiratory: Negative.  Negative for shortness of breath and wheezing.    Cardiovascular: Negative.  Negative for chest pain and palpitations.   Gastrointestinal: Negative.  Negative for abdominal distention, abdominal pain, constipation and diarrhea.   Genitourinary: Negative.  Negative for difficulty urinating.   Musculoskeletal: Positive for back pain. Negative for arthralgias, gait problem, joint swelling, myalgias and neck stiffness.               Skin: Negative.  Negative for color change and rash.   Neurological: Negative for dizziness, facial asymmetry, speech difficulty, weakness, light-headedness and numbness. Headaches:  occipital area.   Hematological: Negative for adenopathy.   Psychiatric/Behavioral: Negative.  Negative for behavioral problems, confusion and sleep  disturbance. The patient is not nervous/anxious.            Objective:      Physical Exam    GENERAL: The patient is alert, oriented, pleasant, petite.   MUSCULOSKELETAL:   Gait is normal, she is able to walk on toes,and heels.   Cervical spine full range of motion in all planes.   Lumbar spine, full range of motion in all planes, flexion > 90 degrees ,    Side bending and rotation to 25 to Right with pain, and 35 on left without pain.  + dextroscoliosis . Positive facet arthropathy.  Positive paravertebral muscle tenderness.  Straight leg raising Negative bilaterally.   Full range of motion in all joints x4 extremities.   Muscle strength 5/5 throughout x4 extremities.   No  joint laxity throughout x4 extremities.   NEUROLOGIC: Cranial nerves II through XII intact.   Deep tendon reflexes is normal, +2 in the upper and lower extremities bilaterally.   Muscle tone is normal.   Sensory is intact to light touch and pinprick throughout x4 extremities.       MRI of T spine ( 11/20/17) showed:  1.  Redemonstration of a spinal cord syrinx extending from the C2-T6 vertebral levels unchanged in caliber from prior examination.  2.  Stable cerebellar tonsillar ectopia of approximately 3-4 mm.  3.  Mild degenerative change of the cervical spine resulting in mild to moderate right neuroforaminal narrowing involving C4-C5 and C5-C6.  4.  Mild thoracic dextroscoliosis.  5.  Stable multicystic liver.    MRI of T spine ( 2015) showed:  Unchanged small caliber syrinx extending from C1-C2 level to T5-T6 level.   Stable mild cerebellar tonsillar ectopia.  Cervical spondylosis, resulting in mild/ moderate right sided neuroforaminal stenosis at C4-5 and C5-6, as above. No spinal canal stenosis.  Mid-thoracic scoliosis with mild thoracic spondylosis, as detailed above.      MRI of Cervical spine 9 2015) showed:  Long cervical cord syrinx starting from the upper C2 level and extending to the thoracic cord.    No spinal canal masses are  identified.  Mild cerebellar tonsillar ectopia.  Mild cervical spondylosis as described above.    MRI of Thoracic spine ( 2015) showed:   Fluid signal within the cord involving the imaged portions of cervical spine extending to approximately T6 level, suggestive of syrinx.  Dextroscoliosis of midthoracic spine with facet arthropathy causing mild left neuroforamina narrowing at T5-T6 and T6-T7.  Multiple hepatic cysts .     Assessment:       1. Scoliosis, unspecified scoliosis type, unspecified spinal region    2. Mid back pain    3. Thoracic radiculopathy    4. Muscle spasm        Plan:       Scoliosis, unspecified scoliosis type, unspecified spinal region  -     traMADol (ULTRAM) 50 mg tablet; Take 1 tablet (50 mg total) by mouth every 8 (eight) hours as needed for Pain.  Dispense: 90 tablet; Refill: 2  -     amitriptyline (ELAVIL) 10 MG tablet; Take 1 tablet (10 mg total) by mouth nightly as needed for Insomnia or Pain.  Dispense: 30 tablet; Refill: 5  -     Ambulatory Referral to [facility] ([facility])  -     [facility] ASSIGN QUESTIONNAIRE SERIES ([facility])  -     [facility]    Mid back pain  -     traMADol (ULTRAM) 50 mg tablet; Take 1 tablet (50 mg total) by mouth every 8 (eight) hours as needed for Pain.  Dispense: 90 tablet; Refill: 2  -     amitriptyline (ELAVIL) 10 MG tablet; Take 1 tablet (10 mg total) by mouth nightly as needed for Insomnia or Pain.  Dispense: 30 tablet; Refill: 5  -     Ambulatory Referral to [facility] ([facility])  -     [facility] ASSIGN QUESTIONNAIRE SERIES ([facility])  -     [facility] Patient Entered [facility]    Thoracic radiculopathy  -     traMADol (ULTRAM) 50 mg tablet; Take 1 tablet (50 mg total) by mouth every 8 (eight) hours as needed for Pain.  Dispense: 90 tablet; Refill: 2  -     amitriptyline (ELAVIL) 10 MG tablet; Take 1 tablet (10 mg total) by mouth nightly as needed for Insomnia or Pain.  Dispense: 30 tablet; Refill:  5  -     Ambulatory Referral to [facility] ([facility])  -     [facility] ASSIGN QUESTIONNAIRE SERIES ([facility])  -     [facility] Patient Entered [facility] ([facility])    Muscle spasm  -     traMADol (ULTRAM) 50 mg tablet; Take 1 tablet (50 mg total) by mouth every 8 (eight) hours as needed for Pain.  Dispense: 90 tablet; Refill: 2  -     amitriptyline (ELAVIL) 10 MG tablet; Take 1 tablet (10 mg total) by mouth nightly as needed for Insomnia or Pain.  Dispense: 30 tablet; Refill: 5  -     Ambulatory Referral to [facility] ([facility])  -     [facility] ASSIGN QUESTIONNAIRE SERIES ([facility])  -     [facility] Patient Entered [facility] ([facility])      Patient with Mid back and right sided flank pain that is radiating to front , c/w thoracic radiculopathy from Syrinx.  Xray of Thoracic spine showed  significant scoliosis in Thoracic - lumbar spine.  MRI of Cervical spine showed syrinx from C2 through T6, she follows with Neurosurgery, Diego Castelan, who re ordered   MRI of C-T spine in 6 months, that showed stable syrinx.   The last imaging and f/u was in 2017.    1. Needs f/u imaging to evaluate if any progression.  Discussed again repeat MRI of Cervical and thoracic spine.    2. Pain management:  Opiates do not help, does not take Hydrocodone.  Tramadol helps but she cannot tolerate it 2/2 to stomach problem.  Lyrica, Rowena were not covered by her insurance.  Gabapentin helps, but gives her a side problems, makes her breast swell and hurt, so she takes only as needed for severe pain.  She takes low dose (cannot tolerate higher dose,) for neuropathic pain at bedtime, prn.  Muscle relaxant helps the best. Takes Flexeril at bedtime, prn.  But failed Robaxin, and Zanaflex,made her too drowsy.  Takes Ativan prn severe muscle spasms, and anxiety.  Failed Cymbalta trial.   [place]  to try Elavil.    RTC in 6 months.    Total time spent face to face with patient was 25 minutes.   More than 50% of that time was spent in  counseling on diagnosis , prognosis and treatment options.   I also caunsel patient  on common and most usual side effect of prescribed medications.   I reviewed Primary care , and other specialty's notes to better coordinate patient's  care.   All questions were answered, and patient voiced understanding.

## 2021-12-29 ENCOUNTER — IMMUNIZATION (OUTPATIENT)
Dept: FAMILY MEDICINE CLINIC | Age: 61
End: 2021-12-29
Payer: COMMERCIAL

## 2021-12-29 PROCEDURE — 91300 COVID-19, PFIZER VACCINE 30MCG/0.3ML DOSE: CPT | Performed by: INTERNAL MEDICINE

## 2021-12-29 PROCEDURE — 0002A COVID-19, PFIZER VACCINE 30MCG/0.3ML DOSE: CPT | Performed by: INTERNAL MEDICINE

## 2022-02-07 ENCOUNTER — OFFICE VISIT (OUTPATIENT)
Dept: GASTROENTEROLOGY | Age: 62
End: 2022-02-07
Payer: COMMERCIAL

## 2022-02-07 ENCOUNTER — OFFICE VISIT (OUTPATIENT)
Dept: DERMATOLOGY | Age: 62
End: 2022-02-07
Payer: COMMERCIAL

## 2022-02-07 ENCOUNTER — TELEPHONE (OUTPATIENT)
Dept: GASTROENTEROLOGY | Age: 62
End: 2022-02-07

## 2022-02-07 VITALS
SYSTOLIC BLOOD PRESSURE: 148 MMHG | BODY MASS INDEX: 34.46 KG/M2 | WEIGHT: 214.4 LBS | TEMPERATURE: 97.2 F | HEIGHT: 66 IN | OXYGEN SATURATION: 98 % | DIASTOLIC BLOOD PRESSURE: 84 MMHG | HEART RATE: 67 BPM

## 2022-02-07 VITALS
BODY MASS INDEX: 34.54 KG/M2 | HEART RATE: 70 BPM | WEIGHT: 214 LBS | DIASTOLIC BLOOD PRESSURE: 73 MMHG | SYSTOLIC BLOOD PRESSURE: 138 MMHG

## 2022-02-07 DIAGNOSIS — K21.9 GASTROESOPHAGEAL REFLUX DISEASE, UNSPECIFIED WHETHER ESOPHAGITIS PRESENT: ICD-10-CM

## 2022-02-07 DIAGNOSIS — L66.1 FRONTAL FIBROSING ALOPECIA: ICD-10-CM

## 2022-02-07 DIAGNOSIS — L30.9 HAND DERMATITIS: Primary | ICD-10-CM

## 2022-02-07 DIAGNOSIS — D12.5 ADENOMATOUS POLYP OF SIGMOID COLON: Primary | ICD-10-CM

## 2022-02-07 DIAGNOSIS — L81.9 DYSPIGMENTATION: ICD-10-CM

## 2022-02-07 DIAGNOSIS — Z86.19 HISTORY OF HEPATITIS C: ICD-10-CM

## 2022-02-07 PROCEDURE — 3017F COLORECTAL CA SCREEN DOC REV: CPT | Performed by: INTERNAL MEDICINE

## 2022-02-07 PROCEDURE — G8417 CALC BMI ABV UP PARAM F/U: HCPCS | Performed by: DERMATOLOGY

## 2022-02-07 PROCEDURE — 1036F TOBACCO NON-USER: CPT | Performed by: INTERNAL MEDICINE

## 2022-02-07 PROCEDURE — 99214 OFFICE O/P EST MOD 30 MIN: CPT | Performed by: DERMATOLOGY

## 2022-02-07 PROCEDURE — 99213 OFFICE O/P EST LOW 20 MIN: CPT | Performed by: INTERNAL MEDICINE

## 2022-02-07 PROCEDURE — G8417 CALC BMI ABV UP PARAM F/U: HCPCS | Performed by: INTERNAL MEDICINE

## 2022-02-07 PROCEDURE — G8427 DOCREV CUR MEDS BY ELIG CLIN: HCPCS | Performed by: INTERNAL MEDICINE

## 2022-02-07 PROCEDURE — G8427 DOCREV CUR MEDS BY ELIG CLIN: HCPCS | Performed by: DERMATOLOGY

## 2022-02-07 PROCEDURE — G8484 FLU IMMUNIZE NO ADMIN: HCPCS | Performed by: INTERNAL MEDICINE

## 2022-02-07 PROCEDURE — 1036F TOBACCO NON-USER: CPT | Performed by: DERMATOLOGY

## 2022-02-07 PROCEDURE — 3017F COLORECTAL CA SCREEN DOC REV: CPT | Performed by: DERMATOLOGY

## 2022-02-07 PROCEDURE — G8484 FLU IMMUNIZE NO ADMIN: HCPCS | Performed by: DERMATOLOGY

## 2022-02-07 RX ORDER — TRETINOIN 1 MG/G
CREAM TOPICAL
Qty: 45 G | Refills: 3 | Status: SHIPPED | OUTPATIENT
Start: 2022-02-07 | End: 2022-06-22

## 2022-02-07 RX ORDER — DUTASTERIDE 0.5 MG/1
0.5 CAPSULE, LIQUID FILLED ORAL
Qty: 20 CAPSULE | Refills: 3 | Status: ON HOLD | OUTPATIENT
Start: 2022-02-07 | End: 2022-02-28

## 2022-02-07 RX ORDER — CLOBETASOL PROPIONATE 0.5 MG/G
CREAM TOPICAL
Qty: 60 G | Refills: 5 | Status: ON HOLD | OUTPATIENT
Start: 2022-02-07 | End: 2022-02-28

## 2022-02-07 RX ORDER — OMEPRAZOLE 40 MG/1
40 CAPSULE, DELAYED RELEASE ORAL 2 TIMES DAILY
Qty: 60 CAPSULE | Refills: 2 | Status: SHIPPED | OUTPATIENT
Start: 2022-02-07 | End: 2022-05-09

## 2022-02-07 ASSESSMENT — ENCOUNTER SYMPTOMS
EYES NEGATIVE: 1
NAUSEA: 1
ALLERGIC/IMMUNOLOGIC NEGATIVE: 1
CONSTIPATION: 1
ABDOMINAL PAIN: 1
RESPIRATORY NEGATIVE: 1

## 2022-02-07 NOTE — Clinical Note
Please inform patient that I prescribed dutasteride 0.5 mg to take 5 x weekly.  She can take weekends off  Please also inform patient that tretinoin 0.1 won't be covered by insurance but let her know about goodrx

## 2022-02-07 NOTE — TELEPHONE ENCOUNTER
Patient left without scheduling EGD procedure. Patient states she needs to find a ride and will call back when she finds someone . Writer wrote down couple procedure time slot availability.  Patient advise f/u will be cancel if she doesn't schedule EGD procedure

## 2022-02-07 NOTE — PROGRESS NOTES
GI FOLLOW UP    INTERVAL HISTORY:     Follow up colonoscopy- 3 polyps  Repeat in 3-5 yrs  Dyspepsia and GERD  LPR   On prilosec 40mg   Pepcid 20mg at bedtime. Chief Complaint   Patient presents with    Follow-up     colonoscopy follow up - patient wants to discuss fatty liver    Other     Patient states still having itching on the bottom of her foot a few times a month.  Gastroesophageal Reflux     Patient taking prilosec 40mg daily. She admits to not avoiding dietary triggers. Notices symptoms a few times a week. 1. Adenomatous polyp of sigmoid colon    2. Gastroesophageal reflux disease, unspecified whether esophagitis present    3. History of hepatitis C          HISTORY OF PRESENT ILLNESS: Ms.Donna SITA Rain is a 61 y.o. female with a past history remarkable for chronic HCV, referred for evaluation of treatment.     Chronic hepatitis C- patient is treatment experience, noncirrhotic, treatment experience with pegylated interferon and ribavirin x2 and has maintained SVR for several years now.        Smoker: None  Drinking history: Socially  Abdominal surgeries: None reported  Prior Colonoscopy: Performed by outside physician identified to have small polyps repeat expected in 2021  Prior EGD: None  FH of GI issues: None reported    Past Medical,Family, and Social History reviewed and does contribute to the patient presenting condition. Patient's PMH/PSH,SH,PSYCH Hx, MEDs, ALLERGIES, and ROS were all reviewed and updated in the appropriate sections.     PAST MEDICAL HISTORY:  Past Medical History:   Diagnosis Date    Essential hypertension 07/27/2020    Hematuria     Hepatitis-C     History of colon polyps     Hypertension     Mitral valve regurgitation     Obesity (BMI 30-39.9) 07/27/2020    AURELIANO on CPAP 12/04/2020       Past Surgical History:   Procedure Laterality Date    CARPAL TUNNEL RELEASE Bilateral     COLONOSCOPY  11/06/2017    TUBULAR ADENOMA    COLONOSCOPY N/A 10/4/2021    COLONOSCOPY POLYPECTOMY SNARE/COLD BIOPSY performed by Renee De MD at  [address] COLONOSCOPY  10/4/2021    COLONOSCOPY WITH BIOPSY performed by Renee De MD at [facility], TOTAL ABDOMINAL      OVARY REMOVAL         CURRENT MEDICATIONS:    Current Outpatient Medications:     dutasteride (AVODART) 0.5 MG capsule, Take 1 capsule by mouth Five times weekly, Disp: 20 capsule, Rfl: 3    clobetasol (TEMOVATE) 0.05 % cream, Apply topically daily to frontal hairline and to hands as needed for dermatitis, Disp: 60 g, Rfl: 5    tretinoin (RETIN-A) 0.1 % cream, Apply a pea sized amount to face nightly, Disp: 45 g, Rfl: 3    docusate sodium (COLACE) 100 MG capsule, Take 1 capsule by mouth daily as needed for Constipation, Disp: 60 capsule, Rfl: 0    chlorthalidone (HYGROTON) 25 MG tablet, TAKE 1 TABLET BY MOUTH DAILY, Disp: 90 tablet, Rfl: 1    omeprazole (PRILOSEC) 40 MG delayed release capsule, Take 1 capsule by mouth daily, Disp: 30 capsule, Rfl: 2    fluticasone (FLONASE) 50 MCG/ACT nasal spray, 1 spray by Each Nostril route daily, Disp: 1 each, Rfl: 2    tretinoin (RETIN-A) 0.025 % cream, Apply pea sized amount to face nightly, Disp: 45 g, Rfl: 3    carvedilol (COREG) 6.25 MG tablet, Take 6.25 mg by mouth 2 times daily TAKING 2 TABLETS bid SINCE 11/28/20, Disp: , Rfl:     meloxicam (MOBIC) 15 MG tablet, Take 1 tablet by mouth daily, Disp: 30 tablet, Rfl: 3    ALLERGIES:   Allergies   Allergen Reactions    Banana     Pcn [Penicillins] Hives       FAMILY HISTORY:       Problem Relation Age of Onset    High Blood Pressure Mother     Other Mother         Burma Moulding    Cancer Father         BONE AND PROSTRATE    Prostate Cancer Brother          SOCIAL HISTORY:   Social History     Socioeconomic History    Marital status: Single     Spouse name: Not on file    Number of children: Not on file    Years of education: Not on file    Highest education level: Not on file   Occupational History    Not on file   Tobacco Use    Smoking status: Former Smoker     Packs/day: 0.50     Years: 5.00     Pack years: 2.50     Types: Cigarettes     Quit date: 10/27/2019     Years since quittin.2    Smokeless tobacco: Never Used    Tobacco comment: Over the past years of my early 19's I was a on & off again smoker due to stress in my life. Vaping Use    Vaping Use: Never used   Substance and Sexual Activity    Alcohol use: Never    Drug use: Never    Sexual activity: Not Currently   Other Topics Concern    Not on file   Social History Narrative    Jose E Atkins has experienced a financial strain with resourcestrain: Food on 2020. Potential community resources and services have been provided in patient instructions by Lacey Coronado. Social Determinants of Health     Financial Resource Strain: Low Risk     Difficulty of Paying Living Expenses: Not hard at all   Food Insecurity: No Food Insecurity    Worried About Running Out of Food in the Last Year: Never true    Madi of Food in the Last Year: Never true   Transportation Needs: No Transportation Needs    Lack of Transportation (Medical): No    Lack of Transportation (Non-Medical):  No   Physical Activity:     Days of Exercise per Week: Not on file    Minutes of Exercise per Session: Not on file   Stress:     Feeling of Stress : Not on file   Social Connections:     Frequency of Communication with Friends and Family: Not on file    Frequency of Social Gatherings with Friends and Family: Not on file    Attends Taoist Services: Not on file    Active Member of Clubs or Organizations: Not on file    Attends Club or Organization Meetings: Not on file    Marital Status: Not on file   Intimate Partner Violence:     Fear of Current or Ex-Partner: Not on file    Emotionally Abused: Not on file   Fan Physically Abused: Not on file    Sexually Abused: Not on file   Housing Stability:     Unable to Pay for Housing in the Last Year: Not on file    Number of Places Lived in the Last Year: Not on file    Unstable Housing in the Last Year: Not on file       REVIEW OF SYSTEMS: A 12-point review of systems was obtained and pertinent positives and negatives were listed below. REVIEW OF SYSTEMS:     Constitutional: No fever, no chills, no lethargy, no weakness. HEENT:  No headache, otalgia, itchy eyes, nasal discharge or sore throat. Cardiac:  No chest pain, dyspnea, orthopnea or PND. Chest:   No cough, phlegm or wheezing. Abdomen:      Detailed by MA   Neuro:  No focal weakness, abnormal movements or seizure like activity. Skin:   No rashes, no itching. :   No hematuria, no pyuria, no dysuria, no flank pain. Extremities:  No swelling or joint pains. ROS was otherwise negative    Review of Systems   Constitutional: Negative. HENT: Negative. Eyes: Negative. Respiratory: Negative. Cardiovascular: Negative. Gastrointestinal: Positive for abdominal pain, constipation and nausea. Endocrine: Negative. Genitourinary: Negative. Musculoskeletal: Negative. Skin: Negative. Allergic/Immunologic: Negative. Neurological: Negative. Hematological: Negative. Psychiatric/Behavioral: Negative. All other systems reviewed and are negative. PHYSICAL EXAMINATION: Vital signs reviewed per the nursing documentation. /73   Pulse 70   Wt 214 lb (97.1 kg)   BMI 34.54 kg/m²   Body mass index is 34.54 kg/m². Physical Exam    Physical Exam   Constitutional: Patient is oriented to person, place, and time. Patient appears well-developed and well-nourished. HENT:   Head: Normocephalic and atraumatic. Eyes: Pupils are equal, round, and reactive to light. EOM are normal.   Neck: Normal range of motion. Neck supple. No JVD present. No tracheal deviation present.  No thyromegaly present. Cardiovascular: Normal rate, regular rhythm, normal heart sounds and intact distal pulses. Pulmonary/Chest: Effort normal and breath sounds normal. No stridor. No respiratory distress. He has no wheezes. He has no rales. He exhibits no tenderness. Abdominal: Soft. Bowel sounds are normal. He exhibits no distension and no mass. There is no tenderness. There is no rebound and no guarding. No hernia. Musculoskeletal: Normal range of motion. Lymphadenopathy:    Patient has no cervical adenopathy. Neurological: Patient is alert and oriented to person, place, and time. Psychiatric: Patient has a normal mood and affect. Patient behavior is normal.       LABORATORY DATA: Reviewed  Lab Results   Component Value Date    WBC 7.8 08/07/2021    HGB 13.4 08/07/2021    HCT 41.8 08/07/2021    MCV 85.8 08/07/2021     08/07/2021     11/13/2021    K 4.4 11/13/2021     11/13/2021    CO2 28 11/13/2021    BUN 11 11/13/2021    CREATININE 0.88 11/13/2021    LABALBU 3.6 08/07/2021    BILITOT 0.51 08/07/2021    ALKPHOS 85 08/07/2021    AST 15 08/07/2021    ALT 12 08/07/2021         Lab Results   Component Value Date    RBC 4.87 08/07/2021    HGB 13.4 08/07/2021    MCV 85.8 08/07/2021    MCH 27.5 08/07/2021    MCHC 32.1 08/07/2021    RDW 13.2 08/07/2021    MPV 9.7 08/07/2021    BASOPCT 0 08/07/2021    LYMPHSABS 2.57 08/07/2021    MONOSABS 0.54 08/07/2021    NEUTROABS 4.49 08/07/2021    EOSABS 0.15 08/07/2021    BASOSABS 0.03 08/07/2021         DIAGNOSTIC TESTING:     No results found. IMPRESSION: Ms.Donna SITA Rain is a 61 y.o. female with a past history remarkable for chronic HCV, referred for evaluation of treatment.     Chronic hepatitis C- patient is treatment experience, noncirrhotic, treatment experience with pegylated interferon and ribavirin x2 and has maintained SVR for several years now    Negative VL   LFTs normal  Ultrasound negative for cirrhosis. Assessment  1. Adenomatous polyp of sigmoid colon    2. Gastroesophageal reflux disease, unspecified whether esophagitis present    3. History of hepatitis C        PLAN:    1) CR screening-Colonoscopy--identified to have a tubular adenoma in the ascending colon, removed. Hyperplastic polyps in the left colon. Repeat 3 yrs. intermittent smoking history. By smoking cessation    2) chronic GERD--- prilosec 40mg. Increase to BID. Will provide evening dose of 20 mg of Prilosec. Dietary modifications recommended. Recommend diagnostic upper endoscopy given the patient's refractory symptoms to PPI therapy. 3) LPR-- pepcid at 20 mg at bedtime per ENT. Thank you for allowing me to participate in the care of Ms. Rain. For any further questions please do not hesitate to contact me. I have reviewed and agree with the ROS entered by the MA/LPN from today's encounter documented in a separate note. Azul Muniz MD, MPH   [facility]  Office #: [phone]    this note is created with the assistance of a speech recognition program.  While intending to generate a document that actually reflects the content of the visit, the document can still have some errors including those of syntax and sound a like substitutions which may escape proof reading. It such instances, actual meaning can be extrapolated by contextual diversion.

## 2022-02-07 NOTE — PATIENT INSTRUCTIONS
For Hand dermatitis  -continue to use clobetasol as needed     For Cicatricial alopecia  - continue clobetasol for scalp  - continue Minoxidil 5% 1-2 daily.  Switch to women's strength (2%) if increased facial hair growth noted  - start dutasteride 0.5 mg 5 times per week

## 2022-02-07 NOTE — PROGRESS NOTES
Dermatology Patient Note  [address]  [facility]  Dept: [phone]  Dept Fax: [phone]      VISITDATE: 2/7/2022   REFERRING PROVIDER: No ref. provider found      Glory Dean is a 64 y.o. female  who presents today in the office for:    Follow-up (3-4m f/u Hand dermatitis- improved. Uses fluticasone & protopic at times,  ; Cicatricial alopecia- clobetasol. Maybe slightly better)      HISTORY OF PRESENT ILLNESS:  F/u FFA and hand dermatitis. Pt states she was experiencing hair follicle pimples on her scalp, but this has resolved. She is continuing to use clobetasol cream to frontal hairline  Pt reports discoloration on her hands bother her. She states the palm of her hands are itchy. 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   Using tretinoin 0.025 for hyperpigmentation on face, wanting to get a stronger version    MEDICAL PROBLEMS:  Patient Active Problem List    Diagnosis Date Noted    Adenomatous polyp of sigmoid colon     Adenomatous polyp of ascending colon     AURELIANO on CPAP 12/04/2020    Hematuria     Essential hypertension 07/27/2020    Dyslipidemia 07/27/2020    Obesity (BMI 30-39.9) 07/27/2020    Vitamin D deficiency 07/27/2020    AURELIANO (obstructive sleep apnea) 07/27/2020    History of hepatitis C 07/27/2020       CURRENT MEDICATIONS:   Current Outpatient Medications   Medication Sig Dispense Refill    dutasteride (AVODART) 0.5 MG capsule Take 1 capsule by mouth Five times weekly 20 capsule 3    clobetasol (TEMOVATE) 0.05 % cream Apply topically daily to frontal hairline and to hands as needed for dermatitis 60 g 5    tretinoin (RETIN-A) 0.1 % cream Apply a pea sized amount to face nightly 45 g 3    docusate sodium (COLACE) 100 MG capsule Take 1 capsule by mouth daily as needed for Constipation 60 capsule 0    chlorthalidone (HYGROTON) 25 MG tablet TAKE 1 TABLET BY MOUTH DAILY 90 tablet 1    omeprazole (PRILOSEC) 40 MG delayed release capsule Take 1 capsule by mouth daily 30 capsule 2    fluticasone (FLONASE) 50 MCG/ACT nasal spray 1 spray by Each Nostril route daily 1 each 2    tretinoin (RETIN-A) 0.025 % cream Apply pea sized amount to face nightly 45 g 3    carvedilol (COREG) 6.25 MG tablet Take 6.25 mg by mouth 2 times daily TAKING 2 TABLETS bid SINCE 20      bisacodyl (BISACODYL) 5 MG EC tablet Please follow instructions given to you by your provider (Patient not taking: Reported on 2022) 4 tablet 0    polyethylene glycol (MIRALAX) 17 GM/SCOOP powder Please follow instructions given to you by your provider 238 g 0    meloxicam (MOBIC) 15 MG tablet Take 1 tablet by mouth daily 30 tablet 3     Current Facility-Administered Medications   Medication Dose Route Frequency Provider Last Rate Last Admin    triamcinolone acetonide (KENALOG) injection 5 mg  5 mg Intra-LESional Once Handy Huber MD           ALLERGIES:   Allergies   Allergen Reactions    Banana     Pcn [Penicillins] Hives       SOCIAL HISTORY:  Social History     Tobacco Use    Smoking status: Former Smoker     Packs/day: 0.50     Years: 5.00     Pack years: 2.50     Types: Cigarettes     Quit date: 10/27/2019     Years since quittin.2    Smokeless tobacco: Never Used    Tobacco comment: Over the past years of my early 19's I was a on & off again smoker due to stress in my life. Substance Use Topics    Alcohol use: Never       Pertinent ROS:  Review of Systems  Skin: Denies any new changing, growing or bleeding lesions or rashes except as described in the HPI   Constitutional: Denies fevers, chills, and malaise.     PHYSICAL EXAM:   BP (!) 148/84   Pulse 67   Temp 97.2 °F (36.2 °C)   Ht 5' 6\" (1.676 m)   Wt 214 lb 6.4 oz (97.3 kg)   SpO2 98%   BMI 34.61 kg/m²     The patient is generally well appearing, well nourished, alert and conversational. Affect is normal.    Cutaneous Exam:  Physical Exam  Focused exam of scalp, face, and hands was performed    Facial covering was removed during examination. Diagnoses/exam findings/medical history pertinent to this visit are listed below:    Assessment:   Diagnosis Orders   1. Hand dermatitis  clobetasol (TEMOVATE) 0.05 % cream   2. Frontal fibrosing alopecia  dutasteride (AVODART) 0.5 MG capsule    clobetasol (TEMOVATE) 0.05 % cream   3. Dyspigmentation  tretinoin (RETIN-A) 0.1 % cream        Plan:  Hand dermatitis  - chronic illness, responding to treatment but not yet at goal  - switch from fluticasone to clobetasol    Cicatricial alopecia - favor FFP  - chronic illness with progression and/or exacerbation  - continue clobetasol for scalp  - continue Minoxidil 5% 1-2 daily. Switch to women's strength (2%) if increased facial hair growth noted  - start dutasteride 0.5 mg 5 times per week. Discussed risks including loss of libido. Studies do not suggest a breast cancer risk. Patient has had a hysterectomy. Dyspigmentation of face and facial papules (favor FFA papules but biopsy showed syringoma)  - tretinoin 0.1 cream nightly, discussed will not be covered by insurance    RTC 3 months    Future Appointments   Date Time Provider Elizabeth Almanzar   2/7/2022  1:45 PM Diego May MD sv gr lks [facility]   2/17/2022  2:00 PM Shey Rosenberg MD PX Rehab [facility]   2/21/2022  9:00 AM Micaela Hutchison [facility]   3/14/2022 10:30 AM Swathi Vaughan MD Resp Spec [facility]   3/21/2022  8:45 AM Christo Abad MD  derm [facility]   4/11/2022  8:30 AM MIGUEL Valentino - CNP [facility]   5/11/2022 10:00 AM Christo Abad MD  derm [facility]         Patient Instructions   For Hand dermatitis  -continue to use clobetasol as needed     For Cicatricial alopecia  - continue clobetasol for scalp  - continue Minoxidil 5% 1-2 daily.  Switch to women's strength (2%) if increased facial hair growth noted  - start dutasteride 0.5 mg 5 times per week      This note was created with the assistance of a speech-recognition program.  Although the intention is to generate a document that actually reflects the content of the visit, no guarantees can be provided that every mistake has been identified and corrected by editing. I, Dr. India Marrero, personally performed the services described in this documentation, as scribed by Sammy Beck in my presence, and it is both accurate and complete.      Electronically signed by Antoinette Tuttle MD on 2/7/22 at 10:13 AM EST

## 2022-02-08 NOTE — TELEPHONE ENCOUNTER
Pt called in wanting to sched EGD proc ordered at [address] by Dennie Reek. Pt is sched w/ [facility] at [facility] 2/28/22 @ 8:45am proc time, 7:15am arrival time. Prep instructions given to pt over the phone and hard copy sent to pt's [facility]. Pt instructed she will need a  and to bring proof of Covid-19 vaccinations. Pt voices her understanding.

## 2022-02-10 ENCOUNTER — TELEPHONE (OUTPATIENT)
Dept: DERMATOLOGY | Age: 62
End: 2022-02-10

## 2022-02-10 NOTE — TELEPHONE ENCOUNTER
Patient informed that medication is not covered by her insurance, states she will try the [address] card and see if its cheaper to do it that way.

## 2022-02-17 ENCOUNTER — PROCEDURE VISIT (OUTPATIENT)
Dept: PHYSICAL MEDICINE AND REHAB | Age: 62
End: 2022-02-17
Payer: COMMERCIAL

## 2022-02-17 DIAGNOSIS — Z98.890 HISTORY OF CARPAL TUNNEL RELEASE OF BOTH WRISTS: ICD-10-CM

## 2022-02-17 PROCEDURE — 95910 NRV CNDJ TEST 7-8 STUDIES: CPT | Performed by: PHYSICAL MEDICINE & REHABILITATION

## 2022-02-17 PROCEDURE — 95886 MUSC TEST DONE W/N TEST COMP: CPT | Performed by: PHYSICAL MEDICINE & REHABILITATION

## 2022-02-21 ENCOUNTER — OFFICE VISIT (OUTPATIENT)
Dept: ORTHOPEDIC SURGERY | Age: 62
End: 2022-02-21
Payer: COMMERCIAL

## 2022-02-21 DIAGNOSIS — G56.03 CARPAL TUNNEL SYNDROME ON BOTH SIDES: ICD-10-CM

## 2022-02-21 DIAGNOSIS — Z98.890 HISTORY OF CARPAL TUNNEL RELEASE OF BOTH WRISTS: ICD-10-CM

## 2022-02-21 DIAGNOSIS — M17.12 PRIMARY OSTEOARTHRITIS OF LEFT KNEE: Primary | ICD-10-CM

## 2022-02-21 PROCEDURE — 1036F TOBACCO NON-USER: CPT | Performed by: FAMILY MEDICINE

## 2022-02-21 PROCEDURE — 3017F COLORECTAL CA SCREEN DOC REV: CPT | Performed by: FAMILY MEDICINE

## 2022-02-21 PROCEDURE — G8484 FLU IMMUNIZE NO ADMIN: HCPCS | Performed by: FAMILY MEDICINE

## 2022-02-21 PROCEDURE — 20610 DRAIN/INJ JOINT/BURSA W/O US: CPT | Performed by: FAMILY MEDICINE

## 2022-02-21 PROCEDURE — 99214 OFFICE O/P EST MOD 30 MIN: CPT | Performed by: FAMILY MEDICINE

## 2022-02-21 PROCEDURE — G8417 CALC BMI ABV UP PARAM F/U: HCPCS | Performed by: FAMILY MEDICINE

## 2022-02-21 PROCEDURE — G8427 DOCREV CUR MEDS BY ELIG CLIN: HCPCS | Performed by: FAMILY MEDICINE

## 2022-02-21 RX ORDER — BUPIVACAINE HYDROCHLORIDE 5 MG/ML
4 INJECTION, SOLUTION PERINEURAL ONCE
Status: COMPLETED | OUTPATIENT
Start: 2022-02-21 | End: 2022-02-21

## 2022-02-21 RX ORDER — TRIAMCINOLONE ACETONIDE 40 MG/ML
40 INJECTION, SUSPENSION INTRA-ARTICULAR; INTRAMUSCULAR ONCE
Status: COMPLETED | OUTPATIENT
Start: 2022-02-21 | End: 2022-02-21

## 2022-02-21 RX ADMIN — TRIAMCINOLONE ACETONIDE 40 MG: 40 INJECTION, SUSPENSION INTRA-ARTICULAR; INTRAMUSCULAR at 12:50

## 2022-02-21 RX ADMIN — BUPIVACAINE HYDROCHLORIDE 20 MG: 5 INJECTION, SOLUTION PERINEURAL at 12:51

## 2022-02-21 NOTE — PROGRESS NOTES
Sports Medicine Consultation     CHIEF COMPLAINT:  Knee Pain (b/l. no new injury) and Wrist Pain (b/l. had EMG done)      HPI:  Sincere Alcantara is a 64y.o. year old female who is a  established patient being seen for regarding follow up of a pre-existing problem bilateral knee pain. The pain has been present for 8 month(s). The patient recalls a no new injury. The patient has tried cortisone and HEP with improvement. The pain is described as achy mostly in the left. There is  pain on weightbearing. The knee does swell. There is is not painful popping and clicking. The knee does not catch or lock. It has not given out. It is  stiff upon arising from sitting. It is  painful to go up and down stairs and sit for a prolonged period of time. follow up of a pre-existing problem of  B/L wrist pain. The patient is a right hand dominant female who has had bilaterally hand/wrist pain for years. As far as trauma to the hand the patient indicates no new. The following medications/interventions have been tried: ct release, braces with benefit.        she has a past medical history of Essential hypertension, Hematuria, Hepatitis-C, History of colon polyps, Hypertension, Mitral valve regurgitation, Obesity (BMI 30-39.9), and AURELIANO on CPAP. she has a past surgical history that includes Hysterectomy, total abdominal; Carpal tunnel release (Bilateral); Colonoscopy (11/06/2017); Colonoscopy (N/A, 10/4/2021); Colonoscopy (10/4/2021); and Ovary removal.    family history includes Cancer in her father; High Blood Pressure in her mother; Other in her mother; Prostate Cancer in her brother.     Social History     Socioeconomic History    Marital status: Single     Spouse name: Not on file    Number of children: Not on file    Years of education: Not on file    Highest education level: Not on file   Occupational History    Not on file   Tobacco Use    Smoking status: Former Smoker     Packs/day: 0.50 Years: 5.00     Pack years: 2.50     Types: Cigarettes     Quit date: 10/27/2019     Years since quittin.3    Smokeless tobacco: Never Used    Tobacco comment: Over the past years of my early 19's I was a on & off again smoker due to stress in my life. Vaping Use    Vaping Use: Never used   Substance and Sexual Activity    Alcohol use: Never    Drug use: Never    Sexual activity: Not Currently   Other Topics Concern    Not on file   Social History Narrative    Eben Talbot has experienced a financial strain with resourcestrain: Food on 2020. Potential community resources and services have been provided in patient instructions by Wes Freed. Social Determinants of Health     Financial Resource Strain: Low Risk     Difficulty of Paying Living Expenses: Not hard at all   Food Insecurity: No Food Insecurity    Worried About Running Out of Food in the Last Year: Never true    Madi of Food in the Last Year: Never true   Transportation Needs: No Transportation Needs    Lack of Transportation (Medical): No    Lack of Transportation (Non-Medical):  No   Physical Activity:     Days of Exercise per Week: Not on file    Minutes of Exercise per Session: Not on file   Stress:     Feeling of Stress : Not on file   Social Connections:     Frequency of Communication with Friends and Family: Not on file    Frequency of Social Gatherings with Friends and Family: Not on file    Attends Yazdanism Services: Not on file    Active Member of Clubs or Organizations: Not on file    Attends Club or Organization Meetings: Not on file    Marital Status: Not on file   Intimate Partner Violence:     Fear of Current or Ex-Partner: Not on file    Emotionally Abused: Not on file    Physically Abused: Not on file    Sexually Abused: Not on file   Housing Stability:     Unable to Pay for Housing in the Last Year: Not on file    Number of Places Lived in the Last Year: Not on file    Unstable Housing in the Last Year: Not on file       Current Outpatient Medications   Medication Sig Dispense Refill    dutasteride (AVODART) 0.5 MG capsule Take 1 capsule by mouth Five times weekly 20 capsule 3    clobetasol (TEMOVATE) 0.05 % cream Apply topically daily to frontal hairline and to hands as needed for dermatitis 60 g 5    tretinoin (RETIN-A) 0.1 % cream Apply a pea sized amount to face nightly 45 g 3    omeprazole (PRILOSEC) 40 MG delayed release capsule Take 1 capsule by mouth 2 times daily 60 capsule 2    docusate sodium (COLACE) 100 MG capsule Take 1 capsule by mouth daily as needed for Constipation 60 capsule 0    chlorthalidone (HYGROTON) 25 MG tablet TAKE 1 TABLET BY MOUTH DAILY 90 tablet 1    fluticasone (FLONASE) 50 MCG/ACT nasal spray 1 spray by Each Nostril route daily 1 each 2    tretinoin (RETIN-A) 0.025 % cream Apply pea sized amount to face nightly 45 g 3    carvedilol (COREG) 6.25 MG tablet Take 6.25 mg by mouth 2 times daily TAKING 2 TABLETS bid SINCE 11/28/20       Current Facility-Administered Medications   Medication Dose Route Frequency Provider Last Rate Last Admin    triamcinolone acetonide (KENALOG-40) injection 40 mg  40 mg Intra-artICUlar Once Ilir Merrill, DO        bupivacaine (MARCAINE) 0.5 % injection 20 mg  4 mL Intra-artICUlar Once Ilir Merrill, DO        triamcinolone acetonide (KENALOG) injection 5 mg  5 mg Intra-LESional Once Mal Goltz, MD           Allergies:  sheis allergic to banana and pcn [penicillins]. ROS:  CV:  Denies chest pain; palpitations; shortness of breath; swelling of feet, ankles; and loss of consciousness. CON: Denies fever and dizziness. ENT:  Denies hearing loss / ringing, ear infections hoarseness, and swallowing problems. RESP:  Denies chronic cough, spitting up blood, and asthma/wheezing. GI: Denies abdominal pain, change in bowel habits, nausea or vomiting, and blood in stools.   :  Denies frequent urination, burning or painful urination, blood in the urine, and bladder incontinence. NEURO:  Denies headache, memory loss, sleep disturbance, and tremor or movement disorder. PHYSICAL EXAM:   There were no vitals taken for this visit. GENERAL: Michelle Paez is a 64 y.o. female who is alert and oriented and sitting comfortably in our office. SKIN:  Intact without rashes, lesions or ulcerations. NEURO: Sensation to the extremity is intact. VASC:  Capillary refill is less than 3 seconds. Distal pulses are palpable. There is no lymphadenopathy. Hand/Wrist Exam  ROM:  Full flexor and extensor tendon function intact   Finger, hand, and wrist range of motion are WNL  Inspection-Deformity: no  Palpation-Tenderness: no  There is is not thenar and is not interosseous atrophy. Strength- WNL  NEURO: Radial, ulnar, and median nerves are intact to motor and sensory testing. Two point discrimination is less than six mm. There is not decreased sensation to light touch and pinprick in the median and ulnar nerve distribution. CTS: Tinel's test at the wrist is negative. Flexion compression test positive  Phalen's test is negative. Finkelstein's test is negative. Knee Exam  Musculoskeletal/Neurologic:  Inspection-Swelling: mild, Ecchymosis: no  Palpation-Tenderness:medial compartment left  Pain with patellar grind: yes  ROM- 0-125  Strength- WNL  Sensation-normal to light touch    Special Tests-  Varus Laxity: negative   Valgus Laxity:  negative   Anterior Drawer: negative   Posterior Drawer: negative  Lachman's: negative  Katherine's:negative    Gait: antalgic    PSYCH:  Good fund of knowledge and displays understanding of exam.    RADIOLOGY: No results found. EMG revealed mild carpal tunnel syndrome    IMPRESSION:     1. Primary osteoarthritis of left knee    2. History of carpal tunnel release of both wrists    3.  Carpal tunnel syndrome on both sides          PLAN:   We discussed some of the etiologies and natural histories of     ICD-10-CM    1. Primary osteoarthritis of left knee  M17.12 triamcinolone acetonide (KENALOG-40) injection 40 mg     bupivacaine (MARCAINE) 0.5 % injection 20 mg     PA ARTHROCENTESIS ASPIR&/INJ MAJOR JT/BURSA W/O US   2. History of carpal tunnel release of both wrists  Z98.890    3. Carpal tunnel syndrome on both sides  G56.03    .  We discussed the various treatment alternatives including anti-inflammatory medications, physical therapy, injections, further imaging studies and as a last resort surgery. At this point patient's left knee is problematic and she would like another cortisone injection of the gel shots have been working well for her we will was given in the manner as typed in chart. Patient taught procedure well. She regards to her wrist we will get a continue conservative management she will continue her cock-up wrist splints home exercise program was provided topical diclofenac will be used for pain relief. She will follow-up with me for viscosupplementation treatment    Return to clinic in No follow-ups on file. Oneida Chu Please be aware portions of this note were completed using voice recognition software and unforeseen errors may have occurred    Electronically signed by Colt Arias DO, FAOASM  on 2/21/22 at 9:35 AM EST            Procedures    PA ARTHROCENTESIS ASPIR&/INJ MAJOR JT/BURSA W/O US     KNEE INJECTION PROCEDURE NOTE:  The patient was identified. The left knee was confirmed with the patient. Consent was obtained and a time out was performed. After a sterile prep with Betadine followed by an alcohol wipe the knee was injected using a bent knee lateral joint line approach with a mixture of 4 mL of 0.5% Marcaine and 40 mg of Kenalog. Patient tolerated the procedure well without post injection complications. I instructed the patient to call our office immediately if they have any swelling or increased pain at the injection site.

## 2022-02-25 NOTE — TELEPHONE ENCOUNTER
Pt lvm to confirm arrival time for procedure on Monday 2/28/22.  Writer reviewed chart and returned call and confirmed arrival time of 7:15 am @ [facility].

## 2022-02-28 ENCOUNTER — ANESTHESIA (OUTPATIENT)
Dept: ENDOSCOPY | Age: 62
End: 2022-02-28
Payer: COMMERCIAL

## 2022-02-28 ENCOUNTER — HOSPITAL ENCOUNTER (OUTPATIENT)
Age: 62
Setting detail: OUTPATIENT SURGERY
Discharge: HOME | End: 2022-02-28
Attending: INTERNAL MEDICINE | Admitting: INTERNAL MEDICINE
Payer: COMMERCIAL

## 2022-02-28 ENCOUNTER — ANESTHESIA EVENT (OUTPATIENT)
Dept: ENDOSCOPY | Age: 62
End: 2022-02-28
Payer: COMMERCIAL

## 2022-02-28 VITALS
OXYGEN SATURATION: 98 % | SYSTOLIC BLOOD PRESSURE: 142 MMHG | DIASTOLIC BLOOD PRESSURE: 64 MMHG | RESPIRATION RATE: 15 BRPM

## 2022-02-28 VITALS
TEMPERATURE: 96.8 F | HEIGHT: 66 IN | BODY MASS INDEX: 34.39 KG/M2 | HEART RATE: 65 BPM | RESPIRATION RATE: 19 BRPM | SYSTOLIC BLOOD PRESSURE: 137 MMHG | WEIGHT: 214 LBS | DIASTOLIC BLOOD PRESSURE: 62 MMHG | OXYGEN SATURATION: 98 %

## 2022-02-28 PROCEDURE — 7100000011 HC PHASE II RECOVERY - ADDTL 15 MIN: Performed by: INTERNAL MEDICINE

## 2022-02-28 PROCEDURE — 2709999900 HC NON-CHARGEABLE SUPPLY: Performed by: INTERNAL MEDICINE

## 2022-02-28 PROCEDURE — 2580000003 HC RX 258: Performed by: INTERNAL MEDICINE

## 2022-02-28 PROCEDURE — 88305 TISSUE EXAM BY PATHOLOGIST: CPT

## 2022-02-28 PROCEDURE — 3700000000 HC ANESTHESIA ATTENDED CARE: Performed by: INTERNAL MEDICINE

## 2022-02-28 PROCEDURE — 76937 US GUIDE VASCULAR ACCESS: CPT

## 2022-02-28 PROCEDURE — 6360000002 HC RX W HCPCS: Performed by: NURSE ANESTHETIST, CERTIFIED REGISTERED

## 2022-02-28 PROCEDURE — 3609012400 HC EGD TRANSORAL BIOPSY SINGLE/MULTIPLE: Performed by: INTERNAL MEDICINE

## 2022-02-28 PROCEDURE — 7100000010 HC PHASE II RECOVERY - FIRST 15 MIN: Performed by: INTERNAL MEDICINE

## 2022-02-28 PROCEDURE — 2500000003 HC RX 250 WO HCPCS: Performed by: NURSE ANESTHETIST, CERTIFIED REGISTERED

## 2022-02-28 PROCEDURE — 43239 EGD BIOPSY SINGLE/MULTIPLE: CPT | Performed by: INTERNAL MEDICINE

## 2022-02-28 RX ORDER — PROPOFOL 10 MG/ML
INJECTION, EMULSION INTRAVENOUS PRN
Status: DISCONTINUED | OUTPATIENT
Start: 2022-02-28 | End: 2022-02-28 | Stop reason: SDUPTHER

## 2022-02-28 RX ORDER — GLYCOPYRROLATE 1 MG/5 ML
SYRINGE (ML) INTRAVENOUS PRN
Status: DISCONTINUED | OUTPATIENT
Start: 2022-02-28 | End: 2022-02-28 | Stop reason: SDUPTHER

## 2022-02-28 RX ORDER — LIDOCAINE HYDROCHLORIDE 10 MG/ML
INJECTION, SOLUTION EPIDURAL; INFILTRATION; INTRACAUDAL; PERINEURAL PRN
Status: DISCONTINUED | OUTPATIENT
Start: 2022-02-28 | End: 2022-02-28 | Stop reason: SDUPTHER

## 2022-02-28 RX ORDER — SODIUM CHLORIDE 9 MG/ML
INJECTION, SOLUTION INTRAVENOUS CONTINUOUS
Status: DISCONTINUED | OUTPATIENT
Start: 2022-02-28 | End: 2022-02-28 | Stop reason: HOSPADM

## 2022-02-28 RX ADMIN — PROPOFOL 50 MG: 10 INJECTION, EMULSION INTRAVENOUS at 09:10

## 2022-02-28 RX ADMIN — SODIUM CHLORIDE: 9 INJECTION, SOLUTION INTRAVENOUS at 09:07

## 2022-02-28 RX ADMIN — Medication 0.2 MG: at 09:10

## 2022-02-28 RX ADMIN — LIDOCAINE HYDROCHLORIDE 50 MG: 10 INJECTION, SOLUTION EPIDURAL; INFILTRATION; INTRACAUDAL; PERINEURAL at 09:10

## 2022-02-28 ASSESSMENT — PAIN DESCRIPTION - DESCRIPTORS: DESCRIPTORS: ACHING

## 2022-02-28 ASSESSMENT — PAIN SCALES - WONG BAKER: WONGBAKER_NUMERICALRESPONSE: 0

## 2022-02-28 ASSESSMENT — PAIN DESCRIPTION - FREQUENCY: FREQUENCY: CONTINUOUS

## 2022-02-28 ASSESSMENT — LIFESTYLE VARIABLES: SMOKING_STATUS: 0

## 2022-02-28 ASSESSMENT — PAIN DESCRIPTION - LOCATION: LOCATION: THROAT

## 2022-02-28 ASSESSMENT — PAIN - FUNCTIONAL ASSESSMENT: PAIN_FUNCTIONAL_ASSESSMENT: 0-10

## 2022-02-28 ASSESSMENT — PAIN SCALES - GENERAL: PAINLEVEL_OUTOF10: 0

## 2022-02-28 NOTE — OP NOTE
Operative Note      Patient: Marielena Silverman  YOB: 1960  MRN: [id number]    Date of Procedure: 2/28/2022    Pre-Op Diagnosis: GERD    Post-Op Diagnosis: Mild gastritis, small hiatal hernia. Procedure(s):  EGD BIOPSY    Surgeon(s):  Brittani Duncan MD    Assistant:   First Assistant: Jadyn Sepulveda RN    Anesthesia: Monitor Anesthesia Care    Estimated Blood Loss (mL): Minimal    Complications: None    Specimens:   ID Type Source Tests Collected by Time Destination   A : gastric bx Tissue Stomach SURGICAL PATHOLOGY Brittani Duncan MD 2/28/2022 1192        Implants:  * No implants in log *      Drains: * No LDAs found *            [facility]    [facility] ENDOSCOPY     EGD    PROCEDURE DATE: 02/28/22    REFERRING PHYSICIAN: No ref. provider found     PRIMARY CARE PROVIDER: Rocio Larson MD    ATTENDING PHYSICIAN: Brittani Duncan MD     HISTORY: Ms. Marielena Silverman is a 64 y.o. female who presents to the [facility] Endoscopy unit for upper endoscopy. The patient's clinical history is remarkable for HTN, Obesity, prior history of HCV, referred for dyspepsia and GERD, plan for diagnostic EGD. She is currently medically stable and appropriate for the planned procedure. PREOPERATIVE DIAGNOSIS: Dyspepsia and GERD. PROCEDURES:   1) Transoral Upper Endoscopy with cold biopsy. POSTOPERATIVE DIAGNOSIS:     1) Small hiatal hernia 1 cm in size, regular z-line, no esophagitis  2) Mild antritis, erythema in the antrum, scattered and streaky. Cold biopsy taken for H. Pylori testing  3) Normal appearing duodenal mucosa     MEDICATIONS:   MAC per anesthesia     EBL:  <10cc    INSTRUMENT: Olympus GIF-H180  flexible Gastroscope. PREPARATION: The nature and character of the procedure as well as risks, benefits, and alternatives were discussed with the patient and informed consent was obtained.  Complications were said to include, but were not limited to: medication allergy, medication reaction, cardiovascular and respiratory problems, bleeding, perforation, infection, and/or missed diagnosis. Following arrival in the endoscopy room, the patient was placed in the left lateral decubitus position and final time-out accomplished in the presence of the nursing staff. Baseline vital signs were obtained and reviewed, and IV sedation was subsequently initiated. FINDINGS:   Esophagus: The esophagus was inspected to the Z-line. The endoscopic exam showed small hiatal hernia. Stomach: The stomach was inspected in both forward and retroflex fashion and was appropriately distensible. The cardia, fundus, incisura, antrum and pylorus were identified via direct visualization. The endoscopic exam showed mild antritis. Duodenum: The proximal small bowel was inspected through the bulb, sweep, and second portion of the duodenum. The endoscopic exam showed normal appearing duodenal mucosa. IMPRESSION:    1) Small hiatal hernia 1 cm in size, regular z-line, no esophagitis  2) Mild antritis, erythema in the antrum, scattered and streaky. Cold biopsy taken for H. Pylori testing  3) Normal appearing duodenal mucosa       RECOMMENDATIONS:   1) Follow up with referring provider, as previously scheduled. 2) Follow up path in GI clinic. Continue with anti-reflux lifestyle and medications. [facility]     This note is created with the assistance of a speech recognition program.  While intending to generate a document that actually reflects the content of the visit, the document can still have some errors including those of syntax and sound a like substitutions which may escape proof reading. It such instances, actual meaning can be extrapolated by contextual diversion. The patient was counseled at length about the risks of cesar Covid-19 during their perioperative period and any recovery window from their procedure.   The patient was made aware that cesar Covid-19  may worsen their prognosis for recovering from their procedure  and lend to a higher morbidity and/or mortality risk. All material risks, benefits, and reasonable alternatives including postponing the procedure were discussed. The patient DOES wish to proceed with the procedure at this time.   Electronically signed by Julieta Eckert MD on 2/28/2022 at 9:21 AM

## 2022-02-28 NOTE — H&P
History and Physical    Pt Name: Venkatesh Goldman  MRN: [id number]  YOB: 1960  Date of evaluation: 2/28/2022    SUBJECTIVE:   History of Chief Complaint:    Patient presents preprocedure for EGD. Patient has not had one in the past, but has had colonoscopy last fall (polyps were noted). Patient reports GERD, chronic \"phlegm\" in the throat with throat clearing, occasional choking sensation. She has been taking medications for GERD but continues with symptoms. She has been scheduled for EGD today. Past Medical History    has a past medical history of Essential hypertension, Hematuria, Hepatitis-C, History of colon polyps, Hypertension, Mitral valve regurgitation, Obesity (BMI 30-39.9), and AURELIANO on CPAP. Past Surgical History   has a past surgical history that includes Hysterectomy, total abdominal; Carpal tunnel release (Bilateral); Colonoscopy (11/06/2017); Colonoscopy (N/A, 10/4/2021); Colonoscopy (10/4/2021); and Ovary removal.  Medications  Prior to Admission medications    Medication Sig Start Date End Date Taking?  Authorizing Provider   omeprazole (PRILOSEC) 40 MG delayed release capsule Take 1 capsule by mouth 2 times daily 2/7/22  Yes Ventura Thompson MD   docusate sodium (COLACE) 100 MG capsule Take 1 capsule by mouth daily as needed for Constipation 10/28/21  Yes Ventura Thompson MD   chlorthalidone (HYGROTON) 25 MG tablet TAKE 1 TABLET BY MOUTH DAILY 10/11/21  Yes Vernal Hammed, APRN - CNP   fluticasone (FLONASE) 50 MCG/ACT nasal spray 1 spray by Each Nostril route daily 9/17/21  Yes Jessica Espinoza MD   tretinoin (RETIN-A) 0.025 % cream Apply pea sized amount to face nightly 4/16/21  Yes Dominick Espinoza MD   carvedilol (COREG) 6.25 MG tablet Take 6.25 mg by mouth 2 times daily TAKING 2 TABLETS bid SINCE 11/28/20 3/12/20  Yes Historical Provider, MD   tretinoin (RETIN-A) 0.1 % cream Apply a pea sized amount to face nightly 2/7/22   Dominick Espinoza MD     Allergies  is allergic to banana, eggs or egg-derived products, and pcn [penicillins]. Family History  family history includes Cancer in her father; High Blood Pressure in her mother; Other in her mother; Prostate Cancer in her brother. Social History   reports that she quit smoking about 2 years ago. Her smoking use included cigarettes. She has a 2.50 pack-year smoking history. She has never used smokeless tobacco.   reports no history of alcohol use. reports no history of drug use. Marital Status single  Occupation works for [employer]17:  CONSTITUTIONAL:   negative for fevers, chills, fatigue and malaise    EYES:   negative for double vision, blurred vision and photophobia    HEENT:   negative for tinnitus, epistaxis and sore throat     RESPIRATORY:   negative for cough, shortness of breath, wheezing     CARDIOVASCULAR:   negative for chest pain, palpitations, syncope, edema     GASTROINTESTINAL:   See HPI above   GENITOURINARY:   negative for incontinence     MUSCULOSKELETAL:   negative for neck or back pain     NEUROLOGICAL:   Negative for weakness and tingling  negative for headaches and dizziness     PSYCHIATRIC:   negative for anxiety         OBJECTIVE:   VITALS:  height is 5' 6\" (1.676 m) and weight is 214 lb (97.1 kg). Her temporal temperature is 97.5 °F (36.4 °C). Her blood pressure is 138/82 and her pulse is 53. Her respiration is 12 and oxygen saturation is 98%. CONSTITUTIONAL:alert & cooperative, no acute distress. SKIN:  Warm and dry, no rashes on exposed areas of skin. HEAD:  Normocephalic, atraumatic   EYES: EOMs intact. EARS:  Hearing grossly WNL. NOSE:  Nares patent. No rhinorrhea   MOUTH/THROAT:  benign  NECK:supple, no lymphadenopathy  LUNGS: Clear to auscultation bilaterally, no wheezes. CARDIOVASCULAR: Heart sounds are normal.  Regular rate and rhythm without murmur. ABDOMEN: soft, non tender, non distended. EXTREMITIES: no edema bilateral lower extremities. IMPRESSIONS:   1. GERD  2. has a past medical history of Essential hypertension (07/27/2020), Hematuria, Hepatitis-C, History of colon polyps, Hypertension, Mitral valve regurgitation, Obesity (BMI 30-39.9) (07/27/2020), and AURELIANO on CPAP (12/04/2020).    PLANS:   1. EGD    ELVIS Zimmer PA-C  Electronically signed 2/28/2022 at 8:37 AM

## 2022-02-28 NOTE — PROGRESS NOTES
Patient with c/o cough and\"slimy\" feeling in her throat. After signing the discharge papers. Dr Hill Resides to bedside, patient cleared to go home.

## 2022-02-28 NOTE — ANESTHESIA POSTPROCEDURE EVALUATION
Department of Anesthesiology  Postprocedure Note    Patient: Rito Plummer  MRN: [id number]  YOB: 1960  Date of evaluation: 2/28/2022  Time:  12:11 PM     Procedure Summary     Date: 02/28/22 Room / Location: [address] [place] [address]    Anesthesia Start: 3610 Anesthesia Stop: 7943    Procedure: EGD BIOPSY (N/A ) Diagnosis: (GERD)    Surgeons: Pantera Tao MD Responsible Provider: Judge Miah MD    Anesthesia Type: MAC ASA Status: 4          Anesthesia Type: MAC    Kevin Phase I: Kevin Score: 10    Kevin Phase II: Kevin Score: 7    Last vitals: Reviewed and per EMR flowsheets.        Anesthesia Post Evaluation    Patient participation: complete - patient participated  Level of consciousness: awake and alert  Pain score: 0  Nausea & Vomiting: no nausea  Cardiovascular status: hemodynamically stable  Respiratory status: room air

## 2022-02-28 NOTE — ANESTHESIA PRE PROCEDURE
Department of Anesthesiology  Preprocedure Note       Name:  Eduardo Machado   Age:  64 y.o.  :  1960                                          MRN:  [id number]         Date:  2022      Surgeon: Tal De La Torre):  Keenan Ashford MD    Procedure: Procedure(s):  EGD ESOPHAGOGASTRODUODENOSCOPY    Medications prior to admission:   Prior to Admission medications    Medication Sig Start Date End Date Taking? Authorizing Provider   dutasteride (AVODART) 0.5 MG capsule Take 1 capsule by mouth Five times weekly 22  Homar Cerna MD   clobetasol (TEMOVATE) 0.05 % cream Apply topically daily to frontal hairline and to hands as needed for dermatitis 22   Sruthi Cerna MD   tretinoin (RETIN-A) 0.1 % cream Apply a pea sized amount to face nightly 22   Dylan Lema MD   omeprazole (PRILOSEC) 40 MG delayed release capsule Take 1 capsule by mouth 2 times daily 22   Keenan Ashford MD   docusate sodium (COLACE) 100 MG capsule Take 1 capsule by mouth daily as needed for Constipation 10/28/21   Keenan Ashford MD   chlorthalidone (HYGROTON) 25 MG tablet TAKE 1 TABLET BY MOUTH DAILY 10/11/21   MIGUEL Page - CNP   fluticasone (FLONASE) 50 MCG/ACT nasal spray 1 spray by Each Nostril route daily 21   Chantal Bowels, MD   tretinoin (RETIN-A) 0.025 % cream Apply pea sized amount to face nightly 21   Dylan Lema MD   carvedilol (COREG) 6.25 MG tablet Take 6.25 mg by mouth 2 times daily TAKING 2 TABLETS bid SINCE 11/28/20 3/12/20   Historical Provider, MD       Current medications:    No current facility-administered medications for this encounter. Allergies: Allergies   Allergen Reactions    Banana     Pcn [Penicillins] Hives       Problem List:    Patient Active Problem List   Diagnosis Code    Essential hypertension I10    Dyslipidemia E78.5    Obesity (BMI 30-39. 9) E66.9    Vitamin D deficiency E55.9    AURELIANO (obstructive sleep apnea) G47.33    History of hepatitis C Z86.19    Hematuria R31.9    AURELIANO on CPAP G47.33, Z99.89    Adenomatous polyp of sigmoid colon D12.5    Adenomatous polyp of ascending colon D12.2       Past Medical History:        Diagnosis Date    Essential hypertension 2020    Hematuria     Hepatitis-C     History of colon polyps     Hypertension     Mitral valve regurgitation     Obesity (BMI 30-39.9) 2020    AURELIANO on CPAP 2020       Past Surgical History:        Procedure Laterality Date    CARPAL TUNNEL RELEASE Bilateral     COLONOSCOPY  2017    TUBULAR ADENOMA    COLONOSCOPY N/A 10/4/2021    COLONOSCOPY POLYPECTOMY SNARE/COLD BIOPSY performed by [facility]    COLONOSCOPY  10/4/2021    COLONOSCOPY WITH BIOPSY performed by Brittani Duncan MD at [facility], TOTAL ABDOMINAL      OVARY REMOVAL         Social History:    Social History     Tobacco Use    Smoking status: Former Smoker     Packs/day: 0.50     Years: 5.00     Pack years: 2.50     Types: Cigarettes     Quit date: 10/27/2019     Years since quittin.3    Smokeless tobacco: Never Used    Tobacco comment: Over the past years of my early 19's I was a on & off again smoker due to stress in my life. Substance Use Topics    Alcohol use: Never                                Counseling given: Not Answered  Comment: Over the past years of my early 's I was a on & off again smoker due to stress in my life.       Vital Signs (Current):   Vitals:    22 0801   BP: 138/82   Pulse: 53   Resp: 12   Temp: 97.5 °F (36.4 °C)   TempSrc: Temporal   SpO2: 98%   Weight: 214 lb (97.1 kg)   Height: 5' 6\" (1.676 m)                                              BP Readings from Last 3 Encounters:   22 138/82   22 138/73   22 (!) 148/84       NPO Status: Time of last liquid consumption:                         Time of last solid consumption:                         Date of last liquid consumption: 22 Date of last solid food consumption: 02/27/22    BMI:   Wt Readings from Last 3 Encounters:   02/28/22 214 lb (97.1 kg)   02/07/22 214 lb (97.1 kg)   02/07/22 214 lb 6.4 oz (97.3 kg)     Body mass index is 34.54 kg/m². CBC:   Lab Results   Component Value Date    WBC 7.8 08/07/2021    RBC 4.87 08/07/2021    HGB 13.4 08/07/2021    HCT 41.8 08/07/2021    MCV 85.8 08/07/2021    RDW 13.2 08/07/2021     08/07/2021       CMP:   Lab Results   Component Value Date     11/13/2021    K 4.4 11/13/2021     11/13/2021    CO2 28 11/13/2021    BUN 11 11/13/2021    CREATININE 0.88 11/13/2021    GFRAA >60 11/13/2021    LABGLOM >60 11/13/2021    GLUCOSE 88 11/13/2021    PROT 7.5 08/07/2021    CALCIUM 9.4 11/13/2021    BILITOT 0.51 08/07/2021    ALKPHOS 85 08/07/2021    AST 15 08/07/2021    ALT 12 08/07/2021       POC Tests: No results for input(s): POCGLU, POCNA, POCK, POCCL, POCBUN, POCHEMO, POCHCT in the last 72 hours. Coags: No results found for: PROTIME, INR, APTT    HCG (If Applicable): No results found for: PREGTESTUR, PREGSERUM, HCG, HCGQUANT     ABGs: No results found for: PHART, PO2ART, JRH3JXJ, KVN6DUB, BEART, E8HXCRPA     Type & Screen (If Applicable):  No results found for: LABABO, LABRH    Drug/Infectious Status (If Applicable):  Lab Results   Component Value Date    HEPCAB REACTIVE 08/26/2020       COVID-19 Screening (If Applicable):   Lab Results   Component Value Date    COVID19 Not Detected 09/30/2021    COVID19 Not Detected 09/16/2020           Anesthesia Evaluation   no history of anesthetic complications:   Airway: Mallampati: III     Neck ROM: full   Dental:          Pulmonary:   (+) sleep apnea: on CPAP,      (-) not a current smoker                           Cardiovascular:  Exercise tolerance: good (>4 METS),   (+) hypertension:,                   Neuro/Psych:      (-) seizures and headaches           GI/Hepatic/Renal:   (+) hepatitis: C,          ROS comment: obese. Endo/Other:        (-) diabetes mellitus               Abdominal:             Vascular: Other Findings:           Anesthesia Plan      MAC     ASA 4       Induction: intravenous.                           Tiana Mishra MD   2/28/2022

## 2022-03-01 LAB — SURGICAL PATHOLOGY REPORT: NORMAL

## 2022-03-14 ENCOUNTER — OFFICE VISIT (OUTPATIENT)
Dept: PULMONOLOGY | Age: 62
End: 2022-03-14
Payer: COMMERCIAL

## 2022-03-14 VITALS
HEIGHT: 67 IN | HEART RATE: 63 BPM | OXYGEN SATURATION: 96 % | DIASTOLIC BLOOD PRESSURE: 71 MMHG | WEIGHT: 211 LBS | RESPIRATION RATE: 18 BRPM | SYSTOLIC BLOOD PRESSURE: 126 MMHG | BODY MASS INDEX: 33.12 KG/M2

## 2022-03-14 DIAGNOSIS — J43.2 CENTRILOBULAR EMPHYSEMA (HCC): ICD-10-CM

## 2022-03-14 DIAGNOSIS — R91.1 NODULE OF LOWER LOBE OF RIGHT LUNG: ICD-10-CM

## 2022-03-14 DIAGNOSIS — G47.33 OSA (OBSTRUCTIVE SLEEP APNEA): Primary | ICD-10-CM

## 2022-03-14 PROCEDURE — G8484 FLU IMMUNIZE NO ADMIN: HCPCS | Performed by: INTERNAL MEDICINE

## 2022-03-14 PROCEDURE — 3017F COLORECTAL CA SCREEN DOC REV: CPT | Performed by: INTERNAL MEDICINE

## 2022-03-14 PROCEDURE — 94010 BREATHING CAPACITY TEST: CPT | Performed by: INTERNAL MEDICINE

## 2022-03-14 PROCEDURE — G8427 DOCREV CUR MEDS BY ELIG CLIN: HCPCS | Performed by: INTERNAL MEDICINE

## 2022-03-14 PROCEDURE — 99214 OFFICE O/P EST MOD 30 MIN: CPT | Performed by: INTERNAL MEDICINE

## 2022-03-14 PROCEDURE — 1036F TOBACCO NON-USER: CPT | Performed by: INTERNAL MEDICINE

## 2022-03-14 PROCEDURE — 3023F SPIROM DOC REV: CPT | Performed by: INTERNAL MEDICINE

## 2022-03-14 PROCEDURE — G8417 CALC BMI ABV UP PARAM F/U: HCPCS | Performed by: INTERNAL MEDICINE

## 2022-03-14 PROCEDURE — 94729 DIFFUSING CAPACITY: CPT | Performed by: INTERNAL MEDICINE

## 2022-03-14 NOTE — PROGRESS NOTES
REASON FOR THE CONSULTATION:  martin  HISTORY OF PRESENT ILLNESS:    Siomara Hicks is a 64y.o. year old female   Cough intermitent . She has small hh and gerd - under treatment . No sob   Post nasal drip is present   Poor compliance with cpap     Last visit   Poor compliance with CPAP therapy. She also complains of seasonal allergic rhinitis during fall season has nasal congestion. Has been seen by ENT, has hiatal hernia and GERD which is leading to chronic cough    CT lung screening reviewed with patient has mild emphysema    Advised to abstain from smoking obstructive sleep apnea. Patient had a last sleep study 2014 and was given a CPAP of 11 cm water pressure. She has been using CPAP every night but her memory card has not been read since 2015. When she sleeps at night if her neck is straight she sleeps better without choking . She has full face mask . when she gets up in morning she feels sleepy and not rested   Even though she get 8 hrs sleep . 2014 - BMI 28.9 and now BMI is 34.2 , she has gained - from 185 lbs to 212 lbs. She tosses and turn during night   Uses bathroom 2 times   Falls asleep right away   Bed time - 11 pm   Wake up time - 8 am   Some times takes melatonin   Feels fatigued and tired during the day even though she has been using CPAP at night  Feels her memory is not as sharp  Sleep Medicine 9/17/2021 12/4/2020 8/27/2020   Sitting and reading 0 2 3   Watching TV 3 2 3   Sitting, inactive in a public place (e.g. a theatre or a meeting) 2 0 2   As a passenger in a car for an hour without a break 1 0 1   Lying down to rest in the afternoon when circumstances permit 3 1 0   Sitting and talking to someone 0 0 0   Sitting quietly after a lunch without alcohol 0 1 0   In a car, while stopped for a few minutes in traffic 2 0 1   Total score 11 6 10         LUNG CANCER SCREENING     1. CRITERIA MET    []     CT ORDERED  []      2. CRITERIA NOT MET   [x]      3.  REFUSED                    [] REASON CRITERIA NOT MET     1. SMOKING LESS THAN 30 PY  []      2. AGE LESS THAN 55 or GREATER 77 YEARS  []      3. QUIT SMOKING 15 YEARS OR GREATER   []      4. RECENT CT WITH IN 11 MONTHS    []      5. LIFE EXPECTANCY < 5 YEARS   []      6.  SIGNS  AND SYMPTOMS OF LUNG CANCER   []         Immunization   Immunization History   Administered Date(s) Administered    COVID-19, Pfizer Purple top, DILUTE for use, 12+ yrs, 30mcg/0.3mL dose 12/03/2021, 12/29/2021        Pneumococcal Vaccine     [] Up to date    [] Indicated   [x] Refused  [] Contraindicated       Influenza Vaccine   [] Up to date    [] Indicated   [x] Refused  [] Contraindicated        Pulmonary Rehab   [] Completed   [] Indicated   [] Refused  [] Contraindicated   PAST MEDICAL HISTORY:       Diagnosis Date    Essential hypertension 07/27/2020    Hematuria     Hepatitis-C     History of colon polyps     Hypertension     Lung disease     Mitral valve regurgitation     Obesity (BMI 30-39.9) 07/27/2020    AURELIANO on CPAP 12/04/2020         Family History:       Problem Relation Age of Onset    High Blood Pressure Mother     Other Mother         ALZHEIMERS    Cancer Father         BONE AND PROSTRATE    Prostate Cancer Brother        SURGICAL HISTORY:   Past Surgical History:   Procedure Laterality Date    CARPAL TUNNEL RELEASE Bilateral     COLONOSCOPY  11/06/2017    TUBULAR ADENOMA    COLONOSCOPY N/A 10/4/2021    COLONOSCOPY POLYPECTOMY SNARE/COLD BIOPSY performed by [facility]an [facility]ford [facility] at [facility]    COLONOSCOPY  10/4/2021    COLONOSCOPY WITH BIOPSY performed by Keenan Ashford MD at [address] [place] [address]      UPPER GASTROINTESTINAL ENDOSCOPY N/A 2/28/2022    EGD BIOPSY performed by Keenan Ashford MD at [address]:        Social History     Socioeconomic History    Marital status: Single     Spouse name: None    Number of children: None    Years of education: None    Highest education level: None   Occupational History    None   Tobacco Use    Smoking status: Former Smoker     Packs/day: 0.50     Years: 5.00     Pack years: 2.50     Types: Cigarettes     Quit date: 10/27/2019     Years since quittin.3    Smokeless tobacco: Never Used    Tobacco comment: Over the past years of my early 19's I was a on & off again smoker due to stress in my life. Vaping Use    Vaping Use: Never used   Substance and Sexual Activity    Alcohol use: Never    Drug use: Never    Sexual activity: Not Currently   Other Topics Concern    None   Social History Narrative    Huang Mckenzie has experienced a financial strain with resourcestrain: Food on 2020. Potential community resources and services have been provided in patient instructions by Raymon Frost. Social Determinants of Health     Financial Resource Strain: Low Risk     Difficulty of Paying Living Expenses: Not hard at all   Food Insecurity: No Food Insecurity    Worried About Running Out of Food in the Last Year: Never true    Madi of Food in the Last Year: Never true   Transportation Needs: No Transportation Needs    Lack of Transportation (Medical): No    Lack of Transportation (Non-Medical):  No   Physical Activity:     Days of Exercise per Week: Not on file    Minutes of Exercise per Session: Not on file   Stress:     Feeling of Stress : Not on file   Social Connections:     Frequency of Communication with Friends and Family: Not on file    Frequency of Social Gatherings with Friends and Family: Not on file    Attends Synagogue Services: Not on file    Active Member of Clubs or Organizations: Not on file    Attends Club or Organization Meetings: Not on file    Marital Status: Not on file   Intimate Partner Violence:     Fear of Current or Ex-Partner: Not on file    Emotionally Abused: Not on file    Physically Abused: Not on file    Sexually Abused: Not on file Housing Stability:     Unable to Pay for Housing in the Last Year: Not on file    Number of Places Lived in the Last Year: Not on file    Unstable Housing in the Last Year: Not on file        TOBACCO:   reports that she quit smoking about 2 years ago. Her smoking use included cigarettes. She has a 2.50 pack-year smoking history. She has never used smokeless tobacco.  ETOH:   reports no history of alcohol use. ALLERGIES:      Allergies   Allergen Reactions    Banana     Eggs Or Egg-Derived Products     Pcn [Penicillins] Hives         Home Meds:   Prior to Admission medications    Medication Sig Start Date End Date Taking? Authorizing Provider   tretinoin (RETIN-A) 0.1 % cream Apply a pea sized amount to face nightly 2/7/22  Yes Brayan Wright MD   omeprazole (PRILOSEC) 40 MG delayed release capsule Take 1 capsule by mouth 2 times daily 2/7/22  Yes Kristian Johns MD   chlorthalidone (HYGROTON) 25 MG tablet TAKE 1 TABLET BY MOUTH DAILY 10/11/21  Yes MIGUEL Holt - CNP   fluticasone (FLONASE) 50 MCG/ACT nasal spray 1 spray by Each Nostril route daily 9/17/21  Yes Catie Torres MD   tretinoin (RETIN-A) 0.025 % cream Apply pea sized amount to face nightly 4/16/21  Yes Brayan Wright MD   carvedilol (COREG) 6.25 MG tablet Take 6.25 mg by mouth 2 times daily TAKING 2 TABLETS bid SINCE 11/28/20 3/12/20  Yes Historical Provider, MD              REVIEW OF SYSTEMS:  Review of Systems -  General ROS: negative for - chills, fatigue, fever or weight loss  ENT ROS: negative for - headaches, oral lesions or sore throat  Cardiovascular ROS: no chest pain , orthopnea or pnd   Gastrointestinal ROS: no abdominal pain, change in bowel habits, or black or bloody stools  Skin - no rash   Neuro - no blurry vision , no loc .  No focal weakness   msk - no jt tenderness or swelling    Vascular - no claudication , rest completed and negative   Lymphatic - complete and negative   Hematology - oncology - complete and negative Allergy immunology - complete and negative    no burning or hematuria      Vitals:  /71 (Site: Left Upper Arm, Position: Sitting, Cuff Size: Medium Adult)   Pulse 63   Resp 18   Ht 5' 7\" (1.702 m)   Wt 211 lb (95.7 kg)   SpO2 96% Comment: ra  BMI 33.05 kg/m²     PHYSICAL EXAM:  Head and neck atraumatic, normocephalic    Lymph nodes-no cervical, supraclavicular lymphadenopathy    Neck-no JVP elevation    Lungs - Ventilating all lobes without any rales, rhonchi, wheezes or dullness. No bronchial breath sounds. Chest expansion equal bilaterally    CVS- S1, S2 regular. No S3 no S4, no murmurs    Abdomen-nontender, nondistended. Bowel sounds are present. No organomegaly    Lower extremity-no edema    Upper extremity-no edema    Neurological-grossly normal cranial nerves. No overt motor deficit                                                         IMPRESSION:     Diagnosis Orders   1. AURELIANO (obstructive sleep apnea)  WA DIFFUSING CAPACITY    WA RESPIRATORY FLOW VOLUME LOOP   2. Nodule of lower lobe of right lung  CT CHEST LOW DOSE (LDCT)   3. Centrilobular emphysema ([address] mild          :                PLAN:     Reviewed compliance report with patient . She does feel better when she uses cpap . Advised cpap compliance . Ct chest to follow up 3 mm right lower lobe lung nodule seen in 2021 - patient has smoking hx but doesnot meet CT lung screening criteria. PFT is normal   Cont gerd tt   Follow up 6 months       Requested Prescriptions      No prescriptions requested or ordered in this encounter       Medications Discontinued During This Encounter   Medication Reason    docusate sodium (COLACE) 100 MG capsule LIST CLEANUP       Media Batty received counseling on the following healthy behaviors: nutrition, exercise and medication adherence    Patient given educational materials : see patient instruction       Discussed use, benefit, and side effects of prescribed medications.   Barriers to medication compliance addressed. All patient questions answered. Pt voiced understanding. I hope this updates you on my evaluation and clinical thinking. Thank you for allowing me to participate in his care. Sincerely,      Electronically signed by Campbell Nguyen MD on 3/14/2022 at 6:37 PM     Please note that this chart was generated using voice recognition Dragon dictation software. Although every effort was made to ensure the accuracy of this automated transcription, some errors in transcription may have occurred.

## 2022-03-14 NOTE — PROGRESS NOTES
Pulmonary Function Test   3/14/2022  Spirometry is normal with normal flow volume loop FEV1 87% predicted, FVC 85% predicted, FEV1 FVC ratio 81    Patient did not complete lung volumes  Diffusion capacity corrected for alveolar volume and hemoglobin 80.1 percent predicted. Final impression  Normal spirometry and normal diffusion capacity.   Clinical correlation advised  Electronically signed by Pastor Rivas MD on 3/14/2022 at 6:51 PM

## 2022-03-18 ENCOUNTER — TELEPHONE (OUTPATIENT)
Dept: DERMATOLOGY | Age: 62
End: 2022-03-18

## 2022-03-18 NOTE — TELEPHONE ENCOUNTER
patient frustrated with lack of progress in her condition and says she was not uppdated that the medication was not covered by her insurance so she wanted to cancel her upcoming appointment. However, pt was informed of this by noe alvarez 2/10/22     \"\"- Denied. Unable to approve TRETINOIN Cream Per the Plan PDL (preferred drug list) document this is a pharmacy benefit exclusion and is not covered for the indication submitted. Please refer to Plan PDL for drugs that are available through the Plan. \" Please call patient and let her know insurance will NOT cover the tretinoin, she would need to pay OOP with a GoodRx coupon.   - Patient informed that medication is not covered by her insurance, states she will try the [address] card and see if its cheaper to do it that way\"

## 2022-03-22 ENCOUNTER — PATIENT MESSAGE (OUTPATIENT)
Dept: GASTROENTEROLOGY | Age: 62
End: 2022-03-22

## 2022-03-22 DIAGNOSIS — Z86.19 HISTORY OF HEPATITIS C: Primary | ICD-10-CM

## 2022-04-11 ENCOUNTER — OFFICE VISIT (OUTPATIENT)
Dept: FAMILY MEDICINE CLINIC | Age: 62
End: 2022-04-11
Payer: COMMERCIAL

## 2022-04-11 VITALS
SYSTOLIC BLOOD PRESSURE: 112 MMHG | TEMPERATURE: 96.8 F | RESPIRATION RATE: 18 BRPM | OXYGEN SATURATION: 98 % | DIASTOLIC BLOOD PRESSURE: 70 MMHG | HEART RATE: 60 BPM | HEIGHT: 67 IN | BODY MASS INDEX: 33.53 KG/M2 | WEIGHT: 213.6 LBS

## 2022-04-11 DIAGNOSIS — E66.09 CLASS 1 OBESITY DUE TO EXCESS CALORIES WITHOUT SERIOUS COMORBIDITY WITH BODY MASS INDEX (BMI) OF 33.0 TO 33.9 IN ADULT: ICD-10-CM

## 2022-04-11 DIAGNOSIS — I10 ESSENTIAL HYPERTENSION: Primary | ICD-10-CM

## 2022-04-11 DIAGNOSIS — R10.9 FLANK PAIN: ICD-10-CM

## 2022-04-11 DIAGNOSIS — M54.50 ACUTE BILATERAL LOW BACK PAIN WITHOUT SCIATICA: ICD-10-CM

## 2022-04-11 PROBLEM — Z90.710 HX OF HYSTERECTOMY: Status: ACTIVE | Noted: 2022-04-11

## 2022-04-11 PROBLEM — E66.811 CLASS 1 OBESITY DUE TO EXCESS CALORIES WITHOUT SERIOUS COMORBIDITY WITH BODY MASS INDEX (BMI) OF 33.0 TO 33.9 IN ADULT: Status: ACTIVE | Noted: 2022-04-11

## 2022-04-11 LAB
APPEARANCE FLUID: ABNORMAL
BILIRUBIN, POC: ABNORMAL
BLOOD URINE, POC: ABNORMAL
CLARITY, POC: CLEAR
COLOR, POC: YELLOW
GLUCOSE URINE, POC: ABNORMAL
KETONES, POC: ABNORMAL
LEUKOCYTE EST, POC: ABNORMAL
NITRITE, POC: ABNORMAL
PH, POC: 6
PROTEIN, POC: ABNORMAL
SPECIFIC GRAVITY, POC: 1.02
UROBILINOGEN, POC: ABNORMAL

## 2022-04-11 PROCEDURE — 3017F COLORECTAL CA SCREEN DOC REV: CPT | Performed by: NURSE PRACTITIONER

## 2022-04-11 PROCEDURE — 99214 OFFICE O/P EST MOD 30 MIN: CPT | Performed by: NURSE PRACTITIONER

## 2022-04-11 PROCEDURE — 81002 URINALYSIS NONAUTO W/O SCOPE: CPT | Performed by: NURSE PRACTITIONER

## 2022-04-11 PROCEDURE — G8427 DOCREV CUR MEDS BY ELIG CLIN: HCPCS | Performed by: NURSE PRACTITIONER

## 2022-04-11 PROCEDURE — 1036F TOBACCO NON-USER: CPT | Performed by: NURSE PRACTITIONER

## 2022-04-11 PROCEDURE — G8417 CALC BMI ABV UP PARAM F/U: HCPCS | Performed by: NURSE PRACTITIONER

## 2022-04-11 RX ORDER — TIZANIDINE 2 MG/1
2 TABLET ORAL 2 TIMES DAILY PRN
Qty: 30 TABLET | Refills: 0 | Status: SHIPPED | OUTPATIENT
Start: 2022-04-11 | End: 2022-07-11 | Stop reason: ALTCHOICE

## 2022-04-11 RX ORDER — FAMOTIDINE 40 MG/1
TABLET, FILM COATED ORAL
COMMUNITY
Start: 2022-03-19 | End: 2022-10-24 | Stop reason: CLARIF

## 2022-04-11 RX ORDER — CETIRIZINE HYDROCHLORIDE 10 MG/1
TABLET ORAL
COMMUNITY
Start: 2022-04-03

## 2022-04-11 RX ORDER — AMMONIUM LACTATE 12 G/100G
CREAM TOPICAL
COMMUNITY
Start: 2022-03-23

## 2022-04-11 RX ORDER — NALTREXONE HYDROCHLORIDE AND BUPROPION HYDROCHLORIDE 8; 90 MG/1; MG/1
TABLET, EXTENDED RELEASE ORAL
Qty: 120 TABLET | Refills: 2 | Status: SHIPPED | OUTPATIENT
Start: 2022-04-11 | End: 2022-06-22

## 2022-04-11 RX ORDER — ESTRADIOL 10 UG/1
INSERT VAGINAL
COMMUNITY
Start: 2022-03-08 | End: 2022-10-24 | Stop reason: CLARIF

## 2022-04-11 RX ORDER — CLOTRIMAZOLE AND BETAMETHASONE DIPROPIONATE 10; .64 MG/G; MG/G
CREAM TOPICAL
COMMUNITY
Start: 2022-03-23

## 2022-04-11 ASSESSMENT — ENCOUNTER SYMPTOMS
SHORTNESS OF BREATH: 0
VOMITING: 0
ABDOMINAL PAIN: 0
BACK PAIN: 1
WHEEZING: 0
NAUSEA: 0

## 2022-04-11 ASSESSMENT — PATIENT HEALTH QUESTIONNAIRE - PHQ9
SUM OF ALL RESPONSES TO PHQ QUESTIONS 1-9: 0
1. LITTLE INTEREST OR PLEASURE IN DOING THINGS: 0
SUM OF ALL RESPONSES TO PHQ9 QUESTIONS 1 & 2: 0
SUM OF ALL RESPONSES TO PHQ QUESTIONS 1-9: 0
2. FEELING DOWN, DEPRESSED OR HOPELESS: 0
SUM OF ALL RESPONSES TO PHQ QUESTIONS 1-9: 0
SUM OF ALL RESPONSES TO PHQ QUESTIONS 1-9: 0

## 2022-04-11 NOTE — PROGRESS NOTES
Subjective:      Patient ID: Huang Osborn is a 64 y.o. female. Visit Information    Have you changed or started any medications since your last visit including any over-the-counter medicines, vitamins, or herbal medicines? Yes    Are you having any side effects from any of your medications? -  no  Have you stopped taking any of your medications? Is so, why? -  yes - will like bloodwork first  Have you seen any other physician or provider since your last visit? Yes - Records Obtained  Have you had any other diagnostic tests since your last visit? Yes - Records Obtained  Have you been seen in the emergency room and/or had an admission to a hospital since we last saw you? No  Have you had your routine dental cleaning in the past 6 months? no    Have you activated your [employer] account? If not, what are your barriers? Yes     Patient Care Team:  Paul Carreon MD as PCP - General (Family Medicine)  Paul Carreon MD as PCP - [facility]  Rajeev Alas MD as Consulting Physician (Gastroenterology)    Medical History Review  Past Medical, Family, and Social History reviewed and does contribute to the patient presenting condition    Health Maintenance   Topic Date Due    Pneumococcal 0-64 years Vaccine (1 of 2 - PPSV23) Never done    HIV screen  Never done    DTaP/Tdap/Td vaccine (1 - Tdap) Never done    Shingles Vaccine (1 of 2) Never done    Depression Screen  2022    COVID-19 Vaccine (3 - Booster for Garcia Peter series) 2022    Flu vaccine (Season Ended) 2022    Potassium monitoring  2022    Creatinine monitoring  2022    Breast cancer screen  2023    Colorectal Cancer Screen  10/04/2024    Lipid screen  2026    Hepatitis A vaccine  Aged Out    Hepatitis B vaccine  Aged Out    Hib vaccine  Aged Out    Meningococcal (ACWY) vaccine  Aged Out     HPI    64year old female presents with management of followin.  HTN and obesity - currently is on dual therapy for bp and it is stable. States she watches diet and exercises routinely but has hard time losing weight. Would like diet pills for weight management. 2. Back pain /bilateral flank pain - for a couple of weeks. Pain is located in bilateral aspect of back and describes as ache intermittent. Denies dysuria urinary urgency frequency or abd pain. UA is unremarkable other than mild hematuria. States she has hx of hematuria and used to follow up with urogyn. Pt had some workup which were unremarkable. Hx of hysterectomy    Review of Systems   Constitutional: Positive for unexpected weight change. Negative for chills and fever. Respiratory: Negative for shortness of breath and wheezing. Cardiovascular: Negative for chest pain and leg swelling. Gastrointestinal: Negative for abdominal pain, nausea and vomiting. Musculoskeletal: Positive for back pain. Neurological: Negative for dizziness, weakness and numbness. Psychiatric/Behavioral: Negative for agitation and behavioral problems. Objective:   Physical Exam  Vitals and nursing note reviewed. Constitutional:       General: She is not in acute distress. Appearance: Normal appearance. She is obese. HENT:      Nose: Nose normal.   Eyes:      Conjunctiva/sclera: Conjunctivae normal.   Cardiovascular:      Rate and Rhythm: Normal rate and regular rhythm. Heart sounds: Normal heart sounds. Pulmonary:      Effort: Pulmonary effort is normal. No respiratory distress. Breath sounds: Normal breath sounds. Abdominal:      Palpations: Abdomen is soft. Tenderness: There is no abdominal tenderness. Musculoskeletal:         General: Tenderness (in bilateral paraspinal area, spasm noted ) present. Normal range of motion. Cervical back: Neck supple. Lymphadenopathy:      Cervical: No cervical adenopathy. Skin:     General: Skin is warm and dry.    Neurological:      Mental Status: She is alert and oriented to person, place, and time. Cranial Nerves: No cranial nerve deficit. Psychiatric:         Mood and Affect: Mood normal.         Behavior: Behavior normal.         Assessment:       1. Essential hypertension    2. Class 1 obesity due to excess calories without serious comorbidity with body mass index (BMI) of 33.0 to 33.9 in adult    3. Flank pain    4. Acute bilateral low back pain without sciatica            Plan:      BP Readings from Last 3 Encounters:   04/11/22 112/70   03/14/22 126/71   02/28/22 137/62     /70   Pulse 60   Temp 96.8 °F (36 °C) (Skin)   Resp 18   Ht 5' 7\" (1.702 m)   Wt 213 lb 9.6 oz (96.9 kg)   SpO2 98%   BMI 33.45 kg/m²   Lab Results   Component Value Date    WBC 7.8 08/07/2021    HGB 13.4 08/07/2021    HCT 41.8 08/07/2021     08/07/2021    CHOL 200 (H) 11/13/2021    TRIG 86 11/13/2021    HDL 53 11/13/2021    ALT 12 08/07/2021    AST 15 08/07/2021     11/13/2021    K 4.4 11/13/2021     11/13/2021    CREATININE 0.88 11/13/2021    BUN 11 11/13/2021    CO2 28 11/13/2021    TSH 2.09 11/13/2021     Lab Results   Component Value Date    CALCIUM 9.4 11/13/2021     Lab Results   Component Value Date    LDLCHOLESTEROL 130 11/13/2021         1. Essential hypertension  - cont current bp therapy     2. Class 1 obesity due to excess calories without serious comorbidity with body mass index (BMI) of 33.0 to 33.9 in adult  - extensive discussion with pt about his current diet and exercise habits, and personalized advice was provided regarding recommended lifestyle changes, diet and exercise. - start naltrexone-buPROPion (CONTRAVE) 8-90 MG per extended release tablet;   - discussed the side effects of the medication and 5% of baseline weight reduction is expected 10-12 ibs in 3 months. 3. Flank pain- likely 4. Acute bilateral low back pain without sciatica- muscular   - POCT Urinalysis no Micro  - start  tiZANidine (ZANAFLEX) 2 MG tablet;  Take 1 tablet by mouth 2 times daily as needed (prn)  Dispense: 30 tablet; Refill: 0  - advised to increase stretching exercise and apply warm compresses       Requested Prescriptions     Signed Prescriptions Disp Refills    naltrexone-buPROPion (CONTRAVE) 8-90 MG per extended release tablet 120 tablet 2     Sig: Week one, take 1 tab po am  Week 2, take 1 tab po am and pm   Week 3, take 2 tabs po am and 1 tab po pm   Week 4, take 2 tabs po am and pm    tiZANidine (ZANAFLEX) 2 MG tablet 30 tablet 0     Sig: Take 1 tablet by mouth 2 times daily as needed (prn)       There are no discontinued medications. Discussed use, benefit, and side effects of prescribed medications. Barriers to medication compliance addressed. All patient questions answered. Pt voiced understanding. Return in about 3 months (around 7/11/2022) for weight mgt- contrave, HTN back pain .

## 2022-05-03 ENCOUNTER — HOSPITAL ENCOUNTER (OUTPATIENT)
Age: 62
Discharge: HOME | End: 2022-05-03
Payer: COMMERCIAL

## 2022-05-03 DIAGNOSIS — Z86.19 HISTORY OF HEPATITIS C: ICD-10-CM

## 2022-05-03 LAB
ALBUMIN SERPL-MCNC: 3.9 G/DL (ref 3.5–5.2)
ALBUMIN/GLOBULIN RATIO: 1.1 (ref 1–2.5)
ALP BLD-CCNC: 92 U/L (ref 35–104)
ALT SERPL-CCNC: 14 U/L (ref 5–33)
AST SERPL-CCNC: 17 U/L
BILIRUB SERPL-MCNC: 1.13 MG/DL (ref 0.3–1.2)
BILIRUBIN DIRECT: <0.08 MG/DL
BILIRUBIN, INDIRECT: NORMAL MG/DL (ref 0–1)
TOTAL PROTEIN: 7.5 G/DL (ref 6.4–8.3)

## 2022-05-03 PROCEDURE — 36415 COLL VENOUS BLD VENIPUNCTURE: CPT

## 2022-05-03 PROCEDURE — 80076 HEPATIC FUNCTION PANEL: CPT

## 2022-05-09 ENCOUNTER — OFFICE VISIT (OUTPATIENT)
Dept: GASTROENTEROLOGY | Age: 62
End: 2022-05-09
Payer: COMMERCIAL

## 2022-05-09 VITALS
HEIGHT: 67 IN | BODY MASS INDEX: 33.59 KG/M2 | HEART RATE: 61 BPM | SYSTOLIC BLOOD PRESSURE: 132 MMHG | DIASTOLIC BLOOD PRESSURE: 62 MMHG | WEIGHT: 214 LBS

## 2022-05-09 DIAGNOSIS — K29.70 GASTRITIS WITHOUT BLEEDING, UNSPECIFIED CHRONICITY, UNSPECIFIED GASTRITIS TYPE: ICD-10-CM

## 2022-05-09 DIAGNOSIS — K21.9 GASTRIC REFLUX: Primary | ICD-10-CM

## 2022-05-09 PROCEDURE — G8427 DOCREV CUR MEDS BY ELIG CLIN: HCPCS | Performed by: INTERNAL MEDICINE

## 2022-05-09 PROCEDURE — 99213 OFFICE O/P EST LOW 20 MIN: CPT | Performed by: INTERNAL MEDICINE

## 2022-05-09 PROCEDURE — 3017F COLORECTAL CA SCREEN DOC REV: CPT | Performed by: INTERNAL MEDICINE

## 2022-05-09 PROCEDURE — G8417 CALC BMI ABV UP PARAM F/U: HCPCS | Performed by: INTERNAL MEDICINE

## 2022-05-09 PROCEDURE — 1036F TOBACCO NON-USER: CPT | Performed by: INTERNAL MEDICINE

## 2022-05-09 RX ORDER — OMEPRAZOLE 40 MG/1
40 CAPSULE, DELAYED RELEASE ORAL DAILY
Qty: 30 CAPSULE | Refills: 2 | Status: SHIPPED | OUTPATIENT
Start: 2022-05-09 | End: 2022-05-31

## 2022-05-09 ASSESSMENT — ENCOUNTER SYMPTOMS
BLOOD IN STOOL: 0
VOMITING: 0
ANAL BLEEDING: 0
CONSTIPATION: 1
DIARRHEA: 0
VOICE CHANGE: 0
CHOKING: 0
ABDOMINAL PAIN: 1
COUGH: 1
ABDOMINAL DISTENTION: 1
WHEEZING: 0
TROUBLE SWALLOWING: 0
RECTAL PAIN: 0
SHORTNESS OF BREATH: 0
NAUSEA: 0

## 2022-05-09 NOTE — PROGRESS NOTES
GI FOLLOW UP    INTERVAL HISTORY:           Chief Complaint   Patient presents with    Follow-up     Bloating with stomach cramps before a BM    EGD       1. Gastric reflux    2. Gastritis without bleeding, unspecified chronicity, unspecified gastritis type          HISTORY OF PRESENT ILLNESS: Flo Marte is a 64 y.o. female with a past history remarkable for prior history of chronic hepatitis C, received ribavirin interferon, maintained SVR for several years now, recent upper endoscopy for dyspepsia and GERD symptoms, gastric biopsies were only significant for mild gastritis,, referred for evaluation of GERD and dyspepsia management. Patient was seen by ENT for suspected LPR, placed on PPI therapy. Transition over to Pepcid 40 mg for GERD symptoms. Self discontinued Prilosec 40 mg    Continues to have mild early morning nonproductive cough possible nocturnal symptoms. Denies any dysphagia, no secondary weight loss, no burning in the postprandial state. No significant lower GI symptoms. Past Medical,Family, and Social History reviewed and does contribute to the patient presenting condition. Patient's PMH/PSH,SH,PSYCH Hx, MEDs, ALLERGIES, and ROS were all reviewed and updated in the appropriate sections.     PAST MEDICAL HISTORY:  Past Medical History:   Diagnosis Date    Essential hypertension 07/27/2020    Hematuria     Hepatitis-C     History of colon polyps     Hypertension     Lung disease     Mitral valve regurgitation     Obesity (BMI 30-39.9) 07/27/2020    AURELIANO on CPAP 12/04/2020       Past Surgical History:   Procedure Laterality Date    CARPAL TUNNEL RELEASE Bilateral     COLONOSCOPY  11/06/2017    TUBULAR ADENOMA    COLONOSCOPY N/A 10/4/2021    COLONOSCOPY POLYPECTOMY SNARE/COLD BIOPSY performed by Janette Cabral MD at  [address] COLONOSCOPY  10/4/2021 COLONOSCOPY WITH BIOPSY performed by Naman Flores MD at [facility]63, TOTAL ABDOMINAL      OVARY REMOVAL      UPPER GASTROINTESTINAL ENDOSCOPY N/A 2/28/2022    EGD BIOPSY performed by Naman Flores MD at [facility]       CURRENT MEDICATIONS:    Current Outpatient Medications:     omeprazole (PRILOSEC) 40 MG delayed release capsule, Take 1 capsule by mouth daily, Disp: 30 capsule, Rfl: 2    cetirizine (ZYRTEC) 10 MG tablet, , Disp: , Rfl:     ammonium lactate (AMLACTIN) 12 % cream, APPLY TOPICALLY TWICE DAILY TO AFFECTED AREA, Disp: , Rfl:     clotrimazole-betamethasone (LOTRISONE) 1-0.05 % cream, APPLY TOPICALLY TO THE AFFECTED AREA TWICE DAILY FOR 4 DAYS THEN APPLY TOPICALLY TO THE AFFECTED AREA DAILY FOR 3 DAYS, Disp: , Rfl:     Estradiol (VAGIFEM) 10 MCG TABS vaginal tablet, INSERT 1 TABLET VAGINALLY EVERY DAY, Disp: , Rfl:     famotidine (PEPCID) 40 MG tablet, TAKE 1 TABLET BY MOUTH EVERY DAY AT BEDTIME, Disp: , Rfl:     naltrexone-buPROPion (CONTRAVE) 8-90 MG per extended release tablet, Week one, take 1 tab po am Week 2, take 1 tab po am and pm  Week 3, take 2 tabs po am and 1 tab po pm  Week 4, take 2 tabs po am and pm (Patient not taking: Reported on 5/9/2022), Disp: 120 tablet, Rfl: 2    tiZANidine (ZANAFLEX) 2 MG tablet, Take 1 tablet by mouth 2 times daily as needed (prn), Disp: 30 tablet, Rfl: 0    tretinoin (RETIN-A) 0.1 % cream, Apply a pea sized amount to face nightly, Disp: 45 g, Rfl: 3    chlorthalidone (HYGROTON) 25 MG tablet, TAKE 1 TABLET BY MOUTH DAILY, Disp: 90 tablet, Rfl: 1    fluticasone (FLONASE) 50 MCG/ACT nasal spray, 1 spray by Each Nostril route daily, Disp: 1 each, Rfl: 2    tretinoin (RETIN-A) 0.025 % cream, Apply pea sized amount to face nightly, Disp: 45 g, Rfl: 3    carvedilol (COREG) 6.25 MG tablet, Take 6.25 mg by mouth 2 times daily TAKING 2 TABLETS bid SINCE 11/28/20, Disp: , Rfl:     ALLERGIES:   Allergies   Allergen Reactions    Banana  Eggs Or Egg-Derived Products     Pcn [Penicillins] Hives       FAMILY HISTORY:       Problem Relation Age of Onset    High Blood Pressure Mother     Other Mother         Virgene Star    Cancer Father         BONE AND PROSTRATE    Prostate Cancer Brother          SOCIAL HISTORY:   Social History     Socioeconomic History    Marital status: Single     Spouse name: Not on file    Number of children: Not on file    Years of education: Not on file    Highest education level: Not on file   Occupational History    Not on file   Tobacco Use    Smoking status: Former Smoker     Packs/day: 0.50     Years: 5.00     Pack years: 2.50     Types: Cigarettes     Quit date: 10/27/2019     Years since quittin.5    Smokeless tobacco: Never Used    Tobacco comment: Over the past years of my early 19's I was a on & off again smoker due to stress in my life. Vaping Use    Vaping Use: Never used   Substance and Sexual Activity    Alcohol use: Never    Drug use: Never    Sexual activity: Not Currently   Other Topics Concern    Not on file   Social History Narrative    Zenobia Townsend has experienced a financial strain with resourcestrain: Food on 2020. Potential community resources and services have been provided in patient instructions by Gordon Cuellar. Social Determinants of Health     Financial Resource Strain: Low Risk     Difficulty of Paying Living Expenses: Not hard at all   Food Insecurity: No Food Insecurity    Worried About Running Out of Food in the Last Year: Never true    Madi of Food in the Last Year: Never true   Transportation Needs: No Transportation Needs    Lack of Transportation (Medical): No    Lack of Transportation (Non-Medical):  No   Physical Activity:     Days of Exercise per Week: Not on file    Minutes of Exercise per Session: Not on file   Stress:     Feeling of Stress : Not on file   Social Connections:     Frequency of Communication with Friends and Family: Not on file    Frequency of Social Gatherings with Friends and Family: Not on file    Attends Cheondoism Services: Not on file    Active Member of Clubs or Organizations: Not on file    Attends Club or Organization Meetings: Not on file    Marital Status: Not on file   Intimate Partner Violence:     Fear of Current or Ex-Partner: Not on file    Emotionally Abused: Not on file    Physically Abused: Not on file    Sexually Abused: Not on file   Housing Stability:     Unable to Pay for Housing in the Last Year: Not on file    Number of [place] in the Last Year: Not on file    Unstable Housing in the Last Year: Not on file       REVIEW OF SYSTEMS: A 12-point review of systems was obtained and pertinent positives and negatives were listed below. REVIEW OF SYSTEMS:     Constitutional: No fever, no chills, no lethargy, no weakness. HEENT:  No headache, otalgia, itchy eyes, nasal discharge or sore throat. Cardiac:  No chest pain, dyspnea, orthopnea or PND. Chest:   No cough, phlegm or wheezing. Abdomen:      Detailed by MA   Neuro:  No focal weakness, abnormal movements or seizure like activity. Skin:   No rashes, no itching. :   No hematuria, no pyuria, no dysuria, no flank pain. Extremities:  No swelling or joint pains. ROS was otherwise negative    Review of Systems   Constitutional: Positive for fatigue. Negative for appetite change and unexpected weight change. HENT: Negative for trouble swallowing and voice change. Eyes: Negative for visual disturbance. Respiratory: Positive for cough (excessively after eating). Negative for choking, shortness of breath and wheezing. Cardiovascular: Negative for chest pain, palpitations and leg swelling. Gastrointestinal: Positive for abdominal distention, abdominal pain and constipation. Negative for anal bleeding, blood in stool, diarrhea, nausea, rectal pain and vomiting. Genitourinary: Negative for difficulty urinating.    Neurological: Negative for dizziness, seizures, weakness, numbness and headaches. Hematological: Does not bruise/bleed easily. Psychiatric/Behavioral: Positive for confusion. Negative for sleep disturbance. The patient is nervous/anxious. PHYSICAL EXAMINATION: Vital signs reviewed per the nursing documentation. /62   Pulse 61   Ht 5' 7\" (1.702 m)   Wt 214 lb (97.1 kg)   BMI 33.52 kg/m²   Body mass index is 33.52 kg/m². Physical Exam    Physical Exam   Constitutional: Patient is oriented to person, place, and time. Patient appears well-developed and well-nourished. HENT:   Head: Normocephalic and atraumatic. Eyes: Pupils are equal, round, and reactive to light. EOM are normal.   Neck: Normal range of motion. Neck supple. No JVD present. No tracheal deviation present. No thyromegaly present. Cardiovascular: Normal rate, regular rhythm, normal heart sounds and intact distal pulses. Pulmonary/Chest: Effort normal and breath sounds normal. No stridor. No respiratory distress. He has no wheezes. He has no rales. He exhibits no tenderness. Abdominal: Soft. Bowel sounds are normal. He exhibits no distension and no mass. There is no tenderness. There is no rebound and no guarding. No hernia. Musculoskeletal: Normal range of motion. Lymphadenopathy:    Patient has no cervical adenopathy. Neurological: Patient is alert and oriented to person, place, and time. Psychiatric: Patient has a normal mood and affect.  Patient behavior is normal.       LABORATORY DATA: Reviewed  Lab Results   Component Value Date    WBC 7.8 08/07/2021    HGB 13.4 08/07/2021    HCT 41.8 08/07/2021    MCV 85.8 08/07/2021     08/07/2021     11/13/2021    K 4.4 11/13/2021     11/13/2021    CO2 28 11/13/2021    BUN 11 11/13/2021    CREATININE 0.88 11/13/2021    LABALBU 3.9 05/03/2022    BILITOT 1.13 05/03/2022    ALKPHOS 92 05/03/2022    AST 17 05/03/2022    ALT 14 05/03/2022         Lab Results   Component Value Date    RBC 4.87 08/07/2021    HGB 13.4 08/07/2021    MCV 85.8 08/07/2021    MCH 27.5 08/07/2021    MCHC 32.1 08/07/2021    RDW 13.2 08/07/2021    MPV 9.7 08/07/2021    BASOPCT 0 08/07/2021    LYMPHSABS 2.57 08/07/2021    MONOSABS 0.54 08/07/2021    NEUTROABS 4.49 08/07/2021    EOSABS 0.15 08/07/2021    BASOSABS 0.03 08/07/2021         DIAGNOSTIC TESTING:     No results found. IMPRESSION: Johnny Zhou is a 64 y.o. female with a past history remarkable for prior history of chronic hepatitis C, received ribavirin interferon, maintained SVR for several years now, recent upper endoscopy for dyspepsia and GERD symptoms, gastric biopsies were only significant for mild gastritis,, referred for evaluation of GERD and dyspepsia management. Patient was seen by ENT for suspected LPR, placed on PPI therapy. Transition over to Pepcid 40 mg for GERD symptoms. Self discontinued Prilosec 40 mg    Continues to have mild early morning nonproductive cough possible nocturnal symptoms. Denies any dysphagia, no secondary weight loss, no burning in the postprandial state. No significant lower GI symptoms. Assessment  1. Gastric reflux    2. Gastritis without bleeding, unspecified chronicity, unspecified gastritis type        Yury Cotto was seen today for follow-up and egd. Diagnoses and all orders for this visit:    Gastric reflux-patient continue with Prilosec 40 mg delayed release capsule once daily in early morning, along with dietary adjustments and encouraged weight loss. Advised the potential taper off this medication on next visit. Gastritis without bleeding, unspecified chronicity, unspecified gastritis type-nonspecific likely situational versus diet related. Continue with PPI therapy. Other orders  -     omeprazole (PRILOSEC) 40 MG delayed release capsule; Take 1 capsule by mouth daily    Patient may continue with Pepcid 40 mg as needed        RTC: 2 to 3 months.     Additional comments: Thank you for allowing me to participate in the care of Ms. Rain. For any further questions please do not hesitate to contact me. I have reviewed and agree with the ROS entered by the MA/LPN from today's encounter documented in a separate note. Riri Soares MD, MPH   Board Certified in Gastroenterology  Board Certified in [address] [place] [address] #: [phone]    this note is created with the assistance of a speech recognition program.  While intending to generate a document that actually reflects the content of the visit, the document can still have some errors including those of syntax and sound a like substitutions which may escape proof reading. It such instances, actual meaning can be extrapolated by contextual diversion.

## 2022-05-27 ENCOUNTER — TELEPHONE (OUTPATIENT)
Dept: ORTHOPEDIC SURGERY | Age: 62
End: 2022-05-27

## 2022-05-27 NOTE — TELEPHONE ENCOUNTER
Patient was seen in office 02/21 for her knee pain, she thought we were getting approval for gel injections but has not heard anything back yet. Are we still waiting on approval or is there something else she needs to do?      Please call her with update  Thank you

## 2022-05-31 RX ORDER — OMEPRAZOLE 40 MG/1
CAPSULE, DELAYED RELEASE ORAL
Qty: 60 CAPSULE | Refills: 3 | Status: SHIPPED | OUTPATIENT
Start: 2022-05-31

## 2022-06-18 ENCOUNTER — HOSPITAL ENCOUNTER (OUTPATIENT)
Dept: CT IMAGING | Age: 62
Discharge: HOME | End: 2022-06-20
Payer: COMMERCIAL

## 2022-06-18 DIAGNOSIS — R91.1 NODULE OF LOWER LOBE OF RIGHT LUNG: ICD-10-CM

## 2022-06-18 PROCEDURE — 71250 CT THORAX DX C-: CPT

## 2022-06-22 ENCOUNTER — PROCEDURE VISIT (OUTPATIENT)
Dept: ORTHOPEDIC SURGERY | Age: 62
End: 2022-06-22
Payer: COMMERCIAL

## 2022-06-22 VITALS — HEIGHT: 67 IN | BODY MASS INDEX: 33.59 KG/M2 | WEIGHT: 214 LBS

## 2022-06-22 DIAGNOSIS — M17.12 PRIMARY OSTEOARTHRITIS OF LEFT KNEE: Primary | ICD-10-CM

## 2022-06-22 PROCEDURE — 99999 PR OFFICE/OUTPT VISIT,PROCEDURE ONLY: CPT | Performed by: FAMILY MEDICINE

## 2022-06-22 PROCEDURE — 20610 DRAIN/INJ JOINT/BURSA W/O US: CPT | Performed by: FAMILY MEDICINE

## 2022-06-22 RX ORDER — HYALURONATE SODIUM 10 MG/ML
20 SYRINGE (ML) INTRAARTICULAR ONCE
Status: COMPLETED | OUTPATIENT
Start: 2022-06-22 | End: 2022-06-22

## 2022-06-22 RX ADMIN — Medication 20 MG: at 12:05

## 2022-06-29 ENCOUNTER — PROCEDURE VISIT (OUTPATIENT)
Dept: ORTHOPEDIC SURGERY | Age: 62
End: 2022-06-29
Payer: COMMERCIAL

## 2022-06-29 VITALS — DIASTOLIC BLOOD PRESSURE: 83 MMHG | HEART RATE: 77 BPM | SYSTOLIC BLOOD PRESSURE: 149 MMHG

## 2022-06-29 DIAGNOSIS — M17.0 OSTEOARTHRITIS OF PATELLOFEMORAL JOINTS OF BOTH KNEES: Primary | ICD-10-CM

## 2022-06-29 PROCEDURE — 20611 DRAIN/INJ JOINT/BURSA W/US: CPT | Performed by: PHYSICIAN ASSISTANT

## 2022-06-29 RX ORDER — LIDOCAINE HYDROCHLORIDE 10 MG/ML
2 INJECTION, SOLUTION INFILTRATION; PERINEURAL ONCE
Status: COMPLETED | OUTPATIENT
Start: 2022-06-29 | End: 2022-06-29

## 2022-06-29 RX ORDER — METHYLPREDNISOLONE ACETATE 80 MG/ML
80 INJECTION, SUSPENSION INTRA-ARTICULAR; INTRALESIONAL; INTRAMUSCULAR; SOFT TISSUE ONCE
Status: COMPLETED | OUTPATIENT
Start: 2022-06-29 | End: 2022-06-29

## 2022-06-29 RX ORDER — HYALURONATE SODIUM 10 MG/ML
20 SYRINGE (ML) INTRAARTICULAR ONCE
Status: COMPLETED | OUTPATIENT
Start: 2022-06-29 | End: 2022-06-29

## 2022-06-29 RX ADMIN — Medication 20 MG: at 10:42

## 2022-06-29 RX ADMIN — METHYLPREDNISOLONE ACETATE 80 MG: 80 INJECTION, SUSPENSION INTRA-ARTICULAR; INTRALESIONAL; INTRAMUSCULAR; SOFT TISSUE at 11:41

## 2022-06-29 RX ADMIN — LIDOCAINE HYDROCHLORIDE 2 ML: 10 INJECTION, SOLUTION INFILTRATION; PERINEURAL at 11:40

## 2022-06-29 ASSESSMENT — ENCOUNTER SYMPTOMS
RESPIRATORY NEGATIVE: 1
APNEA: 0
VOMITING: 0
ABDOMINAL DISTENTION: 0
NAUSEA: 0
CONSTIPATION: 0
COUGH: 0
ABDOMINAL PAIN: 0
CHEST TIGHTNESS: 0
SHORTNESS OF BREATH: 0
COLOR CHANGE: 0
DIARRHEA: 0

## 2022-06-29 NOTE — PROGRESS NOTES
[address] [place] [address] AND SPORTS MEDICINE  [address] [place] [address]  Dept: [phone]  Dept Fax: [phone]          Left knee Visit - Follow up    Subjective:     Chief Complaint   Patient presents with    Knee Pain     Left.euflexxa 2     HPI:     Leonardo Cerrato presents today for Left knee pain. Patient is a previous Dr. Melanie Adan patient who presents today for her second Euflexxa injection. She had a cortisone injection on 2/21/2022 and her first Euflexxa injection on 6/22/2022. She does have some right knee pain and feels like she is having more discomfort there. She would like to consider doing a cortisone injection first treatment for the right knee also. She denies any mechanical catching locking or sharp stabbing pain. She has never had any surgery on her knees. She has went to physical therapy for both knees. She has never had an MRI of either knee. I have reviewed the CC, and if not present in this note, I have reviewed in the patient's chart. I agree with the documentation provided by other staff and have reviewed their documentation prior to providing my signature indicating agreement. Review of Systems   Constitutional: Positive for activity change. Negative for appetite change, fatigue and fever. Respiratory: Negative. Negative for apnea, cough, chest tightness and shortness of breath. Cardiovascular: Negative. Negative for chest pain, palpitations and leg swelling. Gastrointestinal: Negative for abdominal distention, abdominal pain, constipation, diarrhea, nausea and vomiting. Genitourinary: Negative for difficulty urinating, dysuria and hematuria. Musculoskeletal: Positive for arthralgias and gait problem. Negative for joint swelling and myalgias. Skin: Negative for color change and rash. Neurological: Negative for dizziness, weakness, numbness and headaches. Psychiatric/Behavioral: Negative for sleep disturbance. Vitals:   BP (!) 149/83   Pulse 77  There is no height or weight on file to calculate BMI. Orthopedics:    GENERAL: Alert and oriented X3 in no acute distress. SKIN: Intact without lesions or ulcerations. NEURO: Musculoskeletal and axillary nerves intact to sensory and motor testing. VASC: Capillary refill is less than 3 seconds. Knee Exam    LOCATION: Bilateral Knee  GEN: Alert and oriented X 3, in no acute distress. GAIT: The patient's gait was observed while entering the exam room and was noted to be antalgic. The extremity is in anatomic alignment. SKIN: Intact without rashes, lesions, or ulcerations. No obvious deformity or swelling. NEURO: The patient responds to light touch throughout bilateral LE. Patellar and Achilles reflexes are 2/4. VASC: The bilateral LE is neurovascularly intact with 2/4 DP and 2/4 PT pulses. Brisk capillary refill. ROM: 0/125 degrees. There is no effusion. MUSC: decreased quad tone  LIGAMENT:  There is  No varus instability at 0 degrees and No varus instability at 30 degrees. There is No valgus instability at 0 degrees and No valgus instability at 30 degrees. SPECIAL: Katherine test is negative Crepitation with Katherine. PALP: There is medial joint line pain. Assessment:     1. Osteoarthritis of patellofemoral joints of both knees        Procedure:   Procedure: Yes    Regular Knee Injection    Location: Right Knee  Procedure: I discussed in detail the risks, benefits and complications of the corticosteroid injection which included but are not limited to: infection, skin reactions, hot swollen, and anaphylaxis with the patient. Leah Lopez verbalized understanding and they have agreed to have the corticosteroid injection into the right knee. The patient was placed in the Supine position on the exam table. The superior lateral portal was identified and marked with a ball point pen.  The skin was prepped with betadine in a sterile fashion. Utilizing a [employer] ultrasound unit with a variable frequency linear transducer and clean technique with sterile gloves, a 3 cc solution containing 2 cc of 1% lidocaine   with 1 cc containing 80 mg of Depo-medrol  was injected. There was no resistance to the injection. The wound was cleansed and a band-aid was placed. the patient tolerated the procedure without difficulty. Adverse reactions to the injection were discussed with the patient including signs of infection (increasing pain, redness, swelling) and the patient was instructed to call immediately if experiencing any of these symptoms. Images of the injection site were recorded throughout the procedure and are saved on the SD card which is stored in the [employer] ultrasound unit. All images were downloaded and stored in patient's chart. Euflexxa Injection    Location: Left knee  Procedure: I discussed in detail the risks, benefits and complications of the Euflexxa injection which included but are not limited to infection, skin reactions, hot swollen joint and anaphylaxis with the patient. Marisol Cooley agreed for the Euflexxa injection into the left knee and gave consent to do the injection. The patient was placed in the supine position on the exam table. The junction of the superior portion of the patella and the lateral portion of the patella was identified and marked. The skin was prepped with betadine in a sterile fashion. Utilizing sterile technique, a [employer] ultrasound unit with a variable frequency linear transducer was used for precise placement and a 22 gauge needle was used to inject the the knee joint with 2 ml of Euflexxa through the superior lateral approach. There was no resistance to injection. The wound was cleansed and a Band-Aid was placed. The patient tolerated the procedure well without difficulty.  Adverse reactions of the injection was discussed with the patient including signs of infection, fever, chills, warmth, increasing pain, redness and swelling. The patient was instructed to call the office immediately if they are experiencing any of these symptoms. Images of the injection site were recorded throughout the procedure and are saved on the SD card which is stored in the [employer] ultrasound unit. All images were downloaded and stored in patient's chart. Plan:   Since the patient is also having right knee pain also, we can do a cortisone injection into that knee to decrease her inflammation and pain. She has done physical therapy and should keep doing those exercises on her own. We can do a Euflexxa injection into the left knee and a cortisone injection into the right. The patient opted for a cortisone injection into the right knee and Euflexxa in the left to help reduce inflammation and pain. The injection site should never get red, hot, or swollen and if it does the patient will contact our office right away. The patient may experience a increase in soreness the first 24-48 hours due to a cortisone flair and can take anti-inflammatories for a short period of time to reduce that soreness. The patient should not submerge the injection site in water for a minimum of 24 hours to avoid infection. This means no lakes, pools, ponds, or hot tubs for 24 hours. If the patient is diabetic the injection may increase their blood sugar for up to one week. The patient can do this cortisone injection once every 3 months as needed. If the injections stop working and do not give the patient relief the patient should consider surgical interventions to produce long term relief. Patient should return to the clinic in 1 weeks to follow up with John Eldridge PA-C. The patient will call the office immediately with any problems. Orders Placed This Encounter   Medications    sodium hyaluronate (EUFLEXXA, HYALGAN) injection 20 mg     Order Specific Question:   Which brand are you ordering?      Answer:   [employer] methylPREDNISolone acetate (DEPO-MEDROL) injection 80 mg    lidocaine 1 % injection 2 mL       No orders of the defined types were placed in this encounter.           Electronically signed by Rafael Reagan PA-C, on 6/29/2022 at 12:33 PM

## 2022-07-05 NOTE — PROGRESS NOTES
[address] [place] [address] AND SPORTS MEDICINE  [address] [place] [address]  Dept: [phone]  Dept Fax: [phone]          Bilateral knee Visit - Follow up    Subjective:     Chief Complaint   Patient presents with    Procedure     L Knee Euflexxa #3     HPI:     Miriam Bailey presents today for her third Euflexxa injection. Her last appointment she did have a cortisone injection into the right knee also. She states that that was helpful and she has significantly less pain in her right knee at this time. I have reviewed the CC, and if not present in this note, I have reviewed in the patient's chart. I agree with the documentation provided by other staff and have reviewed their documentation prior to providing my signature indicating agreement. Vitals:   Resp 12   Ht 5' 7\" (1.702 m)   Wt 214 lb (97.1 kg)   BMI 33.52 kg/m²  Body mass index is 33.52 kg/m². Assessment:     1. Primary osteoarthritis of left knee          Procedure:   Procedure: Yes    Euflexxa Injection    Location: left knee  Procedure: I discussed in detail the risks, benefits and complications of the Euflexxa injection which included but are not limited to infection, skin reactions, hot swollen joint and anaphylaxis with the patient. Miriam Bailey agreed for the Euflexxa injection into the bilateral knee and gave consent to do the injection. The patient was placed in the supine position on the exam table. The junction of the superior portion of the patella and the lateral portion of the patella was identified and marked. The skin was prepped with betadine in a sterile fashion. Utilizing sterile technique, a [employer] ultrasound unit with a variable frequency linear transducer was used for precise placement and a 22 gauge needle was used to inject the the knee joint with 2 ml of Euflexxa through the superior lateral approach.  There was no resistance to injection. The wound was cleansed and a Band-Aid was placed. The patient tolerated the procedure well without difficulty. Adverse reactions of the injection was discussed with the patient including signs of infection, fever, chills, warmth, increasing pain, redness and swelling. The patient was instructed to call the office immediately if they are experiencing any of these symptoms. Images of the injection site were recorded throughout the procedure and are saved on the SD card which is stored in the [employer] ultrasound unit. All images were downloaded and stored in patient's chart. Plan:   She has home exercises at home that she can start for both of her knees which would be helpful to keep her strength. Patient should return to the clinic in 6 weeks for a cortisone injection into the left knee if she is still having pain to follow up with Clarissa Mccoy PA-C. The patient will call the office immediately with any problems. Orders Placed This Encounter   Medications    sodium hyaluronate (EUFLEXXA, HYALGAN) injection 20 mg     Order Specific Question:   Which brand are you ordering? Answer:   Euflexxa       No orders of the defined types were placed in this encounter.           Electronically signed by Neela Burns PA-C, on 7/6/2022 at 9:49 AM

## 2022-07-06 ENCOUNTER — PROCEDURE VISIT (OUTPATIENT)
Dept: ORTHOPEDIC SURGERY | Age: 62
End: 2022-07-06
Payer: COMMERCIAL

## 2022-07-06 VITALS — RESPIRATION RATE: 12 BRPM | HEIGHT: 67 IN | BODY MASS INDEX: 33.59 KG/M2 | WEIGHT: 214 LBS

## 2022-07-06 DIAGNOSIS — M17.12 PRIMARY OSTEOARTHRITIS OF LEFT KNEE: Primary | ICD-10-CM

## 2022-07-06 PROCEDURE — 20611 DRAIN/INJ JOINT/BURSA W/US: CPT | Performed by: PHYSICIAN ASSISTANT

## 2022-07-06 PROCEDURE — 99999 PR OFFICE/OUTPT VISIT,PROCEDURE ONLY: CPT | Performed by: PHYSICIAN ASSISTANT

## 2022-07-06 RX ORDER — HYALURONATE SODIUM 10 MG/ML
20 SYRINGE (ML) INTRAARTICULAR ONCE
Status: COMPLETED | OUTPATIENT
Start: 2022-07-06 | End: 2022-07-06

## 2022-07-06 RX ADMIN — Medication 20 MG: at 10:25

## 2022-07-11 ENCOUNTER — OFFICE VISIT (OUTPATIENT)
Dept: PULMONOLOGY | Age: 62
End: 2022-07-11
Payer: COMMERCIAL

## 2022-07-11 ENCOUNTER — OFFICE VISIT (OUTPATIENT)
Dept: FAMILY MEDICINE CLINIC | Age: 62
End: 2022-07-11
Payer: COMMERCIAL

## 2022-07-11 VITALS
RESPIRATION RATE: 16 BRPM | SYSTOLIC BLOOD PRESSURE: 135 MMHG | HEIGHT: 66 IN | TEMPERATURE: 96.8 F | DIASTOLIC BLOOD PRESSURE: 68 MMHG | HEART RATE: 61 BPM | WEIGHT: 208 LBS | OXYGEN SATURATION: 98 % | BODY MASS INDEX: 33.43 KG/M2

## 2022-07-11 VITALS
WEIGHT: 211.8 LBS | DIASTOLIC BLOOD PRESSURE: 74 MMHG | TEMPERATURE: 97.2 F | RESPIRATION RATE: 14 BRPM | SYSTOLIC BLOOD PRESSURE: 120 MMHG | BODY MASS INDEX: 34.71 KG/M2 | HEART RATE: 68 BPM

## 2022-07-11 DIAGNOSIS — E66.09 CLASS 1 OBESITY DUE TO EXCESS CALORIES WITHOUT SERIOUS COMORBIDITY WITH BODY MASS INDEX (BMI) OF 33.0 TO 33.9 IN ADULT: ICD-10-CM

## 2022-07-11 DIAGNOSIS — Z23 NEED FOR PNEUMOCOCCAL VACCINE: ICD-10-CM

## 2022-07-11 DIAGNOSIS — K21.9 HIATAL HERNIA WITH GERD: ICD-10-CM

## 2022-07-11 DIAGNOSIS — K44.9 HIATAL HERNIA WITH GERD: ICD-10-CM

## 2022-07-11 DIAGNOSIS — K21.9 LARYNGOPHARYNGEAL REFLUX (LPR): ICD-10-CM

## 2022-07-11 DIAGNOSIS — Z99.89 OSA ON CPAP: ICD-10-CM

## 2022-07-11 DIAGNOSIS — J43.2 CENTRILOBULAR EMPHYSEMA (HCC): Primary | ICD-10-CM

## 2022-07-11 DIAGNOSIS — I10 ESSENTIAL HYPERTENSION: Primary | ICD-10-CM

## 2022-07-11 DIAGNOSIS — R91.1 NODULE OF LOWER LOBE OF RIGHT LUNG: ICD-10-CM

## 2022-07-11 DIAGNOSIS — R31.9 HEMATURIA, UNSPECIFIED TYPE: ICD-10-CM

## 2022-07-11 DIAGNOSIS — G47.33 OSA ON CPAP: ICD-10-CM

## 2022-07-11 LAB
BILIRUBIN, POC: NORMAL
BLOOD URINE, POC: NORMAL
CLARITY, POC: CLEAR
COLOR, POC: YELLOW
GLUCOSE URINE, POC: NORMAL
KETONES, POC: NORMAL
LEUKOCYTE EST, POC: NORMAL
NITRITE, POC: NORMAL
PH, POC: 6
PROTEIN, POC: NORMAL
SPECIFIC GRAVITY, POC: 1.02
UROBILINOGEN, POC: NORMAL

## 2022-07-11 PROCEDURE — 1036F TOBACCO NON-USER: CPT | Performed by: NURSE PRACTITIONER

## 2022-07-11 PROCEDURE — 99214 OFFICE O/P EST MOD 30 MIN: CPT | Performed by: NURSE PRACTITIONER

## 2022-07-11 PROCEDURE — 3023F SPIROM DOC REV: CPT | Performed by: INTERNAL MEDICINE

## 2022-07-11 PROCEDURE — G8427 DOCREV CUR MEDS BY ELIG CLIN: HCPCS | Performed by: INTERNAL MEDICINE

## 2022-07-11 PROCEDURE — 3017F COLORECTAL CA SCREEN DOC REV: CPT | Performed by: NURSE PRACTITIONER

## 2022-07-11 PROCEDURE — 99214 OFFICE O/P EST MOD 30 MIN: CPT | Performed by: INTERNAL MEDICINE

## 2022-07-11 PROCEDURE — G8427 DOCREV CUR MEDS BY ELIG CLIN: HCPCS | Performed by: NURSE PRACTITIONER

## 2022-07-11 PROCEDURE — 81003 URINALYSIS AUTO W/O SCOPE: CPT | Performed by: NURSE PRACTITIONER

## 2022-07-11 PROCEDURE — G8417 CALC BMI ABV UP PARAM F/U: HCPCS | Performed by: INTERNAL MEDICINE

## 2022-07-11 PROCEDURE — G8417 CALC BMI ABV UP PARAM F/U: HCPCS | Performed by: NURSE PRACTITIONER

## 2022-07-11 PROCEDURE — 3017F COLORECTAL CA SCREEN DOC REV: CPT | Performed by: INTERNAL MEDICINE

## 2022-07-11 PROCEDURE — 1036F TOBACCO NON-USER: CPT | Performed by: INTERNAL MEDICINE

## 2022-07-11 RX ORDER — INDOMETHACIN 25 MG/1
CAPSULE ORAL
COMMUNITY
Start: 2022-05-21 | End: 2022-10-24 | Stop reason: CLARIF

## 2022-07-11 ASSESSMENT — ENCOUNTER SYMPTOMS
CHEST TIGHTNESS: 0
SHORTNESS OF BREATH: 0
BLOOD IN STOOL: 0
ABDOMINAL PAIN: 0

## 2022-07-11 NOTE — PATIENT INSTRUCTIONS
Patient Education        pneumococcal 20-valent conjugate vaccine  Pronunciation: RALPH anderson NIKKI al 20-VAY lent ISAIAH harvey VAX een  Brand: Prevnar 20  What is the most important information I should know about pneumococcal 20-valent conjugate vaccine? You should not receive this vaccine if you ever had a severe allergic reactionto a pneumococcal or diphtheria toxoid vaccine. What is pneumococcal 20-valent conjugate vaccine? Pneumococcal disease is a serious infection caused by a bacteria that can infect the sinuses, inner ear, lungs, blood, and brain. These conditions can befatal.  Pneumococcal 20-valent conjugate vaccine is used in adults to help prevent disease caused by pneumococcal bacteria. This vaccine contains 20 differenttypes of pneumococcal bacteria. This vaccine helps your body develop immunity to the disease, but will nottreat an active infection you already have. Like any vaccine, pneumococcal 20-valent conjugate vaccine may not provideprotection from disease in every person. What should I discuss with my healthcare provider before taking pneumococcal 20-valent conjugate vaccine? You should not receive this vaccine if you ever had a severe allergic reactionto a pneumococcal or diphtheria toxoid vaccine. Tell the vaccination provider if you have:   a weak immune system (caused by disease or by using certain medicine); or   if you are receiving radiation or chemotherapy. You can still receive a vaccine if you have a minor cold. In the case of a more severe illness with a fever or any type of infection, wait until you get betterbefore receiving this vaccine. Tell the vaccination provider if you are pregnant or breastfeeding. How should I take pneumococcal 20-valent conjugate vaccine? This vaccine is given as an injection (shot) into a muscle. Pneumococcal 20-valent conjugate vaccine is usually given as 1 shot. What happens if I miss a dose?   Pneumococcal 20-valent conjugate vaccine is used as a single dose and does nothave a booster schedule. What happens if I overdose? An overdose of this vaccine is unlikely to occur. What should I avoid while taking pneumococcal 20-valent conjugate vaccine? Follow your doctor's instructions about any restrictions on food, beverages, oractivity. What are the possible side effects of pneumococcal 20-valent conjugate vaccine? Get emergency medical help if you have signs of an allergic reaction: hives; difficult breathing; swelling of your face, lips, tongue, or throat. Keep track of all side effects you have. If you ever need another pneumococcal 20-valent conjugate vaccine, you will need to tell the vaccination provider ifthe previous shot caused any side effects. Becoming infected with pneumococcal disease is much more dangerous to your health than receiving this vaccine. However, like any medicine, this vaccinecan cause side effects but the risk of serious side effects is low. Common side effects may include:   pain or swelling where a shot was given;   muscle or joint pain;   headache; or   feeling tired. This is not a complete list of side effects and others may occur. Call your doctor for medical advice about side effects. You may report vaccine sideeffects to the [address] [place] [address] [place] at [phone]. What other drugs will affect pneumococcal 20-valent conjugate vaccine? Tell the vaccination provider if you have recently received drugs or treatmentsthat can weaken the immune system, including:   steroid medicine;   medications to treat psoriasis, rheumatoid arthritis, or other autoimmune disorders; or   medicines to treat or prevent organ transplant rejection. This list is not complete. Other drugs may affect pneumococcal 20-valent conjugate vaccine, including prescription and over-the-counter medicines, vitamins, and herbal products. Not all possible drug interactions are listedhere.   Where can I get more information? Your vaccination provider, pharmacist, or doctor can provide more information about this vaccine. Additional information is available from your local [facility] or the Centers for Disease Control and Prevention. Remember, keep this and all other medicines out of the reach of children, never share your medicines with others, and use this medication only for the indication prescribed. Every effort has been made to ensure that the information provided by [address] is accurate, up-to-date, and complete, but no guarantee is made to that effect. Drug information contained herein may be time sensitive. [facility] information has been compiled for use by healthcare practitioners and consumers in the [place] and therefore [facility] does not warrant that uses outside of the [place] are appropriate, unless specifically indicated otherwise. [facility]'s drug information does not endorse drugs, diagnose patients or recommend therapy. [facility]'s drug information is an informational resource designed to assist licensed healthcare practitioners in caring for their patients and/or to serve consumers viewing this service as a supplement to, and not a substitute for, the expertise, skill, knowledge and judgment of healthcare practitioners. The absence of a warning for a given drug or drug combination in no way should be construed to indicate that the drug or drug combination is safe, effective or appropriate for any given patient. [facility] does not assume any responsibility for any aspect of healthcare administered with the aid of information [facility] provides. The information contained herein is not intended to cover all possible uses, directions, precautions, warnings, drug interactions, allergic reactions, or adverse effects.  If you have questions about the drugs you are taking, check with yourdoctor, nurse or pharmacist.  Copyright 9925-8763 [address]. Version: 1.02. Revision date: 3/4/2022. Care instructions adapted under license by [facility] ([facility]). If you have questions about a medical condition or this instruction, always ask your healthcare professional. [address] any warranty or liability for your use of this information. Unable to schedule the pt for chest CT at [facility] in 6/2022. Schedule is not open that far. Pt was informed to call in December.

## 2022-07-11 NOTE — PROGRESS NOTES
REASON FOR THE CONSULTATION:  martin mild emphysema  HISTORY OF PRESENT ILLNESS:    Shilpa Trevino is a 64y.o. year old female   CT chest imaging reviewed with patient and explained that lung nodules have been stable for 1 year. No further CT scans is required however she is concerned and would like a CT chest in 1 year. She does not meet criteria for CT lung screening. She has dry cough, has GERD due to hiatal hernia. Advise  compliance with CPAP therapy  Last visit   Poor compliance with CPAP therapy. She also complains of seasonal allergic rhinitis during fall season has nasal congestion. Has been seen by ENT, has hiatal hernia and GERD which is leading to chronic cough    CT lung screening reviewed with patient has mild emphysema    Advised to abstain from smoking obstructive sleep apnea. Patient had a last sleep study 2014 and was given a CPAP of 11 cm water pressure. She has been using CPAP every night but her memory card has not been read since 2015. When she sleeps at night if her neck is straight she sleeps better without choking . She has full face mask . when she gets up in morning she feels sleepy and not rested   Even though she get 8 hrs sleep . 2014 - BMI 28.9 and now BMI is 34.2 , she has gained - from 185 lbs to 212 lbs.   She tosses and turn during night   Uses bathroom 2 times   Falls asleep right away   Bed time - 11 pm   Wake up time - 8 am   Some times takes melatonin   Feels fatigued and tired during the day even though she has been using CPAP at night  Feels her memory is not as sharp  Sleep Medicine 9/17/2021 12/4/2020 8/27/2020   Sitting and reading 0 2 3   Watching TV 3 2 3   Sitting, inactive in a public place (e.g. a theatre or a meeting) 2 0 2   As a passenger in a car for an hour without a break 1 0 1   Lying down to rest in the afternoon when circumstances permit 3 1 0   Sitting and talking to someone 0 0 0   Sitting quietly after a lunch without alcohol 0 1 0   In a car, while stopped for a few minutes in traffic 2 0 1   Total score 11 6 10         LUNG CANCER SCREENING     1. CRITERIA MET    []     CT ORDERED  []      2. CRITERIA NOT MET   [x]      3. REFUSED                    []        REASON CRITERIA NOT MET     1. SMOKING LESS THAN 30 PY  []      2. AGE LESS THAN 55 or GREATER 77 YEARS  []      3. QUIT SMOKING 15 YEARS OR GREATER   []      4. RECENT CT WITH IN 11 MONTHS    []      5. LIFE EXPECTANCY < 5 YEARS   []      6.  SIGNS  AND SYMPTOMS OF LUNG CANCER   []         Immunization   Immunization History   Administered Date(s) Administered    COVID-19, PFIZER PURPLE top, DILUTE for use, (age 15 y+), 30mcg/0.3mL 12/03/2021, 12/29/2021        Pneumococcal Vaccine     [] Up to date    [] Indicated   [x] Refused  [] Contraindicated       Influenza Vaccine   [] Up to date    [] Indicated   [x] Refused  [] Contraindicated        Pulmonary Rehab   [] Completed   [] Indicated   [] Refused  [] Contraindicated   PAST MEDICAL HISTORY:       Diagnosis Date    Essential hypertension 07/27/2020    Hematuria     Hepatitis-C     History of colon polyps     Hypertension     Lung disease     Mitral valve regurgitation     Obesity (BMI 30-39.9) 07/27/2020    AURELIANO on CPAP 12/04/2020         Family History:       Problem Relation Age of Onset    High Blood Pressure Mother     Other Mother         ALZHEIMERS    Cancer Father         BONE AND PROSTRATE    Prostate Cancer Brother        SURGICAL HISTORY:   Past Surgical History:   Procedure Laterality Date    CARPAL TUNNEL RELEASE Bilateral     COLONOSCOPY  11/06/2017    TUBULAR ADENOMA    COLONOSCOPY N/A 10/4/2021    COLONOSCOPY POLYPECTOMY SNARE/COLD BIOPSY performed by [facility] at [facility]    COLONOSCOPY  10/4/2021    COLONOSCOPY WITH BIOPSY performed by Tonya Best MD at [place] [id number], TOTAL ABDOMINAL (CERVIX REMOVED)      OVARY REMOVAL      UPPER GASTROINTESTINAL ENDOSCOPY N/A 2/28/2022    EGD BIOPSY performed by Lynn Bravo MD at [address]:        Social History     Socioeconomic History    Marital status: Single     Spouse name: None    Number of children: None    Years of education: None    Highest education level: None   Occupational History    None   Tobacco Use    Smoking status: Former Smoker     Packs/day: 0.50     Years: 5.00     Pack years: 2.50     Types: Cigarettes     Quit date: 10/27/2019     Years since quittin.7    Smokeless tobacco: Never Used    Tobacco comment: Over the past years of my early 19's I was a on & off again smoker due to stress in my life. Vaping Use    Vaping Use: Never used   Substance and Sexual Activity    Alcohol use: Never    Drug use: Never    Sexual activity: Not Currently   Other Topics Concern    None   Social History Narrative    Debbie Clark has experienced a financial strain with resourcestrain: Food on 2020. Potential community resources and services have been provided in patient instructions by Anusha Correa. Social Determinants of Health     Financial Resource Strain: Low Risk     Difficulty of Paying Living Expenses: Not hard at all   Food Insecurity: No Food Insecurity    Worried About Running Out of Food in the Last Year: Never true    Madi of Food in the Last Year: Never true   Transportation Needs: No Transportation Needs    Lack of Transportation (Medical): No    Lack of Transportation (Non-Medical):  No   Physical Activity:     Days of Exercise per Week: Not on file    Minutes of Exercise per Session: Not on file   Stress:     Feeling of Stress : Not on file   Social Connections:     Frequency of Communication with Friends and Family: Not on file    Frequency of Social Gatherings with Friends and Family: Not on file    Attends Confucianist Services: Not on file    Active Member of Clubs or Organizations: Not on file    Attends Club or Organization Meetings: Not on file    Marital Status: Not on file   Intimate Partner Violence:     Fear of Current or Ex-Partner: Not on file    Emotionally Abused: Not on file    Physically Abused: Not on file    Sexually Abused: Not on file   Housing Stability:     Unable to Pay for Housing in the Last Year: Not on file    Number of Destiny in the Last Year: Not on file    Unstable Housing in the Last Year: Not on file        TOBACCO:   reports that she quit smoking about 2 years ago. Her smoking use included cigarettes. She has a 2.50 pack-year smoking history. She has never used smokeless tobacco.  ETOH:   reports no history of alcohol use. ALLERGIES:      Allergies   Allergen Reactions    Banana     Eggs Or Egg-Derived Products     Pcn [Penicillins] Hives         Home Meds:   Prior to Admission medications    Medication Sig Start Date End Date Taking?  Authorizing Provider   omeprazole (PRILOSEC) 40 MG delayed release capsule TAKE 1 CAPSULE BY MOUTH TWICE DAILY 5/31/22  Yes Arron Yun MD   cetirizine (ZYRTEC) 10 MG tablet  4/3/22  Yes Historical Provider, MD   ammonium lactate (AMLACTIN) 12 % cream APPLY TOPICALLY TWICE DAILY TO AFFECTED AREA 3/23/22  Yes Historical Provider, MD   clotrimazole-betamethasone (Armani Matar) 1-0.05 % cream APPLY TOPICALLY TO THE AFFECTED AREA TWICE DAILY FOR 4 DAYS THEN APPLY TOPICALLY TO THE AFFECTED AREA DAILY FOR 3 DAYS  Patient not taking: Reported on 7/11/2022 3/23/22  Yes Historical Provider, MD   Estradiol (VAGIFEM) 10 MCG TABS vaginal tablet INSERT 1 TABLET VAGINALLY EVERY DAY 3/8/22  Yes Historical Provider, MD   famotidine (PEPCID) 40 MG tablet TAKE 1 TABLET BY MOUTH EVERY DAY AT BEDTIME 3/19/22  Yes Historical Provider, MD   chlorthalidone (HYGROTON) 25 MG tablet TAKE 1 TABLET BY MOUTH DAILY 10/11/21  Yes MIGUEL Fowler CNP   fluticasone (FLONASE) 50 MCG/ACT nasal spray 1 spray by Each Nostril route daily 9/17/21  Yes Bharath Maharaj MD   carvedilol (COREG) 6.25 MG tablet Take 6.25 mg by mouth 2 times daily TAKING 2 TABLETS bid SINCE 11/28/20 3/12/20  Yes Historical Provider, MD   indomethacin (INDOCIN) 25 MG capsule TAKE 1-2 CAPSULES BY MOUTH TWICE DAILY FOR 1 WEEK FOR RED PAINFUL EYE 5/21/22   Historical Provider, MD              REVIEW OF SYSTEMS:  Review of Systems -  General ROS: negative for - chills, fatigue, fever or weight loss  ENT ROS: negative for - headaches, oral lesions or sore throat  Cardiovascular ROS: no chest pain , orthopnea or pnd   Gastrointestinal ROS: no abdominal pain, change in bowel habits, or black or bloody stools  Skin - no rash   Neuro - no blurry vision , no loc . No focal weakness   msk - no jt tenderness or swelling    Vascular - no claudication , rest completed and negative   Lymphatic - complete and negative   Hematology - oncology - complete and negative   Allergy immunology - complete and negative    no burning or hematuria      Vitals:  /68 (Site: Left Upper Arm)   Pulse 61   Temp 96.8 °F (36 °C)   Resp 16   Ht 5' 5.5\" (1.664 m)   Wt 208 lb (94.3 kg)   SpO2 98% Comment: Room air at rest  BMI 34.09 kg/m²     PHYSICAL EXAM:  Head and neck atraumatic, normocephalic    Lymph nodes-no cervical, supraclavicular lymphadenopathy    Neck-no JVP elevation    Lungs - Ventilating all lobes without any rales, rhonchi, wheezes or dullness. No bronchial breath sounds. Chest expansion equal bilaterally    CVS- S1, S2 regular. No S3 no S4, no murmurs    Abdomen-nontender, nondistended. Bowel sounds are present. No organomegaly    Lower extremity-no edema    Upper extremity-no edema    Neurological-grossly normal cranial nerves. No overt motor deficit                                                         IMPRESSION:     Diagnosis Orders   1. Centrilobular emphysema (HCC) mild     2. Need for pneumococcal vaccine     3. Nodule of lower lobe of right lung  CT CHEST LOW DOSE (LDCT)   4. Laryngopharyngeal reflux (LPR)     5.  Hiatal hernia with GERD     6. AURELIANO on CPAP          :                PLAN:     Ct chest reviewed - stable lung nodules since 2021 - will follow with CT chest in 1 year ( pt requested ) . Advise compliance with CPAP   Follow up in 6 months   Advise pneumococcal - Prevnar 20 vaccine . Requested Prescriptions      No prescriptions requested or ordered in this encounter       There are no discontinued medications. Manny Glass received counseling on the following healthy behaviors: nutrition, exercise and medication adherence    Patient given educational materials : see patient instruction       Discussed use, benefit, and side effects of prescribed medications. Barriers to medication compliance addressed. All patient questions answered. Pt voiced understanding. I hope this updates you on my evaluation and clinical thinking. Thank you for allowing me to participate in his care. Sincerely,      Electronically signed by Marco Hatfield MD on 7/11/2022 at 2:55 PM     Please note that this chart was generated using voice recognition Dragon dictation software. Although every effort was made to ensure the accuracy of this automated transcription, some errors in transcription may have occurred.

## 2022-07-11 NOTE — PROGRESS NOTES
Subjective:      Patient ID: Jj Saeed is a 64 y.o. female. Visit Information    Have you changed or started any medications since your last visit including any over-the-counter medicines, vitamins, or herbal medicines? no   Are you having any side effects from any of your medications? -  no  Have you stopped taking any of your medications? Is so, why? -  no    Have you seen any other physician or provider since your last visit? No  Have you had any other diagnostic tests since your last visit? No  Have you been seen in the emergency room and/or had an admission to a hospital since we last saw you? No  Have you had your routine dental cleaning in the past 6 months? yes -     Have you activated your [employer] account? If not, what are your barriers? Yes     Patient Care Team:  Lesvia Castro MD as PCP - General (Family Medicine)  [facility]  Madelaine Grider MD as Consulting Physician (Gastroenterology)    Medical History Review  Past Medical, Family, and Social History reviewed and does not contribute to the patient presenting condition    Health Maintenance   Topic Date Due    Pneumococcal 0-64 years Vaccine (1 - PCV) Never done    HIV screen  Never done    DTaP/Tdap/Td vaccine (1 - Tdap) Never done    Shingles vaccine (1 of 2) Never done    COVID-19 Vaccine (3 - Booster for Pfizer series) 05/29/2022    Flu vaccine (1) 09/01/2022    Depression Screen  04/11/2023    Breast cancer screen  11/13/2023    Colorectal Cancer Screen  10/04/2024    Lipids  11/13/2026    Hepatitis A vaccine  Aged Out    Hepatitis B vaccine  Aged Out    Hib vaccine  Aged Out    Meningococcal (ACWY) vaccine  Aged Out     HPI    64year old female presents with management of HTN, obesity and hematuria. bp is stable with dual therapy. States she has gained a lot of weight ever since hysterectomy years ago. States she watches diet and exercises routinely but has hard time losing weight.   Also noted to have trace blood in urine for a while and states she used to follow uro- gynecologist. Clide Vernon was unremarkable. Denies dysuria, urinary urgency frequency but admits intermittent back/flank pain. Hx of hysterectomy  UA today is unremarkable     Review of Systems   Constitutional: Negative for chills and fever. Respiratory: Negative for chest tightness and shortness of breath. Cardiovascular: Negative for chest pain. Gastrointestinal: Negative for abdominal pain and blood in stool. Genitourinary: Positive for hematuria. Negative for dysuria and urgency. Neurological: Negative for dizziness, weakness and numbness. Objective:   Physical Exam  Vitals and nursing note reviewed. Constitutional:       General: She is not in acute distress. Appearance: Normal appearance. She is obese. HENT:      Nose: Nose normal.   Eyes:      Conjunctiva/sclera: Conjunctivae normal.   Cardiovascular:      Rate and Rhythm: Normal rate and regular rhythm. Heart sounds: Normal heart sounds. Pulmonary:      Effort: Pulmonary effort is normal. No respiratory distress. Breath sounds: Normal breath sounds. Abdominal:      Palpations: Abdomen is soft. Tenderness: There is no abdominal tenderness. There is no right CVA tenderness or left CVA tenderness. Musculoskeletal:         General: Normal range of motion. Cervical back: Neck supple. Lymphadenopathy:      Cervical: No cervical adenopathy. Skin:     General: Skin is warm and dry. Neurological:      Mental Status: She is alert and oriented to person, place, and time. Cranial Nerves: No cranial nerve deficit. Psychiatric:         Mood and Affect: Mood normal.         Behavior: Behavior normal.         Assessment:      1. Essential hypertension    2. Class 1 obesity due to excess calories without serious comorbidity with body mass index (BMI) of 33.0 to 33.9 in adult    3.  Hematuria, unspecified type            Plan:       BP Readings from Last 3 Encounters:   07/11/22 120/74   07/11/22 135/68   06/29/22 (!) 149/83     /74   Pulse 68   Temp 97.2 °F (36.2 °C) (Infrared)   Resp 14   Wt 211 lb 12.8 oz (96.1 kg)   BMI 34.71 kg/m²   Lab Results   Component Value Date    WBC 7.8 08/07/2021    HGB 13.4 08/07/2021    HCT 41.8 08/07/2021     08/07/2021    CHOL 200 (H) 11/13/2021    TRIG 86 11/13/2021    HDL 53 11/13/2021    ALT 14 05/03/2022    AST 17 05/03/2022     11/13/2021    K 4.4 11/13/2021     11/13/2021    CREATININE 0.88 11/13/2021    BUN 11 11/13/2021    CO2 28 11/13/2021    TSH 2.09 11/13/2021     Lab Results   Component Value Date    CALCIUM 9.4 11/13/2021     Lab Results   Component Value Date    LDLCHOLESTEROL 130 11/13/2021         1. Essential hypertension  - cont current bp therapy     2. Class 1 obesity due to excess calories without serious comorbidity with body mass index (BMI) of 33.0 to 33.9 in adult  - extensive discussion with pt about her current diet and exercise habits, and personalized advice was provided regarding recommended lifestyle changes, diet and exercise. 3. Hematuria, unspecified type  - Urinalysis with Reflex to Culture; Future  - negative UA today and will cont to monitor       Requested Prescriptions      No prescriptions requested or ordered in this encounter       Medications Discontinued During This Encounter   Medication Reason    tiZANidine (ZANAFLEX) 2 MG tablet Therapy completed     Discussed use, benefit, and side effects of prescribed medications. Barriers to medication compliance addressed. All patient questions answered. Pt voiced understanding. Return in about 4 months (around 11/11/2022) for HTN, HLD, obesity.

## 2022-07-13 ENCOUNTER — TELEPHONE (OUTPATIENT)
Dept: ORTHOPEDIC SURGERY | Age: 62
End: 2022-07-13

## 2022-08-17 ENCOUNTER — PROCEDURE VISIT (OUTPATIENT)
Dept: ORTHOPEDIC SURGERY | Age: 62
End: 2022-08-17
Payer: COMMERCIAL

## 2022-08-17 VITALS
SYSTOLIC BLOOD PRESSURE: 134 MMHG | WEIGHT: 210 LBS | RESPIRATION RATE: 14 BRPM | DIASTOLIC BLOOD PRESSURE: 78 MMHG | BODY MASS INDEX: 33.75 KG/M2 | HEIGHT: 66 IN | HEART RATE: 64 BPM

## 2022-08-17 DIAGNOSIS — M70.62 GREATER TROCHANTERIC BURSITIS OF LEFT HIP: Primary | ICD-10-CM

## 2022-08-17 DIAGNOSIS — M16.12 PRIMARY OSTEOARTHRITIS OF LEFT HIP: ICD-10-CM

## 2022-08-17 DIAGNOSIS — M25.552 HIP PAIN, LEFT: Primary | ICD-10-CM

## 2022-08-17 PROCEDURE — 20611 DRAIN/INJ JOINT/BURSA W/US: CPT | Performed by: PHYSICIAN ASSISTANT

## 2022-08-17 PROCEDURE — 99213 OFFICE O/P EST LOW 20 MIN: CPT | Performed by: PHYSICIAN ASSISTANT

## 2022-08-17 PROCEDURE — G8427 DOCREV CUR MEDS BY ELIG CLIN: HCPCS | Performed by: PHYSICIAN ASSISTANT

## 2022-08-17 PROCEDURE — G8417 CALC BMI ABV UP PARAM F/U: HCPCS | Performed by: PHYSICIAN ASSISTANT

## 2022-08-17 PROCEDURE — 3017F COLORECTAL CA SCREEN DOC REV: CPT | Performed by: PHYSICIAN ASSISTANT

## 2022-08-17 PROCEDURE — 1036F TOBACCO NON-USER: CPT | Performed by: PHYSICIAN ASSISTANT

## 2022-08-17 RX ORDER — METHYLPREDNISOLONE ACETATE 80 MG/ML
80 INJECTION, SUSPENSION INTRA-ARTICULAR; INTRALESIONAL; INTRAMUSCULAR; SOFT TISSUE ONCE
Status: COMPLETED | OUTPATIENT
Start: 2022-08-17 | End: 2022-08-17

## 2022-08-17 RX ORDER — LIDOCAINE HYDROCHLORIDE 10 MG/ML
2 INJECTION, SOLUTION INFILTRATION; PERINEURAL ONCE
Status: COMPLETED | OUTPATIENT
Start: 2022-08-17 | End: 2022-08-17

## 2022-08-17 RX ADMIN — LIDOCAINE HYDROCHLORIDE 2 ML: 10 INJECTION, SOLUTION INFILTRATION; PERINEURAL at 10:21

## 2022-08-17 RX ADMIN — METHYLPREDNISOLONE ACETATE 80 MG: 80 INJECTION, SUSPENSION INTRA-ARTICULAR; INTRALESIONAL; INTRAMUSCULAR; SOFT TISSUE at 10:22

## 2022-08-17 ASSESSMENT — ENCOUNTER SYMPTOMS
ABDOMINAL PAIN: 0
COLOR CHANGE: 0
NAUSEA: 0
CHEST TIGHTNESS: 0
COUGH: 0
APNEA: 0
ABDOMINAL DISTENTION: 0
CONSTIPATION: 0
RESPIRATORY NEGATIVE: 1
SHORTNESS OF BREATH: 0
DIARRHEA: 0
VOMITING: 0

## 2022-08-17 NOTE — PROGRESS NOTES
[address] AND SPORTS MEDICINE  [address] [place] [id number]  Dept: [phone]  Dept Fax: [phone]        Left Hip Office Visit    Subjective:     Chief Complaint   Patient presents with    Hip Pain     L Hip Pain     HPI:     Lexie Farrar presents with a 3 month history of pain in the left hip. The pain does radiate into the thigh and into the groin. The pain is not present over the lateral aspect of the hip. The pain is most troublesome during ambulatory activity and now limits the patient`s walking distance to shorter. Night pain, which disturbs the patient`s sleep is not a problem. She describes the pain as dull and sharp at times. She states it catches and she has an increase in pain. She at times she has severe pain with movement. Nothing has helped the pain. She will take Tylenol at times which helps temporarily. She has not done physical therapy for the hip. She has not had any injections into the hip joint. She does have left knee pain and she has had cortisone and lubrication injections into the knee. The knee is not causing as much problem is a hip today. She does have a history of low back pain but no surgeries. ROS:     Review of Systems   Constitutional:  Positive for activity change. Negative for appetite change, fatigue and fever. Respiratory: Negative. Negative for apnea, cough, chest tightness and shortness of breath. Cardiovascular: Negative. Negative for chest pain, palpitations and leg swelling. Gastrointestinal:  Negative for abdominal distention, abdominal pain, constipation, diarrhea, nausea and vomiting. Genitourinary:  Negative for difficulty urinating, dysuria and hematuria. Musculoskeletal:  Positive for arthralgias and gait problem. Negative for joint swelling and myalgias. Skin:  Negative for color change and rash.    Neurological:  Negative for dizziness, weakness, numbness and headaches. Psychiatric/Behavioral:  Positive for sleep disturbance.       Past Medical History:    Past Medical History:   Diagnosis Date    Essential hypertension 07/27/2020    Hematuria     Hepatitis-C     History of colon polyps     Hypertension     Lung disease     Mitral valve regurgitation     Obesity (BMI 30-39.9) 07/27/2020    AURELIANO on CPAP 12/04/2020       Past Surgical History:    Past Surgical History:   Procedure Laterality Date    CARPAL TUNNEL RELEASE Bilateral     COLONOSCOPY  11/06/2017    TUBULAR ADENOMA    COLONOSCOPY N/A 10/4/2021    COLONOSCOPY POLYPECTOMY SNARE/COLD BIOPSY performed by Moe Powell MD at [facility]    COLONOSCOPY  10/4/2021    COLONOSCOPY WITH BIOPSY performed by Moe Powell MD at [place] [address], TOTAL ABDOMINAL (CERVIX REMOVED)      OVARY REMOVAL      UPPER GASTROINTESTINAL ENDOSCOPY N/A 2/28/2022    EGD BIOPSY performed by Moe Powell MD at [facility] End[facility]       CurrentMedications:   Current Outpatient Medications   Medication Sig Dispense Refill    indomethacin (INDOCIN) 25 MG capsule TAKE 1-2 CAPSULES BY MOUTH TWICE DAILY FOR 1 WEEK FOR RED PAINFUL EYE      omeprazole (PRILOSEC) 40 MG delayed release capsule TAKE 1 CAPSULE BY MOUTH TWICE DAILY 60 capsule 3    cetirizine (ZYRTEC) 10 MG tablet       ammonium lactate (AMLACTIN) 12 % cream APPLY TOPICALLY TWICE DAILY TO AFFECTED AREA      clotrimazole-betamethasone (LOTRISONE) 1-0.05 % cream APPLY TOPICALLY TO THE AFFECTED AREA TWICE DAILY FOR 4 DAYS THEN APPLY TOPICALLY TO THE AFFECTED AREA DAILY FOR 3 DAYS (Patient not taking: Reported on 7/11/2022)      Estradiol (VAGIFEM) 10 MCG TABS vaginal tablet INSERT 1 TABLET VAGINALLY EVERY DAY      famotidine (PEPCID) 40 MG tablet TAKE 1 TABLET BY MOUTH EVERY DAY AT BEDTIME      chlorthalidone (HYGROTON) 25 MG tablet TAKE 1 TABLET BY MOUTH DAILY 90 tablet 1    fluticasone (FLONASE) 50 MCG/ACT nasal spray 1 spray by Each Nostril route daily 1 each 2    carvedilol (COREG) 6.25 MG tablet Take 6.25 mg by mouth 2 times daily TAKING 2 TABLETS bid SINCE 20       Current Facility-Administered Medications   Medication Dose Route Frequency Provider Last Rate Last Admin    triamcinolone acetonide (KENALOG) injection 5 mg  5 mg Intra-LESional Once Jeremi Garcia MD           Allergies:    Banana, Eggs or egg-derived products, and Pcn [penicillins]    Social History:   Social History     Socioeconomic History    Marital status: Single     Spouse name: None    Number of children: None    Years of education: None    Highest education level: None   Tobacco Use    Smoking status: Former     Packs/day: 0.50     Years: 5.00     Pack years: 2.50     Types: Cigarettes     Quit date: 10/27/2019     Years since quittin.8    Smokeless tobacco: Never    Tobacco comments:     Over the past years of my early 19's I was a on & off again smoker due to stress in my life. Vaping Use    Vaping Use: Never used   Substance and Sexual Activity    Alcohol use: Never    Drug use: Never    Sexual activity: Not Currently   Social History Narrative    Giovani Castro has experienced a financial strain with resourcestrain: Food on 2020. Potential community resources and services have been provided in patient instructions by Lynn Mora.                  Social Determinants of Health     Financial Resource Strain: Low Risk     Difficulty of Paying Living Expenses: Not hard at all   Food Insecurity: No Food Insecurity    Worried About Running Out of Food in the Last Year: Never true    Ran Out of Food in the Last Year: Never true   Transportation Needs: No Transportation Needs    Lack of Transportation (Medical): No    Lack of Transportation (Non-Medical): No       Family History:  Family History   Problem Relation Age of Onset    High Blood Pressure Mother     Other Mother         Migue Jones Father         BONE AND PROSTRATE    Prostate Cancer Brother Vitals:   /78   Pulse 64   Resp 14   Ht 5' 5.5\" (1.664 m)   Wt 210 lb (95.3 kg)   BMI 34.41 kg/m²  Body mass index is 34.41 kg/m². Physical Examination:     Orthopedics:    GENERAL: Alert and oriented X3 in no acute distress. SKIN: Intact without lesions or ulcerations. NEURO: Intact to sensory and motor testing. VASC: Capillary refill is less than 3 seconds. Left Hip     GEN: Alert and oriented X 3, in no acute distress. GAIT: The patient's gait was observed while entering the exam room and was noted to be antalgic. The extremity is in anatomic alignment. SKIN: Intact without rashes, lesions, or ulcerations. No obvious deformity or swelling. NEURO: The patient responds to light touch throughout bilateral LE. Patellar and Achilles reflexes are 2/4. VASC: The bilateral LE is neurovascularly intact with 2/4 DP and 2/4 PT pulses. Brisk capillary refill. ROM: 0 degree flexion contracture, 90 degrees flexion, 40 degrees abduction, 20 degrees adduction, 20 degrees of internal rotation, 60 degrees of external rotation. MUSC: Strength is 5/5 flexion, abduction, internal rotation, external rotation. PALP: The patient is  tender to palpation over the greater trochanter. TEST: Log roll is negative. No apparent leg length discrepancy. + Stenchfield test. negative Impingement test. Negative Trendelenburg test. Negative Cole's test. +C sign. No labral clunks. minimal pain on compression. Assessment:     1. Greater trochanteric bursitis of left hip    2. Primary osteoarthritis of left hip      Procedures:    Procedure: yes    Greater Trochanteric Bursa Injection     Procedure: Thad Gil agreed today for a Corticosteroid injection into the left greater trochanteric bursa. The patient was placed in the lateral position with the affected side up. The point of the maximum tenderness was marked with the closed end of a click type pen. The skin was prepped with betadine in a sterile fashion. her hip. We did had a long discussion about weight loss and that small amounts of weight can help take the pressure off the hips, knees, ankle and feet. We also discussed we could try a cortisone injection into the bursa and if she still having groin pain we could try an injection in 6 weeks there. She also has meloxicam that she got from Dr. Soy Beyer that I feel she could take 2 weeks of and that may help with the groin pain. I would like her to go to physical therapy to work on range of motion and strengthening and to also give her a good exercise routine that she can follow without increasing her pain in her knees or hips. The patient agrees with this plan. The patient has opted for a cortisone injection into the left greater trochanteric bursa to help reduce inflammation and pain. The injection site should never get red, hot, or swollen and if it does the patient will contact our office right away. The patient may experience a increase in soreness the first 24-48 hours due to a cortisone flair and can take anti-inflammatories for a short period of time to reduce that soreness. The patient should not submerge the injection site in water for a minimum of 24 hours to avoid infection. This means no lakes, pools, ponds, or hot tubs for 24 hours. If the patient is diabetic the injection may increase their blood sugar for up to one week. The patient can do this cortisone injection once every 3 months as needed. If the injections stop working and do not give the patient relief the patient should consider surgical interventions to produce long term relief. A physical therapy prescription was given. I included exercises in her after visit summary. Patient should return to the clinic in 6 weeks to follow up with  Solis Dale PA-C. The patient will call the office immediately with any problems.       Orders Placed This Encounter   Medications    methylPREDNISolone acetate (DEPO-MEDROL) injection 80 mg    lidocaine 1 % injection 2 mL         Orders Placed This Encounter   Procedures    [facility] Physical [facility]     Referral Priority:   Routine     Referral Type:   Eval and Treat     Referral Reason:   Specialty Services Required     Requested Specialty:   Physical Therapist     Number of Visits Requested:   1         This note is created with the assistance of a speech recognition program.  While intending to generate a document that actually reflects the content of the visit, the document can still have some errors including those of syntax and sound a like substitutions which may escape proof reading.   In such instances, actual meaning can be extrapolated by contextual diversion      Electronically signed by Onesimo Henderson PA-C, on 8/17/2022 at 1:06 PM

## 2022-08-24 ENCOUNTER — HOSPITAL ENCOUNTER (OUTPATIENT)
Dept: PHYSICAL THERAPY | Facility: CLINIC | Age: 62
Setting detail: THERAPIES SERIES
Discharge: HOME | End: 2022-08-24
Payer: COMMERCIAL

## 2022-08-24 PROCEDURE — 97161 PT EVAL LOW COMPLEX 20 MIN: CPT

## 2022-08-24 PROCEDURE — 97110 THERAPEUTIC EXERCISES: CPT

## 2022-08-24 NOTE — CONSULTS
[] [facility]) - [facility] &  Therapy  [address]  [phone]  F: [phone] [x] [address]  [address] [facility]   [address]  P: [phone]  F: [phone] [] [address] &  Therapy  [address] [facility]  P: [phone]  F: [phone] [] [address]  P: [phone]  F: [phone] [] [address]  [facility]   [address]   [place]: [phone]  F: [phone]    Physical Therapy Lower Extremity Evaluation    Date:  2022  Patient: Nolan Gibson  : 1960  MRN: [id number]  Physician: Laura Reveles PA-C    Insurance: [facility] ([id number])  Medical Diagnosis:   M16.12 (ICD-10-CM) - Primary osteoarthritis of left hip   M70.62 (ICD-10-CM) - Greater trochanteric bursitis of left hip   Rehab Codes: M16.12, M70.62, M62.81, R26.89  Onset date: 2022   Next 's appt.: 22    Subjective:   CC/HPI: 64 y.o. female presents to physical therapy with L hip pain. Pain began insidiously approximately in 2022. Pt notes increased pain and catching through L anterior hip that would come and go intermittently with a variety of activities. Pain increased in the morning and when first getting out of bed. Pt did receive a cortisone injection to L hip on Wednesday without noting any improvement in symptoms. Pt notes intermittent shooting pain into anterior hip. Pain does not radiate throughout L thigh and remains primarily in groin. Pt has been managing pain with lidocane cream/patch temporary relief otherwise no changes. Pt notes that she would like to lose weight as well. Pt denies any numbness or tingling. Pt notes intermittent pain with stair climbing and prolonged walking. Pt does notice crepitus and clicking through L hip. Pt denies any changes in bowel or bladder habits. Pt had injection to bilateral knees due to increased knee pain from OA which helped to alleviate symptoms, minimal complaints of bilateral knee pain within past few months. PMHx: [] Unremarkable [] Diabetes [x] HTN  [] Pacemaker   [] MI/Heart Problems [] Cancer [x] Arthritis   [x] Other:  Hepatitis C              [x] Refer to full medical chart  In EPIC     Tests: [x] X-Ray:   mild joint    space narrowing  and visualized articular surfaces are intact. There is    no evidence of fracture, dislocation or other significant abnormality. Cystic changes of the femoral head and acetabulum noted. Pincer lesion    noted. Spurring of the greater trochanter noted bilaterally.      Degenerative changes of the lumbar spine noted      [] MRI:    [] Other:     Comorbidities:   [x] Obesity [] Dialysis  [] N/A   [] Asthma/COPD [] Dementia [] Other:   [] Stroke [x] Sleep apnea [] Other:   [] Vascular disease [] Rheumatic disease [] Other:       Medications:  [x] Refer to full medical record [] None [] Other:  Allergies:       [x] Refer to full medical record [] None [] Other:    ADL/IADL Previous level of function Current level of function Who currently assists the patient with task Comments    Bathing  [x] Independent  [] Assist [x] Independent  [] Assist     Dress/grooming [x] Independent  [] Assist [x] Modified Independent  [] Assist  Increased pain LE dressing    Transfer/mobility [x] Independent  [] Assist [x] Independent  [] Assist     Feeding [x] Independent  [] Assist [x] Independent  [] Assist     Toileting [x] Independent  [] Assist [x] Independent  [] Assist     Driving [x] Independent  [] Assist [x] Independent  [] Assist     Housekeeping [x] Independent  [] Assist [x] Modified Independent  [] Assist  Increased pain heavier household chores, occasional catching    Grocery shop/meal prep [x] Independent  [] Assist [x] Independent  [] Assist       Gait Prior level of function Current level of function    [x] Independent  [] Assist [x] Independent  [] Assist   Device: [x] Independent [x] Independent    [] Straight Cane [] Quad cane [] Straight Cane [] Quad cane    [] Standard walker [] Rolling walker   [] 4 wheeled walker [] Standard walker [] Rolling walker   [] 4 wheeled walker    [] Wheelchair [] [address] Status [x]  Normal duty   [] Light duty   [] Off due to condition    []  Retired   [] Not employed   [] Disability  [] Other:  []  Return to work: Work activities/duties Sitting prolonged    Recreational Activities  Exercise for weight loss          Pain present? Yes    Location L hip    Pain Rating currently 4/10   Pain at worse 7/10   Pain at best 0/10   Description of pain Dull ache, sharp, shooting    Altered Sensation None    What makes it worse \"Random\" sharp pains, walking, sit to stand, pivoting on L leg    What makes it better Medications   Symptom progression Static- worsening    Sleep Disturbed              Objective:    STRENGTH    Left Right   Hip Flex 4+/5 4+/5   Ext 4/5 4/5   ABD 4/5 4+/5   ADD 4/5 4+/5   IR 4+/5 4+/5   ER 4+/5 4+/5   Knee Flex     Ext     Ankle DF     PF     INV     EVER              ROM  ° A/P    Left Right   Hip Flex 95  75 95  75   Ext 4 8   ER 30* 40   IR 20 25   ABD     ADD     Knee Flex     Ext     Ankle DF     PF     INV     EVER            TESTS (+/-) Left Right Not Tested   Ant.  Drawer   [x]   Post. Drawer   [x]   Lachmans   [x]   Valgus Stress   [x]   Varus Stress   [x]   Katherines   [x]   Apleys Comp. -  []   Apleys Dist. -  []   Hip Scouring -  []   TONIs +  []   Piriformis +  []   Carrillos - \"stretch\"  []   Talor Tilt   [x]   Pat-Fem Grind   [x]   Ely's (+) 94 degrees L    OBSERVATION No Deficit Deficit Not Tested Comments   Posture       Forward Head [x] [] []    Rounded Shoulders [x] [] []    Kyphosis [x] [] []    Lordosis [x] [] [] Lateral Shift [x] [] []    Scoliosis [x] [] []    Iliac Crest [x] [] []    PSIS [x] [] []    ASIS [x] [] []    Genu Valgus [] [x] [] Bilateral    Genu Varus [x] [] []    Genu Recurvatum [x] [] []    Pronation [x] [] []    Supination [x] [] []    Leg Length Discrp [x] [] []    Slumped Sitting [] [x] []    Palpation [] [x] []    Sensation [x] [] []    Edema [x] [] []    Neurological [x] [] []    Patellar Mobility [x] [] []    Patellar Orientation [x] [] []    Gait [] [x] [] Analysis: slight antalgic gait pattern          FUNCTION Normal Difficult Unable   Sitting [x] [] []   Standing [] [x] []   Ambulation [] [x] []   Groom/Dress [x] [] []   Lift/Carry [] [x] []   Stairs [] [x] []   Bending [] [x] []   Squat [] [x] []   Kneel [] [x] []         BALANCE/PROPRIOCEPTION              [x] Not tested   Single leg stance       R                     L                                PAIN   Eyes open                             Sec. Sec                  . []    Eyes closed                          Sec. Sec                  . []          FUNCTIONAL TESTS PAIN NO PAIN COMMENTS   Step Test 4 [] []    6 [] []    8 [] []    Squat [x] [] Decreased range of motion, slight bilateral knee discomfort and hip pain          Flexibility Normal Left tight Right tight   Hip flexor [] [x] []   quad [] [x] []   HS [] [] []   piriformis [] [x] []   ITB [] [x] []   gastroc [] [x] []   Soleus  [] [] []    [] [] []    [] [] []        Functional Test: LEFI Score: 62/64 or 4% functionally impaired       Comments:      Assessment:    Joel Chavez 89 y.o. female presents to physical therapy with L hip pain, reduced L hip mobility, impaired bilateral hip strength, and impaired gait. These impairments are limiting the patient's ability to perform tasks that include standing/walking, stair climbing, transferring from sit to stand and bed mobility. Pt does note intermittent catching in L hip.  Pt does present with palpable increase tightness and tone through L hip flexor/TFL/ITB and piriformis with (+) rebeka testing on L. These signs and symptoms are consistent with hip osteoarthritis and bursitis, potential piriformis involvement. Patient would benefit from skilled physical therapy services in order to: reduce pain, improve mobility, improve strength, and improve gait to ease difficulty with all daily activities including recreational exercise. Problems:    [x] ? Pain: up to 7/10 pain L hip   [x] ? ROM: reduced L hip extension, IR/ER  [x] ? Strength: impaired bilateral hip strength globally   [x] ? Function: reduced function indicated by LEFI score of 4%   [x] Other impaired gait     STG: (to be met in 6 treatments)  ? Pain: Decrease L hip pain levels to 5/10 or less to improve tolerance to therapeutic exercise progressions. ? ROM: Increase L hip AROM limitations throughout to at least 8 degrees extension, 25 degrees IR, and 40 degrees ER to reduce difficulty with LE dressing and ambulation. ? Strength: Increase bilateral hip strength to 4+/5 globally to ease difficulty with prolonged ambulation and stair climbing. ? Function: pt to demonstrate ability to ascend/descend full flight of stairs independently with reciprocal stepping mechanics and without increased hip pain. Pt to be independent and compliant with Home Exercise Programs    LTG: (to be met in 10 treatments)  Pt to demonstrate improved functional strength with ability to perform 5x bilateral squats with appropriate mechanics and without increased hip pain. Reduce L hip pain levels to 2/10 or less to promote a safe return to recreational exercise. Pt to demonstrate ability to ambulate at least 300' with appropriate gait mechanics and without pain to ease community access. Pt to increase bilateral hip strength to 5/5 globally to ease difficulty with heavier household chores and ambulation.                     Patient goals: reduce pain    Rehab Potential:  [x] Good [] Fair  [] Poor   Suggested Professional Referral:  [x] No  [] Yes:  Barriers to Goal Achievement[de-identified]  [x] No  [] Yes:  Domestic Concerns:  [x] No  [] Yes:    Pt. Education:  [x] Plans/Goals, Risks/Benefits discussed  [x] Home exercise program    Method of Education: [x] Verbal  [x] Demo  [x] Written  Access Code: [email or web address]  URL: [email or web address]  Date: 08/24/2022  Prepared by: Coretha Modest    Exercises  Supine Bridge - 1 x daily - 7 x weekly - 3 reps - 10\" hold  Sidelying Quadriceps Stretch - 1 x daily - 7 x weekly - 3 sets - 30\" hold  Prone Quad Stretch with Towel Roll and Strap - 1 x daily - 7 x weekly - 3 sets - 30\" hold  Supine Piriformis Stretch with Foot on Ground - 1 x daily - 7 x weekly - 3 sets - 30\" hold  Supine ITB Stretch with Strap - 1 x daily - 7 x weekly - 3 sets - 30\" hold    Comprehension of Education:  [x] Verbalizes understanding. [x] Demonstrates understanding. [x] Needs Review. [] Demonstrates/verbalizes understanding of HEP/Ed previously given. Treatment Plan:  [x] Therapeutic Exercise   31257  [] Iontophoresis: 4 mg/mL Dexamethasone Sodium Phosphate  mAmin  23192   [] Therapeutic Activity  47784 [x] Vasopneumatic cold with compression  05967    [x] Gait Training   20652 [] Ultrasound   04985   [x] Neuromuscular Re-education  20525 [] Electrical Stimulation Unattended  57266   [x] Manual Therapy  44771 [] Electrical Stimulation Attended  30640   [x] Instruction in HEP  [] Lumbar/Cervical Traction  99844   [] Aquatic Therapy   24263 [x] Cold/hotpack    [] Massage   71193      [] Dry Needling, 1 or 2 muscles  54979   [] Biofeedback, first 15 minutes   31381  [] Biofeedback, additional 15 minutes   41949 [] Dry Needling, 3 or more muscles  96982            [x]  Medication allergies reviewed for use of    Dexamethasone Sodium Phosphate 4mg/ml     with iontophoresis treatments. Pt is not allergic.     Frequency:  2 x/week for 10 visits    Crista Wilson

## 2022-08-30 ENCOUNTER — HOSPITAL ENCOUNTER (OUTPATIENT)
Dept: PHYSICAL THERAPY | Facility: CLINIC | Age: 62
Setting detail: THERAPIES SERIES
Discharge: HOME | End: 2022-08-30
Payer: COMMERCIAL

## 2022-08-30 PROCEDURE — 97140 MANUAL THERAPY 1/> REGIONS: CPT

## 2022-08-30 PROCEDURE — 97110 THERAPEUTIC EXERCISES: CPT

## 2022-08-30 NOTE — FLOWSHEET NOTE
[] [facility]) - [facility] &  Therapy  [address]  [phone]  F: [phone] [x] [address]  [facility] [address]   [address]  P: [phone]  F: [phone] [] [place] &  Therapy  [address]  P: [phone]  F: [phone] [] [address]  P: [phone]  F: [phone] [] [address]  [facility]   [address]   [place]: [phone]  F: [phone]      Physical Therapy Daily Treatment Note    Date:  2022  Patient Name:  Paris Salas    :  1960  MRN: [id number]  Physician: Roman Kong PA-C                                            Insurance: [facility] (88G/16)  Medical Diagnosis:   M16.12 (ICD-10-CM) - Primary osteoarthritis of left hip   M70.62 (ICD-10-CM) - Greater trochanteric bursitis of left hip   Rehab Codes: M16.12, M70.62, M62.81, R26.89  Onset date: 2022                                  Next 's appt.: 22  Visit# / total visits: 2/10; Cancels/No Shows: 0/0    Subjective:    Pain:  [x] Yes  [] No Location: L hip  Pain Rating: (0-10 scale) 5/10  Pain altered Tx:  [x] No  [] Yes  Action:  Comments: Pt arrives noting some tenderness through L hip that began last night. Pt notes that she felt loose after evaluation and was able to complete HEP.        Objective:  Modalities:   Precautions:  Exercises:  Exercise       Reps/ Time Weight/ Level Comments    Nustep 5' L1           Bridge  3x10\"       HS stretch 3x20\" strap    ITB stretch 3x20\" strap     Piriformis stretch 3x20\"       Modified Kourtney  30\"  Attempted - increased L knee pain   Hip Add iso  15x5\" ball          SL clamshells  2x10 A    SL hip abd  10x A Tactile cues to limit hip flexion    Reverse Clamshells 2x10 A          Quad stretch 3x20\"                 Hip flexor step stretch 3x30\"                                                                                       Other:  Manual: DI to L hip flexors, MFR via hypervolt to L piriformis and glute, L ITB x 15'     Specific Instructions for next treatment: restore hip mobility and LLE flexibility, progress global hip girdle strengthening, consider MFR to L piriformis, hip flexor, ITB; MHP prn         Treatment Charges: Mins Units   []  Modalities     [x]  Ther Exercise 30 2   [x]  Manual Therapy 15 1   []  Ther Activities     []  Aquatics     []  Vasocompression     []  Other     Total Treatment time 45 3       Assessment: [x] Progressing toward goals. Initiated session with nustep warm up followed by manual to address soft tissue tightness throughout L hip flexor, piriformis and ITB. Multiple palpable trigger points through R hip flexor with fair relief post manual. Pt with positive response following use of hypervolt noting that her hip felt \"looser\" following. Pt demonstrates good recall of HEP with ability to demonstrate all exercises for minimal cueing for appropriate technique. Added gentle hip strengthening with good tolerance however pt does note fatigue. Pt denies need for CP or MHP post session and was educated on use of modalities at home if delayed onset of muscle soreness occurs. Pt verbalizes understanding. [] No change. [] Other:  [x] Patient would continue to benefit from skilled physical therapy services in order to: reduce pain, improve mobility, improve strength, and improve gait to ease difficulty with all daily activities including recreational exercise. STG/LTG  Problems:    [x] ? Pain: up to 7/10 pain L hip   [x] ? ROM: reduced L hip extension, IR/ER  [x] ? Strength: impaired bilateral hip strength globally   [x] ? Function: reduced function indicated by LEFI score of 4%   [x] Other impaired gait      STG: (to be met in 6 treatments)  ?  Pain: Decrease L hip pain levels to 5/10 or less to improve tolerance to therapeutic exercise progressions. ? ROM: Increase L hip AROM limitations throughout to at least 8 degrees extension, 25 degrees IR, and 40 degrees ER to reduce difficulty with LE dressing and ambulation. ? Strength: Increase bilateral hip strength to 4+/5 globally to ease difficulty with prolonged ambulation and stair climbing. ? Function: pt to demonstrate ability to ascend/descend full flight of stairs independently with reciprocal stepping mechanics and without increased hip pain. Pt to be independent and compliant with Home Exercise Programs     LTG: (to be met in 10 treatments)  Pt to demonstrate improved functional strength with ability to perform 5x bilateral squats with appropriate mechanics and without increased hip pain. Reduce L hip pain levels to 2/10 or less to promote a safe return to recreational exercise. Pt to demonstrate ability to ambulate at least 300' with appropriate gait mechanics and without pain to ease community access. Pt to increase bilateral hip strength to 5/5 globally to ease difficulty with heavier household chores and ambulation. Patient goals: reduce pain    Pt. Education:  [x] Yes  [] No  [x] Reviewed Prior HEP/Ed  Method of Education: [x] Verbal  [x] Demo  [x] Written  Access Code: [email or web address]  URL: [email or web address]  Date: 08/24/2022  Prepared by: Negrito Nesbitt     Exercises  Supine Bridge - 1 x daily - 7 x weekly - 3 reps - 10\" hold  Sidelying Quadriceps Stretch - 1 x daily - 7 x weekly - 3 sets - 30\" hold  Prone Quad Stretch with Towel Roll and Strap - 1 x daily - 7 x weekly - 3 sets - 30\" hold  Supine Piriformis Stretch with Foot on Ground - 1 x daily - 7 x weekly - 3 sets - 30\" hold  Supine ITB Stretch with Strap - 1 x daily - 7 x weekly - 3 sets - 30\" hold     Comprehension of Education:  [x] Verbalizes understanding. [x] Demonstrates understanding. [x] Needs review.   [] Demonstrates/verbalizes HEP/Ed previously given. Plan: [x] Continue current frequency toward long and short term goals.     [x] Specific Instructions for subsequent treatments: see above      Time In: 5:50 pm             Time Out: 6:40 pm    Electronically signed by:  Delfin Crawley PT

## 2022-09-01 ENCOUNTER — HOSPITAL ENCOUNTER (OUTPATIENT)
Dept: PHYSICAL THERAPY | Facility: CLINIC | Age: 62
Setting detail: THERAPIES SERIES
Discharge: HOME | End: 2022-09-01
Payer: COMMERCIAL

## 2022-09-01 PROCEDURE — 97140 MANUAL THERAPY 1/> REGIONS: CPT

## 2022-09-01 PROCEDURE — 97110 THERAPEUTIC EXERCISES: CPT

## 2022-09-01 NOTE — FLOWSHEET NOTE
to limit hip flexion    Reverse Clamshells 2x10 A          Quad stretch 3x20\"                 Hip flexor step stretch 3x30\"                                                                                       Other:  Manual: DI to L hip flexors, MFR via hypervolt to L piriformis and glute, L ITB x 15'     Specific Instructions for next treatment: restore hip mobility and LLE flexibility, progress global hip girdle strengthening, consider MFR to L piriformis, hip flexor, ITB; MHP prn         Treatment Charges: Mins Units   []  Modalities     [x]  Ther Exercise 30 2   [x]  Manual Therapy 15 1   []  Ther Activities     []  Aquatics     []  Vasocompression     []  Other     Total Treatment time 45 3       Assessment: [x] Progressing toward goals. Initiated session with nustep warm up followed by manual to address soft tissue tightness throughout L hip flexor, piriformis and ITB. Reduced tissue tightness during manual this date compared to previous session, with positive relief of tissue tightness noted post manual. Continued with previously initiated stretches and exercises and performed bilaterally this date. Encouraged TA contraction during bridges and to avoid hyperextension of lumbar spine during hip flexor stretch and bridges to assess response due to increased R sided LBP yesterday. Pt denies any increase in pain throughout session but does continue to note fatigue in bilateral hips. Will progress as able at next session. [] No change. [] Other:  [x] Patient would continue to benefit from skilled physical therapy services in order to: reduce pain, improve mobility, improve strength, and improve gait to ease difficulty with all daily activities including recreational exercise. STG/LTG  Problems:    [x] ? Pain: up to 7/10 pain L hip   [x] ? ROM: reduced L hip extension, IR/ER  [x] ? Strength: impaired bilateral hip strength globally   [x] ?  Function: reduced function indicated by LEFI score of 4%   [x] Other impaired gait      STG: (to be met in 6 treatments)  ? Pain: Decrease L hip pain levels to 5/10 or less to improve tolerance to therapeutic exercise progressions. ? ROM: Increase L hip AROM limitations throughout to at least 8 degrees extension, 25 degrees IR, and 40 degrees ER to reduce difficulty with LE dressing and ambulation. ? Strength: Increase bilateral hip strength to 4+/5 globally to ease difficulty with prolonged ambulation and stair climbing. ? Function: pt to demonstrate ability to ascend/descend full flight of stairs independently with reciprocal stepping mechanics and without increased hip pain. Pt to be independent and compliant with Home Exercise Programs     LTG: (to be met in 10 treatments)  Pt to demonstrate improved functional strength with ability to perform 5x bilateral squats with appropriate mechanics and without increased hip pain. Reduce L hip pain levels to 2/10 or less to promote a safe return to recreational exercise. Pt to demonstrate ability to ambulate at least 300' with appropriate gait mechanics and without pain to ease community access. Pt to increase bilateral hip strength to 5/5 globally to ease difficulty with heavier household chores and ambulation. Patient goals: reduce pain    Pt. Education:  [x] Yes  [] No  [x] Reviewed Prior HEP/Ed  Method of Education: [x] Verbal  [x] Demo  [x] Written  Access Code: [email or web address]  URL: [email or web address]  Date: 08/24/2022  Prepared by: Deana Beebe     Exercises  Supine Bridge - 1 x daily - 7 x weekly - 3 reps - 10\" hold  Sidelying Quadriceps Stretch - 1 x daily - 7 x weekly - 3 sets - 30\" hold  Prone Quad Stretch with Towel Roll and Strap - 1 x daily - 7 x weekly - 3 sets - 30\" hold  Supine Piriformis Stretch with Foot on Ground - 1 x daily - 7 x weekly - 3 sets - 30\" hold  Supine ITB Stretch with Strap - 1 x daily - 7 x weekly - 3 sets - 30\" hold     Comprehension of Education:  [x] Verbalizes understanding. [x] Demonstrates understanding. [x] Needs review. [] Demonstrates/verbalizes HEP/Ed previously given. Plan: [x] Continue current frequency toward long and short term goals.     [x] Specific Instructions for subsequent treatments: see above      Time In: 6:00 pm           Time Out: 6:50 pm    Electronically signed by:  Shilpi Oliver PT

## 2022-09-02 RX ORDER — BENZONATATE 100 MG/1
100 CAPSULE ORAL 3 TIMES DAILY PRN
Qty: 30 CAPSULE | Refills: 0 | Status: SHIPPED | OUTPATIENT
Start: 2022-09-02 | End: 2022-09-09

## 2022-09-06 ENCOUNTER — HOSPITAL ENCOUNTER (OUTPATIENT)
Dept: PHYSICAL THERAPY | Facility: CLINIC | Age: 62
Setting detail: THERAPIES SERIES
Discharge: HOME | End: 2022-09-06
Payer: COMMERCIAL

## 2022-09-06 PROCEDURE — 97140 MANUAL THERAPY 1/> REGIONS: CPT

## 2022-09-06 PROCEDURE — 97110 THERAPEUTIC EXERCISES: CPT

## 2022-09-06 NOTE — FLOWSHEET NOTE
[] [facility]) - [facility] &  Therapy  [address]  [phone]  F: [phone] [x] [address]  [employer] [address]   [address]  P: [phone]  F: [phone] [] [place] &  Therapy  [address]  P: [phone]  F: [phone] [] [address]  P: [phone]  F: [phone] [] [address]  [facility]   [address]   [place]: [phone]  F: [phone]      Physical Therapy Daily Treatment Note    Date:  2022  Patient Name:  Clement Bush    :  1960  MRN: [id number]  Physician: Nav Dave PA-C                                            Insurance: [facility] (67Q/56)  Medical Diagnosis:   M16.12 (ICD-10-CM) - Primary osteoarthritis of left hip   M70.62 (ICD-10-CM) - Greater trochanteric bursitis of left hip   Rehab Codes: M16.12, M70.62, M62.81, R26.89  Onset date: 2022                                  Next 's appt.: 22  Visit# / total visits: 4/10; Cancels/No Shows: 0/0    Subjective:    Pain:  [x] Yes  [] No Location: L hip  Pain Rating: (0-10 scale) 0/10  Pain altered Tx:  [x] No  [] Yes  Action:  Comments: Pt arrives without complaint of L hip pain this date however did notice an increase in R sided low back pain again yesterday and was unsure of cause. Pt does note some soreness through hips following previous session. Pt notes that she was unsure if she could complete HEP when in increased pain.        Objective:  Modalities:   Precautions:  Exercises: performed bilaterally   Exercise       Reps/ Time Weight/ Level Comments    Nustep 5' L1           Bridge  3x15\"       HS stretch 3x20\" strap    ITB stretch 3x20\" strap     Piriformis stretch 3x20\"       Modified Rolo Jhonny 30\"  Attempted - increased L knee pain   Hip Add iso  15x5\" ball    SLR  10xea A New 9/6   Marches 20xea  New 9/6- cues for TA contraction          SL clamshells  2x10 orange Inc resistance 9/6   SL hip abd  10x orange Tactile cues to limit hip flexion  inc resistance 9/6    Reverse Clamshells 2x10 orange Inc resistance 9/6         Quad stretch 3x20\"       Prone hip ext  10x A New 9/6          Hip flexor step stretch 3x30\"                                                                                       Other:  Manual: DI to L hip flexors, MFR via hypervolt to L piriformis and glute, L ITB x 15'  DI to R hip flexor, MFR via hypervolt to bilateral hip flexors and TFL x 15'     Specific Instructions for next treatment: restore hip mobility and LLE flexibility, progress global hip girdle strengthening, consider MFR to L piriformis, hip flexor, ITB; MHP prn         Treatment Charges: Mins Units   []  Modalities     [x]  Ther Exercise 43 3   [x]  Manual Therapy 15 1   []  Ther Activities     []  Aquatics     []  Vasocompression     []  Other     Total Treatment time 58 4       Assessment: [x] Progressing toward goals. Initiated session with nustep warm up followed by mat table exercises. Able to progress resistance of previous exercises and added prone hip extension, SLR, and TA + marches to further improve hip girdle strength. Pt notes increased discomfort in R groin/TFL with ITB stretch and some hip soreness post exercise. Otherwise good tolerance to session. Ended with manual this date focusing on hip flexors due to groin pain and tightness. Pt denies pain at end of session. [] No change. [] Other:  [x] Patient would continue to benefit from skilled physical therapy services in order to: reduce pain, improve mobility, improve strength, and improve gait to ease difficulty with all daily activities including recreational exercise. STG/LTG  Problems:    [x] ? Pain: up to 7/10 pain L hip   [x] ?  ROM: reduced L hip extension, IR/ER  [x] ? Strength: impaired bilateral hip strength globally   [x] ? Function: reduced function indicated by LEFI score of 4%   [x] Other impaired gait      STG: (to be met in 6 treatments)  ? Pain: Decrease L hip pain levels to 5/10 or less to improve tolerance to therapeutic exercise progressions. ? ROM: Increase L hip AROM limitations throughout to at least 8 degrees extension, 25 degrees IR, and 40 degrees ER to reduce difficulty with LE dressing and ambulation. ? Strength: Increase bilateral hip strength to 4+/5 globally to ease difficulty with prolonged ambulation and stair climbing. ? Function: pt to demonstrate ability to ascend/descend full flight of stairs independently with reciprocal stepping mechanics and without increased hip pain. Pt to be independent and compliant with Home Exercise Programs     LTG: (to be met in 10 treatments)  Pt to demonstrate improved functional strength with ability to perform 5x bilateral squats with appropriate mechanics and without increased hip pain. Reduce L hip pain levels to 2/10 or less to promote a safe return to recreational exercise. Pt to demonstrate ability to ambulate at least 300' with appropriate gait mechanics and without pain to ease community access. Pt to increase bilateral hip strength to 5/5 globally to ease difficulty with heavier household chores and ambulation. Patient goals: reduce pain    Pt. Education:  [x] Yes  [] No  [x] Reviewed Prior HEP/Ed  Method of Education: [x] Verbal  [x] Demo  [x] Written  Access Code: [email or web address]  URL: [email or web address]  Date: 08/24/2022  Prepared by: Dang Bynum     Exercises  Supine Bridge - 1 x daily - 7 x weekly - 3 reps - 10\" hold  Sidelying Quadriceps Stretch - 1 x daily - 7 x weekly - 3 sets - 30\" hold  Prone Quad Stretch with Towel Roll and Strap - 1 x daily - 7 x weekly - 3 sets - 30\" hold  Supine Piriformis Stretch with Foot on Ground - 1 x daily - 7 x weekly - 3 sets - 30\" hold  Supine ITB Stretch with Strap - 1 x daily - 7 x weekly - 3 sets - 30\" hold     Comprehension of Education:  [x] Verbalizes understanding. [x] Demonstrates understanding. [x] Needs review. [] Demonstrates/verbalizes HEP/Ed previously given. Plan: [x] Continue current frequency toward long and short term goals.     [x] Specific Instructions for subsequent treatments: see above      Time In: 6:00 pm           Time Out: 7:03 pm     Electronically signed by:  Nabeel Fernando PT

## 2022-09-08 ENCOUNTER — HOSPITAL ENCOUNTER (OUTPATIENT)
Dept: PHYSICAL THERAPY | Facility: CLINIC | Age: 62
Setting detail: THERAPIES SERIES
Discharge: HOME | End: 2022-09-08
Payer: COMMERCIAL

## 2022-09-08 PROCEDURE — 97110 THERAPEUTIC EXERCISES: CPT

## 2022-09-08 NOTE — FLOWSHEET NOTE
contraction          SL clamshells  2x10 orange Inc resistance 9/6   SL hip abd  10x2 orange Tactile cues to limit hip flexion  inc resistance 9/6 ; inc reps 9/8   Reverse Clamshells 2x10 orange Inc resistance 9/6         Quad stretch 3x20\"       Prone hip ext  10x A New 9/6         Standing       Hip flexor step stretch 3x30\"    not today    Hip abd  10x orange New 9/8    Hip ext  10x Sunol  New 9/8                                                               Other:  Manual: DI to L hip flexors, MFR via hypervolt to L piriformis and glute, L ITB x 5'  DI to R hip flexor, MFR via hypervolt to bilateral hip flexors and TFL x 15'     Specific Instructions for next treatment: restore hip mobility and LLE flexibility, progress global hip girdle strengthening, consider MFR to L piriformis, hip flexor, ITB; MHP prn         Treatment Charges: Mins Units   []  Modalities     [x]  Ther Exercise 50 4   [x]  Manual Therapy 5 --   []  Ther Activities     []  Aquatics     []  Vasocompression     []  Other     Total Treatment time 55 4       Assessment: [x] Progressing toward goals. Initiated session with nustep warm up followed by manual to address soft tissue tightness. Decreased time spent on manual this date due to improvements in tenderness and soft tissue tightness through bilateral hip flexors with positive relief of symptoms. Continued with previous exercises increasing reps of SLR and SL hip abduction and added standing hip abduction and extension with good tolerance. Held standing step stretch this date to assess if this increased LBP previously- may resume if appropriate. Overall good tolerance throughout session, pt denies pain but does note fatigue and ms soreness. [] Other:  [x] Patient would continue to benefit from skilled physical therapy services in order to: reduce pain, improve mobility, improve strength, and improve gait to ease difficulty with all daily activities including recreational exercise. STG/LTG  Problems:    [x] ? Pain: up to 7/10 pain L hip   [x] ? ROM: reduced L hip extension, IR/ER  [x] ? Strength: impaired bilateral hip strength globally   [x] ? Function: reduced function indicated by LEFI score of 4%   [x] Other impaired gait      STG: (to be met in 6 treatments)  ? Pain: Decrease L hip pain levels to 5/10 or less to improve tolerance to therapeutic exercise progressions. ? ROM: Increase L hip AROM limitations throughout to at least 8 degrees extension, 25 degrees IR, and 40 degrees ER to reduce difficulty with LE dressing and ambulation. ? Strength: Increase bilateral hip strength to 4+/5 globally to ease difficulty with prolonged ambulation and stair climbing. ? Function: pt to demonstrate ability to ascend/descend full flight of stairs independently with reciprocal stepping mechanics and without increased hip pain. Pt to be independent and compliant with Home Exercise Programs     LTG: (to be met in 10 treatments)  Pt to demonstrate improved functional strength with ability to perform 5x bilateral squats with appropriate mechanics and without increased hip pain. Reduce L hip pain levels to 2/10 or less to promote a safe return to recreational exercise. Pt to demonstrate ability to ambulate at least 300' with appropriate gait mechanics and without pain to ease community access. Pt to increase bilateral hip strength to 5/5 globally to ease difficulty with heavier household chores and ambulation. Patient goals: reduce pain    Pt. Education:  [x] Yes  [] No  [x] Reviewed Prior HEP/Ed  Method of Education: [x] Verbal  [x] Demo  [x] Written  Access Code: [email or web address]  URL: [email or web address]. com/  Date: 08/24/2022  Prepared by: Ronna Voss     Exercises  Supine Bridge - 1 x daily - 7 x weekly - 3 reps - 10\" hold  Sidelying Quadriceps Stretch - 1 x daily - 7 x weekly - 3 sets - 30\" hold  Prone Quad Stretch with Towel Roll and Strap - 1 x daily - 7 x weekly - 3 sets - 30\" hold  Supine Piriformis Stretch with Foot on Ground - 1 x daily - 7 x weekly - 3 sets - 30\" hold  Supine ITB Stretch with Strap - 1 x daily - 7 x weekly - 3 sets - 30\" hold    Date: 09/08/2022  Prepared by: Jeff Carbajal    Exercises  Clamshell with Resistance - 1 x daily - 7 x weekly - 2 sets - 10 reps  Sidelying Hip Abduction - 1 x daily - 7 x weekly - 2 sets - 10 reps  Sidelying Reverse Clamshell with Resistance - 1 x daily - 7 x weekly - 2 sets - 10 reps  Supine March - 1 x daily - 7 x weekly - 2 sets - 10 reps  Supine Active Straight Leg Raise - 1 x daily - 7 x weekly - 2 sets - 10 reps  Prone Hip Extension - 1 x daily - 7 x weekly - 2 sets - 10 reps    Comprehension of Education:  [x] Verbalizes understanding. [x] Demonstrates understanding. [x] Needs review. [] Demonstrates/verbalizes HEP/Ed previously given. Plan: [x] Continue current frequency toward long and short term goals.     [x] Specific Instructions for subsequent treatments: see above      Time In: 6:00 pm           Time Out: 7:00 pm    Electronically signed by:  Hayley Asher PT

## 2022-09-09 NOTE — FLOWSHEET NOTE
[] [facility] &  Therapy  [address]  [phone]  F: [phone] [x] [address] [facility] [address]   [address]  P: [phone]  F: [phone] [] [place]  [address] [facility]  P: [phone]  F: [phone] [] [address]  [facility]   [address]   [place]: [phone]  F: [phone]     THERAPY RESPONSIBILITY OF CARE TRANSFER FORM       PATIENT NAME: Isha Flower  MRN: [id number]   : 1960      TRANSFERRING FACILITY:    [] [facility]   [] [facility] Outpatient   [x]  [facility]   [] [facility]   [] [facility] [] [facility]   [] [facility]  [] [place]   [] Other:       ACCEPTING FACILITY   [] [facility]   [] [facility] Outpatient   [x]  [facility]   [] [place] [facility]   [] [facility] [] [facility]   [] [facility] Outpatient  [] [place]   [] Other:          REASON FOR TRANSFER: primary PT on maternity leave      TRANSFER OF CARE:    I am transferring the care of the above patient to: Calli Scheuermann, THUY [facility] PT  2022      ACCEPTANCE OF CARE:     I am accepting the care of the above patient.  Calli Scheuermann, PT

## 2022-09-15 ENCOUNTER — HOSPITAL ENCOUNTER (OUTPATIENT)
Dept: PHYSICAL THERAPY | Facility: CLINIC | Age: 62
Setting detail: THERAPIES SERIES
Discharge: HOME | End: 2022-09-15
Payer: COMMERCIAL

## 2022-09-15 PROCEDURE — 97110 THERAPEUTIC EXERCISES: CPT

## 2022-09-15 NOTE — FLOWSHEET NOTE
[] [facility] [facility]  [address]  [phone]  F: [phone] [x] [address]  [facility] 36   [address]  P: [phone]  F: [phone] [] [address]  [address]  P: [phone]  F: [phone] [] [address]  P: [phone]  F: [phone] [] [address]  [facility]   [address]   [place]: [phone]  F: [phone]      Physical Therapy Daily Treatment Note    Date:  9/15/2022  Patient Name:  Clement Bush    :  1960  MRN: [id number]  Physician: Nav Dave PA-C                                            [facility] (20/43[facility]  Medical Diagnosis:   M16.12 (ICD-10-CM) - Primary osteoarthritis of left hip   M70.62 (ICD-10-CM) - Greater trochanteric bursitis of left hip   Rehab Codes: M16.12, M70.62, M62.81, R26.89  Onset date: 2022                                  Next Dr's appt.: 22  Visit# / total visits: 6/10;     Cancels/No Shows: 0/0    Subjective:    Pain:  [] Yes  [x] No Location: L hip  Pain Rating: (0-10 scale) 0/10  Pain altered Tx:  [x] No  [] Yes  Action:  Comments: Pt arrives with complaints of mild discomfort in the L hip however, denies pain.        Objective:  Modalities:   Precautions:  Exercises: performed bilaterally   Exercise       Reps/ Time Weight/ Level Comments    Nustep 5' L1           Bridge  3x15\"       HS stretch 3x30\" strap    ITB stretch 3x30\" strap     Piriformis stretch 3x20\"       Modified [place] Jhonny 30\"  Attempted - increased L knee pain  Not today    Hip Add iso  15x5\" ball    SLR  2x10 A New ; inc reps    March 20xea  New - cues for TA contraction          SL clamshells  2x10 [employer] resistance 9/15   SL hip abd   Band at knees  10x2 Blue  Tactile cues to limit hip flexion  inc resistance 9/15 ; inc reps 9/8   Reverse Clamshells 2x10 2# Inc resistance 9/6, inc resistance 9/15          Quad stretch 3x20\"       Prone hip ext  10x2 A New 9/6, inc reps 9/15          Standing       Hip flexor step stretch 3x30\"    not today   3 way hip  2x20ea  Lime  Progressed 9/15                                  Other:  Manual: DI to L hip flexors, MFR via hypervolt to L piriformis and glute, L ITB x 5'  DI to R hip flexor, MFR via hypervolt to bilateral hip flexors and TFL x 15'     Specific Instructions for next treatment: restore hip mobility and LLE flexibility, progress global hip girdle strengthening, consider MFR to L piriformis, hip flexor, ITB; MHP prn         Treatment Charges: Mins Units   []  Modalities     [x]  Ther Exercise 55 4   []  Manual Therapy     []  Ther Activities     []  Aquatics     []  Vasocompression     []  Other     Total Treatment time 55 4       Assessment: [x] Progressing toward goals. Initiated treatment with nustep warm up followed by supine HS, ITB, and hip flexor stretch with good tolerance. Able to increase resistance for sidelying exercises with a good challenge noted however, no increase in pain. Pt presents with weakness in prone L hip extension and notes mild discomfort during prone quad stretch. Able to progress 3 way hip with lime band and reps as pt reports muscular fatigue however, no pain. Will continue to monitor pt response to treatment and progress as able. [] Other:  [x] Patient would continue to benefit from skilled physical therapy services in order to: reduce pain, improve mobility, improve strength, and improve gait to ease difficulty with all daily activities including recreational exercise. STG/LTG  Problems:    [x] ? Pain: up to 7/10 pain L hip   [x] ? ROM: reduced L hip extension, IR/ER  [x] ? Strength: impaired bilateral hip strength globally   [x] ?  Function: reduced function indicated by LEFI score of 4%   [x] Other impaired gait      STG: (to be met in 6 treatments)  ? Pain: Decrease L hip pain levels to 5/10 or less to improve tolerance to therapeutic exercise progressions. ? ROM: Increase L hip AROM limitations throughout to at least 8 degrees extension, 25 degrees IR, and 40 degrees ER to reduce difficulty with LE dressing and ambulation. ? Strength: Increase bilateral hip strength to 4+/5 globally to ease difficulty with prolonged ambulation and stair climbing. ? Function: pt to demonstrate ability to ascend/descend full flight of stairs independently with reciprocal stepping mechanics and without increased hip pain. Pt to be independent and compliant with Home Exercise Programs     LTG: (to be met in 10 treatments)  Pt to demonstrate improved functional strength with ability to perform 5x bilateral squats with appropriate mechanics and without increased hip pain. Reduce L hip pain levels to 2/10 or less to promote a safe return to recreational exercise. Pt to demonstrate ability to ambulate at least 300' with appropriate gait mechanics and without pain to ease community access. Pt to increase bilateral hip strength to 5/5 globally to ease difficulty with heavier household chores and ambulation. Patient goals: reduce pain    Pt. Education:  [x] Yes  [] No  [x] Reviewed Prior HEP/Ed  Method of Education: [x] Verbal  [] Demo  [] Written  Access Code: [email or web address]  URL: [email or web address]  Date: 08/24/2022  Prepared by: Evans Platt     Exercises  Supine Bridge - 1 x daily - 7 x weekly - 3 reps - 10\" hold  Sidelying Quadriceps Stretch - 1 x daily - 7 x weekly - 3 sets - 30\" hold  Prone Quad Stretch with Towel Roll and Strap - 1 x daily - 7 x weekly - 3 sets - 30\" hold  Supine Piriformis Stretch with Foot on Ground - 1 x daily - 7 x weekly - 3 sets - 30\" hold  Supine ITB Stretch with Strap - 1 x daily - 7 x weekly - 3 sets - 30\" hold    Date: 09/08/2022  Prepared by: Monica Mendoza    Exercises  Clamshell with Resistance - 1 x daily - 7 x weekly - 2 sets - 10 reps  Sidelying Hip Abduction - 1 x daily - 7 x weekly - 2 sets - 10 reps  Sidelying Reverse Clamshell with Resistance - 1 x daily - 7 x weekly - 2 sets - 10 reps  Supine March - 1 x daily - 7 x weekly - 2 sets - 10 reps  Supine Active Straight Leg Raise - 1 x daily - 7 x weekly - 2 sets - 10 reps  Prone Hip Extension - 1 x daily - 7 x weekly - 2 sets - 10 reps    Comprehension of Education:  [] Verbalizes understanding. [] Demonstrates understanding. [] Needs review. [x] Demonstrates/verbalizes HEP/Ed previously given. Plan: [x] Continue current frequency toward long and short term goals.     [x] Specific Instructions for subsequent treatments: see above      Time In: 6:00 pm           Time Out: 7:00 pm    Electronically signed by:  Juanis Gordillo PTA

## 2022-09-20 ENCOUNTER — HOSPITAL ENCOUNTER (OUTPATIENT)
Dept: PHYSICAL THERAPY | Facility: CLINIC | Age: 62
Setting detail: THERAPIES SERIES
Discharge: HOME | End: 2022-09-20
Payer: COMMERCIAL

## 2022-09-20 NOTE — FLOWSHEET NOTE
[] [facility]) - [facility]  [address]    [phone]  F: [phone]   [x] [address]  [facility] 36   [address]  P: [phone]  F: [phone]  [] [address]  [address]  P: [phone]  F: [phone] [] [address]  P: [phone]  F: [phone]  [] [address]  [facility] [address]   [place] Go: [phone]  F: [phone]   [] [facility]  [address] [facility] [address]  [place] Go: [phone]   F: [phone]     Physical Therapy Cancel/No Show note    Date: 2022  Patient: Evelin Duarte  : 1960  MRN: [id number]    Cancels/No Shows to date:     For today's appointment patient:    [x]  Cancelled    [] Rescheduled appointment    [] No-show     Reason given by patient:    []  Patient ill    []  Conflicting appointment    [] No transportation      [x] Conflict with work    [] No reason given    [] Weather related    [] COVID-19    [] Other:      Comments:        [x] Next appointment was confirmed    Electronically signed by: Al Covarrubias PTA

## 2022-09-22 ENCOUNTER — HOSPITAL ENCOUNTER (OUTPATIENT)
Dept: PHYSICAL THERAPY | Facility: CLINIC | Age: 62
Setting detail: THERAPIES SERIES
Discharge: HOME | End: 2022-09-22
Payer: COMMERCIAL

## 2022-09-22 PROCEDURE — 97110 THERAPEUTIC EXERCISES: CPT

## 2022-09-22 NOTE — FLOWSHEET NOTE
[] [facility] [address]  [address]  [phone]  F: [phone] [x] [address]  [facility]   [address]  P: [phone]  F: [phone] [] [address]  [address] [facility]  P: [phone]  F: [phone] [] [address]  P: [phone]  F: [phone] [] [address]  [facility]   [address]   [place]: [phone]  F: [phone]      Physical Therapy Daily Treatment Note    Date:  2022  Patient Name:  Paris Salas    :  1960  MRN: [id number]  Physician: Roman Kong PA-C                                            Insurance: [facility] (78Q/63)  Medical Diagnosis:   M16.12 (ICD-10-CM) - Primary osteoarthritis of left hip   M70.62 (ICD-10-CM) - Greater trochanteric bursitis of left hip   Rehab Codes: M16.12, M70.62, M62.81, R26.89  Onset date: 2022                                  Next 's appt.: 22  Visit# / total visits: 7/10;     Cancels/No Shows: 1/0    Subjective:    Pain:  [x] Yes  [] No Location: L hip  Pain Rating: (0-10 scale) 7/10 \"sore but not pain\"   Pain altered Tx:  [x] No  [] Yes  Action:  Comments: Pt arrived to physical therapy with c/o \"soreness\" in L hip and L knee noted \"could be due to the weather\". Pt noted f/u with Dr. Yen Rolon, noted Doctor told pt \"everything looks fine\". Pt noted have been somewhat compliant with HEP d/t \"some nights I get in late\" but does try to complete HEP in the weekend. Pt reported believed therapy is helpful in improving overall symptoms, noted \"I don't feel that catching or giving out sensation in my hip anymore\", noted able to navigate stairs with improved stability compared to before.   Pt requested to reduce therapy frequency to INV       EVER                     Assessment: [x] Progressing toward goals. Initiated therapy session on NuStep warm up followed by stretching exs on mat and added theraband resistance to bridge to progress mobility and strength. Time spent re-assessed STGs and overall function. Pt demo improvement in pain level as pt reported have been feeling \"ache and sore but not pain\" lately without feeling instability or \"catching\" in L hip/knee when negotiating stairs at work compared to before. Pt is progressing well with B hip girdle strength and mobility. Pt was able to ascend/descend stairs with reciprocal stepping pattern with proper eccentric quad control when descending stairs. Will reduce frequency to 1x/week per pt request and educated pt on increasing frequency of completing HEP in a consistent manner to progress independence towards improving overall function and to prevent regression. Updated HEP with added various seated stretching exs for pt to complete throughout the day at work and encouraged pt to take standing/walking break every 30-45 mins from sitting/desk job to improve BLE mobility and blood flow to reduce muscle tightness and joint stiffness. Pt verbalized understanding. Will cont with initial POC to assist pt in returning to prior function. [] Other:  [x] Patient would continue to benefit from skilled physical therapy services in order to: reduce pain, improve mobility, improve strength, and improve gait to ease difficulty with all daily activities including recreational exercise. STG/LTG  Problems:    [x] ? Pain: up to 7/10 pain L hip   [x] ? ROM: reduced L hip extension, IR/ER  [x] ? Strength: impaired bilateral hip strength globally   [x] ? Function: reduced function indicated by LEFI score of 4%   [x] Other impaired gait      STG: (to be met in 6 treatments) - Re-assessed on 9/22/22 by An Arcenio Lennon PT  ?  Pain: Decrease L hip pain levels to 5/10 or less to improve tolerance to therapeutic exercise progressions. - MET, pt stated \"ache and sore but not pain like before\"   ? ROM: Increase L hip AROM limitations throughout to at least 8 degrees extension, 25 degrees IR, and 40 degrees ER to reduce difficulty with LE dressing and ambulation. - Progressing; 6 deg ext, 40 deg IR, 40 deg ER   ? Strength: Increase bilateral hip strength to 4+/5 globally to ease difficulty with prolonged ambulation and stair climbing. Progressing - all improved except L hip ext 4/5  ? Function: pt to demonstrate ability to ascend/descend full flight of stairs independently with reciprocal stepping mechanics and without increased hip pain. - MET   Pt to be independent and compliant with Home Exercise Programs - Ongoing, somewhat compliant with HEP       LTG: (to be met in 10 treatments) - Ongoing   Pt to demonstrate improved functional strength with ability to perform 5x bilateral squats with appropriate mechanics and without increased hip pain. Reduce L hip pain levels to 2/10 or less to promote a safe return to recreational exercise. Pt to demonstrate ability to ambulate at least 300' with appropriate gait mechanics and without pain to ease community access. Pt to increase bilateral hip strength to 5/5 globally to ease difficulty with heavier household chores and ambulation. Patient goals: reduce pain - Ongoing     Pt. Education:  [x] Yes  [] No  [] Reviewed Prior HEP/Ed  Method of Education: [x] Verbal  [x] Demo  [x] Written  Access Code: [email or web address]  URL: [email or web address]  Date: 08/24/2022  Prepared by: Ronna Voss     Exercises  Supine Bridge - 1 x daily - 7 x weekly - 3 reps - 10\" hold  Sidelying Quadriceps Stretch - 1 x daily - 7 x weekly - 3 sets - 30\" hold  Prone Quad Stretch with Towel Roll and Strap - 1 x daily - 7 x weekly - 3 sets - 30\" hold  Supine Piriformis Stretch with Foot on Ground - 1 x daily - 7 x weekly - 3 sets - 30\" hold  Supine ITB Stretch with Strap - 1 x daily - 7 x weekly - 3 sets - 30\" hold    Date: 09/08/2022  Prepared by: Shantel Anderson    Exercises  Clamshell with Resistance - 1 x daily - 7 x weekly - 2 sets - 10 reps  Sidelying Hip Abduction - 1 x daily - 7 x weekly - 2 sets - 10 reps  Sidelying Reverse Clamshell with Resistance - 1 x daily - 7 x weekly - 2 sets - 10 reps  Supine March - 1 x daily - 7 x weekly - 2 sets - 10 reps  Supine Active Straight Leg Raise - 1 x daily - 7 x weekly - 2 sets - 10 reps  Prone Hip Extension - 1 x daily - 7 x weekly - 2 sets - 10 reps      Date: 09/22/2022  Prepared by: Verónica Gray    Exercises  Seated Hamstring Stretch - 1 x daily - 7 x weekly - 1 sets - 3 reps - 30s hold  Standing Hip Flexor Stretch - 1 x daily - 7 x weekly - 1 sets - 3 reps - 30s hold  Seated Piriformis Stretch - 1 x daily - 7 x weekly - 1 sets - 3 reps - 30s hold  Seated Figure 4 Piriformis Stretch - 1 x daily - 7 x weekly - 1 sets - 3 reps - 30s hold  Seated Piriformis Stretch with Trunk Bend - 1 x daily - 7 x weekly - 1 sets - 3 reps - 30s hold  Bridge with Hip Abduction and Resistance - 1 x daily - 7 x weekly - 2 sets - 10 reps      Comprehension of Education:  [x] Verbalizes understanding. [] Demonstrates understanding. [] Needs review. [] Demonstrates/verbalizes HEP/Ed previously given. Plan: [x] Continue current frequency toward long and short term goals. [x] Specific Instructions for subsequent treatments: see above      Time In: 6:00 pm          Time Out: 6:55 pm    Electronically signed by:   Verónica Lennon, PT

## 2022-09-27 ENCOUNTER — HOSPITAL ENCOUNTER (OUTPATIENT)
Dept: PHYSICAL THERAPY | Facility: CLINIC | Age: 62
Setting detail: THERAPIES SERIES
Discharge: HOME | End: 2022-09-27
Payer: COMMERCIAL

## 2022-09-27 PROCEDURE — 97140 MANUAL THERAPY 1/> REGIONS: CPT

## 2022-09-27 PROCEDURE — 97110 THERAPEUTIC EXERCISES: CPT

## 2022-09-27 NOTE — FLOWSHEET NOTE
[] [facility] [address]  [address]  [phone]  F: [phone] [x] [address]  [facility] 36   [address]  P: [phone]  F: [phone] [] [address]  [address]  P: [phone]  F: [phone] [] [address]  P: [phone]  F: [phone] [] [address]  [facility]   [address]   [place]: [phone]  F: [phone]      Physical Therapy Daily Treatment Note    Date:  2022  Patient Name:  Clement Bush    :  1960  MRN: [id number]  Physician: Nav Dave PA-C                                            Insurance: [employer] [place] (35Q/43)  Medical Diagnosis:   M16.12 (ICD-10-CM) - Primary osteoarthritis of left hip   M70.62 (ICD-10-CM) - Greater trochanteric bursitis of left hip   Rehab Codes: M16.12, M70.62, M62.81, R26.89  Onset date: 2022                                  Next 's appt.: 22  Visit# / total visits: 8/10; Cancels/No Shows: 1/0    Subjective:    Pain:  [x] Yes  [] No  Location: L hip  Pain Rating: (0-10 scale)  0/10  Pain altered Tx:  [x] No  [] Yes  Action:    Comments: Pt denies pain at this time.  Reports upon entry that she needs to leave PT 20-30 minutes early d/t work related commitments       Objective:  Modalities:   Precautions:  Exercises: performed bilaterally, bolded  d/t pt needing to leave early  Exercise       Reps/ Time Weight/ Level Comments    Nustep 5' L1 Held d/t shortened treatment per pt          Bridge  3x15\"       HS stretch 3x30\" strap     ITB stretch 3x30\" strap     Piriformis stretch 3x20\"       Modified [place] Jhonny 30\"  Attempted - increased L knee pain  Not today    Hip Add iso  15x5\" ball    SLR  2x10 A New ; inc reps 9/8   [place] 20xea  New 9/6- cues for TA contraction          SL clamshells  2x10 [employer] resistance 9/15   SL hip abd   Band at knees  10x2 Blue  Tactile cues to limit hip flexion  inc resistance 9/15 ; inc reps 9/8   Reverse Clamshells 2x10 2# Inc resistance 9/6, inc resistance 9/15          Quad stretch 3x20\"       Prone hip ext  10x2 A New 9/6, inc reps 9/15          Standing       Hip flexor step stretch 3x30\"    not today   3 way hip  2x20ea  Lime  Progressed 9/15                                  Other:    Manual: hypervolt to L IT band, TFL, glute/piriformis in R side lying x 9 min at end of session      Specific Instructions for next treatment: restore hip mobility and LLE flexibility, progress global hip girdle strengthening, consider MFR to L piriformis, hip flexor, ITB; MHP prn         Treatment Charges: Mins Units   []  Modalities     [x]  Ther Exercise 33 2   [x]  Manual Therapy 9 1   []  Ther Activities     []  Aquatics     []  Vasocompression     []  Other     Total Treatment time 42 3       Objective:    STRENGTH     Left Right   Hip Flex 4+/5 4+/5   Ext 4/5 4+/5   ABD 4+/5 4+/5   ADD 4/5 4+/5   IR 4+/5 4+/5   ER 4+/5 4+/5   Knee Flex       Ext       Ankle DF       PF       INV       EVER                    ROM  ° A/P     Left Right   Hip Flex 95  75 95  75   Ext 6 8   ER 40 45   IR 30 30   ABD       ADD       Knee Flex       Ext       Ankle DF       PF       INV       EVER                     Assessment: [x] Progressing toward goals. Held warm up d/t pt reports of limited time for treatment. Started with exercises. Completed all bilaterally. Performed all PREs 2 sets of 10 pt fatigues during second set with all exercises. Ended with hypervolt to L hip to address IT band tightness. Pt with increased tenderness at start of manual, improving by end of treatment. Pt reports L hip to feel less tight upon completion of session.       [] Other:  [x] Patient would continue to benefit from skilled physical therapy services in order to: reduce pain, improve mobility, improve strength, and improve gait to ease difficulty with all daily activities including recreational exercise. STG/LTG  Problems:    [x] ? Pain: up to 7/10 pain L hip   [x] ? ROM: reduced L hip extension, IR/ER  [x] ? Strength: impaired bilateral hip strength globally   [x] ? Function: reduced function indicated by LEFI score of 4%   [x] Other impaired gait      STG: (to be met in 6 treatments) - Re-assessed on 9/22/22 by An Shey Duarte PT  ? Pain: Decrease L hip pain levels to 5/10 or less to improve tolerance to therapeutic exercise progressions. - MET, pt stated \"ache and sore but not pain like before\"   ? ROM: Increase L hip AROM limitations throughout to at least 8 degrees extension, 25 degrees IR, and 40 degrees ER to reduce difficulty with LE dressing and ambulation. - Progressing; 6 deg ext, 40 deg IR, 40 deg ER   ? Strength: Increase bilateral hip strength to 4+/5 globally to ease difficulty with prolonged ambulation and stair climbing. Progressing - all improved except L hip ext 4/5  ? Function: pt to demonstrate ability to ascend/descend full flight of stairs independently with reciprocal stepping mechanics and without increased hip pain. - MET   Pt to be independent and compliant with Home Exercise Programs - Ongoing, somewhat compliant with HEP       LTG: (to be met in 10 treatments) - Ongoing   Pt to demonstrate improved functional strength with ability to perform 5x bilateral squats with appropriate mechanics and without increased hip pain. Reduce L hip pain levels to 2/10 or less to promote a safe return to recreational exercise. Pt to demonstrate ability to ambulate at least 300' with appropriate gait mechanics and without pain to ease community access. Pt to increase bilateral hip strength to 5/5 globally to ease difficulty with heavier household chores and ambulation.                      Patient goals: reduce pain - Ongoing Pt. Education:  [x] Yes  [] No  [x] Reviewed Prior HEP/Ed  Method of Education: [x] Verbal  [x] Demo  [] Written  Access Code: [email or web address]  URL: [email or web address]  Date: 08/24/2022  Prepared by: Michael Perdomo     Exercises  Supine Bridge - 1 x daily - 7 x weekly - 3 reps - 10\" hold  Sidelying Quadriceps Stretch - 1 x daily - 7 x weekly - 3 sets - 30\" hold  Prone Quad Stretch with Towel Roll and Strap - 1 x daily - 7 x weekly - 3 sets - 30\" hold  Supine Piriformis Stretch with Foot on Ground - 1 x daily - 7 x weekly - 3 sets - 30\" hold  Supine ITB Stretch with Strap - 1 x daily - 7 x weekly - 3 sets - 30\" hold    Date: 09/08/2022  Prepared by: Michael Perdomo    Exercises  Clamshell with Resistance - 1 x daily - 7 x weekly - 2 sets - 10 reps  Sidelying Hip Abduction - 1 x daily - 7 x weekly - 2 sets - 10 reps  Sidelying Reverse Clamshell with Resistance - 1 x daily - 7 x weekly - 2 sets - 10 reps  Supine March - 1 x daily - 7 x weekly - 2 sets - 10 reps  Supine Active Straight Leg Raise - 1 x daily - 7 x weekly - 2 sets - 10 reps  Prone Hip Extension - 1 x daily - 7 x weekly - 2 sets - 10 reps      Date: 09/22/2022  Prepared by: Verónica Gray    Exercises  Seated Hamstring Stretch - 1 x daily - 7 x weekly - 1 sets - 3 reps - 30s hold  Standing Hip Flexor Stretch - 1 x daily - 7 x weekly - 1 sets - 3 reps - 30s hold  Seated Piriformis Stretch - 1 x daily - 7 x weekly - 1 sets - 3 reps - 30s hold  Seated Figure 4 Piriformis Stretch - 1 x daily - 7 x weekly - 1 sets - 3 reps - 30s hold  Seated Piriformis Stretch with Trunk Bend - 1 x daily - 7 x weekly - 1 sets - 3 reps - 30s hold  Bridge with Hip Abduction and Resistance - 1 x daily - 7 x weekly - 2 sets - 10 reps      Comprehension of Education:  [] Verbalizes understanding. [] Demonstrates understanding. [] Needs review. [x] Demonstrates/verbalizes HEP/Ed previously given. Plan: [x] Continue current frequency toward long and short term goals.     [x] Specific Instructions for subsequent treatments: see above      Time In: 6:00pm          Time Out: 6:42pm    Electronically signed by:  Joseph Winn PTA

## 2022-09-28 RX ORDER — CHLORTHALIDONE 25 MG/1
TABLET ORAL
Qty: 90 TABLET | Refills: 0 | Status: SHIPPED | OUTPATIENT
Start: 2022-09-28

## 2022-10-04 ENCOUNTER — HOSPITAL ENCOUNTER (OUTPATIENT)
Dept: PHYSICAL THERAPY | Facility: CLINIC | Age: 62
Setting detail: THERAPIES SERIES
Discharge: HOME | End: 2022-10-04
Payer: COMMERCIAL

## 2022-10-04 PROCEDURE — 97140 MANUAL THERAPY 1/> REGIONS: CPT

## 2022-10-04 PROCEDURE — 97110 THERAPEUTIC EXERCISES: CPT

## 2022-10-04 NOTE — FLOWSHEET NOTE
[] [facility].  [address]  [phone]  F: [phone] [x] [address]  [facility]   [address]  P: [phone]  F: [phone] [] [address]  [address]  P: [phone]  F: [phone] [] [address]  P: [phone]  F: [phone] [] [address]  [facility]   [address]   [place]: [phone]  F: [phone]      Physical Therapy Daily Treatment Note    Date:  10/4/2022  Patient Name:  Chinedu Haley    :  1960  MRN: [id number]  Physician: Blanca Donahue PARicardaC                                            Insurance: [facility] ()  Medical Diagnosis:   M16.12 (ICD-10-CM) - Primary osteoarthritis of left hip   M70.62 (ICD-10-CM) - Greater trochanteric bursitis of left hip   Rehab Codes: M16.12, M70.62, M62.81, R26.89  Onset date: 2022                                  Next 's appt.: 22  Visit# / total visits: 9/10;     Cancels/No Shows: 1/0    Subjective:    Pain:  [x] Yes  [] No  Location: L hip  Pain Rating: (0-10 scale)  7/10 L hip; 8/10 L knee   Pain altered Tx:  [x] No  [] Yes  Action:    Comments: Pt arrives feeling down as her co worker has cancer. Presents with increased pain levels this date and is unsure why.        Objective:  Modalities:   Precautions:  Exercises: performed bolded exercises and completed bilaterally 10/04/22    Exercise   BILAT    Reps/ Time Weight/ Level Comments    Nustep 5' L2 Incr resistance 10/4         Bridge  3x15\" Blue   TB on 10/4   HS stretch 3x30\" strap     ITB stretch 3x30\" strap     Piriformis stretch 3x20\"       Modified Claudette Miser 30\"  Attempted - increased L knee pain  Not today    Hip Add iso  15x5\" ball    SLR  2x10 A New 9/6; inc reps 9/8   Ale Freed  New 9/6- cues for TA contraction          SL clamshells  2x10 [employer] resistance 9/15   SL hip abd   Band at knees  10x2 Blue  Tactile cues to limit hip flexion  inc resistance 9/15 ; inc reps 9/8   Reverse Clamshells 2x10 2# Inc resistance 9/6, inc resistance 9/15          Quad stretch 3x20\"       Prone hip ext  10x2 A New 9/6, inc reps 9/15    Prone hip ext-knee bent  10x  New 10/4- challenging on L cues for pain free range - min cramp in HS               Standing       Hip flexor step stretch 3x30\"    not today   3 way hip  X20 ea  Lime  Progressed 9/15; only flex and ext on 10/4    squats  10x    new 10/4                       Other:    Manual: hypervolt to L IT band, TFL, glute/piriformis in R side lying x 8 min at end of session      Specific Instructions for next treatment: restore hip mobility and LLE flexibility, progress global hip girdle strengthening, consider MFR to L piriformis, hip flexor, ITB; MHP prn         Treatment Charges: Mins Units   []  Modalities     [x]  Ther Exercise 47 3   [x]  Manual Therapy 8 1   []  Ther Activities     []  Aquatics     []  Vasocompression     []  Other     Total Treatment time 55 4       Objective:    STRENGTH     Left Right   Hip Flex 4+/5 4+/5   Ext 4/5 4+/5   ABD 4+/5 4+/5   ADD 4/5 4+/5   IR 4+/5 4+/5   ER 4+/5 4+/5   Knee Flex       Ext       Ankle DF       PF       INV       EVER                    ROM  ° A/P     Left Right   Hip Flex 95  75 95  75   Ext 6 8   ER 40 45   IR 30 30   ABD       ADD       Knee Flex       Ext       Ankle DF       PF       INV       EVER                     Assessment: [x] Progressing toward goals. Initiated session with increased resistance on nu step followed by mat stretching to improve ms extensibility. Added prone hip extension with knee bent demonstrating more difficulty completing with LLE and min cramp in HS. Addition of squats with cues for equal pressure between LE's ands pain free motion. Ended with hypervolt to LLE to improve mobility with some tenderness initially but reduced with time. Notes feeling more loose and reduced pain post tx. Scheduled with primary PT next session to check goals. [] Other:  [x] Patient would continue to benefit from skilled physical therapy services in order to: reduce pain, improve mobility, improve strength, and improve gait to ease difficulty with all daily activities including recreational exercise. STG/LTG  Problems:    [x] ? Pain: up to 7/10 pain L hip   [x] ? ROM: reduced L hip extension, IR/ER  [x] ? Strength: impaired bilateral hip strength globally   [x] ? Function: reduced function indicated by LEFI score of 4%   [x] Other impaired gait      STG: (to be met in 6 treatments) - Re-assessed on 9/22/22 by An [place], PT  ? Pain: Decrease L hip pain levels to 5/10 or less to improve tolerance to therapeutic exercise progressions. - MET, pt stated \"ache and sore but not pain like before\"   ? ROM: Increase L hip AROM limitations throughout to at least 8 degrees extension, 25 degrees IR, and 40 degrees ER to reduce difficulty with LE dressing and ambulation. - Progressing; 6 deg ext, 40 deg IR, 40 deg ER   ? Strength: Increase bilateral hip strength to 4+/5 globally to ease difficulty with prolonged ambulation and stair climbing. Progressing - all improved except L hip ext 4/5  ? Function: pt to demonstrate ability to ascend/descend full flight of stairs independently with reciprocal stepping mechanics and without increased hip pain. - MET   Pt to be independent and compliant with Home Exercise Programs - Ongoing, somewhat compliant with HEP       LTG: (to be met in 10 treatments) - Ongoing   Pt to demonstrate improved functional strength with ability to perform 5x bilateral squats with appropriate mechanics and without increased hip pain. Reduce L hip pain levels to 2/10 or less to promote a safe return to recreational exercise.    Pt to demonstrate ability to ambulate at least 300' with appropriate gait mechanics and without pain to ease community access. Pt to increase bilateral hip strength to 5/5 globally to ease difficulty with heavier household chores and ambulation. Patient goals: reduce pain - Ongoing     Pt. Education:  [x] Yes  [] No  [x] Reviewed Prior HEP/Ed  Method of Education: [x] Verbal  [x] Demo  [] Written  Access Code: [email or web address]  URL: [email or web address]  Date: 08/24/2022  Prepared by: Macon Coma     Exercises  Supine Bridge - 1 x daily - 7 x weekly - 3 reps - 10\" hold  Sidelying Quadriceps Stretch - 1 x daily - 7 x weekly - 3 sets - 30\" hold  Prone Quad Stretch with Towel Roll and Strap - 1 x daily - 7 x weekly - 3 sets - 30\" hold  Supine Piriformis Stretch with Foot on Ground - 1 x daily - 7 x weekly - 3 sets - 30\" hold  Supine ITB Stretch with Strap - 1 x daily - 7 x weekly - 3 sets - 30\" hold    Date: 09/08/2022  Prepared by: Chapo Coma    Exercises  Clamshell with Resistance - 1 x daily - 7 x weekly - 2 sets - 10 reps  Sidelying Hip Abduction - 1 x daily - 7 x weekly - 2 sets - 10 reps  Sidelying Reverse Clamshell with Resistance - 1 x daily - 7 x weekly - 2 sets - 10 reps  Supine March - 1 x daily - 7 x weekly - 2 sets - 10 reps  Supine Active Straight Leg Raise - 1 x daily - 7 x weekly - 2 sets - 10 reps  Prone Hip Extension - 1 x daily - 7 x weekly - 2 sets - 10 reps      Date: 09/22/2022  Prepared by:  Verónica Gray    Exercises  Seated Hamstring Stretch - 1 x daily - 7 x weekly - 1 sets - 3 reps - 30s hold  Standing Hip Flexor Stretch - 1 x daily - 7 x weekly - 1 sets - 3 reps - 30s hold  Seated Piriformis Stretch - 1 x daily - 7 x weekly - 1 sets - 3 reps - 30s hold  Seated Figure 4 Piriformis Stretch - 1 x daily - 7 x weekly - 1 sets - 3 reps - 30s hold  Seated Piriformis Stretch with Trunk Bend - 1 x daily - 7 x weekly - 1 sets - 3 reps - 30s hold  Bridge with Hip Abduction and Resistance - 1 x daily - 7 x weekly - 2 sets - 10 reps      Comprehension of Education:  [] Verbalizes understanding. [] Demonstrates understanding. [] Needs review. [x] Demonstrates/verbalizes HEP/Ed previously given. Plan: [x] Continue current frequency toward long and short term goals.     [x] Specific Instructions for subsequent treatments: see above      Time In: 5:51 pm          Time Out: 6:51 pm    Electronically signed by:  Nettie Frost PTA

## 2022-10-07 ENCOUNTER — OFFICE VISIT (OUTPATIENT)
Dept: GASTROENTEROLOGY | Age: 62
End: 2022-10-07
Payer: COMMERCIAL

## 2022-10-07 VITALS
DIASTOLIC BLOOD PRESSURE: 72 MMHG | BODY MASS INDEX: 34.87 KG/M2 | HEIGHT: 66 IN | WEIGHT: 217 LBS | SYSTOLIC BLOOD PRESSURE: 134 MMHG

## 2022-10-07 DIAGNOSIS — B18.2 CHRONIC HEPATITIS C WITHOUT HEPATIC COMA (HCC): Primary | ICD-10-CM

## 2022-10-07 PROCEDURE — G8427 DOCREV CUR MEDS BY ELIG CLIN: HCPCS | Performed by: INTERNAL MEDICINE

## 2022-10-07 PROCEDURE — G8417 CALC BMI ABV UP PARAM F/U: HCPCS | Performed by: INTERNAL MEDICINE

## 2022-10-07 PROCEDURE — 99213 OFFICE O/P EST LOW 20 MIN: CPT | Performed by: INTERNAL MEDICINE

## 2022-10-07 PROCEDURE — 1036F TOBACCO NON-USER: CPT | Performed by: INTERNAL MEDICINE

## 2022-10-07 PROCEDURE — 3017F COLORECTAL CA SCREEN DOC REV: CPT | Performed by: INTERNAL MEDICINE

## 2022-10-07 PROCEDURE — G8484 FLU IMMUNIZE NO ADMIN: HCPCS | Performed by: INTERNAL MEDICINE

## 2022-10-07 ASSESSMENT — ENCOUNTER SYMPTOMS
COUGH: 1
CONSTIPATION: 1
NAUSEA: 0
RECTAL PAIN: 0
WHEEZING: 0
ABDOMINAL PAIN: 1
SHORTNESS OF BREATH: 0
ANAL BLEEDING: 0
CHOKING: 0
VOMITING: 0
ABDOMINAL DISTENTION: 1
TROUBLE SWALLOWING: 0
DIARRHEA: 0
BLOOD IN STOOL: 0
VOICE CHANGE: 0

## 2022-10-07 NOTE — PROGRESS NOTES
GI FOLLOW UP    INTERVAL HISTORY:           Chief Complaint   Patient presents with    GI Problem     Gastric reflux        1. Chronic hepatitis C without hepatic coma (HCC)          HISTORY OF PRESENT ILLNESS: Malka Willis is a 64 y.o. female with a past history remarkable for prior history of chronic hepatitis C, received ribavirin interferon, maintained SVR for several years now, recent upper endoscopy for dyspepsia and GERD symptoms, gastric biopsies were only significant for mild gastritis,, referred for evaluation of GERD and dyspepsia management. Patient was seen by ENT for suspected LPR, placed on PPI therapy. Transition over to Pepcid 40 mg for GERD symptoms. Self discontinued Prilosec 40 mg    Continues to have mild early morning nonproductive cough possible nocturnal symptoms. Denies any dysphagia, no secondary weight loss, no burning in the postprandial state. No significant lower GI symptoms. Past Medical,Family, and Social History reviewed and does contribute to the patient presenting condition. Patient's PMH/PSH,SH,PSYCH Hx, MEDs, ALLERGIES, and ROS were all reviewed and updated in the appropriate sections.     PAST MEDICAL HISTORY:  Past Medical History:   Diagnosis Date    Essential hypertension 07/27/2020    Hematuria     Hepatitis-C     History of colon polyps     Hypertension     Lung disease     Mitral valve regurgitation     Obesity (BMI 30-39.9) 07/27/2020    AURELIANO on CPAP 12/04/2020       Past Surgical History:   Procedure Laterality Date    CARPAL TUNNEL RELEASE Bilateral     COLONOSCOPY  11/06/2017    TUBULAR ADENOMA    COLONOSCOPY N/A 10/4/2021    COLONOSCOPY POLYPECTOMY SNARE/COLD BIOPSY performed by Krishna Godinez MD at [facility]    COLONOSCOPY  10/4/2021    COLONOSCOPY WITH BIOPSY performed by Krishna Godinez MD at [address] [place] [address],[address] ABDOMINAL (CERVIX REMOVED)      OVARY REMOVAL      UPPER GASTROINTESTINAL ENDOSCOPY N/A 2/28/2022    EGD BIOPSY performed by Sylvester Vizcaino MD at [facility]       CURRENT MEDICATIONS:    Current Outpatient Medications:     chlorthalidone (HYGROTON) 25 MG tablet, TAKE 1 TABLET BY MOUTH DAILY, Disp: 90 tablet, Rfl: 0    indomethacin (INDOCIN) 25 MG capsule, TAKE 1-2 CAPSULES BY MOUTH TWICE DAILY FOR 1 WEEK FOR RED PAINFUL EYE, Disp: , Rfl:     omeprazole (PRILOSEC) 40 MG delayed release capsule, TAKE 1 CAPSULE BY MOUTH TWICE DAILY, Disp: 60 capsule, Rfl: 3    cetirizine (ZYRTEC) 10 MG tablet, , Disp: , Rfl:     ammonium lactate (AMLACTIN) 12 % cream, APPLY TOPICALLY TWICE DAILY TO AFFECTED AREA, Disp: , Rfl:     clotrimazole-betamethasone (LOTRISONE) 1-0.05 % cream, APPLY TOPICALLY TO THE AFFECTED AREA TWICE DAILY FOR 4 DAYS THEN APPLY TOPICALLY TO THE AFFECTED AREA DAILY FOR 3 DAYS (Patient not taking: Reported on 7/11/2022), Disp: , Rfl:     Estradiol (VAGIFEM) 10 MCG TABS vaginal tablet, INSERT 1 TABLET VAGINALLY EVERY DAY, Disp: , Rfl:     famotidine (PEPCID) 40 MG tablet, TAKE 1 TABLET BY MOUTH EVERY DAY AT BEDTIME, Disp: , Rfl:     fluticasone (FLONASE) 50 MCG/ACT nasal spray, 1 spray by Each Nostril route daily, Disp: 1 each, Rfl: 2    carvedilol (COREG) 6.25 MG tablet, Take 6.25 mg by mouth 2 times daily TAKING 2 TABLETS bid SINCE 11/28/20, Disp: , Rfl:     ALLERGIES:   Allergies   Allergen Reactions    Banana     Eggs Or Egg-Derived Products     Pcn [Penicillins] Hives       FAMILY HISTORY:       Problem Relation Age of Onset    High Blood Pressure Mother     Other Mother         ALZHEIMERS    Cancer Father         BONE AND PROSTRATE    Prostate Cancer Brother          SOCIAL HISTORY:   Social History     Socioeconomic History    Marital status: Single     Spouse name: Not on file    Number of children: Not on file    Years of education: Not on file    Highest education level: Not on file   Occupational History    Not on file   Tobacco Use    Smoking status: Former     Packs/day: 0.50     Years: 5.00     Pack years: 2.50     Types: Cigarettes     Quit date: 10/27/2019     Years since quittin.9    Smokeless tobacco: Never    Tobacco comments:     Over the past years of my early 19's I was a on & off again smoker due to stress in my life. Vaping Use    Vaping Use: Never used   Substance and Sexual Activity    Alcohol use: Never    Drug use: Never    Sexual activity: Not Currently   Other Topics Concern    Not on file   Social History Narrative    Lelo Coleman has experienced a financial strain with resourcestrain: Food on 2020. Potential community resources and services have been provided in patient instructions by Juli Vora. Social Determinants of Health     Financial Resource Strain: Low Risk     Difficulty of Paying Living Expenses: Not hard at all   Food Insecurity: No Food Insecurity    Worried About [address] in the Last Year: Never true    [email or web address] in the Last Year: Never true   Transportation Needs: No Transportation Needs    Lack of Transportation (Medical): No    Lack of Transportation (Non-Medical): No   Physical Activity: Not on file   Stress: Not on file   Social Connections: Not on file   Intimate Partner Violence: Not on file   Housing Stability: Not on file       REVIEW OF SYSTEMS: A 12-point review of systems was obtained and pertinent positives and negatives were listed below. REVIEW OF SYSTEMS:     Constitutional: No fever, no chills, no lethargy, no weakness. HEENT:  No headache, otalgia, itchy eyes, nasal discharge or sore throat. Cardiac:  No chest pain, dyspnea, orthopnea or PND. Chest:   No cough, phlegm or wheezing. Abdomen:      Detailed by MA   Neuro:  No focal weakness, abnormal movements or seizure like activity. Skin:   No rashes, no itching. :   No hematuria, no pyuria, no dysuria, no flank pain.   Extremities:  No swelling or joint pains. ROS was otherwise negative    Review of Systems   Constitutional:  Positive for fatigue. Negative for appetite change and unexpected weight change. HENT:  Negative for trouble swallowing and voice change. Eyes:  Negative for visual disturbance. Respiratory:  Positive for cough (excessively after eating). Negative for choking, shortness of breath and wheezing. Cardiovascular:  Negative for chest pain, palpitations and leg swelling. Gastrointestinal:  Positive for abdominal distention, abdominal pain and constipation. Negative for anal bleeding, blood in stool, diarrhea, nausea, rectal pain and vomiting. Genitourinary:  Negative for difficulty urinating. Neurological:  Negative for dizziness, seizures, weakness, numbness and headaches. Hematological:  Does not bruise/bleed easily. Psychiatric/Behavioral:  Positive for confusion. Negative for sleep disturbance. The patient is nervous/anxious. PHYSICAL EXAMINATION: Vital signs reviewed per the nursing documentation. /72 (Site: Left Upper Arm, Position: Sitting, Cuff Size: Large Adult)   Ht 5' 6\" (1.676 m)   Wt 217 lb (98.4 kg)   BMI 35.02 kg/m²   Body mass index is 35.02 kg/m². Physical Exam    Physical Exam   Constitutional: Patient is oriented to person, place, and time. Patient appears well-developed and well-nourished. HENT:   Head: Normocephalic and atraumatic. Eyes: Pupils are equal, round, and reactive to light. EOM are normal.   Neck: Normal range of motion. Neck supple. No JVD present. No tracheal deviation present. No thyromegaly present. Cardiovascular: Normal rate, regular rhythm, normal heart sounds and intact distal pulses. Pulmonary/Chest: Effort normal and breath sounds normal. No stridor. No respiratory distress. He has no wheezes. He has no rales. He exhibits no tenderness. Abdominal: Soft. Bowel sounds are normal. He exhibits no distension and no mass. There is no tenderness.  There is no rebound and no guarding. No hernia. Musculoskeletal: Normal range of motion. Lymphadenopathy:    Patient has no cervical adenopathy. Neurological: Patient is alert and oriented to person, place, and time. Psychiatric: Patient has a normal mood and affect. Patient behavior is normal.       LABORATORY DATA: Reviewed  Lab Results   Component Value Date    WBC 7.8 08/07/2021    HGB 13.4 08/07/2021    HCT 41.8 08/07/2021    MCV 85.8 08/07/2021     08/07/2021     11/13/2021    K 4.4 11/13/2021     11/13/2021    CO2 28 11/13/2021    BUN 11 11/13/2021    CREATININE 0.88 11/13/2021    LABALBU 3.9 05/03/2022    BILITOT 1.13 05/03/2022    ALKPHOS 92 05/03/2022    AST 17 05/03/2022    ALT 14 05/03/2022         Lab Results   Component Value Date    RBC 4.87 08/07/2021    HGB 13.4 08/07/2021    MCV 85.8 08/07/2021    MCH 27.5 08/07/2021    MCHC 32.1 08/07/2021    RDW 13.2 08/07/2021    MPV 9.7 08/07/2021    BASOPCT 0 08/07/2021    LYMPHSABS 2.57 08/07/2021    MONOSABS 0.54 08/07/2021    NEUTROABS 4.49 08/07/2021    EOSABS 0.15 08/07/2021    BASOSABS 0.03 08/07/2021         DIAGNOSTIC TESTING:     No results found. IMPRESSION: Eric Osorio is a 64 y.o. female with a past history remarkable for prior history of chronic hepatitis C, received ribavirin interferon, maintained SVR for several years now, recent upper endoscopy for dyspepsia and GERD symptoms, gastric biopsies were only significant for mild gastritis,, referred for evaluation of GERD and dyspepsia management. Patient was seen by ENT for suspected LPR, placed on PPI therapy. Transition over to Pepcid 40 mg for GERD symptoms. Self discontinued Prilosec 40 mg    Continues to have mild early morning nonproductive cough possible nocturnal symptoms. Denies any dysphagia, no secondary weight loss, no burning in the postprandial state. No significant lower GI symptoms. Assessment  1.  Chronic hepatitis C without hepatic coma ([facility]        Sammi Walter was seen today for follow-up and egd. Diagnoses and all orders for this visit:    Gastric reflux-patient continue with Prilosec 40 mg delayed release capsule once daily in early morning, along with dietary adjustments and encouraged weight loss. Advised the potential taper off this medication on next visit. Gastritis without bleeding, unspecified chronicity, unspecified gastritis type-nonspecific likely situational versus diet related. Continue with PPI therapy. Other orders  -     omeprazole (PRILOSEC) 40 MG delayed release capsule; Take 1 capsule by mouth daily    Patient may continue with Pepcid 40 mg as needed    Annual surveillance with liver ultrasound for prior history of hepatitis C.    RTC: 2 to 3 months. Additional comments: Thank you for allowing me to participate in the care of Ms. Rain. For any further questions please do not hesitate to contact me. I have reviewed and agree with the ROS entered by the MA/LPN from today's encounter documented in a separate note. Buckley Duane MD, MPH   Board Certified in Gastroenterology  Board Certified in [address] [place] [address] #: [phone]    this note is created with the assistance of a speech recognition program.  While intending to generate a document that actually reflects the content of the visit, the document can still have some errors including those of syntax and sound a like substitutions which may escape proof reading. It such instances, actual meaning can be extrapolated by contextual diversion.

## 2022-10-10 NOTE — FLOWSHEET NOTE
[] [facility]) - [facility]  Therapy  [address]    [phone]  F: [phone]   [x] [address]  [facility]   [address]  P: [phone]  F: [phone]  [] [address]  [address]  P: [phone]  F: [phone] [] [address]  P: [phone]  F: [phone]  [] [address]  [facility]   [address]   [place]: [phone]  F: [phone]   [] [facility]  [address] [facility] [address]  [place]: [phone]   F: [phone]     Physical Therapy Cancel/No Show note    Date: 10/10/2022  Patient: Reyes Mcgregor  : 1960  MRN: [id number]    Cancels/No Shows to date: 0    For today's appointment patient:    [x]  Cancelled    [] Rescheduled appointment    [] No-show     Reason given by patient:    [x]  Patient ill/not feeling well - will call back to reschedule    []  Conflicting appointment    [] No transportation      [] Conflict with work    [] No reason given    [] Weather related    [] [id number]    [] Other:      Comments:        [] Next appointment was confirmed    Electronically signed by:  Verónica Zuniga PT

## 2022-10-11 ENCOUNTER — HOSPITAL ENCOUNTER (OUTPATIENT)
Dept: PHYSICAL THERAPY | Facility: CLINIC | Age: 62
Setting detail: THERAPIES SERIES
Discharge: HOME | End: 2022-10-11
Payer: COMMERCIAL

## 2022-10-19 ENCOUNTER — HOSPITAL ENCOUNTER (OUTPATIENT)
Dept: WOMENS IMAGING | Age: 62
Discharge: HOME | End: 2022-10-21
Payer: COMMERCIAL

## 2022-10-19 ENCOUNTER — TELEPHONE (OUTPATIENT)
Dept: FAMILY MEDICINE CLINIC | Age: 62
End: 2022-10-19

## 2022-10-19 ENCOUNTER — HOSPITAL ENCOUNTER (OUTPATIENT)
Age: 62
Setting detail: SPECIMEN
Discharge: HOME | End: 2022-10-19
Payer: COMMERCIAL

## 2022-10-19 DIAGNOSIS — Z12.39 SCREENING BREAST EXAMINATION: ICD-10-CM

## 2022-10-19 DIAGNOSIS — N64.4 MASTODYNIA: ICD-10-CM

## 2022-10-19 LAB
HCT VFR BLD CALC: 35.8 % (ref 36–46)
HEMOGLOBIN: 12.5 G/DL (ref 12–16)
MCH RBC QN AUTO: 28.9 PG (ref 26–34)
MCHC RBC AUTO-ENTMCNC: 34.9 G/DL (ref 31–37)
MCV RBC AUTO: 82.9 FL (ref 80–100)
PDW BLD-RTO: 14.5 % (ref 11.5–14.9)
PLATELET # BLD: 251 K/UL (ref 150–450)
PMV BLD AUTO: 7.8 FL (ref 6–12)
RBC # BLD: 4.32 M/UL (ref 4–5.2)
WBC # BLD: 5.2 K/UL (ref 3.5–11)

## 2022-10-19 PROCEDURE — 76642 ULTRASOUND BREAST LIMITED: CPT

## 2022-10-19 PROCEDURE — G0279 TOMOSYNTHESIS, MAMMO: HCPCS

## 2022-10-19 PROCEDURE — 85027 COMPLETE CBC AUTOMATED: CPT

## 2022-10-19 PROCEDURE — 36415 COLL VENOUS BLD VENIPUNCTURE: CPT

## 2022-10-19 NOTE — TELEPHONE ENCOUNTER
----- Message from Yulianahuy Gamez sent at 10/19/2022  2:28 PM EDT -----  Subject: Message to Provider    QUESTIONS  Information for Provider? pt. is having chest congestion , sore throat ,   cough , chills , headache . pt. is asking if a prescription could be   called in please, call pt. to discuss thank you   ---------------------------------------------------------------------------  --------------  [address] [facility]  [phone]; Do not leave any message, patient will call back for answer  ---------------------------------------------------------------------------  --------------  SCRIPT ANSWERS  Relationship to Patient?  Self

## 2022-10-19 NOTE — TELEPHONE ENCOUNTER
THE PATIENT WILL ATTEND [facility] OR URGENT CARE FOR EVALUATION AND TREATMENT OF HER SYMPTOMS.

## 2022-10-24 ENCOUNTER — OFFICE VISIT (OUTPATIENT)
Dept: PULMONOLOGY | Age: 62
End: 2022-10-24

## 2022-10-24 VITALS
TEMPERATURE: 98.2 F | BODY MASS INDEX: 34.49 KG/M2 | OXYGEN SATURATION: 91 % | WEIGHT: 214.6 LBS | SYSTOLIC BLOOD PRESSURE: 159 MMHG | HEIGHT: 66 IN | HEART RATE: 66 BPM | DIASTOLIC BLOOD PRESSURE: 83 MMHG

## 2022-10-24 DIAGNOSIS — J43.2 CENTRILOBULAR EMPHYSEMA (HCC): ICD-10-CM

## 2022-10-24 DIAGNOSIS — G47.33 OSA ON CPAP: ICD-10-CM

## 2022-10-24 DIAGNOSIS — Z99.89 OSA ON CPAP: ICD-10-CM

## 2022-10-24 DIAGNOSIS — J21.8 ACUTE BRONCHIOLITIS DUE TO OTHER SPECIFIED ORGANISMS: Primary | ICD-10-CM

## 2022-10-24 RX ORDER — PREDNISONE 10 MG/1
TABLET ORAL
Qty: 18 TABLET | Refills: 0 | Status: SHIPPED | OUTPATIENT
Start: 2022-10-24

## 2022-10-24 RX ORDER — FEXOFENADINE HYDROCHLORIDE 180 MG/1
TABLET, FILM COATED ORAL
COMMUNITY
Start: 2022-09-29

## 2022-10-24 RX ORDER — LEVOFLOXACIN 500 MG/1
500 TABLET, FILM COATED ORAL DAILY
Qty: 7 TABLET | Refills: 0 | Status: SHIPPED | OUTPATIENT
Start: 2022-10-24 | End: 2022-10-31

## 2022-10-24 RX ORDER — CLOBETASOL PROPIONATE 0.5 MG/G
CREAM TOPICAL
COMMUNITY
Start: 2022-10-19

## 2022-10-24 RX ORDER — IPRATROPIUM BROMIDE AND ALBUTEROL SULFATE 2.5; .5 MG/3ML; MG/3ML
1 SOLUTION RESPIRATORY (INHALATION) 4 TIMES DAILY PRN
Qty: 360 ML | Refills: 3 | Status: SHIPPED | OUTPATIENT
Start: 2022-10-24 | End: 2022-11-23

## 2022-10-25 NOTE — PROGRESS NOTES
REASON FOR THE CONSULTATION:  martin mild emphysema  HISTORY OF PRESENT ILLNESS:    Olinda Laura is a 64y.o. year old female   Coming in for a sick visit. She had called that she was having coughing of sputum which was yellow-green and wheezing. Z-Ollie was sent but she did not improve. Today feels fatigued. Is coughing yellow-green sputum no hemoptysis has wheezing. negative for COVID. Last visit  CT chest imaging reviewed with patient and explained that lung nodules have been stable for 1 year. No further CT scans is required however she is concerned and would like a CT chest in 1 year. She does not meet criteria for CT lung screening. She has dry cough, has GERD due to hiatal hernia. Advise  compliance with CPAP therapy  Last visit   Poor compliance with CPAP therapy. She also complains of seasonal allergic rhinitis during fall season has nasal congestion. Has been seen by ENT, has hiatal hernia and GERD which is leading to chronic cough    CT lung screening reviewed with patient has mild emphysema    Advised to abstain from smoking obstructive sleep apnea. Patient had a last sleep study 2014 and was given a CPAP of 11 cm water pressure. She has been using CPAP every night but her memory card has not been read since 2015. When she sleeps at night if her neck is straight she sleeps better without choking . She has full face mask . when she gets up in morning she feels sleepy and not rested   Even though she get 8 hrs sleep . 2014 - BMI 28.9 and now BMI is 34.2 , she has gained - from 185 lbs to 212 lbs.   She tosses and turn during night   Uses bathroom 2 times   Falls asleep right away   Bed time - 11 pm   Wake up time - 8 am   Some times takes melatonin   Feels fatigued and tired during the day even though she has been using CPAP at night  Feels her memory is not as sharp  Sleep Medicine 9/17/2021 12/4/2020 8/27/2020   Sitting and reading 0 2 3   Watching TV 3 2 3   Sitting, inactive in a public place (e.g. a theatre or a meeting) 2 0 2   As a passenger in a car for an hour without a break 1 0 1   Lying down to rest in the afternoon when circumstances permit 3 1 0   Sitting and talking to someone 0 0 0   Sitting quietly after a lunch without alcohol 0 1 0   In a car, while stopped for a few minutes in traffic 2 0 1   [place] Sleepiness Score 11 6 10         LUNG CANCER SCREENING     CRITERIA MET    []     CT ORDERED  []      CRITERIA NOT MET   [x]      REFUSED                    []        REASON CRITERIA NOT MET     SMOKING LESS THAN 30 PY  []      AGE LESS THAN 55 or GREATER 77 YEARS  []      QUIT SMOKING 15 YEARS OR GREATER   []      RECENT CT WITH IN 11 MONTHS    []      LIFE EXPECTANCY < 5 YEARS   []      SIGNS  AND SYMPTOMS OF LUNG CANCER   []         Immunization   Immunization History   Administered Date(s) Administered    COVID-19, PFIZER PURPLE top, DILUTE for use, (age 15 y+), 30mcg/0.3mL 12/03/2021, 12/29/2021        Pneumococcal Vaccine     [] Up to date    [] Indicated   [x] Refused  [] Contraindicated       Influenza Vaccine   [] Up to date    [] Indicated   [x] Refused  [] Contraindicated        Pulmonary Rehab   [] Completed   [] Indicated   [] Refused  [] Contraindicated   PAST MEDICAL HISTORY:       Diagnosis Date    Essential hypertension 07/27/2020    Hematuria     Hepatitis-C     History of colon polyps     Hypertension     Lung disease     Mitral valve regurgitation     Obesity (BMI 30-39.9) 07/27/2020    AURELIANO on CPAP 12/04/2020         Family History:       Problem Relation Age of Onset    High Blood Pressure Mother     Other Mother         ALZHEIMERS    Cancer Father         BONE AND PROSTRATE    Prostate Cancer Brother        SURGICAL HISTORY:   Past Surgical History:   Procedure Laterality Date    CARPAL TUNNEL RELEASE Bilateral     COLONOSCOPY  11/06/2017    TUBULAR ADENOMA    COLONOSCOPY N/A 10/4/2021    COLONOSCOPY POLYPECTOMY SNARE/COLD BIOPSY performed by Rad Jang MD at [facility]    COLONOSCOPY  10/4/2021    COLONOSCOPY WITH BIOPSY performed by Rebecca Acharya MD at [address] [facility], TOTAL ABDOMINAL (CERVIX REMOVED)      OVARY REMOVAL      UPPER GASTROINTESTINAL ENDOSCOPY N/A 2022    EGD BIOPSY performed by Rebecca Acharya MD at [address]:        Social History     Socioeconomic History    Marital status: Single     Spouse name: None    Number of children: None    Years of education: None    Highest education level: None   Tobacco Use    Smoking status: Former     Packs/day: 0.50     Years: 5.00     Pack years: 2.50     Types: Cigarettes     Quit date: 10/27/2019     Years since quittin.9    Smokeless tobacco: Never    Tobacco comments:     Over the past years of my early 19's I was a on & off again smoker due to stress in my life. Vaping Use    Vaping Use: Never used   Substance and Sexual Activity    Alcohol use: Never    Drug use: Never    Sexual activity: Not Currently   Social History Narrative    Camryn Marie has experienced a financial strain with resourcestrain: Food on 2020. Potential community resources and services have been provided in patient instructions by Clarita Morales. TOBACCO:   reports that she quit smoking about 2 years ago. Her smoking use included cigarettes. She has a 2.50 pack-year smoking history. She has never used smokeless tobacco.  ETOH:   reports no history of alcohol use. ALLERGIES:      Allergies   Allergen Reactions    Banana     Eggs Or Egg-Derived Products     Pcn [Penicillins] Hives         Home Meds:   Prior to Admission medications    Medication Sig Start Date End Date Taking?  Authorizing Provider   ALLERGY RELIEF 180 MG tablet TAKE 1 TABLET BY MOUTH EVERY DAY 22  Yes Historical Provider, MD   levoFLOXacin (LEVAQUIN) 500 MG tablet Take 1 tablet by mouth daily for 7 days 10/24/22 10/31/22 Yes Amina Meraz MD   predniSONE ([place]) 10 MG tablet 3 tabs once a day for 3 days, 2 tabs once a day for 3 days,1 tabs once a day for 3 days 10/24/22  Yes Carlos Lunsford MD   ipratropium-albuterol (DUONEB) 0.5-2.5 (3) MG/3ML SOLN nebulizer solution Inhale 3 mLs into the lungs 4 times daily as needed for Shortness of Breath or Other (wheezing) 10/24/22 11/23/22 Yes Carlos Lunsford MD   chlorthalidone (HYGROTON) 25 MG tablet TAKE 1 TABLET BY MOUTH DAILY 9/28/22  Yes MIGUEL Green - CNP   carvedilol (COREG) 6.25 MG tablet Take 6.25 mg by mouth 2 times daily TAKING 2 TABLETS bid SINCE 11/28/20 3/12/20  Yes Historical Provider, MD   clobetasol (TEMOVATE) 0.05 % cream  10/19/22   Historical Provider, MD   omeprazole (PRILOSEC) 40 MG delayed release capsule TAKE 1 CAPSULE BY MOUTH TWICE DAILY  Patient not taking: Reported on 10/24/2022 5/31/22   Dirk Vigil MD   cetirizine (ZYRTEC) 10 MG tablet  4/3/22   Historical Provider, MD   ammonium lactate (AMLACTIN) 12 % cream APPLY TOPICALLY TWICE DAILY TO AFFECTED AREA  Patient not taking: Reported on 10/24/2022 3/23/22   Historical Provider, MD   clotrimazole-betamethasone ([place]) 1-0.05 % cream APPLY TOPICALLY TO THE AFFECTED AREA TWICE DAILY FOR 4 DAYS THEN APPLY TOPICALLY TO THE AFFECTED AREA DAILY FOR 3 DAYS  Patient not taking: No sig reported 3/23/22   Historical Provider, MD   fluticasone (FLONASE) 50 MCG/ACT nasal spray 1 spray by Each Nostril route daily  Patient not taking: Reported on 10/24/2022 9/17/21   Carlos Lunsford MD              REVIEW OF SYSTEMS:  Review of Systems -  General ROS: No fever but has fatigue no chills no rigors  ENT ROS: negative for - headaches, oral lesions or sore throat  Cardiovascular ROS: no chest pain , orthopnea or pnd   Gastrointestinal ROS: no abdominal pain, change in bowel habits, or black or bloody stools  Skin - no rash   Neuro - no blurry vision , no loc .  No focal weakness   msk - no jt tenderness or swelling    Vascular - no claudication , rest completed and negative   Lymphatic - complete and negative   Hematology - oncology - complete and negative   Allergy immunology - complete and negative    no burning or hematuria      Vitals:  BP (!) 159/83 (Site: Left Upper Arm, Position: Sitting, Cuff Size: Large Adult)   Pulse 66   Temp 98.2 °F (36.8 °C)   Ht 5' 5.5\" (1.664 m)   Wt 214 lb 9.6 oz (97.3 kg)   SpO2 91%   BMI 35.17 kg/m²     PHYSICAL EXAM:  Head and neck atraumatic, normocephalic    Lymph nodes-no cervical, supraclavicular lymphadenopathy    Neck-no JVP elevation  Lung bilateral wheezing no crackles no bronchial breath sound no dullness    CVS- S1, S2 regular. No S3 no S4, no murmurs    Abdomen-nontender, nondistended. Bowel sounds are present. No organomegaly    Lower extremity-no edema    Upper extremity-no edema    Neurological-grossly normal cranial nerves. No overt motor deficit                                                         IMPRESSION:     Diagnosis Orders   1. Acute bronchiolitis due to other specified organisms  levoFLOXacin (LEVAQUIN) 500 MG tablet    predniSONE (DELTASONE) 10 MG tablet    DME Order for Nebulizer as OP    ipratropium-albuterol (DUONEB) 0.5-2.5 (3) MG/3ML SOLN nebulizer solution      2. Centrilobular emphysema ([address] mild  DME Order for Nebulizer as OP    ipratropium-albuterol (DUONEB) 0.5-2.5 (3) MG/3ML SOLN nebulizer solution      3. AURELIANO on CPAP             :                PLAN:     Acute purulent bronchitis did not respond to Z-Ollie. Will change antibiotic to Levaquin and add prednisone. Prescription for nebulizer and nebulized DuoNeb provided.   Follow-up as scheduled in the clinic        Requested Prescriptions     Signed Prescriptions Disp Refills    levoFLOXacin (LEVAQUIN) 500 MG tablet 7 tablet 0     Sig: Take 1 tablet by mouth daily for 7 days    predniSONE (DELTASONE) 10 MG tablet 18 tablet 0     Sig: 3 tabs once a day for 3 days, 2 tabs once a day for 3 days,1 tabs once a day for 3 days ipratropium-albuterol (DUONEB) 0.5-2.5 (3) MG/3ML SOLN nebulizer solution 360 mL 3     Sig: Inhale 3 mLs into the lungs 4 times daily as needed for Shortness of Breath or Other (wheezing)       Medications Discontinued During This Encounter   Medication Reason    indomethacin (INDOCIN) 25 MG capsule ERROR    famotidine (PEPCID) 40 MG tablet ERROR    Estradiol (VAGIFEM) 10 MCG TABS vaginal tablet ERROR    azithromycin (ZITHROMAX Z-COBY) 250 MG tablet LIST CLEANUP       Agueda received counseling on the following healthy behaviors: nutrition, exercise and medication adherence    Patient given educational materials : see patient instruction       Discussed use, benefit, and side effects of prescribed medications. Barriers to medication compliance addressed. All patient questions answered. Pt voiced understanding. I hope this updates you on my evaluation and clinical thinking. Thank you for allowing me to participate in his care. Sincerely,      Electronically signed by Buffy Sandoval MD on 10/25/2022 at 3:29 PM     Please note that this chart was generated using voice recognition Dragon dictation software. Although every effort was made to ensure the accuracy of this automated transcription, some errors in transcription may have occurred.

## 2022-10-29 ENCOUNTER — HOSPITAL ENCOUNTER (OUTPATIENT)
Age: 62
Discharge: HOME | End: 2022-10-29
Payer: COMMERCIAL

## 2022-10-29 ENCOUNTER — HOSPITAL ENCOUNTER (OUTPATIENT)
Dept: ULTRASOUND IMAGING | Age: 62
Discharge: HOME | End: 2022-10-31
Payer: COMMERCIAL

## 2022-10-29 DIAGNOSIS — B18.2 CHRONIC HEPATITIS C WITHOUT HEPATIC COMA (HCC): ICD-10-CM

## 2022-10-29 LAB
ANION GAP SERPL CALCULATED.3IONS-SCNC: 8 MMOL/L (ref 9–17)
BUN BLDV-MCNC: 19 MG/DL (ref 8–23)
CALCIUM SERPL-MCNC: 9 MG/DL (ref 8.6–10.4)
CHLORIDE BLD-SCNC: 100 MMOL/L (ref 98–107)
CO2: 32 MMOL/L (ref 20–31)
CREAT SERPL-MCNC: 0.94 MG/DL (ref 0.5–0.9)
GFR SERPL CREATININE-BSD FRML MDRD: >60 ML/MIN/1.73M2
GLUCOSE BLD-MCNC: 75 MG/DL (ref 70–99)
HCT VFR BLD CALC: 40.5 % (ref 36.3–47.1)
HEMOGLOBIN: 13 G/DL (ref 11.9–15.1)
MAGNESIUM: 2.1 MG/DL (ref 1.6–2.6)
MCH RBC QN AUTO: 28.1 PG (ref 25.2–33.5)
MCHC RBC AUTO-ENTMCNC: 32.1 G/DL (ref 28.4–34.8)
MCV RBC AUTO: 87.5 FL (ref 82.6–102.9)
NRBC AUTOMATED: 0 PER 100 WBC
PDW BLD-RTO: 13.7 % (ref 11.8–14.4)
PLATELET # BLD: 304 K/UL (ref 138–453)
PMV BLD AUTO: 9.7 FL (ref 8.1–13.5)
POTASSIUM SERPL-SCNC: 3.6 MMOL/L (ref 3.7–5.3)
RBC # BLD: 4.63 M/UL (ref 3.95–5.11)
SODIUM BLD-SCNC: 140 MMOL/L (ref 135–144)
WBC # BLD: 10.2 K/UL (ref 3.5–11.3)

## 2022-10-29 PROCEDURE — 83735 ASSAY OF MAGNESIUM: CPT

## 2022-10-29 PROCEDURE — 80048 BASIC METABOLIC PNL TOTAL CA: CPT

## 2022-10-29 PROCEDURE — 36415 COLL VENOUS BLD VENIPUNCTURE: CPT

## 2022-10-29 PROCEDURE — 76705 ECHO EXAM OF ABDOMEN: CPT

## 2022-10-29 PROCEDURE — 85027 COMPLETE CBC AUTOMATED: CPT

## 2022-12-01 RX ORDER — CELECOXIB 200 MG/1
200 CAPSULE ORAL DAILY
COMMUNITY

## 2022-12-02 ENCOUNTER — TELEMEDICINE (OUTPATIENT)
Dept: PULMONOLOGY | Age: 62
End: 2022-12-02
Payer: COMMERCIAL

## 2022-12-02 DIAGNOSIS — R05.3 CHRONIC COUGH: ICD-10-CM

## 2022-12-02 DIAGNOSIS — R91.1 INCIDENTAL LUNG NODULE, LESS THAN OR EQUAL TO 3MM: ICD-10-CM

## 2022-12-02 DIAGNOSIS — K21.9 LARYNGOPHARYNGEAL REFLUX (LPR): ICD-10-CM

## 2022-12-02 DIAGNOSIS — J43.2 CENTRILOBULAR EMPHYSEMA (HCC): Primary | ICD-10-CM

## 2022-12-02 DIAGNOSIS — Z99.89 OSA ON CPAP: ICD-10-CM

## 2022-12-02 DIAGNOSIS — G47.33 OSA ON CPAP: ICD-10-CM

## 2022-12-02 DIAGNOSIS — K44.9 HIATAL HERNIA WITH GERD: ICD-10-CM

## 2022-12-02 DIAGNOSIS — K21.9 HIATAL HERNIA WITH GERD: ICD-10-CM

## 2022-12-02 PROCEDURE — 3017F COLORECTAL CA SCREEN DOC REV: CPT | Performed by: INTERNAL MEDICINE

## 2022-12-02 PROCEDURE — G8427 DOCREV CUR MEDS BY ELIG CLIN: HCPCS | Performed by: INTERNAL MEDICINE

## 2022-12-02 PROCEDURE — 99213 OFFICE O/P EST LOW 20 MIN: CPT | Performed by: INTERNAL MEDICINE

## 2022-12-02 NOTE — PROGRESS NOTES
2022    TELEHEALTH EVALUATION -- Audio/Visual     Patient and physician are located in their individual locations.  This is visit is completed via Smarts[employer] application / [email or web address] / Telephone     Chief Complaint   Patient presents with    Breathing Problem    Follow-up        HPI:    Rosalio North 58 y.o. female  (:  1960) has requested an audio/video evaluation for the following concern(s):    No wheezing , but continues to have cough episodes , dry  , no hemoptysis   GERD under control with Prilosec   No sob with ex   Has been having difficulty using CPAP due to cough        Immunization   Immunization History   Administered Date(s) Administered    COVID-19, PFIZER PURPLE top, DILUTE for use, (age 15 y+), 30mcg/0.3mL 2021, 2021      PAST MEDICAL HISTORY:       Diagnosis Date    Essential hypertension 2020    Hematuria     Hepatitis-C     History of colon polyps     Hypertension     Lung disease     Mitral valve regurgitation     Obesity (BMI 30-39.9) 2020    AURELIANO on CPAP 2020         Family History:       Problem Relation Age of Onset    High Blood Pressure Mother     Other Mother         ALZHEIMERS    Cancer Father         BONE AND PROSTRATE    Prostate Cancer Brother        SURGICAL HISTORY:   Past Surgical History:   Procedure Laterality Date    CARPAL TUNNEL RELEASE Bilateral     COLONOSCOPY  2017    TUBULAR ADENOMA    COLONOSCOPY N/A 10/4/2021    COLONOSCOPY POLYPECTOMY SNARE/COLD BIOPSY performed by Aline Adams MD at [facility]    COLONOSCOPY  10/4/2021    COLONOSCOPY WITH BIOPSY performed by Aline Adams MD at [facility], TOTAL ABDOMINAL (CERVIX REMOVED)      OVARY REMOVAL      UPPER GASTROINTESTINAL ENDOSCOPY N/A 2022    EGD BIOPSY performed by Aline Adams MD at [facility] [address]:       Social History     Socioeconomic History    Marital status: Single     Spouse name: None    Number of children: None    Years of education: None    Highest education level: None   Tobacco Use    Smoking status: Former     Packs/day: 0.50     Years: 5.00     Pack years: 2.50     Types: Cigarettes     Quit date: 10/27/2019     Years since quitting: 3.1    Smokeless tobacco: Never    Tobacco comments:     Over the past years of my early 19's I was a on & off again smoker due to stress in my life. Vaping Use    Vaping Use: Never used   Substance and Sexual Activity    Alcohol use: Never    Drug use: Never    Sexual activity: Not Currently   Social History Narrative    Alexis Boston has experienced a financial strain with resourcestrain: Food on July 27, 2020. Potential community resources and services have been provided in patient instructions by Zayra Aldrich. TOBACCO:   reports that she quit smoking about 3 years ago. Her smoking use included cigarettes. She has a 2.50 pack-year smoking history. She has never used smokeless tobacco.  ETOH:   reports no history of alcohol use. ALLERGIES:      Allergies   Allergen Reactions    Banana     Eggs Or Egg-Derived Products     Pcn [Penicillins] Hives         Home Meds:   Prior to Admission medications    Medication Sig Start Date End Date Taking?  Authorizing Provider   mometasone-formoterol [facility]) 100-5 MCG/ACT inhaler Inhale 2 puffs into the lungs 2 times daily 12/2/22  Yes Michelle Brooks MD   celecoxib (CELEBREX) 200 MG capsule Take 200 mg by mouth daily   Yes Historical Provider, MD   clobetasol (TEMOVATE) 0.05 % cream  10/19/22  Yes Historical Provider, MD   ALLERGY RELIEF 180 MG tablet TAKE 1 TABLET BY MOUTH EVERY DAY 9/29/22  Yes Historical Provider, MD   chlorthalidone (HYGROTON) 25 MG tablet TAKE 1 TABLET BY MOUTH DAILY 9/28/22  Yes MIGUEL Ahmadi CNP   omeprazole (PRILOSEC) 40 MG delayed release capsule TAKE 1 CAPSULE BY MOUTH TWICE DAILY 5/31/22  Yes Morgan Balderas MD   cetirizine (ZYRTEC) 10 MG tablet  4/3/22  Yes Historical Provider, MD ammonium lactate (AMLACTIN) 12 % cream  3/23/22  Yes Historical Provider, MD   clotrimazole-betamethasone (LOTRISONE) 1-0.05 % cream APPLY TOPICALLY TO THE AFFECTED AREA TWICE DAILY FOR 4 DAYS THEN APPLY TOPICALLY TO THE AFFECTED AREA DAILY FOR 3 DAYS 3/23/22  Yes Historical Provider, MD   fluticasone (FLONASE) 50 MCG/ACT nasal spray 1 spray by Each Nostril route daily 9/17/21  Yes Redmond Sever, MD   carvedilol (COREG) 6.25 MG tablet Take 6.25 mg by mouth 2 times daily TAKING 2 TABLETS bid SINCE 11/28/20 3/12/20  Yes Historical Provider, MD   ipratropium-albuterol ([facility]) 0.5-2.5 (3) MG/3ML SOLN nebulizer solution Inhale 3 mLs into the lungs 4 times daily as needed for Shortness of Breath or Other (wheezing) 10/24/22 11/23/22  Redmond Sever, MD              Wt Readings from Last 3 Encounters:   10/24/22 214 lb 9.6 oz (97.3 kg)   10/07/22 217 lb (98.4 kg)   08/17/22 210 lb (95.3 kg)       Results for orders placed or performed during the hospital encounter of 18/55/46   Basic Metabolic Panel   Result Value Ref Range    Glucose 75 70 - 99 mg/dL    BUN 19 8 - 23 mg/dL    Creatinine 0.94 (H) 0.50 - 0.90 mg/dL    Est, Glom Filt Rate >60 >60 mL/min/1.73m2    Calcium 9.0 8.6 - 10.4 mg/dL    Sodium 140 135 - 144 mmol/L    Potassium 3.6 (L) 3.7 - 5.3 mmol/L    Chloride 100 98 - 107 mmol/L    CO2 32 (H) 20 - 31 mmol/L    Anion Gap 8 (L) 9 - 17 mmol/L   CBC   Result Value Ref Range    WBC 10.2 3.5 - 11.3 k/uL    RBC 4.63 3.95 - 5.11 m/uL    Hemoglobin 13.0 11.9 - 15.1 g/dL    Hematocrit 40.5 36.3 - 47.1 %    MCV 87.5 82.6 - 102.9 fL    MCH 28.1 25.2 - 33.5 pg    MCHC 32.1 28.4 - 34.8 g/dL    RDW 13.7 11.8 - 14.4 %    Platelets 759 761 - 392 k/uL    MPV 9.7 8.1 - 13.5 fL    NRBC Automated 0.0 0.0 per 100 WBC   Magnesium   Result Value Ref Range    Magnesium 2.1 1.6 - 2.6 mg/dL       No results found.     PHYSICAL EXAMINATION:  Due to this being a TeleHealth encounter, evaluation of the following organ systems is limited: Vitals/Constitutional/EENT/Resp/CV/GI//MS/Neuro/Skin/Heme-Lymph-Imm. Constitutional: [x] Appears well-developed and well-nourished. [] Abnormal  Mental status  [x] Alert and awake  [x] Oriented to person/place/time [x]Able to follow commands    [x] No apparent distress      Eyes:  EOM    [x]  Normal  [] Abnormal-  Sclera  [x]  Normal  [] Abnormal -         Discharge [x]  None visible  [] Abnormal -    HENT:   [x] Normocephalic, atraumatic. [] Abnormal shaped head   [x] Mouth/Throat: Mucous membranes are moist.     Ears [x] Normal  [] Abnormal-    Neck: [x] Normal range of motion [x] Supple [x] No visualized mass. Pulmonary/Chest: [x] Respiratory effort normal.  [x] No visualized signs of difficulty breathing or respiratory distress        [] Abnormal      Musculoskeletal:   [x] Normal range of motion. [x] Normal gait with no signs of ataxia. [x]  No signs of cyanosis of the peripheral portions of extremities. [] Abnormal       Neurological:        [x] Normal cranial nerve (limited exam to video visit) [x] No focal weakness observed       [] Abnormal          Speech       [x] Normal   [] Abnormal               ASSESSMENT:   Diagnosis Orders   1. Centrilobular emphysema (HCC)  mometasone-formoterol (DULERA) 100-5 MCG/ACT inhaler      2. Chronic cough  mometasone-formoterol (DULERA) 100-5 MCG/ACT inhaler      3. AURELIANO on CPAP        4. Incidental lung nodule, less than or equal to 3mm        5. Hiatal hernia with GERD        6.  Laryngopharyngeal reflux (LPR)              Plan:   Due to persistent cough will give trial of LABA and ICS - cough responds to albuterol and atrovent nebulized   Cont gerd tt   Follow up in 2 months          Requested Prescriptions     Signed Prescriptions Disp Refills    mometasone-formoterol (DULERA) 100-5 MCG/ACT inhaler 1 each 4     Sig: Inhale 2 puffs into the lungs 2 times daily       Medications Discontinued During This Encounter   Medication Reason predniSONE (DELTASONE) 10 MG tablet LIST CLEANUP       Netta Sampson received counseling on the following healthy behaviors: nutrition, exercise and medication adherence    Patient given educational materials : see patient instruction       Discussed use, benefit, and side effects of prescribed medications. Barriers to medication compliance addressed. All patient questions answered. Pt voiced understanding. An  electronic signature was used to authenticate this note. --Vivian Stevenson MD on 12/2/2022 at 4:58 PM      Please note that this chart was generated using voice recognition Dragon dictation software. Although every effort was made to ensure the accuracy of this automated transcription, some errors in transcription may have occurred. Keyanna Altman , was evaluated through a synchronous (real-time) audio-video encounter. The patient (or guardian if applicable) is aware that this is a billable service, which includes applicable co-pays. This Virtual Visit was conducted with patient's (and/or legal guardian's) consent. The visit was conducted pursuant to the emergency declaration under the [address] [place] [address] waiver authority and the [employer] and [employer] General Act. Patient identification was verified, and a caregiver was present when appropriate. The patient was located in a state where the provider was licensed to provide care.

## 2022-12-27 ENCOUNTER — TELEPHONE (OUTPATIENT)
Dept: PULMONOLOGY | Age: 62
End: 2022-12-27

## 2022-12-27 DIAGNOSIS — Z99.89 OSA ON CPAP: Primary | ICD-10-CM

## 2022-12-27 DIAGNOSIS — G47.33 OSA ON CPAP: Primary | ICD-10-CM

## 2022-12-27 NOTE — DISCHARGE SUMMARY
[] Juaquin Calhoun        Outpatient Physical                Therapy       [address]       Phone: [phone]       Fax: [phone] [x] [facility]       Promotion at [address] [place] [address]       Phone: [phone]       Fax: [phone] [] Marciano Nelia Hayley      for Health Promotion     [address] [place] [facility]     Phone: [phone]     Fax:  [phone]     Physical Therapy Discharge Note    Date: 2022      Patient: Paramjit Tobin  : 1960  MRN: [id number]    Physician: Carrillo Williamson PA-C                                            Insurance: [facility] ()  Medical Diagnosis:   M16.12 (ICD-10-CM) - Primary osteoarthritis of left hip   M70.62 (ICD-10-CM) - Greater trochanteric bursitis of left hip   Rehab Codes: M16.12, M70.62, M62.81, R26.89  Onset date: 2022                                  Next 's appt.: 22  Visit# / total visits: 9/10;                     Cancels/No Shows:   Date of initial visit: 22                Date of final visit: 10/04/22       Discharge Status:     Pt failed to make additional appointments for therapy. Pt. Is now discharged. Electronically signed by: Arlene Rey PT    If you have any questions or concerns, please don't hesitate to call.   Thank you for your referral.

## 2023-02-24 ENCOUNTER — OFFICE VISIT (OUTPATIENT)
Dept: GASTROENTEROLOGY | Age: 63
End: 2023-02-24

## 2023-02-24 VITALS
DIASTOLIC BLOOD PRESSURE: 66 MMHG | SYSTOLIC BLOOD PRESSURE: 124 MMHG | BODY MASS INDEX: 35.99 KG/M2 | HEIGHT: 65 IN | WEIGHT: 216 LBS

## 2023-02-24 DIAGNOSIS — K21.9 GASTROESOPHAGEAL REFLUX DISEASE WITHOUT ESOPHAGITIS: Primary | ICD-10-CM

## 2023-02-24 ASSESSMENT — ENCOUNTER SYMPTOMS
TROUBLE SWALLOWING: 0
CHOKING: 0
NAUSEA: 0
ABDOMINAL PAIN: 1
VOICE CHANGE: 0
CONSTIPATION: 1
ABDOMINAL DISTENTION: 1
SHORTNESS OF BREATH: 0
WHEEZING: 0
BLOOD IN STOOL: 0
COUGH: 1
VOMITING: 0
ANAL BLEEDING: 0
DIARRHEA: 0
RECTAL PAIN: 0
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
## 2023-04-20 NOTE — TELEPHONE ENCOUNTER
\"Denied. Unable to approve TRETINOIN Cream Per the Plan PDL (preferred drug list) document this is a pharmacy benefit exclusion and is not covered for the indication submitted. Please refer to Plan PDL for drugs that are available through the Plan. \" Please call patient and let her know insurance will NOT cover the tretinoin, she would need to pay OOP with a [employer] coupon Post-Care Instructions: I reviewed with the patient in detail post-care instructions. Patient is not to engage in any heavy lifting, exercise, or swimming for the next 14 days. Should the patient develop any fevers, chills, bleeding, severe pain patient will contact the office immediately.

## 2023-05-02 ENCOUNTER — TELEPHONE (OUTPATIENT)
Dept: PULMONOLOGY | Age: 63
End: 2023-05-02

## 2023-05-02 DIAGNOSIS — R91.1 NODULE OF LOWER LOBE OF RIGHT LUNG: Primary | ICD-10-CM

## 2023-05-02 NOTE — TELEPHONE ENCOUNTER
You placed an order for a CT Chest Low Dose on 7/11/22, Dx: Nodule of lower lobe of right lung. Pt has had these images in the past but on 5/2/23 [facility] stated this order is strictly for a CT Lung Screen. Pt has a 2.5 pack per year history so this order is being denied. Scheduling at [facility] asked you to please place a new order for a CT Chest w/o Contrast. If you agree please place the order and I will fax to to [facility], [facility] Scheduling, at (r) [phone]. Please advise. Thank you.

## 2023-06-22 DIAGNOSIS — R91.1 NODULE OF LOWER LOBE OF RIGHT LUNG: ICD-10-CM

## 2023-06-30 ENCOUNTER — OFFICE VISIT (OUTPATIENT)
Dept: PULMONOLOGY | Age: 63
End: 2023-06-30

## 2023-06-30 VITALS
BODY MASS INDEX: 34.85 KG/M2 | WEIGHT: 209.2 LBS | OXYGEN SATURATION: 98 % | HEART RATE: 68 BPM | HEIGHT: 65 IN | DIASTOLIC BLOOD PRESSURE: 84 MMHG | SYSTOLIC BLOOD PRESSURE: 159 MMHG

## 2023-06-30 DIAGNOSIS — J43.2 CENTRILOBULAR EMPHYSEMA (HCC): Primary | ICD-10-CM

## 2023-06-30 DIAGNOSIS — K44.9 HIATAL HERNIA WITH GERD: ICD-10-CM

## 2023-06-30 DIAGNOSIS — K21.9 HIATAL HERNIA WITH GERD: ICD-10-CM

## 2023-06-30 DIAGNOSIS — G47.33 OSA (OBSTRUCTIVE SLEEP APNEA): ICD-10-CM

## 2023-06-30 DIAGNOSIS — Z87.891 PERSONAL HISTORY OF TOBACCO USE: ICD-10-CM

## 2023-06-30 RX ORDER — OMEPRAZOLE 10 MG/1
10 CAPSULE, DELAYED RELEASE ORAL DAILY
COMMUNITY

## 2023-08-10 ENCOUNTER — PATIENT MESSAGE (OUTPATIENT)
Dept: GASTROENTEROLOGY | Age: 63
End: 2023-08-10

## 2023-08-11 NOTE — TELEPHONE ENCOUNTER
Writer spoke with patient regarding her mychart message, patient was informed that with her concerns she has  should be  addressed with her PCP. Patient voiced understanding and states she will call back if she did not receive answers from her PCP.

## 2023-08-17 ENCOUNTER — PATIENT MESSAGE (OUTPATIENT)
Dept: GASTROENTEROLOGY | Age: 63
End: 2023-08-17

## 2023-08-17 NOTE — TELEPHONE ENCOUNTER
Writer spoke with patient regarding her medical records, patient was notified to have her family doctor send over an request for an medical records release. Patient voiced understanding and states her appreciation and had no further questions at the time.

## 2023-08-17 NOTE — TELEPHONE ENCOUNTER
From: Bernabe Simon  To: Dr. Vivian Samaniego: 8/17/2023 9:59 AM EDT  Subject: Lu Shiva Medical Records    Good morning,    I would like to request copies of some of my medical records / previous test- results be sent to my PC Dr. Jordi Lopez her office fax number is [phone]. Kind thanks,  NEETA Condon

## 2023-10-18 ENCOUNTER — TELEPHONE (OUTPATIENT)
Dept: PULMONOLOGY | Age: 63
End: 2023-10-18

## 2023-10-18 NOTE — TELEPHONE ENCOUNTER
Pt called in requesting to see if Dr. Rebecca Nava would give her a note to excuse her from [employer] that she has coming up next week. 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         Please call pt to advise @ [phone]

## 2024-03-06 ENCOUNTER — TELEPHONE (OUTPATIENT)
Dept: PULMONOLOGY | Age: 64
End: 2024-03-06

## 2024-03-06 NOTE — TELEPHONE ENCOUNTER
Desi from [facility] called and needs office notes to support CT order. I faxed to [phone]

## 2024-03-07 ENCOUNTER — PATIENT MESSAGE (OUTPATIENT)
Dept: PULMONOLOGY | Age: 64
End: 2024-03-07

## 2024-03-08 NOTE — TELEPHONE ENCOUNTER
From: Agueda Rain  To: Dr. Diogo Atkins  Sent: 3/7/2024 4:23 PM EST  Subject: Upcoming CT Appointment with Dr. Milly Cruz,    I stopped in the office Tuesday 3/5 to request a copy of my order for my Annual Lung CT Scan. I dropped it off at [facility] same day. They just informed me earlier that the order would need to be made out to [facility] as it was approved to [facility]. Can you kindly send an updated order for my lung screening to [facility]. I have a contact [phone] then option #3.     Kind regards,    Agueda Rain  [phone]

## 2024-03-28 ENCOUNTER — TELEPHONE (OUTPATIENT)
Dept: PULMONOLOGY | Age: 64
End: 2024-03-28

## 2024-03-28 NOTE — TELEPHONE ENCOUNTER
Patient called stating she was going to make appt for CT lung screen but [facility] told her it would not be covered. Per Dr. Atkins's notes she does not fit criteria for CT lung screening but she was was concerned and wanted it done in a year.  Patient has a follow up with Dr. Atkins in May and she will discuss with him then.  She may do CT without contrast again if needed.  All questions answered.

## 2024-07-31 SDOH — ECONOMIC STABILITY: FOOD INSECURITY: WITHIN THE PAST 12 MONTHS, YOU WORRIED THAT YOUR FOOD WOULD RUN OUT BEFORE YOU GOT MONEY TO BUY MORE.: NEVER TRUE

## 2024-07-31 SDOH — ECONOMIC STABILITY: INCOME INSECURITY: HOW HARD IS IT FOR YOU TO PAY FOR THE VERY BASICS LIKE FOOD, HOUSING, MEDICAL CARE, AND HEATING?: HARD

## 2024-07-31 SDOH — ECONOMIC STABILITY: FOOD INSECURITY: WITHIN THE PAST 12 MONTHS, THE FOOD YOU BOUGHT JUST DIDN'T LAST AND YOU DIDN'T HAVE MONEY TO GET MORE.: NEVER TRUE

## 2024-07-31 SDOH — ECONOMIC STABILITY: HOUSING INSECURITY
IN THE LAST 12 MONTHS, WAS THERE A TIME WHEN YOU DID NOT HAVE A STEADY PLACE TO SLEEP OR SLEPT IN A SHELTER (INCLUDING NOW)?: NO

## 2024-07-31 ASSESSMENT — PATIENT HEALTH QUESTIONNAIRE - PHQ9
SUM OF ALL RESPONSES TO PHQ9 QUESTIONS 1 & 2: 2
SUM OF ALL RESPONSES TO PHQ QUESTIONS 1-9: 2
SUM OF ALL RESPONSES TO PHQ9 QUESTIONS 1 & 2: 2
1. LITTLE INTEREST OR PLEASURE IN DOING THINGS: SEVERAL DAYS
2. FEELING DOWN, DEPRESSED OR HOPELESS: SEVERAL DAYS
2. FEELING DOWN, DEPRESSED OR HOPELESS: SEVERAL DAYS
SUM OF ALL RESPONSES TO PHQ QUESTIONS 1-9: 2
SUM OF ALL RESPONSES TO PHQ QUESTIONS 1-9: 2
1. LITTLE INTEREST OR PLEASURE IN DOING THINGS: SEVERAL DAYS
SUM OF ALL RESPONSES TO PHQ QUESTIONS 1-9: 2

## 2024-08-02 ENCOUNTER — OFFICE VISIT (OUTPATIENT)
Dept: FAMILY MEDICINE CLINIC | Age: 64
End: 2024-08-02

## 2024-08-02 VITALS
OXYGEN SATURATION: 99 % | SYSTOLIC BLOOD PRESSURE: 148 MMHG | DIASTOLIC BLOOD PRESSURE: 76 MMHG | WEIGHT: 204.6 LBS | BODY MASS INDEX: 34.09 KG/M2 | RESPIRATION RATE: 18 BRPM | HEIGHT: 65 IN | HEART RATE: 57 BPM

## 2024-08-02 DIAGNOSIS — E78.5 DYSLIPIDEMIA: ICD-10-CM

## 2024-08-02 DIAGNOSIS — M25.552 LEFT HIP PAIN: Primary | ICD-10-CM

## 2024-08-02 DIAGNOSIS — I10 ESSENTIAL HYPERTENSION: Primary | ICD-10-CM

## 2024-08-02 DIAGNOSIS — M25.552 LEFT HIP PAIN: ICD-10-CM

## 2024-08-02 DIAGNOSIS — Z91.81 HISTORY OF FALL: ICD-10-CM

## 2024-08-02 DIAGNOSIS — E55.9 VITAMIN D DEFICIENCY: ICD-10-CM

## 2024-08-02 DIAGNOSIS — Z79.899 CURRENT USE OF PROTON PUMP INHIBITOR: ICD-10-CM

## 2024-08-02 PROBLEM — J43.2 CENTRILOBULAR EMPHYSEMA (HCC): Status: ACTIVE | Noted: 2024-08-02

## 2024-08-02 RX ORDER — RED BEET 500 MG
CAPSULE ORAL
COMMUNITY

## 2024-08-02 ASSESSMENT — ENCOUNTER SYMPTOMS
BLOOD IN STOOL: 0
CHEST TIGHTNESS: 0
SHORTNESS OF BREATH: 0
ABDOMINAL PAIN: 0

## 2024-08-02 NOTE — PATIENT INSTRUCTIONS
[facility] Financial Resources*  (Call United Way/211 if need more resources).       Area Office on Aging of [facility] ([place] and surrounding [facility]):  What they offer: Assisted Living Waiver Program, Medicare benefits counseling, and referral to community resources.  Phone Number: [phone]   [place] [facility] ([place] and counties to the east):  What they offer: Assistance with utility expenses.  Phone Number: [phone]   [facility] Commission:  What they offer:  Assistance with rent or mortgage payments, utilities, auto payments or repair, food, medical prescriptions, transportation to VA medical facilities for veterans and their widows who qualify.  Phone Number: [phone]   [facility] Community Action Commission ([place] and counties to the west):   What they offer: Assistance with utility expenses.  Phone Number: 479.684.2868  [place] [employer] ([employer]):  What they offer: Medicaid, SNAP (food stamps), TANF (cash assistance), and childcare assistance.  Phone Number: 140.670.5200  [employer] ([facility]):  What they offer: Assistance with utility expenses.  Phone Number: 920.589.7711 ext: x11   Community Action Commission of [place]:  What they offer: Electric, gas and water payment service.  Phone: 671.817.7400    [facility] Agency on Aging, District 5:    [place], [place], [place], [place], [place], [place], [place], [place], [facility]:  What they offer: Referral to transportation and other resources for seniors.  Phone Number: 972.436.6491   [place]    First Call for Help     [facility]  What they offer: Information and referral services about food, housing, utility assistance, drug and alcohol treatment, child and family support  Phone number: 286.655.1032    [employer]  What they offer: Programs for children, addiction recovery, family restoration  Phone number:

## 2024-08-02 NOTE — PROGRESS NOTES
[facility]      Date of Visit:  2024  Patient Name: Agueda Rain   Patient :  1960     CHIEF COMPLAINT:     Agueda Rain is a 63 y.o. female who presents today for an general visit to be evaluated for the following condition(s):  Chief Complaint   Patient presents with    Hyperlipidemia     DYSLIPIDEMIA    Hypertension    VIT D DEF    Health Maintenance     BOOSTER SHOT DUE    SDOH POSITIVE     FINANCIAL       HISTORY OF PRESENT ILLNESS:       HPI   Visit Information    Have you changed or started any medications since your last visit including any over-the-counter medicines, vitamins, or herbal medicines? no   Are you having any side effects from any of your medications? -  no  Have you stopped taking any of your medications? Is so, why? -  no    Have you seen any other physician or provider since your last visit? Yes - Records Obtained  Have you had any other diagnostic tests since your last visit? Yes - Records Obtained  Have you been seen in the emergency room and/or had an admission to a hospital since we last saw you? No  Have you had your routine dental cleaning in the past 6 months? no    Have you activated your [employer] account? If not, what are your barriers? Yes     Patient Care Team:  Jessee Pitts MD as PCP - General (Family Medicine)  Jessee Pitts MD as PCP - Empaneled Provider  Roderick Stone MD as Consulting Physician (Gastroenterology)    Medical History Review  Past Medical, Family, and Social History reviewed and does contribute to the patient presenting condition    Health Maintenance   Topic Date Due    Pneumococcal 0-64 years Vaccine (1 of 2 - PCV) Never done    HIV screen  Never done    DTaP/Tdap/Td vaccine (1 - Tdap) Never done    Shingles vaccine (1 of 2) Never done    Hepatitis B vaccine (1 of 3 - Risk 3-dose series) Never done    Respiratory Syncytial Virus (RSV) Pregnant or age 60 yrs+ (1 - 1-dose 60+ series) Never done    COVID-19 Vaccine

## 2024-08-06 ENCOUNTER — HOSPITAL ENCOUNTER (OUTPATIENT)
Age: 64
Discharge: HOME | End: 2024-08-06
Payer: MEDICAID

## 2024-08-06 DIAGNOSIS — M70.62 GREATER TROCHANTERIC BURSITIS OF LEFT HIP: ICD-10-CM

## 2024-08-06 DIAGNOSIS — M25.552 HIP PAIN, LEFT: Primary | ICD-10-CM

## 2024-08-06 DIAGNOSIS — M16.12 PRIMARY OSTEOARTHRITIS OF LEFT HIP: ICD-10-CM

## 2024-08-06 DIAGNOSIS — E55.9 VITAMIN D DEFICIENCY: ICD-10-CM

## 2024-08-06 DIAGNOSIS — Z79.899 CURRENT USE OF PROTON PUMP INHIBITOR: ICD-10-CM

## 2024-08-06 DIAGNOSIS — I10 ESSENTIAL HYPERTENSION: ICD-10-CM

## 2024-08-06 DIAGNOSIS — E78.5 DYSLIPIDEMIA: ICD-10-CM

## 2024-08-06 LAB
25(OH)D3 SERPL-MCNC: 33.1 NG/ML (ref 30–100)
ALBUMIN SERPL-MCNC: 4.4 G/DL (ref 3.5–5.2)
ALBUMIN/GLOB SERPL: 1 {RATIO} (ref 1–2.5)
ALP SERPL-CCNC: 88 U/L (ref 35–104)
ALT SERPL-CCNC: 8 U/L (ref 10–35)
ANION GAP SERPL CALCULATED.3IONS-SCNC: 8 MMOL/L (ref 9–16)
AST SERPL-CCNC: 17 U/L (ref 10–35)
BACTERIA URNS QL MICRO: NORMAL
BASOPHILS # BLD: 0.03 K/UL (ref 0–0.2)
BASOPHILS NFR BLD: 1 % (ref 0–2)
BILIRUB SERPL-MCNC: 0.6 MG/DL (ref 0–1.2)
BILIRUB UR QL STRIP: NEGATIVE
BUN SERPL-MCNC: 10 MG/DL (ref 8–23)
CALCIUM SERPL-MCNC: 9.4 MG/DL (ref 8.6–10.4)
CASTS #/AREA URNS LPF: NORMAL /LPF (ref 0–8)
CHLORIDE SERPL-SCNC: 101 MMOL/L (ref 98–107)
CHOLEST SERPL-MCNC: 194 MG/DL (ref 0–199)
CHOLESTEROL/HDL RATIO: 4
CLARITY UR: CLEAR
CO2 SERPL-SCNC: 28 MMOL/L (ref 20–31)
COLOR UR: YELLOW
CREAT SERPL-MCNC: 0.9 MG/DL (ref 0.5–0.9)
EOSINOPHIL # BLD: 0.21 K/UL (ref 0–0.44)
EOSINOPHILS RELATIVE PERCENT: 4 % (ref 1–4)
EPI CELLS #/AREA URNS HPF: NORMAL /HPF (ref 0–5)
ERYTHROCYTE [DISTWIDTH] IN BLOOD BY AUTOMATED COUNT: 13.2 % (ref 11.8–14.4)
FOLATE SERPL-MCNC: 10.4 NG/ML (ref 4.8–24.2)
GFR, ESTIMATED: 72 ML/MIN/1.73M2
GLUCOSE SERPL-MCNC: 83 MG/DL (ref 74–99)
GLUCOSE UR STRIP-MCNC: NEGATIVE MG/DL
HCT VFR BLD AUTO: 41.9 % (ref 36.3–47.1)
HDLC SERPL-MCNC: 54 MG/DL
HGB BLD-MCNC: 13.4 G/DL (ref 11.9–15.1)
HGB UR QL STRIP.AUTO: ABNORMAL
IMM GRANULOCYTES # BLD AUTO: <0.03 K/UL (ref 0–0.3)
IMM GRANULOCYTES NFR BLD: 0 %
KETONES UR STRIP-MCNC: NEGATIVE MG/DL
LDLC SERPL CALC-MCNC: 121 MG/DL (ref 0–100)
LEUKOCYTE ESTERASE UR QL STRIP: NEGATIVE
LYMPHOCYTES NFR BLD: 1.85 K/UL (ref 1.1–3.7)
LYMPHOCYTES RELATIVE PERCENT: 37 % (ref 24–43)
MAGNESIUM SERPL-MCNC: 2.2 MG/DL (ref 1.6–2.4)
MCH RBC QN AUTO: 27.9 PG (ref 25.2–33.5)
MCHC RBC AUTO-ENTMCNC: 32 G/DL (ref 28.4–34.8)
MCV RBC AUTO: 87.1 FL (ref 82.6–102.9)
MONOCYTES NFR BLD: 0.35 K/UL (ref 0.1–1.2)
MONOCYTES NFR BLD: 7 % (ref 3–12)
NEUTROPHILS NFR BLD: 51 % (ref 36–65)
NEUTS SEG NFR BLD: 2.57 K/UL (ref 1.5–8.1)
NITRITE UR QL STRIP: NEGATIVE
NRBC BLD-RTO: 0 PER 100 WBC
PH UR STRIP: 7 [PH] (ref 5–8)
PLATELET # BLD AUTO: 278 K/UL (ref 138–453)
PMV BLD AUTO: 9.7 FL (ref 8.1–13.5)
POTASSIUM SERPL-SCNC: 4 MMOL/L (ref 3.7–5.3)
PROT SERPL-MCNC: 8.4 G/DL (ref 6.6–8.7)
PROT UR STRIP-MCNC: NEGATIVE MG/DL
RBC # BLD AUTO: 4.81 M/UL (ref 3.95–5.11)
RBC #/AREA URNS HPF: NORMAL /HPF (ref 0–4)
SODIUM SERPL-SCNC: 137 MMOL/L (ref 136–145)
SP GR UR STRIP: 1 (ref 1–1.03)
TRIGL SERPL-MCNC: 97 MG/DL
TSH SERPL DL<=0.05 MIU/L-ACNC: 1.8 UIU/ML (ref 0.27–4.2)
UROBILINOGEN UR STRIP-ACNC: NORMAL EU/DL (ref 0–1)
VIT B12 SERPL-MCNC: 715 PG/ML (ref 232–1245)
VLDLC SERPL CALC-MCNC: 19 MG/DL
WBC #/AREA URNS HPF: NORMAL /HPF (ref 0–5)
WBC OTHER # BLD: 5 K/UL (ref 3.5–11.3)

## 2024-08-06 PROCEDURE — 82746 ASSAY OF FOLIC ACID SERUM: CPT

## 2024-08-06 PROCEDURE — 82607 VITAMIN B-12: CPT

## 2024-08-06 PROCEDURE — 80053 COMPREHEN METABOLIC PANEL: CPT

## 2024-08-06 PROCEDURE — 84630 ASSAY OF ZINC: CPT

## 2024-08-06 PROCEDURE — 36415 COLL VENOUS BLD VENIPUNCTURE: CPT

## 2024-08-06 PROCEDURE — 80061 LIPID PANEL: CPT

## 2024-08-06 PROCEDURE — 82306 VITAMIN D 25 HYDROXY: CPT

## 2024-08-06 PROCEDURE — 81001 URINALYSIS AUTO W/SCOPE: CPT

## 2024-08-06 PROCEDURE — 84443 ASSAY THYROID STIM HORMONE: CPT

## 2024-08-06 PROCEDURE — 83735 ASSAY OF MAGNESIUM: CPT

## 2024-08-06 PROCEDURE — 85025 COMPLETE CBC W/AUTO DIFF WBC: CPT

## 2024-08-06 PROCEDURE — 93005 ELECTROCARDIOGRAM TRACING: CPT | Performed by: FAMILY MEDICINE

## 2024-08-06 SDOH — HEALTH STABILITY: PHYSICAL HEALTH: ON AVERAGE, HOW MANY DAYS PER WEEK DO YOU ENGAGE IN MODERATE TO STRENUOUS EXERCISE (LIKE A BRISK WALK)?: 2 DAYS

## 2024-08-06 SDOH — HEALTH STABILITY: PHYSICAL HEALTH: ON AVERAGE, HOW MANY MINUTES DO YOU ENGAGE IN EXERCISE AT THIS LEVEL?: 10 MIN

## 2024-08-07 ENCOUNTER — OFFICE VISIT (OUTPATIENT)
Dept: ORTHOPEDIC SURGERY | Age: 64
End: 2024-08-07
Payer: COMMERCIAL

## 2024-08-07 VITALS — WEIGHT: 204 LBS | BODY MASS INDEX: 33.99 KG/M2 | HEIGHT: 65 IN

## 2024-08-07 DIAGNOSIS — M16.12 PRIMARY OSTEOARTHRITIS OF LEFT HIP: ICD-10-CM

## 2024-08-07 DIAGNOSIS — M70.61 GREATER TROCHANTERIC BURSITIS OF RIGHT HIP: ICD-10-CM

## 2024-08-07 DIAGNOSIS — M25.552 HIP PAIN, LEFT: Primary | ICD-10-CM

## 2024-08-07 DIAGNOSIS — M25.552 BILATERAL HIP PAIN: ICD-10-CM

## 2024-08-07 DIAGNOSIS — M25.552 HIP PAIN, LEFT: ICD-10-CM

## 2024-08-07 DIAGNOSIS — M25.551 BILATERAL HIP PAIN: ICD-10-CM

## 2024-08-07 DIAGNOSIS — M70.62 GREATER TROCHANTERIC BURSITIS OF LEFT HIP: Primary | ICD-10-CM

## 2024-08-07 LAB
EKG ATRIAL RATE: 55 BPM
EKG P AXIS: 34 DEGREES
EKG P-R INTERVAL: 168 MS
EKG Q-T INTERVAL: 418 MS
EKG QRS DURATION: 88 MS
EKG QTC CALCULATION (BAZETT): 399 MS
EKG R AXIS: 3 DEGREES
EKG T AXIS: 13 DEGREES
EKG VENTRICULAR RATE: 55 BPM

## 2024-08-07 PROCEDURE — 20610 DRAIN/INJ JOINT/BURSA W/O US: CPT

## 2024-08-07 PROCEDURE — 99214 OFFICE O/P EST MOD 30 MIN: CPT

## 2024-08-07 RX ORDER — METHYLPREDNISOLONE ACETATE 80 MG/ML
80 INJECTION, SUSPENSION INTRA-ARTICULAR; INTRALESIONAL; INTRAMUSCULAR; SOFT TISSUE ONCE
Status: COMPLETED | OUTPATIENT
Start: 2024-08-07 | End: 2024-08-07

## 2024-08-07 RX ORDER — BUPIVACAINE HYDROCHLORIDE 2.5 MG/ML
4 INJECTION, SOLUTION INFILTRATION; PERINEURAL ONCE
Status: COMPLETED | OUTPATIENT
Start: 2024-08-07 | End: 2024-08-07

## 2024-08-07 RX ADMIN — BUPIVACAINE HYDROCHLORIDE 10 MG: 2.5 INJECTION, SOLUTION INFILTRATION; PERINEURAL at 09:13

## 2024-08-07 RX ADMIN — METHYLPREDNISOLONE ACETATE 80 MG: 80 INJECTION, SUSPENSION INTRA-ARTICULAR; INTRALESIONAL; INTRAMUSCULAR; SOFT TISSUE at 09:15

## 2024-08-07 RX ADMIN — METHYLPREDNISOLONE ACETATE 80 MG: 80 INJECTION, SUSPENSION INTRA-ARTICULAR; INTRALESIONAL; INTRAMUSCULAR; SOFT TISSUE at 09:16

## 2024-08-07 ASSESSMENT — ENCOUNTER SYMPTOMS
COLOR CHANGE: 0
BACK PAIN: 1

## 2024-08-07 NOTE — PROGRESS NOTES
[facility]  [address]  [place] [address]  Dept: [phone]  Dept Fax: [phone]        Ambulatory Follow Up      Subjective:   Agueda Rain is a 63 y.o. year old female who presents to our office today for routine followup regarding her   1. Greater trochanteric bursitis of left hip    2. Hip pain, left    3. Primary osteoarthritis of left hip    4. Bilateral hip pain    5. Greater trochanteric bursitis of right hip    .    Chief Complaint   Patient presents with    Hip Pain     L Hip Pain, Fall on 6/6/24       Date of Injury: 6/6/24    HPI Agueda Rain  is a 63 y.o.  female who presents today in follow for her left hip pain.  The patient was last seen on 8/17/2022 and underwent treatment in the form of a left hip greater trochanteric bursa injection.  She states that she has had several injections for both hip greater trochanteric bursa's in the past.  She is a previous patient of Dr. Irvin Wong DO.  She states that she had a fall on 6/7/2024 where she tripped in her dining room and fell onto the left hip.  She states that she has had increased pain throughout the left hip since the fall.  She also has had ongoing right lateral hip pain.  She states that the pain is most noticeable with walking up stairs and with laying onto either hip at night.  She also has a difficult time with walking.  She has completed physical therapy in the past for both hips.  She is currently taking Tylenol arthritis for pain and doing Epsom salt baths.    Review of Systems   Musculoskeletal:  Positive for arthralgias, back pain and gait problem. Negative for joint swelling.   Skin:  Negative for color change and rash.   Neurological:  Positive for weakness. Negative for numbness.         Objective :   Ht 1.651 m (5' 5\")   Wt 92.5 kg (204 lb)   BMI 33.95 kg/m²  Body mass index is 33.95 kg/m².  General:

## 2024-08-07 NOTE — PATIENT INSTRUCTIONS
CORTISONE INJECTION CARE    The injection site should never get red, hot, or swollen and if it does the patient will contact our office right away. The patient may experience a increase in soreness the first 24-48 hours due to a cortisone flair and can take anti-inflammatories for a short period of time to reduce that soreness. The patient should not submerge the injection site in water for a minimum of 24 hours to avoid infection. This means no lakes, pools, ponds, or hot tubs for 24 hours. If the patient is diabetic the injection may increase their blood sugar for up to one week. The patient can do this cortisone injection once every 4 months as needed.

## 2024-08-08 DIAGNOSIS — R94.31 ABNORMAL EKG: Primary | ICD-10-CM

## 2024-08-08 DIAGNOSIS — I10 ESSENTIAL HYPERTENSION: ICD-10-CM

## 2024-08-08 DIAGNOSIS — E78.5 DYSLIPIDEMIA: ICD-10-CM

## 2024-08-08 LAB — ZINC SERPL-MCNC: 86.6 UG/DL (ref 60–120)

## 2024-08-10 ENCOUNTER — APPOINTMENT (OUTPATIENT)
Dept: GENERAL RADIOLOGY | Age: 64
DRG: 192 | End: 2024-08-10
Payer: COMMERCIAL

## 2024-08-10 ENCOUNTER — HOSPITAL ENCOUNTER (INPATIENT)
Age: 64
LOS: 1 days | Discharge: HOME | DRG: 192 | End: 2024-08-13
Attending: EMERGENCY MEDICINE | Admitting: INTERNAL MEDICINE
Payer: COMMERCIAL

## 2024-08-10 DIAGNOSIS — R07.9 CHEST PAIN, UNSPECIFIED TYPE: Primary | ICD-10-CM

## 2024-08-10 DIAGNOSIS — R94.39 POSITIVE CARDIAC STRESS TEST: ICD-10-CM

## 2024-08-10 DIAGNOSIS — I20.9 ANGINA PECTORIS (HCC): ICD-10-CM

## 2024-08-10 DIAGNOSIS — J43.2 CENTRILOBULAR EMPHYSEMA (HCC): ICD-10-CM

## 2024-08-10 DIAGNOSIS — J21.8 ACUTE BRONCHIOLITIS DUE TO OTHER SPECIFIED ORGANISMS: ICD-10-CM

## 2024-08-10 PROBLEM — R00.1 BRADYCARDIA: Status: ACTIVE | Noted: 2024-08-10

## 2024-08-10 LAB
ANION GAP SERPL CALCULATED.3IONS-SCNC: 13 MMOL/L (ref 9–17)
BASOPHILS # BLD: 0.03 K/UL (ref 0–0.2)
BASOPHILS NFR BLD: 0 % (ref 0–2)
BUN SERPL-MCNC: 14 MG/DL (ref 8–23)
BUN/CREAT SERPL: 18 (ref 9–20)
CALCIUM SERPL-MCNC: 8.9 MG/DL (ref 8.6–10.4)
CHLORIDE SERPL-SCNC: 99 MMOL/L (ref 98–107)
CO2 SERPL-SCNC: 23 MMOL/L (ref 20–31)
CREAT SERPL-MCNC: 0.8 MG/DL (ref 0.5–0.9)
D DIMER PPP FEU-MCNC: 0.45 UG/ML FEU (ref 0–0.59)
EOSINOPHIL # BLD: <0.03 K/UL (ref 0–0.44)
EOSINOPHILS RELATIVE PERCENT: 0 % (ref 1–4)
ERYTHROCYTE [DISTWIDTH] IN BLOOD BY AUTOMATED COUNT: 13.2 % (ref 11.8–14.4)
GFR, ESTIMATED: 83 ML/MIN/1.73M2
GLUCOSE SERPL-MCNC: 89 MG/DL (ref 70–99)
HCT VFR BLD AUTO: 41.3 % (ref 36.3–47.1)
HGB BLD-MCNC: 13.7 G/DL (ref 11.9–15.1)
IMM GRANULOCYTES # BLD AUTO: 0.07 K/UL (ref 0–0.3)
IMM GRANULOCYTES NFR BLD: 1 %
LYMPHOCYTES NFR BLD: 1.9 K/UL (ref 1.1–3.7)
LYMPHOCYTES RELATIVE PERCENT: 20 % (ref 24–43)
MAGNESIUM SERPL-MCNC: 2.3 MG/DL (ref 1.6–2.6)
MCH RBC QN AUTO: 28.7 PG (ref 25.2–33.5)
MCHC RBC AUTO-ENTMCNC: 33.2 G/DL (ref 28.4–34.8)
MCV RBC AUTO: 86.4 FL (ref 82.6–102.9)
MONOCYTES NFR BLD: 0.67 K/UL (ref 0.1–1.2)
MONOCYTES NFR BLD: 7 % (ref 3–12)
NEUTROPHILS NFR BLD: 72 % (ref 36–65)
NEUTS SEG NFR BLD: 7.07 K/UL (ref 1.5–8.1)
NRBC BLD-RTO: 0 PER 100 WBC
PLATELET # BLD AUTO: 270 K/UL (ref 138–453)
PMV BLD AUTO: 9.3 FL (ref 8.1–13.5)
POTASSIUM SERPL-SCNC: 3.8 MMOL/L (ref 3.7–5.3)
RBC # BLD AUTO: 4.78 M/UL (ref 3.95–5.11)
SODIUM SERPL-SCNC: 135 MMOL/L (ref 135–144)
TROPONIN I SERPL HS-MCNC: <6 NG/L (ref 0–14)
TROPONIN I SERPL HS-MCNC: <6 NG/L (ref 0–14)
WBC OTHER # BLD: 9.8 K/UL (ref 3.5–11.3)

## 2024-08-10 PROCEDURE — 96375 TX/PRO/DX INJ NEW DRUG ADDON: CPT

## 2024-08-10 PROCEDURE — 84484 ASSAY OF TROPONIN QUANT: CPT

## 2024-08-10 PROCEDURE — 96374 THER/PROPH/DIAG INJ IV PUSH: CPT

## 2024-08-10 PROCEDURE — 99222 1ST HOSP IP/OBS MODERATE 55: CPT

## 2024-08-10 PROCEDURE — G0378 HOSPITAL OBSERVATION PER HR: HCPCS

## 2024-08-10 PROCEDURE — 85025 COMPLETE CBC W/AUTO DIFF WBC: CPT

## 2024-08-10 PROCEDURE — 83735 ASSAY OF MAGNESIUM: CPT

## 2024-08-10 PROCEDURE — 80048 BASIC METABOLIC PNL TOTAL CA: CPT

## 2024-08-10 PROCEDURE — 99285 EMERGENCY DEPT VISIT HI MDM: CPT

## 2024-08-10 PROCEDURE — 85379 FIBRIN DEGRADATION QUANT: CPT

## 2024-08-10 PROCEDURE — 6360000002 HC RX W HCPCS: Performed by: EMERGENCY MEDICINE

## 2024-08-10 PROCEDURE — 71045 X-RAY EXAM CHEST 1 VIEW: CPT

## 2024-08-10 PROCEDURE — 2580000003 HC RX 258

## 2024-08-10 PROCEDURE — 6370000000 HC RX 637 (ALT 250 FOR IP)

## 2024-08-10 PROCEDURE — 93005 ELECTROCARDIOGRAM TRACING: CPT | Performed by: EMERGENCY MEDICINE

## 2024-08-10 PROCEDURE — 6370000000 HC RX 637 (ALT 250 FOR IP): Performed by: EMERGENCY MEDICINE

## 2024-08-10 RX ORDER — POTASSIUM CHLORIDE 7.45 MG/ML
10 INJECTION INTRAVENOUS PRN
Status: DISCONTINUED | OUTPATIENT
Start: 2024-08-10 | End: 2024-08-13 | Stop reason: HOSPADM

## 2024-08-10 RX ORDER — SODIUM CHLORIDE 0.9 % (FLUSH) 0.9 %
10 SYRINGE (ML) INJECTION PRN
Status: DISCONTINUED | OUTPATIENT
Start: 2024-08-10 | End: 2024-08-13 | Stop reason: HOSPADM

## 2024-08-10 RX ORDER — SODIUM CHLORIDE 9 MG/ML
INJECTION, SOLUTION INTRAVENOUS PRN
Status: DISCONTINUED | OUTPATIENT
Start: 2024-08-10 | End: 2024-08-13 | Stop reason: HOSPADM

## 2024-08-10 RX ORDER — ASPIRIN 81 MG/1
324 TABLET, CHEWABLE ORAL ONCE
Status: COMPLETED | OUTPATIENT
Start: 2024-08-10 | End: 2024-08-10

## 2024-08-10 RX ORDER — SODIUM CHLORIDE 0.9 % (FLUSH) 0.9 %
5-40 SYRINGE (ML) INJECTION EVERY 12 HOURS SCHEDULED
Status: DISCONTINUED | OUTPATIENT
Start: 2024-08-10 | End: 2024-08-13 | Stop reason: HOSPADM

## 2024-08-10 RX ORDER — ACETAMINOPHEN 325 MG/1
650 TABLET ORAL EVERY 6 HOURS PRN
Status: DISCONTINUED | OUTPATIENT
Start: 2024-08-10 | End: 2024-08-13 | Stop reason: HOSPADM

## 2024-08-10 RX ORDER — MORPHINE SULFATE 4 MG/ML
4 INJECTION, SOLUTION INTRAMUSCULAR; INTRAVENOUS ONCE
Status: COMPLETED | OUTPATIENT
Start: 2024-08-10 | End: 2024-08-10

## 2024-08-10 RX ORDER — ASPIRIN 81 MG/1
81 TABLET, CHEWABLE ORAL DAILY
Status: DISCONTINUED | OUTPATIENT
Start: 2024-08-11 | End: 2024-08-13 | Stop reason: HOSPADM

## 2024-08-10 RX ORDER — ONDANSETRON 2 MG/ML
4 INJECTION INTRAMUSCULAR; INTRAVENOUS EVERY 6 HOURS PRN
Status: DISCONTINUED | OUTPATIENT
Start: 2024-08-10 | End: 2024-08-13 | Stop reason: HOSPADM

## 2024-08-10 RX ORDER — ENOXAPARIN SODIUM 100 MG/ML
40 INJECTION SUBCUTANEOUS DAILY
Status: DISCONTINUED | OUTPATIENT
Start: 2024-08-11 | End: 2024-08-13 | Stop reason: HOSPADM

## 2024-08-10 RX ORDER — ACETAMINOPHEN 650 MG/1
650 SUPPOSITORY RECTAL EVERY 6 HOURS PRN
Status: DISCONTINUED | OUTPATIENT
Start: 2024-08-10 | End: 2024-08-13 | Stop reason: HOSPADM

## 2024-08-10 RX ORDER — POTASSIUM CHLORIDE 20 MEQ/1
40 TABLET, EXTENDED RELEASE ORAL PRN
Status: DISCONTINUED | OUTPATIENT
Start: 2024-08-10 | End: 2024-08-13 | Stop reason: HOSPADM

## 2024-08-10 RX ORDER — ONDANSETRON 4 MG/1
4 TABLET, ORALLY DISINTEGRATING ORAL EVERY 8 HOURS PRN
Status: DISCONTINUED | OUTPATIENT
Start: 2024-08-10 | End: 2024-08-13 | Stop reason: HOSPADM

## 2024-08-10 RX ORDER — MAGNESIUM SULFATE 1 G/100ML
1000 INJECTION INTRAVENOUS PRN
Status: DISCONTINUED | OUTPATIENT
Start: 2024-08-10 | End: 2024-08-13 | Stop reason: HOSPADM

## 2024-08-10 RX ORDER — ATORVASTATIN CALCIUM 40 MG/1
40 TABLET, FILM COATED ORAL NIGHTLY
Status: DISCONTINUED | OUTPATIENT
Start: 2024-08-10 | End: 2024-08-13 | Stop reason: HOSPADM

## 2024-08-10 RX ORDER — ONDANSETRON 2 MG/ML
4 INJECTION INTRAMUSCULAR; INTRAVENOUS ONCE
Status: COMPLETED | OUTPATIENT
Start: 2024-08-10 | End: 2024-08-10

## 2024-08-10 RX ORDER — HYDRALAZINE HYDROCHLORIDE 10 MG/1
10 TABLET, FILM COATED ORAL EVERY 8 HOURS SCHEDULED
Status: DISCONTINUED | OUTPATIENT
Start: 2024-08-10 | End: 2024-08-11

## 2024-08-10 RX ORDER — NITROGLYCERIN 0.4 MG/1
0.4 TABLET SUBLINGUAL EVERY 5 MIN PRN
Status: DISCONTINUED | OUTPATIENT
Start: 2024-08-10 | End: 2024-08-13 | Stop reason: HOSPADM

## 2024-08-10 RX ADMIN — SODIUM CHLORIDE, PRESERVATIVE FREE 10 ML: 5 INJECTION INTRAVENOUS at 22:20

## 2024-08-10 RX ADMIN — ATORVASTATIN CALCIUM 40 MG: 40 TABLET, FILM COATED ORAL at 22:20

## 2024-08-10 RX ADMIN — ASPIRIN 81 MG CHEWABLE TABLET 324 MG: 81 TABLET CHEWABLE at 18:47

## 2024-08-10 RX ADMIN — HYDRALAZINE HYDROCHLORIDE 10 MG: 10 TABLET, FILM COATED ORAL at 22:20

## 2024-08-10 RX ADMIN — MORPHINE SULFATE 4 MG: 4 INJECTION, SOLUTION INTRAMUSCULAR; INTRAVENOUS at 18:47

## 2024-08-10 RX ADMIN — ONDANSETRON 4 MG: 2 INJECTION INTRAMUSCULAR; INTRAVENOUS at 18:47

## 2024-08-10 ASSESSMENT — PAIN SCALES - GENERAL
PAINLEVEL_OUTOF10: 2
PAINLEVEL_OUTOF10: 5

## 2024-08-10 ASSESSMENT — HEART SCORE: ECG: NORMAL

## 2024-08-10 NOTE — ED NOTES
Pt presents to ER from home due to chest pain.Pt states chest pain started within the last week.Pt states intermediate spasms located to her left breast. Pt states she she recently had a EKG and lab work done. Pt states she has a low HR.pt denies SOB or difficulty breathing. Pt is A/O x 4, equal chest expansion with non labored breathing, wheels locked, bed in lowest position, call light in reach.

## 2024-08-10 NOTE — ED PROVIDER NOTES
EMERGENCY DEPARTMENT ENCOUNTER    Pt Name: Agueda Rain  MRN: [id number]  Birthdate 1960  Date of evaluation: 8/10/24  CHIEF COMPLAINT       Chief Complaint   Patient presents with    Chest Pain     Pt states she has a sharp pain in her Lt chest/shoulder 2 twice today.     HISTORY OF PRESENT ILLNESS   HPI   The patient is a 63-year-old female with a history of hypertension who presented to the emergency department secondary to chest pain and shortness of breath.  Patient complains of chest pain that started yesterday, while at rest.  Pain localized to the left side of her chest increases with inspiration is gotten progressively worse.  Of note, patient had an outpatient EKG this week and has an appointment scheduled with Dr. Szymanski cardiologist to establish care.  She had a stress test several years ago.  Patient has a history of hypertension.  She had a previous history of diabetes or hyperlipidemia.  Patient denied nausea, vomiting, fevers or chills      REVIEW OF SYSTEMS     Review of Systems   Constitutional:  Negative for chills, diaphoresis and fever.   HENT:  Negative for congestion, ear pain and facial swelling.    Eyes:  Negative for pain, discharge and visual disturbance.   Respiratory:  Positive for shortness of breath. Negative for chest tightness.    Cardiovascular:  Positive for chest pain. Negative for palpitations.   Gastrointestinal:  Negative for abdominal distention and abdominal pain.   Genitourinary:  Negative for difficulty urinating and flank pain.   Musculoskeletal:  Negative for back pain.   Skin:  Negative for wound.   Neurological:  Negative for dizziness, light-headedness and headaches.     PASTMEDICAL HISTORY     Past Medical History:   Diagnosis Date    Essential hypertension 07/27/2020    Headache     Hematuria     Hepatitis-C     History of colon polyps     Hypertension     Lung disease     Mitral valve regurgitation     Obesity (BMI 30-39.9) 07/27/2020    AURELIANO on CPAP 12/04/2020

## 2024-08-11 LAB
ANION GAP SERPL CALCULATED.3IONS-SCNC: 9 MMOL/L (ref 9–17)
BASOPHILS # BLD: 0.03 K/UL (ref 0–0.2)
BASOPHILS NFR BLD: 0 % (ref 0–2)
BUN SERPL-MCNC: 13 MG/DL (ref 8–23)
BUN/CREAT SERPL: 16 (ref 9–20)
CALCIUM SERPL-MCNC: 9.4 MG/DL (ref 8.6–10.4)
CHLORIDE SERPL-SCNC: 101 MMOL/L (ref 98–107)
CHOLEST SERPL-MCNC: 212 MG/DL (ref 0–199)
CHOLESTEROL/HDL RATIO: 3
CO2 SERPL-SCNC: 31 MMOL/L (ref 20–31)
CREAT SERPL-MCNC: 0.8 MG/DL (ref 0.5–0.9)
EOSINOPHIL # BLD: 0.03 K/UL (ref 0–0.44)
EOSINOPHILS RELATIVE PERCENT: 0 % (ref 1–4)
ERYTHROCYTE [DISTWIDTH] IN BLOOD BY AUTOMATED COUNT: 13.2 % (ref 11.8–14.4)
GFR, ESTIMATED: 83 ML/MIN/1.73M2
GLUCOSE SERPL-MCNC: 88 MG/DL (ref 70–99)
HCT VFR BLD AUTO: 44.9 % (ref 36.3–47.1)
HDLC SERPL-MCNC: 68 MG/DL
HGB BLD-MCNC: 14.7 G/DL (ref 11.9–15.1)
IMM GRANULOCYTES # BLD AUTO: 0.04 K/UL (ref 0–0.3)
IMM GRANULOCYTES NFR BLD: 0 %
LDLC SERPL CALC-MCNC: 123 MG/DL (ref 0–100)
LYMPHOCYTES NFR BLD: 2.03 K/UL (ref 1.1–3.7)
LYMPHOCYTES RELATIVE PERCENT: 21 % (ref 24–43)
MAGNESIUM SERPL-MCNC: 2.5 MG/DL (ref 1.6–2.6)
MCH RBC QN AUTO: 28.6 PG (ref 25.2–33.5)
MCHC RBC AUTO-ENTMCNC: 32.7 G/DL (ref 28.4–34.8)
MCV RBC AUTO: 87.4 FL (ref 82.6–102.9)
MONOCYTES NFR BLD: 0.6 K/UL (ref 0.1–1.2)
MONOCYTES NFR BLD: 6 % (ref 3–12)
NEUTROPHILS NFR BLD: 73 % (ref 36–65)
NEUTS SEG NFR BLD: 6.76 K/UL (ref 1.5–8.1)
NRBC BLD-RTO: 0 PER 100 WBC
PLATELET # BLD AUTO: 286 K/UL (ref 138–453)
PMV BLD AUTO: 9.4 FL (ref 8.1–13.5)
POTASSIUM SERPL-SCNC: 4.1 MMOL/L (ref 3.7–5.3)
RBC # BLD AUTO: 5.14 M/UL (ref 3.95–5.11)
SODIUM SERPL-SCNC: 141 MMOL/L (ref 135–144)
TRIGL SERPL-MCNC: 108 MG/DL
VLDLC SERPL CALC-MCNC: 22 MG/DL
WBC OTHER # BLD: 9.5 K/UL (ref 3.5–11.3)

## 2024-08-11 PROCEDURE — 6370000000 HC RX 637 (ALT 250 FOR IP): Performed by: INTERNAL MEDICINE

## 2024-08-11 PROCEDURE — 2580000003 HC RX 258

## 2024-08-11 PROCEDURE — 80048 BASIC METABOLIC PNL TOTAL CA: CPT

## 2024-08-11 PROCEDURE — 85025 COMPLETE CBC W/AUTO DIFF WBC: CPT

## 2024-08-11 PROCEDURE — 96375 TX/PRO/DX INJ NEW DRUG ADDON: CPT

## 2024-08-11 PROCEDURE — 6370000000 HC RX 637 (ALT 250 FOR IP): Performed by: NURSE PRACTITIONER

## 2024-08-11 PROCEDURE — 36415 COLL VENOUS BLD VENIPUNCTURE: CPT

## 2024-08-11 PROCEDURE — 80061 LIPID PANEL: CPT

## 2024-08-11 PROCEDURE — 99232 SBSQ HOSP IP/OBS MODERATE 35: CPT | Performed by: NURSE PRACTITIONER

## 2024-08-11 PROCEDURE — 6360000002 HC RX W HCPCS: Performed by: NURSE PRACTITIONER

## 2024-08-11 PROCEDURE — 83735 ASSAY OF MAGNESIUM: CPT

## 2024-08-11 PROCEDURE — 6370000000 HC RX 637 (ALT 250 FOR IP)

## 2024-08-11 PROCEDURE — 94761 N-INVAS EAR/PLS OXIMETRY MLT: CPT

## 2024-08-11 PROCEDURE — G0378 HOSPITAL OBSERVATION PER HR: HCPCS

## 2024-08-11 RX ORDER — CLONIDINE HYDROCHLORIDE 0.1 MG/1
0.1 TABLET ORAL 2 TIMES DAILY
Status: DISCONTINUED | OUTPATIENT
Start: 2024-08-11 | End: 2024-08-11

## 2024-08-11 RX ORDER — HYDRALAZINE HYDROCHLORIDE 50 MG/1
50 TABLET, FILM COATED ORAL EVERY 8 HOURS SCHEDULED
Status: DISCONTINUED | OUTPATIENT
Start: 2024-08-11 | End: 2024-08-11

## 2024-08-11 RX ORDER — CYCLOBENZAPRINE HCL 10 MG
10 TABLET ORAL 3 TIMES DAILY PRN
Status: DISCONTINUED | OUTPATIENT
Start: 2024-08-11 | End: 2024-08-13 | Stop reason: HOSPADM

## 2024-08-11 RX ORDER — CLONIDINE HYDROCHLORIDE 0.2 MG/1
0.2 TABLET ORAL 2 TIMES DAILY
Status: DISCONTINUED | OUTPATIENT
Start: 2024-08-11 | End: 2024-08-11

## 2024-08-11 RX ORDER — HYDRALAZINE HYDROCHLORIDE 25 MG/1
25 TABLET, FILM COATED ORAL EVERY 8 HOURS PRN
Status: DISCONTINUED | OUTPATIENT
Start: 2024-08-11 | End: 2024-08-12

## 2024-08-11 RX ORDER — HYDRALAZINE HYDROCHLORIDE 20 MG/ML
10 INJECTION INTRAMUSCULAR; INTRAVENOUS EVERY 6 HOURS PRN
Status: DISCONTINUED | OUTPATIENT
Start: 2024-08-11 | End: 2024-08-13 | Stop reason: HOSPADM

## 2024-08-11 RX ORDER — LOSARTAN POTASSIUM 25 MG/1
25 TABLET ORAL DAILY
Status: DISCONTINUED | OUTPATIENT
Start: 2024-08-11 | End: 2024-08-13 | Stop reason: HOSPADM

## 2024-08-11 RX ADMIN — LOSARTAN POTASSIUM 25 MG: 25 TABLET, FILM COATED ORAL at 16:19

## 2024-08-11 RX ADMIN — CYCLOBENZAPRINE 10 MG: 10 TABLET, FILM COATED ORAL at 16:19

## 2024-08-11 RX ADMIN — ATORVASTATIN CALCIUM 40 MG: 40 TABLET, FILM COATED ORAL at 19:44

## 2024-08-11 RX ADMIN — HYDRALAZINE HYDROCHLORIDE 10 MG: 10 TABLET, FILM COATED ORAL at 05:13

## 2024-08-11 RX ADMIN — ASPIRIN 81 MG CHEWABLE TABLET 81 MG: 81 TABLET CHEWABLE at 09:13

## 2024-08-11 RX ADMIN — SODIUM CHLORIDE, PRESERVATIVE FREE 10 ML: 5 INJECTION INTRAVENOUS at 19:46

## 2024-08-11 RX ADMIN — HYDRALAZINE HYDROCHLORIDE 10 MG: 20 INJECTION INTRAMUSCULAR; INTRAVENOUS at 00:31

## 2024-08-11 RX ADMIN — SODIUM CHLORIDE, PRESERVATIVE FREE 10 ML: 5 INJECTION INTRAVENOUS at 09:14

## 2024-08-11 RX ADMIN — CLONIDINE HYDROCHLORIDE 0.2 MG: 0.2 TABLET ORAL at 09:13

## 2024-08-11 ASSESSMENT — PAIN DESCRIPTION - ORIENTATION: ORIENTATION: LEFT

## 2024-08-11 ASSESSMENT — PAIN SCALES - GENERAL
PAINLEVEL_OUTOF10: 6
PAINLEVEL_OUTOF10: 0

## 2024-08-11 ASSESSMENT — PAIN DESCRIPTION - DESCRIPTORS: DESCRIPTORS: SPASM

## 2024-08-11 ASSESSMENT — PAIN - FUNCTIONAL ASSESSMENT: PAIN_FUNCTIONAL_ASSESSMENT: ACTIVITIES ARE NOT PREVENTED

## 2024-08-11 ASSESSMENT — PAIN DESCRIPTION - LOCATION: LOCATION: CHEST

## 2024-08-11 NOTE — PLAN OF CARE
Patient admitted for chest pain and htn   HTN medication 0.2 Catapres added, other htn meds discontinued   Dr.Maly andrew jordanaprkevin and added losartan this afternoon   Plan is to have an echo and stress tomorrow       Problem: Discharge Planning  Goal: Discharge to home or other facility with appropriate resources  8/11/2024 1206 by Nancy Lanier, RN  Outcome: Progressing     Problem: Pain  Goal: Verbalizes/displays adequate comfort level or baseline comfort level  8/11/2024 1206 by Nancy Lanier RN  Outcome: Progressing  Patient having pain on their chest and rates it a 6. Pain interventions includemedication. Patients goal for pain relief is  0 . The need for pain and symptom management will be considered in the discharge planning process to ensure patients comfort.    Problem: Safety - Adult  Goal: Free from fall injury  8/11/2024 1206 by Nancy Lanier, RN  Outcome: Progressing     Problem: ABCDS Injury Assessment  Goal: Absence of physical injury  8/11/2024 1206 by Nancy Lanier, RN  Outcome: Progressing

## 2024-08-11 NOTE — PROGRESS NOTES
[facility]  Office: [phone]  Lasha Rodas DO, Chito Agosto DO, Drew Jj DO, Bernabe Post DO, Rocío Vaca MD, Ashleigh Ospina MD, Kelley Stone MD, Ximena Jackson MD,  Jeffrey Marshall MD, Jose Humphrey MD, Jamil Kruger MD,  Giovanny Goldberg DO, Nataliya Valentin MD, Dru Higuera MD, Suhas Rodas DO, Shikha Banerjee MD,  Gentry Tran DO, Chante Ellison MD, Beatrice Atkins MD, Joaquina Maria MD, Duarte Rashid MD,  Mikey Jackson MD, Teofilo Paez MD, Ondina Crook MD, Shyann Choi MD, Ramy Duran MD, Francisco J Hylton MD, Olvin Maldonado DO, Daniel Willoughby DO, Melinda Connolly MD,  Cruz Griffin MD, Shirley Waterhouse, CNP,  Hayley Easton CNP, Abraham Joseph, CNP,  Olena Solis, DNP, Eliz Meyer, CNP, Chelsea Lobo, CNP, Jessica Braun CNP, Yoselin Armendariz, CNP, Katharina Whelan, PA-C, Lauren Fitzpatrick PA-C, Layne Herrera, CNP, Ld Land, CNP, Laverne Gardner, CNP, Cristina Munroe, CNP, Iveth Garduno, CNS, Amy Hare, CNP, Kristen Curiel CNP, Tracy Schwab, CNP         [facility]   IN-PATIENT SERVICE   [facility]    Progress Note    8/11/2024    12:06 PM    Name:   Agueda Rain  MRN:     [id number]     Acct:      [id number]   Room:   1006/1006-02   Day:  0  Admit Date:  8/10/2024  6:20 PM    PCP:   Jessee Pitts MD  Code Status:  Full Code    Subjective:     C/C:   Chief Complaint   Patient presents with    Chest Pain     Pt states she has a sharp pain in her Lt chest/shoulder 2 twice today.     Interval History Status: improved.     Patient has intermittent chest \"spasms\" in the left upper chest/left shoulder.  Troponins normal.  No clear indication this cardiac cause however she does have risk factors.  Options for care discussed and patient is not comfortable pursuing outpatient treatment modality.  She will remain in the hospital for stress test and echo.    Brief History:     8/10 - Patient presents to the emergency  Hematuria, Hepatitis-C, History of colon polyps, Hypertension, Lung disease, Mitral valve regurgitation, Obesity (BMI 30-39.9), and AURELIANO on CPAP.    Social History:   reports that she quit smoking about 4 years ago. Her smoking use included cigarettes. She started smoking about 24 years ago. She has a 20 pack-year smoking history. She has never used smokeless tobacco. She reports that she does not drink alcohol and does not use drugs.     Family History:   Family History   Problem Relation Age of Onset    High Blood Pressure Mother     Other Mother         ALZHEIMERS    Cancer Father         BONE AND PROSTRATE    Prostate Cancer Brother        Vitals:  /69   Pulse (!) 45   Temp 97.5 °F (36.4 °C) (Oral)   Resp 14   Ht 1.651 m (5' 5\")   Wt 92.5 kg (204 lb)   SpO2 96%   BMI 33.95 kg/m²   Temp (24hrs), Av.7 °F (36.5 °C), Min:97.5 °F (36.4 °C), Max:98.5 °F (36.9 °C)    No results for input(s): \"POCGLU\" in the last 72 hours.    I/O (24Hr):    Intake/Output Summary (Last 24 hours) at 2024 1206  Last data filed at 8/10/2024 2251  Gross per 24 hour   Intake 420 ml   Output --   Net 420 ml       Labs:  Hematology:  Recent Labs     08/10/24  1834 08/11/24  0656   WBC 9.8 9.5   RBC 4.78 5.14*   HGB 13.7 14.7   HCT 41.3 44.9   MCV 86.4 87.4   MCH 28.7 28.6   MCHC 33.2 32.7   RDW 13.2 13.2    286   MPV 9.3 9.4   DDIMER 0.45  --      Chemistry:  Recent Labs     08/10/24  1834 08/10/24  2020 08/11/24  0656     --  141   K 3.8  --  4.1   CL 99  --  101   CO2 23  --  31   GLUCOSE 89  --  88   BUN 14  --  13   CREATININE 0.8  --  0.8   MG 2.3  --  2.5   ANIONGAP 13  --  9   LABGLOM 83  --  83   CALCIUM 8.9  --  9.4   TROPHS <6 <6  --      Recent Labs     24  0656   CHOL 212*   HDL 68   CHOLHDLRATIO 3.0   TRIG 108   VLDL 22     ABG:No results found for: \"POCPH\", \"PHART\", \"PH\", \"POCPCO2\", \"IIN9XSZ\", \"PCO2\", \"POCPO2\", \"PO2ART\", \"PO2\", \"POCHCO3\", \"YLE1LIZ\", \"HCO3\", \"NBEA\", \"PBEA\", \"BEART\", \"BE\",

## 2024-08-11 NOTE — CARE COORDINATION
Case Management Assessment  Initial Evaluation    Date/Time of Evaluation: 8/11/2024 3:23 PM  Assessment Completed by: KRISTEN NEGRON RN    If patient is discharged prior to next notation, then this note serves as note for discharge by case management.    Patient Name: Agueda Rain                   YOB: 1960  Diagnosis: Chest pain [R07.9]  Chest pain, unspecified type [R07.9]                   Date / Time: 8/10/2024  6:20 PM    Patient Admission Status: Observation   Readmission Risk (Low < 19, Mod (19-27), High > 27): No data recorded  Current PCP: Jessee Pitts MD  PCP verified by CM? Yes    Chart Reviewed: Yes      History Provided by: Patient  Patient Orientation: Alert and Oriented    Patient Cognition: Alert    Hospitalization in the last 30 days (Readmission):  No    If yes, Readmission Assessment in  Navigator will be completed.    Advance Directives:      Code Status: Full Code   Patient's Primary Decision Maker is: Legal Next of Kin      Discharge Planning:    Patient lives with: Alone Type of Home: House  Primary Care Giver: Self  Patient Support Systems include: Family Members   Current Financial resources: Medicaid  Current community resources: None  Current services prior to admission: C-pap, Durable Medical Equipment            Current DME: Home Aerosol            Type of Home Care services:  None    ADLS  Prior functional level: Independent in ADLs/IADLs  Current functional level: Independent in ADLs/IADLs    PT AM-PAC:   /24  OT AM-PAC:   /24    Family can provide assistance at DC: Yes  Would you like Case Management to discuss the discharge plan with any other family members/significant others, and if so, who? No  Plans to Return to Present Housing: Yes  Other Identified Issues/Barriers to RETURNING to current housing: cardiac testing   Potential Assistance needed at discharge: N/A            Potential DME:    Patient expects to discharge to: Apartment (APT

## 2024-08-11 NOTE — H&P
[facility]  Office: [phone]  Lasha Rodas DO, Chito Agosto DO, Drew Jj DO, Bernabe Post DO, Rocío Vaca MD, Ashleigh Ospina MD, Kelley Stone MD, Ximena Jackson MD,  Jeffrey Marshall MD, Jose Humphrey MD, Jamil Kruger MD,  Giovanny Goldberg DO, Nataliya Valentin MD, Dru Higuera MD, Suhas Rodas DO, Shikha Banerjee MD,  Gentry Tran DO, Chante Ellison MD, Beatrice Atkins MD, Joaquina Maria MD, Duarte Rashid MD,  Mikey Jackson MD, Teofilo Paez MD, Ondina Crook MD, Shyann Choi MD, Ramy Duran MD, Francisco J Hylton MD, Olvin Maldonado DO, Daniel Willoughby DO, Melinda Connolly MD,  Cruz Griffin MD, Shirley Waterhouse, CNP,  Hayley Easton, CNP, Abraham Joseph, CNP,  Olena Solis, DNP, Eliz Meyer, CNP, Chelsea Lobo, CNP, Cristina Munroe CNP, Jessica Braun, CNP, Yoselin Armendariz, CNP, Katharina Whelan, PA-C, Lauren Fitzpatrick, PA-C, Layne Herrera, CNP, Soniya Calixto, CNP, Laverne Gardner, CNP, Iveth Garduno, CNS, Amy Hare, CNP, Kristen Curiel, CNP, Tracy Schwab, CNP         [facility]   IN-PATIENT SERVICE   [facility]    HISTORY AND PHYSICAL EXAMINATION            Date:   8/10/2024  Patient name:  Agueda Rain  Date of admission:  8/10/2024  6:20 PM  MRN:   [id number]  Account:  [id number]  YOB: 1960  PCP:    Jessee Pitts MD  Room:   [place] [address]  Code Status:    No Order    Chief Complaint:     Chief Complaint   Patient presents with    Chest Pain     Pt states she has a sharp pain in her Lt chest/shoulder 2 twice today.       History Obtained From:     patient    History of Present Illness:     Agueda Rain is a 63 y.o. Non- / non  female who presents with Chest Pain (Pt states she has a sharp pain in her Lt chest/shoulder 2 twice today.)   and is admitted to the hospital for the management of Chest pain.    Patient presents to the emergency department with complaints of left upper chest pain that  Value Ref Range    Troponin, High Sensitivity <6 0 - 14 ng/L       Imaging/Diagnostics:  XR CHEST PORTABLE    Result Date: 8/10/2024  No radiographic evidence of an acute cardiopulmonary process.       Assessment :      Hospital Problems             Last Modified POA    * (Principal) Chest pain 8/10/2024 Yes    Essential hypertension 8/10/2024 Yes    Dyslipidemia 8/10/2024 Yes    Bradycardia 8/10/2024 Yes       Plan:     Patient status observation in the  Progressive Unit/Step down    Chest pain with bradycardia without concerns for ACS   Cardiology services on consult  Stop beta-blockers as this may be a contributing factor to patient's bradycardia  Repeat troponin x 1- no need to repeat if flat  Check echocardiogram to assess LV function, may benefit from stress test however defer to cardiology  Start Lipitor and baby ASA    Essential hypertension and dyslipidemia  Start hydralazine 10 mg every 8 hours with holding parameters for systolic blood pressure less than 100  Noted patient did not tolerate Lasix, Lisinopril, Losartan or Norvasc in the past   Lipid panel reviewed, starting low dose Lipitor. Dose correction post cardiology work up    Consultations:   IP CONSULT TO CARDIOLOGY  IP CONSULT TO INTERNAL MEDICINE    MIGUEL Mojica - CNP  8/10/2024  9:01 PM    Copy sent to Dr. Pitts, Jessee Rocha MD

## 2024-08-11 NOTE — PROGRESS NOTES
[x] Medication Reconciliation was completed and the patient's home medication list was verified. The Med List Status is \"Complete\". The following sources were used to assist with Medication Reconciliation:    [] Med Rec Pharmacist already completed  [] Patient had a list of medications which was transcribed into the EHR.  [] Patient provided bottles of their medications  [x] Home medications reviewed and confirmed with patient  [] Contacted patient's pharmacy to confirm home medications  [] Contacted patient's physician office to confirm home medications  [] Medical Records from another facility and/or Care Everywhere were reviewed  [] MAR from facility requested to be faxed over  [] Unable to complete due to patient condition  [] Unable to validate med reconciliation      [] There are one or more home medications that need clarification before Medication Reconciliation can be completed. The Med List Status has been marked as In Progress.     To assist with Home Medication Reconciliation the following actions have been taken:    [] Pharmacy medication reconciliation service requested. (Note: This can be done by sending a Perfect Serve message to The Med Rec Pharmacist or by phoning [phone].)  [] Family requested to bring medications into the hospital  [] Family requested to call hospital with medication list  [] Message left with physician office  [] Request for medical records made to   [] Other

## 2024-08-11 NOTE — PROGRESS NOTES
Admitted from ER  to room 1006 .   Pt alert and oriented.   Pt oriented to call light system and agreeable to call for assistance.    Vital signs recorded    Telemetry monitor applied.   Admission documentation completed

## 2024-08-11 NOTE — PLAN OF CARE
Patient A & O x4, ambulates as a standby  Adm for chest pain, patient states it is more of a spasm above left breast.  BP elevated, PRN Hydralazine ordered  Oral Hydralazine Q8H added  Cardiology to see patient tomorrow  Echo ordered  Care ongoing      Problem: Discharge Planning  Goal: Discharge to home or other facility with appropriate resources  Outcome: Progressing     Problem: Pain  Goal: Verbalizes/displays adequate comfort level or baseline comfort level  Outcome: Progressing     Problem: Safety - Adult  Goal: Free from fall injury  Outcome: Progressing     Problem: ABCDS Injury Assessment  Goal: Absence of physical injury  Outcome: Progressing

## 2024-08-11 NOTE — CONSULTS
Cardiovascular Consult Note     TODAY'S DATE: 8/11/2024    Patient name: Agueda Rain   YOB: 1960  Date of admission:  8/10/2024       Patient seen, examined. Previous clinical entries reviewed.  All available laboratory, imaging and ancillary data reviewed.      History of present Illness:     Agueda Rain is a 63 y.o. female with past medical history significant for hypertension who has been having episodes of some chest discomfort off-and-on in the left upper chest that has been happening for the last 2 to 3 weeks.  She started noticing episodes again 2 days prior to coming to the hospital.  On the day of the admission, he she started having recurrent episodes of left upper chest with radiation into the left arm.  It is mild to moderate in intensity without any significant relieving or aggravating factors.  She does not have a strong family history of coronary artery disease.  She does have longstanding hypertension which has been controlled with amlodipine in the past and has been off it due to side effects from the medication.  On arrival to the emergency room, she was noted to be having sinus bradycardia.  She has been on carvedilol prior to presentation blood pressure control.  Her carvedilol was discontinued.  Overall she has not had any repeat episodes of chest pain.  Of note, her cardiac enzymes have been negative.  Her electrocardiogram shows sinus bradycardia with occasional PACs.  There is also evidence of left ventricular hypertrophy on the electrocardiogram.      Past Medical History:    has a past medical history of Essential hypertension, Headache, Hematuria, Hepatitis-C, History of colon polyps, Hypertension, Lung disease, Mitral valve regurgitation, Obesity (BMI 30-39.9), and AURELIANO on CPAP.    Surgical History:     Past Surgical History:   Procedure Laterality Date    CARPAL TUNNEL RELEASE Bilateral     COLONOSCOPY  11/06/2017    TUBULAR ADENOMA    COLONOSCOPY N/A 10/4/2021

## 2024-08-12 ENCOUNTER — APPOINTMENT (OUTPATIENT)
Dept: NUCLEAR MEDICINE | Age: 64
DRG: 192 | End: 2024-08-12
Attending: INTERNAL MEDICINE
Payer: COMMERCIAL

## 2024-08-12 ENCOUNTER — HOSPITAL ENCOUNTER (OUTPATIENT)
Age: 64
Setting detail: OBSERVATION
Discharge: HOME | DRG: 192 | End: 2024-08-14
Attending: INTERNAL MEDICINE
Payer: COMMERCIAL

## 2024-08-12 ENCOUNTER — APPOINTMENT (OUTPATIENT)
Age: 64
DRG: 192 | End: 2024-08-12
Payer: COMMERCIAL

## 2024-08-12 VITALS — SYSTOLIC BLOOD PRESSURE: 135 MMHG | DIASTOLIC BLOOD PRESSURE: 74 MMHG | HEART RATE: 73 BPM | RESPIRATION RATE: 20 BRPM

## 2024-08-12 PROBLEM — E66.9 CLASS 1 OBESITY WITH SERIOUS COMORBIDITY AND BODY MASS INDEX (BMI) OF 33.0 TO 33.9 IN ADULT: Status: ACTIVE | Noted: 2022-04-11

## 2024-08-12 PROBLEM — E66.9 OBESITY (BMI 30-39.9): Status: RESOLVED | Noted: 2020-07-27 | Resolved: 2024-08-12

## 2024-08-12 LAB
ANION GAP SERPL CALCULATED.3IONS-SCNC: 10 MMOL/L (ref 9–17)
BASOPHILS # BLD: <0.03 K/UL (ref 0–0.2)
BASOPHILS NFR BLD: 0 % (ref 0–2)
BUN SERPL-MCNC: 20 MG/DL (ref 8–23)
BUN/CREAT SERPL: 25 (ref 9–20)
CALCIUM SERPL-MCNC: 9 MG/DL (ref 8.6–10.4)
CHLORIDE SERPL-SCNC: 102 MMOL/L (ref 98–107)
CO2 SERPL-SCNC: 29 MMOL/L (ref 20–31)
CREAT SERPL-MCNC: 0.8 MG/DL (ref 0.5–0.9)
ECHO AO ROOT DIAM: 2.6 CM
ECHO AO ROOT INDEX: 1.31 CM/M2
ECHO AV PEAK GRADIENT: 11 MMHG
ECHO AV PEAK VELOCITY: 1.7 M/S
ECHO BSA: 2.06 M2
ECHO LA AREA 4C: 18.2 CM2
ECHO LA DIAMETER INDEX: 1.56 CM/M2
ECHO LA DIAMETER: 3.1 CM
ECHO LA MAJOR AXIS: 6 CM
ECHO LA TO AORTIC ROOT RATIO: 1.19
ECHO LA VOL MOD A4C: 45 ML (ref 22–52)
ECHO LA VOLUME INDEX MOD A4C: 23 ML/M2 (ref 16–34)
ECHO LV E' LATERAL VELOCITY: 6 CM/S
ECHO LV E' SEPTAL VELOCITY: 5 CM/S
ECHO LV FRACTIONAL SHORTENING: 40 % (ref 28–44)
ECHO LV INTERNAL DIMENSION DIASTOLE INDEX: 2.16 CM/M2
ECHO LV INTERNAL DIMENSION DIASTOLIC: 4.3 CM (ref 3.9–5.3)
ECHO LV INTERNAL DIMENSION SYSTOLIC INDEX: 1.31 CM/M2
ECHO LV INTERNAL DIMENSION SYSTOLIC: 2.6 CM
ECHO LV IVSD: 1.2 CM (ref 0.6–0.9)
ECHO LV MASS 2D: 184.7 G (ref 67–162)
ECHO LV MASS INDEX 2D: 92.8 G/M2 (ref 43–95)
ECHO LV POSTERIOR WALL DIASTOLIC: 1.2 CM (ref 0.6–0.9)
ECHO LV RELATIVE WALL THICKNESS RATIO: 0.56
ECHO MV A VELOCITY: 0.64 M/S
ECHO MV E DECELERATION TIME (DT): 261 MS
ECHO MV E VELOCITY: 0.69 M/S
ECHO MV E/A RATIO: 1.08
ECHO MV E/E' LATERAL: 11.5
ECHO MV E/E' RATIO (AVERAGED): 12.65
ECHO MV E/E' SEPTAL: 13.8
EKG ATRIAL RATE: 51 BPM
EKG P AXIS: 39 DEGREES
EKG P-R INTERVAL: 152 MS
EKG Q-T INTERVAL: 412 MS
EKG QRS DURATION: 88 MS
EKG QTC CALCULATION (BAZETT): 379 MS
EKG R AXIS: 0 DEGREES
EKG T AXIS: 65 DEGREES
EKG VENTRICULAR RATE: 51 BPM
EOSINOPHIL # BLD: 0.07 K/UL (ref 0–0.44)
EOSINOPHILS RELATIVE PERCENT: 1 % (ref 1–4)
ERYTHROCYTE [DISTWIDTH] IN BLOOD BY AUTOMATED COUNT: 13.3 % (ref 11.8–14.4)
GFR, ESTIMATED: 83 ML/MIN/1.73M2
GLUCOSE SERPL-MCNC: 86 MG/DL (ref 70–99)
HCT VFR BLD AUTO: 44.4 % (ref 36.3–47.1)
HGB BLD-MCNC: 14.5 G/DL (ref 11.9–15.1)
IMM GRANULOCYTES # BLD AUTO: 0.03 K/UL (ref 0–0.3)
IMM GRANULOCYTES NFR BLD: 0 %
LYMPHOCYTES NFR BLD: 2.53 K/UL (ref 1.1–3.7)
LYMPHOCYTES RELATIVE PERCENT: 29 % (ref 24–43)
MCH RBC QN AUTO: 28.6 PG (ref 25.2–33.5)
MCHC RBC AUTO-ENTMCNC: 32.7 G/DL (ref 28.4–34.8)
MCV RBC AUTO: 87.6 FL (ref 82.6–102.9)
MONOCYTES NFR BLD: 0.59 K/UL (ref 0.1–1.2)
MONOCYTES NFR BLD: 7 % (ref 3–12)
NEUTROPHILS NFR BLD: 63 % (ref 36–65)
NEUTS SEG NFR BLD: 5.61 K/UL (ref 1.5–8.1)
NRBC BLD-RTO: 0 PER 100 WBC
PLATELET # BLD AUTO: 260 K/UL (ref 138–453)
PMV BLD AUTO: 9.6 FL (ref 8.1–13.5)
POTASSIUM SERPL-SCNC: 4.2 MMOL/L (ref 3.7–5.3)
RBC # BLD AUTO: 5.07 M/UL (ref 3.95–5.11)
SODIUM SERPL-SCNC: 141 MMOL/L (ref 135–144)
WBC OTHER # BLD: 8.9 K/UL (ref 3.5–11.3)

## 2024-08-12 PROCEDURE — 6370000000 HC RX 637 (ALT 250 FOR IP): Performed by: NURSE PRACTITIONER

## 2024-08-12 PROCEDURE — G0378 HOSPITAL OBSERVATION PER HR: HCPCS

## 2024-08-12 PROCEDURE — 6360000002 HC RX W HCPCS: Performed by: INTERNAL MEDICINE

## 2024-08-12 PROCEDURE — A9500 TC99M SESTAMIBI: HCPCS | Performed by: INTERNAL MEDICINE

## 2024-08-12 PROCEDURE — 93306 TTE W/DOPPLER COMPLETE: CPT

## 2024-08-12 PROCEDURE — 85025 COMPLETE CBC W/AUTO DIFF WBC: CPT

## 2024-08-12 PROCEDURE — 99232 SBSQ HOSP IP/OBS MODERATE 35: CPT | Performed by: NURSE PRACTITIONER

## 2024-08-12 PROCEDURE — 96376 TX/PRO/DX INJ SAME DRUG ADON: CPT

## 2024-08-12 PROCEDURE — 78452 HT MUSCLE IMAGE SPECT MULT: CPT

## 2024-08-12 PROCEDURE — 93017 CV STRESS TEST TRACING ONLY: CPT

## 2024-08-12 PROCEDURE — 36415 COLL VENOUS BLD VENIPUNCTURE: CPT

## 2024-08-12 PROCEDURE — 6360000002 HC RX W HCPCS: Performed by: NURSE PRACTITIONER

## 2024-08-12 PROCEDURE — 3430000000 HC RX DIAGNOSTIC RADIOPHARMACEUTICAL: Performed by: INTERNAL MEDICINE

## 2024-08-12 PROCEDURE — 80048 BASIC METABOLIC PNL TOTAL CA: CPT

## 2024-08-12 PROCEDURE — 2580000003 HC RX 258

## 2024-08-12 PROCEDURE — 6370000000 HC RX 637 (ALT 250 FOR IP)

## 2024-08-12 RX ORDER — FLUTICASONE PROPIONATE 50 MCG
1 SPRAY, SUSPENSION (ML) NASAL DAILY PRN
Status: DISCONTINUED | OUTPATIENT
Start: 2024-08-12 | End: 2024-08-13 | Stop reason: HOSPADM

## 2024-08-12 RX ORDER — M-VIT,TX,IRON,MINS/CALC/FOLIC 27MG-0.4MG
1 TABLET ORAL DAILY
COMMUNITY

## 2024-08-12 RX ORDER — FLUTICASONE PROPIONATE 50 MCG
1 SPRAY, SUSPENSION (ML) NASAL DAILY PRN
COMMUNITY

## 2024-08-12 RX ORDER — REGADENOSON 0.08 MG/ML
0.4 INJECTION, SOLUTION INTRAVENOUS
Status: COMPLETED | OUTPATIENT
Start: 2024-08-12 | End: 2024-08-12

## 2024-08-12 RX ORDER — SODIUM CHLORIDE 0.9 % (FLUSH) 0.9 %
5-40 SYRINGE (ML) INJECTION PRN
Status: CANCELLED | OUTPATIENT
Start: 2024-08-12 | End: 2024-08-12

## 2024-08-12 RX ORDER — M-VIT,TX,IRON,MINS/CALC/FOLIC 27MG-0.4MG
1 TABLET ORAL DAILY
Status: DISCONTINUED | OUTPATIENT
Start: 2024-08-12 | End: 2024-08-13 | Stop reason: HOSPADM

## 2024-08-12 RX ORDER — RIZATRIPTAN BENZOATE 10 MG/1
10 TABLET, ORALLY DISINTEGRATING ORAL PRN
Status: ON HOLD | COMMUNITY
End: 2024-08-12

## 2024-08-12 RX ORDER — ATROPINE SULFATE 0.1 MG/ML
0.5 INJECTION INTRAVENOUS EVERY 5 MIN PRN
Status: CANCELLED | OUTPATIENT
Start: 2024-08-12 | End: 2024-08-12

## 2024-08-12 RX ORDER — SODIUM CHLORIDE 9 MG/ML
500 INJECTION, SOLUTION INTRAVENOUS CONTINUOUS PRN
Status: CANCELLED | OUTPATIENT
Start: 2024-08-12 | End: 2024-08-12

## 2024-08-12 RX ORDER — NITROGLYCERIN 0.4 MG/1
0.4 TABLET SUBLINGUAL EVERY 5 MIN PRN
Status: CANCELLED | OUTPATIENT
Start: 2024-08-12 | End: 2024-08-12

## 2024-08-12 RX ORDER — LATANOPROST 50 UG/ML
1 SOLUTION/ DROPS OPHTHALMIC NIGHTLY
Status: DISCONTINUED | OUTPATIENT
Start: 2024-08-12 | End: 2024-08-13 | Stop reason: HOSPADM

## 2024-08-12 RX ORDER — LATANOPROST 50 UG/ML
1 SOLUTION/ DROPS OPHTHALMIC NIGHTLY
COMMUNITY

## 2024-08-12 RX ORDER — METOPROLOL TARTRATE 1 MG/ML
5 INJECTION, SOLUTION INTRAVENOUS EVERY 5 MIN PRN
Status: CANCELLED | OUTPATIENT
Start: 2024-08-12 | End: 2024-08-12

## 2024-08-12 RX ORDER — AMINOPHYLLINE 25 MG/ML
50 INJECTION, SOLUTION INTRAVENOUS PRN
Status: CANCELLED | OUTPATIENT
Start: 2024-08-12 | End: 2024-08-12

## 2024-08-12 RX ORDER — TETRAKIS(2-METHOXYISOBUTYLISOCYANIDE)COPPER(I) TETRAFLUOROBORATE 1 MG/ML
20.5 INJECTION, POWDER, LYOPHILIZED, FOR SOLUTION INTRAVENOUS
Status: COMPLETED | OUTPATIENT
Start: 2024-08-12 | End: 2024-08-12

## 2024-08-12 RX ORDER — FAMOTIDINE 40 MG/1
40 TABLET, FILM COATED ORAL DAILY PRN
COMMUNITY

## 2024-08-12 RX ORDER — TETRAKIS(2-METHOXYISOBUTYLISOCYANIDE)COPPER(I) TETRAFLUOROBORATE 1 MG/ML
41.7 INJECTION, POWDER, LYOPHILIZED, FOR SOLUTION INTRAVENOUS
Status: COMPLETED | OUTPATIENT
Start: 2024-08-12 | End: 2024-08-12

## 2024-08-12 RX ADMIN — Medication 41.7 MILLICURIE: at 12:00

## 2024-08-12 RX ADMIN — ASPIRIN 81 MG CHEWABLE TABLET 81 MG: 81 TABLET CHEWABLE at 09:52

## 2024-08-12 RX ADMIN — LATANOPROST 1 DROP: 50 SOLUTION OPHTHALMIC at 21:17

## 2024-08-12 RX ADMIN — SODIUM CHLORIDE, PRESERVATIVE FREE 10 ML: 5 INJECTION INTRAVENOUS at 21:17

## 2024-08-12 RX ADMIN — HYDRALAZINE HYDROCHLORIDE 10 MG: 20 INJECTION INTRAMUSCULAR; INTRAVENOUS at 23:38

## 2024-08-12 RX ADMIN — LOSARTAN POTASSIUM 25 MG: 25 TABLET, FILM COATED ORAL at 09:52

## 2024-08-12 RX ADMIN — REGADENOSON 0.4 MG: 0.08 INJECTION, SOLUTION INTRAVENOUS at 08:16

## 2024-08-12 RX ADMIN — SODIUM CHLORIDE, PRESERVATIVE FREE 10 ML: 5 INJECTION INTRAVENOUS at 08:16

## 2024-08-12 RX ADMIN — ATORVASTATIN CALCIUM 40 MG: 40 TABLET, FILM COATED ORAL at 21:17

## 2024-08-12 RX ADMIN — Medication 1 TABLET: at 21:17

## 2024-08-12 RX ADMIN — Medication 20.5 MILLICURIE: at 08:15

## 2024-08-12 RX ADMIN — SODIUM CHLORIDE, PRESERVATIVE FREE 10 ML: 5 INJECTION INTRAVENOUS at 09:52

## 2024-08-12 ASSESSMENT — ENCOUNTER SYMPTOMS
RESPIRATORY NEGATIVE: 1
GASTROINTESTINAL NEGATIVE: 1
EYES NEGATIVE: 1

## 2024-08-12 NOTE — PROGRESS NOTES
Cardiovascular progress Note          Patient name: Agueda Rain    YOB: 1960  Date of admission:  8/10/2024       Patient seen, examined. Previous clinical entries reviewed.  All available laboratory, imaging and ancillary data reviewed.    Subjective:      No more episodes of chest pain.  Echocardiogram showed normal function with no significant valvular abnormalities.  Stress test shows inferior ischemia.    Systems review:  Constitutional: No fever/chills.   HENT: No headache, neck pain or neck stiffness. No sore throat or dysphagia.   Gastrointestinal: No abdominal pain, nausea or vomiting.   Cardiac: As Above  Respiratory: As above  Neurologic: No new focal weakness or numbness  Psychiatric: Normal mood and mentation       Examination:   Vitals: BP (!) 143/75   Pulse (!) 46   Temp 97.3 °F (36.3 °C) (Oral)   Resp 16   Ht 1.651 m (5' 5\")   Wt 92.5 kg (204 lb)   SpO2 98%   BMI 33.95 kg/m²   No intake or output data in the 24 hours ending 08/12/24 0818    General appearance: Comfortable in no apparent distress.  HEENT: No pallor. No icterus  Neck: Supple.  Lungs:Generally decreased breath sounds  Heart: S1,S2  Abdomen: Soft  Extremities: No peripheral edema  Skin: No obvious rashes.  Musculoskeletal: No obvious deformities.  Neurologic: No focal deficits.     Labs/ Ancillary data:     CBC:   Recent Labs     08/10/24  1834 08/11/24  0656 08/12/24  0556   WBC 9.8 9.5 8.9   HGB 13.7 14.7 14.5    286 260     BMP:    Recent Labs     08/10/24  1834 08/11/24  0656 08/12/24  0556    141 141   K 3.8 4.1 4.2   CL 99 101 102   CO2 23 31 29   BUN 14 13 20   CREATININE 0.8 0.8 0.8   GLUCOSE 89 88 86       Lipids:   Recent Labs     08/11/24  0656   CHOL 212*   HDL 68       Medications:   Scheduled Meds:   losartan  25 mg Oral Daily    sodium chloride flush  5-40 mL IntraVENous 2 times per day    atorvastatin  40 mg Oral Nightly    enoxaparin  40 mg SubCUTAneous Daily    aspirin  81 mg

## 2024-08-12 NOTE — PLAN OF CARE
Pt up ad malika. No complaints of pain this shift.  Stress test and ECHO completed today.  NPO at midnight, heart cath tomorrow.    Problem: Discharge Planning  Goal: Discharge to home or other facility with appropriate resources  8/12/2024 1714 by Merna Summers, RN  Outcome: Progressing     Problem: Pain  Goal: Verbalizes/displays adequate comfort level or baseline comfort level  8/12/2024 1714 by Merna Summers, RN  Outcome: Progressing     Problem: Safety - Adult  Goal: Free from fall injury  8/12/2024 1714 by Merna Summers, RN  Outcome: Progressing     Problem: ABCDS Injury Assessment  Goal: Absence of physical injury  8/12/2024 1714 by Merna Summers, RN  Outcome: Progressing

## 2024-08-12 NOTE — PROGRESS NOTES
[facility]  Office: [phone]  Lasha Rodas DO, Chito Agosto DO, Drew Jj DO, Bernabe Post DO, Rocío Vaca MD, Ashleigh Ospina MD, Kelley Stone MD, Ximena Jackson MD,  Jeffrey Marshall MD, Jose Humphrey MD, Jamil Kruger MD,  Giovanny Goldberg DO, Nataliya Valentin MD, Dru Higuera MD, Suhas Rodas DO, Shikha Banerjee MD,  Gentry Tran DO, Chante Ellison MD, Beatrice Atkins MD, Joaquina Maria MD, Duarte Rashid MD,  Mikey Jackson MD, Teofilo Paez MD, Ondina Crook MD, Shyann Choi MD, Ramy Duran MD, Francisco J Hylton MD, Olvin Maldonado DO, Daniel Willoughby DO, Melinda Connolly MD,  Cruz Griffin MD, Shirley Waterhouse, CNP,  Hayley Easton CNP, Abraham Joseph, CNP,  Olena Solis, DNP, Eliz Meyer, CNP, Chelsea Lobo, CNP, Jessica Braun CNP, Yoselin Armendariz, CNP, Katharina Whelan, PA-C, Lauren Fitzpatrick PA-C, Layne Herrera, CNP, Ld Land, CNP, Laverne Gardner, CNP, Cristina Munroe, CNP, Iveth Garduno, CNS, Amy Hare, CNP, Kristen Curiel CNP, Tracy Schwab, CNP         [facility]   IN-PATIENT SERVICE   [facility]    Progress Note    8/12/2024    9:21 AM    Name:   Agueda Rain  MRN:     [id number]     Acct:      [id number]   Room:   1006/1006-02   Day:  0  Admit Date:  8/10/2024  6:20 PM    PCP:   Jessee Pitts MD  Code Status:  Full Code    Subjective:     C/C: headache  Chief Complaint   Patient presents with    Chest Pain     Pt states she has a sharp pain in her Lt chest/shoulder 2 twice today.     Interval History Status: improved.     Patient resting in bed and eating breakfast. Lexiscan portion of stress completed and patient denies any symptoms during stress. She complains of headache- has not yet received her BP medications this AM. No complaints of shortness of breath, abdominal pain, nausea, vomiting, or dizziness.     Discussed with primary RN- no needs voiced at this time. 2nd half of stress to be completed this AM.  replacement **OR** potassium chloride, potassium chloride, magnesium sulfate, ondansetron **OR** ondansetron, acetaminophen **OR** acetaminophen, magnesium hydroxide, nitroGLYCERIN, perflutren lipid microspheres    Data:     Past Medical History:   has a past medical history of Essential hypertension, Headache, Hematuria, Hepatitis-C, History of colon polyps, Hypertension, Lung disease, Mitral valve regurgitation, Obesity (BMI 30-39.9), and AURELIANO on CPAP.    Social History:   reports that she quit smoking about 4 years ago. Her smoking use included cigarettes. She started smoking about 24 years ago. She has a 20 pack-year smoking history. She has never used smokeless tobacco. She reports that she does not drink alcohol and does not use drugs.     Family History:   Family History   Problem Relation Age of Onset    High Blood Pressure Mother     Other Mother         ALZHEIMERS    Cancer Father         BONE AND PROSTRATE    Prostate Cancer Brother        Vitals:  BP (!) 143/75   Pulse (!) 46   Temp 97.3 °F (36.3 °C) (Oral)   Resp 16   Ht 1.651 m (5' 5\")   Wt 92.5 kg (204 lb)   SpO2 98%   BMI 33.95 kg/m²   Temp (24hrs), Av.8 °F (36.6 °C), Min:97.3 °F (36.3 °C), Max:98.2 °F (36.8 °C)        Labs:  Hematology:  Recent Labs     08/10/24  1834 08/11/24  0656 08/12/24  0556   WBC 9.8 9.5 8.9   RBC 4.78 5.14* 5.07   HGB 13.7 14.7 14.5   HCT 41.3 44.9 44.4   MCV 86.4 87.4 87.6   MCH 28.7 28.6 28.6   MCHC 33.2 32.7 32.7   RDW 13.2 13.2 13.3    286 260   MPV 9.3 9.4 9.6   DDIMER 0.45  --   --      Chemistry:  Recent Labs     08/10/24  1834 08/10/24  2020 08/11/24  0656 24  0556     --  141 141   K 3.8  --  4.1 4.2   CL 99  --  101 102   CO2 23  --  31 29   GLUCOSE 89  --  88 86   BUN 14  --  13 20   CREATININE 0.8  --  0.8 0.8   MG 2.3  --  2.5  --    ANIONGAP 13  --  9 10   LABGLOM 83  --  83 83   CALCIUM 8.9  --  9.4 9.0   TROPHS <6 <6  --   --      Recent Labs     24  0656   CHOL 212*   HDL

## 2024-08-12 NOTE — PROGRESS NOTES
Transitions of Care Pharmacy Service   Medication Review    The patient's list of current home medications has been reviewed.     Source(s) of information: patient, Epic, Surescripts refill report    PROVIDER ACTION REQUESTED  Medications that need to be addressed by a physician/nurse practitioner:    Medication Action Requested        Home med list is ready for provider reconciliation         Please feel free to call me with any questions about this encounter. Thank you.    Lor Grimes [facility]   Transitions of Care Pharmacy Service  Phone:  [phone]  Fax: [phone]      Electronically signed by Lor Grimes [facility] on 8/12/2024 at 6:13 PM           Medications Prior to Admission:   fluticasone (FLONASE) 50 MCG/ACT nasal spray, 1 spray by Each Nostril route daily as needed for Rhinitis  rizatriptan (MAXALT-MLT) 10 MG disintegrating tablet, Take 1 tablet by mouth as needed for Migraine May repeat in 2 hours if needed  famotidine (PEPCID) 40 MG tablet, Take 1 tablet by mouth daily as needed (heartburn)  latanoprost (XALATAN) 0.005 % ophthalmic solution, Place 1 drop into both eyes nightly  Multiple Vitamins-Minerals (THERAPEUTIC MULTIVITAMIN-MINERALS) tablet, Take 1 tablet by mouth daily  Misc Natural Products (BEET ROOT) 500 MG CAPS, Take 1 tablet by mouth daily 1 DAILY AM  chlorthalidone (HYGROTON) 25 MG tablet, TAKE 1 TABLET BY MOUTH DAILY (Patient taking differently: as needed (swelling))  cetirizine (ZYRTEC) 10 MG tablet, Take 1 tablet by mouth daily as needed for Allergies  carvedilol (COREG) 12.5 MG tablet, Take 1 tablet by mouth 2 times daily  ipratropium-albuterol (DUONEB) 0.5-2.5 (3) MG/3ML SOLN nebulizer solution, Inhale 3 mLs into the lungs 4 times daily as needed for Shortness of Breath or Other (wheezing)

## 2024-08-12 NOTE — PLAN OF CARE
Patient alert and oriented. VSS. SB on tele; HR 40s-50s. SpO2 mid 90s on RA. Afebrile. No c/o pain. NPO for stress test and echo today. Will d/c home once medically stable. Bed in low position, call light within reach. Will continue to monitor.       Problem: Discharge Planning  Goal: Discharge to home or other facility with appropriate resources  Outcome: Progressing     Problem: Pain  Goal: Verbalizes/displays adequate comfort level or baseline comfort level  Outcome: Progressing     Problem: Safety - Adult  Goal: Free from fall injury  Outcome: Progressing     Problem: ABCDS Injury Assessment  Goal: Absence of physical injury  Outcome: Progressing

## 2024-08-12 NOTE — PLAN OF CARE
Spoke with Dr Rushing with [facility] re: perfusion result/ stress test. Per Dr Rushing, patient is an intermediate risk due a reversible defect in her inferior wall. Dr Szymanski updated on finding- patient will not be discharged at this time and will plan for heart cath. Primary RN and patient updated on POC.

## 2024-08-13 VITALS
RESPIRATION RATE: 18 BRPM | HEIGHT: 65 IN | OXYGEN SATURATION: 96 % | TEMPERATURE: 97.3 F | SYSTOLIC BLOOD PRESSURE: 152 MMHG | HEART RATE: 58 BPM | WEIGHT: 196.9 LBS | DIASTOLIC BLOOD PRESSURE: 76 MMHG | BODY MASS INDEX: 32.8 KG/M2

## 2024-08-13 PROBLEM — R07.9 CHEST PAIN IN ADULT: Status: ACTIVE | Noted: 2024-08-13

## 2024-08-13 PROBLEM — R07.9 CHEST PAIN: Status: RESOLVED | Noted: 2024-08-10 | Resolved: 2024-08-13

## 2024-08-13 LAB
ANION GAP SERPL CALCULATED.3IONS-SCNC: 11 MMOL/L (ref 9–17)
BASOPHILS # BLD: 0.03 K/UL (ref 0–0.2)
BASOPHILS NFR BLD: 0 % (ref 0–2)
BUN SERPL-MCNC: 20 MG/DL (ref 8–23)
BUN/CREAT SERPL: 29 (ref 9–20)
CALCIUM SERPL-MCNC: 8.9 MG/DL (ref 8.6–10.4)
CHLORIDE SERPL-SCNC: 102 MMOL/L (ref 98–107)
CO2 SERPL-SCNC: 23 MMOL/L (ref 20–31)
CREAT SERPL-MCNC: 0.7 MG/DL (ref 0.5–0.9)
ECHO BSA: 2.06 M2
ECHO BSA: 2.06 M2
EOSINOPHIL # BLD: 0.11 K/UL (ref 0–0.44)
EOSINOPHILS RELATIVE PERCENT: 1 % (ref 1–4)
ERYTHROCYTE [DISTWIDTH] IN BLOOD BY AUTOMATED COUNT: 13.2 % (ref 11.8–14.4)
GFR, ESTIMATED: >90 ML/MIN/1.73M2
GLUCOSE SERPL-MCNC: 90 MG/DL (ref 70–99)
HCT VFR BLD AUTO: 46.3 % (ref 36.3–47.1)
HGB BLD-MCNC: 15 G/DL (ref 11.9–15.1)
IMM GRANULOCYTES # BLD AUTO: 0.03 K/UL (ref 0–0.3)
IMM GRANULOCYTES NFR BLD: 0 %
LYMPHOCYTES NFR BLD: 2.37 K/UL (ref 1.1–3.7)
LYMPHOCYTES RELATIVE PERCENT: 24 % (ref 24–43)
MCH RBC QN AUTO: 28.5 PG (ref 25.2–33.5)
MCHC RBC AUTO-ENTMCNC: 32.4 G/DL (ref 28.4–34.8)
MCV RBC AUTO: 87.9 FL (ref 82.6–102.9)
MONOCYTES NFR BLD: 0.65 K/UL (ref 0.1–1.2)
MONOCYTES NFR BLD: 7 % (ref 3–12)
NEUTROPHILS NFR BLD: 68 % (ref 36–65)
NEUTS SEG NFR BLD: 6.57 K/UL (ref 1.5–8.1)
NRBC BLD-RTO: 0 PER 100 WBC
NUC STRESS EJECTION FRACTION: 56 %
NUC STRESS LV EDV: 75 ML (ref 56–104)
PLATELET # BLD AUTO: 304 K/UL (ref 138–453)
PMV BLD AUTO: 9.6 FL (ref 8.1–13.5)
POTASSIUM SERPL-SCNC: 3.9 MMOL/L (ref 3.7–5.3)
RBC # BLD AUTO: 5.27 M/UL (ref 3.95–5.11)
SODIUM SERPL-SCNC: 136 MMOL/L (ref 135–144)
STRESS BASELINE DIAS BP: 81 MMHG
STRESS BASELINE HR: 44 BPM
STRESS BASELINE SYS BP: 160 MMHG
STRESS ESTIMATED WORKLOAD: 1 METS
STRESS EXERCISE DUR MIN: 1 MIN
STRESS EXERCISE DUR SEC: 13 SEC
STRESS PEAK DIAS BP: 81 MMHG
STRESS PEAK SYS BP: 160 MMHG
STRESS PERCENT HR ACHIEVED: 34 %
STRESS POST PEAK HR: 53 BPM
STRESS RATE PRESSURE PRODUCT: 8480 BPM*MMHG
STRESS STAGE RECOVERY 1 BP: NORMAL MMHG
STRESS STAGE RECOVERY 1 DURATION: 1 MIN:SEC
STRESS STAGE RECOVERY 1 HR: 76 BPM
STRESS TARGET HR: 157 BPM
TID: 0.98
WBC OTHER # BLD: 9.8 K/UL (ref 3.5–11.3)

## 2024-08-13 PROCEDURE — 36415 COLL VENOUS BLD VENIPUNCTURE: CPT

## 2024-08-13 PROCEDURE — B2151ZZ FLUOROSCOPY OF LEFT HEART USING LOW OSMOLAR CONTRAST: ICD-10-PCS | Performed by: INTERNAL MEDICINE

## 2024-08-13 PROCEDURE — 6370000000 HC RX 637 (ALT 250 FOR IP)

## 2024-08-13 PROCEDURE — 99238 HOSP IP/OBS DSCHRG MGMT 30/<: CPT | Performed by: NURSE PRACTITIONER

## 2024-08-13 PROCEDURE — 6360000002 HC RX W HCPCS: Performed by: INTERNAL MEDICINE

## 2024-08-13 PROCEDURE — 99152 MOD SED SAME PHYS/QHP 5/>YRS: CPT | Performed by: INTERNAL MEDICINE

## 2024-08-13 PROCEDURE — 2580000003 HC RX 258

## 2024-08-13 PROCEDURE — B2111ZZ FLUOROSCOPY OF MULTIPLE CORONARY ARTERIES USING LOW OSMOLAR CONTRAST: ICD-10-PCS | Performed by: INTERNAL MEDICINE

## 2024-08-13 PROCEDURE — 2500000003 HC RX 250 WO HCPCS: Performed by: INTERNAL MEDICINE

## 2024-08-13 PROCEDURE — 6370000000 HC RX 637 (ALT 250 FOR IP): Performed by: NURSE PRACTITIONER

## 2024-08-13 PROCEDURE — 85025 COMPLETE CBC W/AUTO DIFF WBC: CPT

## 2024-08-13 PROCEDURE — 2709999900 HC NON-CHARGEABLE SUPPLY: Performed by: INTERNAL MEDICINE

## 2024-08-13 PROCEDURE — 1200000000 HC SEMI PRIVATE

## 2024-08-13 PROCEDURE — 6360000004 HC RX CONTRAST MEDICATION: Performed by: INTERNAL MEDICINE

## 2024-08-13 PROCEDURE — 4A023N7 MEASUREMENT OF CARDIAC SAMPLING AND PRESSURE, LEFT HEART, PERCUTANEOUS APPROACH: ICD-10-PCS | Performed by: INTERNAL MEDICINE

## 2024-08-13 PROCEDURE — C1894 INTRO/SHEATH, NON-LASER: HCPCS | Performed by: INTERNAL MEDICINE

## 2024-08-13 PROCEDURE — 93458 L HRT ARTERY/VENTRICLE ANGIO: CPT | Performed by: INTERNAL MEDICINE

## 2024-08-13 PROCEDURE — G0378 HOSPITAL OBSERVATION PER HR: HCPCS

## 2024-08-13 PROCEDURE — 80048 BASIC METABOLIC PNL TOTAL CA: CPT

## 2024-08-13 RX ORDER — ATORVASTATIN CALCIUM 40 MG/1
40 TABLET, FILM COATED ORAL NIGHTLY
Qty: 30 TABLET | Refills: 3 | Status: SHIPPED | OUTPATIENT
Start: 2024-08-13

## 2024-08-13 RX ORDER — ASPIRIN 81 MG/1
81 TABLET, CHEWABLE ORAL DAILY
Qty: 30 TABLET | Refills: 3 | Status: SHIPPED | OUTPATIENT
Start: 2024-08-14

## 2024-08-13 RX ORDER — ATORVASTATIN CALCIUM 40 MG/1
40 TABLET, FILM COATED ORAL NIGHTLY
Qty: 30 TABLET | Refills: 3 | Status: SHIPPED | OUTPATIENT
Start: 2024-08-13 | End: 2024-08-13

## 2024-08-13 RX ORDER — LOSARTAN POTASSIUM 25 MG/1
25 TABLET ORAL DAILY
Qty: 30 TABLET | Refills: 3 | Status: SHIPPED | OUTPATIENT
Start: 2024-08-14

## 2024-08-13 RX ORDER — FENTANYL CITRATE 50 UG/ML
INJECTION, SOLUTION INTRAMUSCULAR; INTRAVENOUS PRN
Status: DISCONTINUED | OUTPATIENT
Start: 2024-08-13 | End: 2024-08-13 | Stop reason: HOSPADM

## 2024-08-13 RX ORDER — HEPARIN SODIUM 1000 [USP'U]/ML
INJECTION, SOLUTION INTRAVENOUS; SUBCUTANEOUS PRN
Status: DISCONTINUED | OUTPATIENT
Start: 2024-08-13 | End: 2024-08-13 | Stop reason: HOSPADM

## 2024-08-13 RX ORDER — LOSARTAN POTASSIUM 25 MG/1
25 TABLET ORAL DAILY
Qty: 30 TABLET | Refills: 3 | Status: SHIPPED | OUTPATIENT
Start: 2024-08-14 | End: 2024-08-13

## 2024-08-13 RX ORDER — IPRATROPIUM BROMIDE AND ALBUTEROL SULFATE 2.5; .5 MG/3ML; MG/3ML
1 SOLUTION RESPIRATORY (INHALATION) 4 TIMES DAILY PRN
Qty: 360 ML | Refills: 0 | Status: SHIPPED | OUTPATIENT
Start: 2024-08-13 | End: 2024-08-13

## 2024-08-13 RX ORDER — MIDAZOLAM HYDROCHLORIDE 1 MG/ML
INJECTION INTRAMUSCULAR; INTRAVENOUS PRN
Status: DISCONTINUED | OUTPATIENT
Start: 2024-08-13 | End: 2024-08-13 | Stop reason: HOSPADM

## 2024-08-13 RX ORDER — IPRATROPIUM BROMIDE AND ALBUTEROL SULFATE 2.5; .5 MG/3ML; MG/3ML
1 SOLUTION RESPIRATORY (INHALATION) 4 TIMES DAILY PRN
Qty: 360 ML | Refills: 0 | Status: SHIPPED | OUTPATIENT
Start: 2024-08-13 | End: 2024-09-12

## 2024-08-13 RX ADMIN — ATORVASTATIN CALCIUM 40 MG: 40 TABLET, FILM COATED ORAL at 19:47

## 2024-08-13 RX ADMIN — ACETAMINOPHEN 650 MG: 325 TABLET ORAL at 00:36

## 2024-08-13 RX ADMIN — Medication 1 TABLET: at 08:25

## 2024-08-13 RX ADMIN — SODIUM CHLORIDE, PRESERVATIVE FREE 10 ML: 5 INJECTION INTRAVENOUS at 12:28

## 2024-08-13 RX ADMIN — LOSARTAN POTASSIUM 25 MG: 25 TABLET, FILM COATED ORAL at 08:25

## 2024-08-13 RX ADMIN — ASPIRIN 81 MG CHEWABLE TABLET 81 MG: 81 TABLET CHEWABLE at 08:25

## 2024-08-13 ASSESSMENT — ENCOUNTER SYMPTOMS
RESPIRATORY NEGATIVE: 1
EYES NEGATIVE: 1
GASTROINTESTINAL NEGATIVE: 1

## 2024-08-13 ASSESSMENT — PAIN DESCRIPTION - PAIN TYPE: TYPE: ACUTE PAIN

## 2024-08-13 ASSESSMENT — PAIN SCALES - GENERAL: PAINLEVEL_OUTOF10: 4

## 2024-08-13 ASSESSMENT — PAIN - FUNCTIONAL ASSESSMENT: PAIN_FUNCTIONAL_ASSESSMENT: ACTIVITIES ARE NOT PREVENTED

## 2024-08-13 ASSESSMENT — PAIN DESCRIPTION - ORIENTATION: ORIENTATION: MID

## 2024-08-13 ASSESSMENT — PAIN DESCRIPTION - LOCATION: LOCATION: HEAD

## 2024-08-13 ASSESSMENT — PAIN DESCRIPTION - DESCRIPTORS: DESCRIPTORS: ACHING

## 2024-08-13 NOTE — PROGRESS NOTES
the emergency department with complaints of left upper chest pain that radiates to her shoulder. She describes the pain as achiness in nature and comes and goes without alleviating or worsening factors identified. She was recently seen and evaluated in the outpatient setting where an EKG revealed a bradycardic rhythm and she was referred to cardiology but has not yet been evaluated. She states she came to the emergency department as she was concerned that maybe her bradycardia was causing her chest discomfort.  Upon arrival her blood pressure was 172/74 with heart rate of 44. \"     Stress test underway today. No symptoms during Lexiscan portion of stress. Patient to have imaging completed this afternoon. Cardiology following- continue to hold Coreg 2/2 bradycardia, clonidine discontinued, and statin and Losartan started. She ha had no further chest pain at this time.     Stress test resulted as no ischemic changes on EKG portion of test, however findings stratified her risk as intermediate on perfusion imaging portion of stress test. Plan for heart cath this afternoon.     Review of Systems:     Review of Systems   Constitutional: Negative.    HENT: Negative.     Eyes: Negative.    Respiratory: Negative.     Cardiovascular: Negative.    Gastrointestinal: Negative.    Genitourinary: Negative.    Musculoskeletal: Negative.    Skin: Negative.    Neurological: Negative.    Psychiatric/Behavioral: Negative.         Medications:     Allergies:    Allergies   Allergen Reactions    Banana     Egg-Derived Products     Penicillins Hives and Other (See Comments)       Current Meds:   Scheduled Meds:    latanoprost  1 drop Both Eyes Nightly    therapeutic multivitamin-minerals  1 tablet Oral Daily    losartan  25 mg Oral Daily    sodium chloride flush  5-40 mL IntraVENous 2 times per day    atorvastatin  40 mg Oral Nightly    enoxaparin  40 mg SubCUTAneous Daily    aspirin  81 mg Oral Daily     Continuous Infusions:    sodium    Musculoskeletal:         General: Normal range of motion.      Cervical back: Normal range of motion and neck supple.      Right lower leg: No edema.      Left lower leg: No edema.   Skin:     General: Skin is warm.      Coloration: Skin is not jaundiced.      Findings: No bruising, erythema, lesion or rash.   Neurological:      General: No focal deficit present.      Mental Status: She is alert and oriented to person, place, and time.   Psychiatric:         Mood and Affect: Mood normal.         Behavior: Behavior normal.         Thought Content: Thought content normal.         Judgment: Judgment normal.         Assessment:        Hospital Problems             Last Modified POA    * (Principal) Chest pain 8/10/2024 Yes    Essential hypertension 8/10/2024 Yes    Dyslipidemia 8/10/2024 Yes    Class 1 obesity with serious comorbidity and body mass index (BMI) of 33.0 to 33.9 in adult 8/12/2024 Yes    Bradycardia 8/10/2024 Yes       Plan:        Chest pain: Cont to monitor. Cardiology following- appreciate input. Telemetry. Cont asa, statin, BP meds. Plan for cardiac cath this afternoon. Labs reviewed. Labs as ordered.   HTN: Monitor VS as ordered. BP meds as ordered- parameters in place. DAVID diet.   Dyslipidemia: Cont statin. Low fat diet when clinically appropriate.   Obesity: Lifestyle modification for weight loss.   Bradycardia: Cont telemetry. Monitor HR. Cont to hold Coreg. Cardiology following- appreciate input.     MIGUEL Vann NP  8/13/2024  9:50 AM

## 2024-08-13 NOTE — CARE COORDINATION
Discharge planning    Chart reviewed. Patient lives alone in apt. Has CPAP and neb. Her car is in the parking lot    Admitted with chest pain and stress showing intermediate risk.     Cardiology is planning on cath later today

## 2024-08-13 NOTE — PROGRESS NOTES
CLINICAL PHARMACY NOTE: MEDS TO BEDS    Total # of Prescriptions Filled: 3   The following medications were delivered to the patient:  Atorvastatin 40mg  Losartan 25mg  Aspirin 81mg chew    Additional Documentation:    We are ordering ipratropium-albuterl 0.5-2.5 soln    Pt can  8/1//24 after 11:00am. 0.00 copay

## 2024-08-13 NOTE — PROGRESS NOTES
Writer reached out to Dr. Szymanski's office and left VM for Dr. Szymanski to call back regarding POC.

## 2024-08-13 NOTE — PLAN OF CARE
Patient alert and oriented. Elevated BP's; prn hydralazine given with good effect. SpO2 mid 90s on RA. Afebrile. Patient having pain on their head and rates it a 5. Pain interventions include medication. Patients goal for pain relief is  0 . The need for pain and symptom management will be considered in the discharge planning process to ensure patients comfort. NPO for heart cath today. Will d/c home with no needs once medically stable. Bed in low position, call light within reach.       Problem: Discharge Planning  Goal: Discharge to home or other facility with appropriate resources  8/13/2024 0347 by Tamiko Morales RN  Outcome: Progressing  8/12/2024 1714 by Merna Summers RN  Outcome: Progressing     Problem: Pain  Goal: Verbalizes/displays adequate comfort level or baseline comfort level  8/13/2024 0347 by Tamiko Morales RN  Outcome: Progressing  8/12/2024 1714 by Merna Summers RN  Outcome: Progressing     Problem: Safety - Adult  Goal: Free from fall injury  8/13/2024 0347 by aTmiko Morales RN  Outcome: Progressing  8/12/2024 1714 by Merna Summers RN  Outcome: Progressing     Problem: ABCDS Injury Assessment  Goal: Absence of physical injury  8/13/2024 0347 by Tamiko Morales RN  Outcome: Progressing  8/12/2024 1714 by Merna Summers RN  Outcome: Progressing     Problem: Cardiovascular - Adult  Goal: Maintains optimal cardiac output and hemodynamic stability  Outcome: Progressing

## 2024-08-13 NOTE — PROGRESS NOTES
Pt arrived back from heart cath about 1635 first VSS at 1637. During continued monitoring writer went over d/c instructions and answered any questions pt had.

## 2024-08-13 NOTE — PLAN OF CARE
Pt admitted 8/10 for chest pain and HTN. Chest pain has subsided, pt still has some HTN, though improved.     Pt to have heart cath today at 1600 with Dr. Szymanski.     D/c plan HNN if heart cath goes well with no stent placements. If stents placed, d/c tomorrow. Pt remains A&O x 4, on RA. No new s/s of skin breakdown or injury. Safety protocols in place and enforced. Pt ambulates independently. Pt has no c/o pain.      Problem: Discharge Planning  Goal: Discharge to home or other facility with appropriate resources  8/13/2024 1215 by Laverne Massey, RN  Outcome: Progressing     Problem: Pain  Goal: Verbalizes/displays adequate comfort level or baseline comfort level  8/13/2024 1215 by Laverne Massey RN  Outcome: Progressing     Problem: Safety - Adult  Goal: Free from fall injury  8/13/2024 1215 by Laverne Massey, RN  Outcome: Progressing     Problem: ABCDS Injury Assessment  Goal: Absence of physical injury  8/13/2024 1215 by Laverne Massey, RN  Outcome: Progressing     Problem: Cardiovascular - Adult  Goal: Maintains optimal cardiac output and hemodynamic stability  8/13/2024 1215 by Laverne Massey, RN  Outcome: Progressing

## 2024-08-13 NOTE — DISCHARGE SUMMARY
[facility]  Office: [phone]  Lasha Rodas DO, Chito Agosto DO, Drew Jj DO, Bernabe Post DO, Rocío Vaca MD, Ashleigh Ospina MD, Kelley Stone MD, Ximena Jackson MD,  Jeffrey Marshall MD, Jose Humphrey MD, Jamil Kruger MD,  Giovanny Goldberg DO, Nataliya Valentin MD, Dru Higuera MD, Suhas Rodas DO, Shikha Banerjee MD,  Gentry Tran DO, Chante Ellison MD, Beatrice Atkins MD, Joaquina Maria MD, Duarte Rashid MD,  Mikey Jackson MD, Teofilo Paez MD, Ondina Crook MD, Shyann Choi MD, Ramy Duran MD, Francisco J Hylton MD, Olvin Maldonado DO, Daniel Willoughby DO, Melinda Connolly MD,  Cruz Griffin MD, Shirley Waterhouse, CNP,  Hayley Easton CNP, Abraham Joseph, CNP,  Olena Solis, EBEN, Eliz Meyer CNP, Chelsea Lobo, CNP, Jessica Braun CNP, Yoselin Armendariz, CNP, Katharina Whelan, PA-C, Lauren Fitzpatrick PA-C, Layne Herrera, CNP, Ld Land, CNP, Laverne Gardner, CNP, Cristina Munroe, CNP, Iveth Garduno, CNS, Amy Hare, CNP, Kristen Curiel CNP, Tracy Schwab, CNP         [facility]   IN-PATIENT SERVICE   [facility]    Discharge Summary     Patient ID: Agueda Rain  :  1960   MRN: [id number]     ACCOUNT:  [id number]   Patient's PCP: Jessee Pitts MD  Admit Date: 8/10/2024   Discharge Date: 2024 ***    Length of Stay: 0  Code Status:  Full Code  Admitting Physician: Rocío Vaca MD  Discharge Physician: MIGUEL Vann NP     Active Discharge Diagnoses:     Hospital Problem Lists:  Principal Problem (Resolved):    Chest pain  Active Problems:    Essential hypertension    Dyslipidemia    Class 1 obesity with serious comorbidity and body mass index (BMI) of 33.0 to 33.9 in adult    Bradycardia    Chest pain in adult      Admission Condition:  {condition:14959}     Discharged Condition: {condition:26239}    Hospital Stay:     Hospital Course:  Agueda Rain is a 63 y.o. female who was admitted for the

## 2024-08-13 NOTE — PROGRESS NOTES
Cardiovascular progress Note          Patient name: Agueda Rain    YOB: 1960  Date of admission:  8/10/2024       Patient seen, examined. Previous clinical entries reviewed.  All available laboratory, imaging and ancillary data reviewed.    Subjective:      More chest pain late earlier today.  Cardiac catheterization showed no evidence of any obstructive coronary disease.  Left ventricular systolic function is normal    Systems review:  Constitutional: No fever/chills.   HENT: No headache, neck pain or neck stiffness. No sore throat or dysphagia.   Gastrointestinal: No abdominal pain, nausea or vomiting.   Cardiac: As Above  Respiratory: As above  Neurologic: No new focal weakness or numbness  Psychiatric: Normal mood and mentation       Examination:   Vitals: /82   Pulse 55   Temp 97.7 °F (36.5 °C) (Oral)   Resp 16   Ht 1.651 m (5' 5\")   Wt 89.3 kg (196 lb 14.4 oz)   SpO2 100%   BMI 32.77 kg/m²   No intake or output data in the 24 hours ending 08/13/24 1626    General appearance: Comfortable in no apparent distress.  HEENT: No pallor. No icterus  Neck: Supple.  Lungs:Generally decreased breath sounds  Heart: S1,S2  Abdomen: Soft  Extremities: No peripheral edema  Skin: No obvious rashes.  Musculoskeletal: No obvious deformities.  Neurologic: No focal deficits.     Labs/ Ancillary data:     CBC:   Recent Labs     08/11/24  0656 08/12/24  0556 08/13/24  0449   WBC 9.5 8.9 9.8   HGB 14.7 14.5 15.0    260 304     BMP:    Recent Labs     08/11/24  0656 08/12/24  0556 08/13/24  0449    141 136   K 4.1 4.2 3.9    102 102   CO2 31 29 23   BUN 13 20 20   CREATININE 0.8 0.8 0.7   GLUCOSE 88 86 90       Lipids:   Recent Labs     08/11/24  0656   CHOL 212*   HDL 68       Medications:   Scheduled Meds:   latanoprost  1 drop Both Eyes Nightly    therapeutic multivitamin-minerals  1 tablet Oral Daily    losartan  25 mg Oral Daily    sodium chloride flush  5-40 mL IntraVENous

## 2024-08-14 ENCOUNTER — TELEPHONE (OUTPATIENT)
Dept: FAMILY MEDICINE CLINIC | Age: 64
End: 2024-08-14

## 2024-08-14 DIAGNOSIS — E78.5 DYSLIPIDEMIA: Primary | ICD-10-CM

## 2024-08-14 DIAGNOSIS — E66.9 CLASS 1 OBESITY WITH SERIOUS COMORBIDITY AND BODY MASS INDEX (BMI) OF 33.0 TO 33.9 IN ADULT, UNSPECIFIED OBESITY TYPE: ICD-10-CM

## 2024-08-14 NOTE — TELEPHONE ENCOUNTER
Care Transitions Initial Follow Up Call    Call within 2 business days of discharge: Yes     Patient: Agueda Rain Patient : 1960 MRN: [id number]    [unfilled]    RARS: Readmission Risk Score: 4.8       Spoke with: the patient    Discharge department/facility: D/C [facility]    Non-face-to-face services provided:  Scheduled appointment with Specialist-appointment with Dr Szymanski on 24.  Obtained and reviewed discharge summary and/or continuity of care documents    Follow Up  Future Appointments   Date Time Provider Department Center   2024 10:00 AM Stephania Anderson, PT [facility]   2024  8:45 AM Sruthi Dorsey, PA-C [facility]   2024  9:45 AM Diogo Atkins MD Resp [facility]   2024  2:30 PM Jessee Pitts MD [facility] DEP       Faith Pitts RN

## 2024-08-14 NOTE — TELEPHONE ENCOUNTER
The patient was recently discharged from the hospital and she wondered if she would benefit from seeing a dietician/nutritionist.  Please advise.

## 2024-08-15 NOTE — TELEPHONE ENCOUNTER
Informed the patient that a referral to [facility] for outpatient nutrition department was completed and they will be reaching out to her to schedule an appointment.  I also reminded her that the resource information that she had requested at her previous visit was provided in her AVS.

## 2024-08-19 ENCOUNTER — HOSPITAL ENCOUNTER (OUTPATIENT)
Dept: PHYSICAL THERAPY | Facility: CLINIC | Age: 64
Setting detail: THERAPIES SERIES
Discharge: HOME | End: 2024-08-19
Payer: COMMERCIAL

## 2024-08-19 PROCEDURE — 97161 PT EVAL LOW COMPLEX 20 MIN: CPT

## 2024-08-19 NOTE — CONSULTS
3. ? Strength: Increase B hip strength to 4+/5 throughout and knee strength to 5/5 to ease functional limitations and mobility  []  []  []     4. Independent with Home Exercise Programs with ability to demonstrate exercises without cueing for technique.  []  []  []     5. Pt to demonstrate improved functional strength with ability to perform 5x STS without UE support and equal bilateral weight shift.  []  []  []            Date Addressed:        LTG: To be met in 12 treatments       1. Improve score on assessment tool LEFI from 77.4% impairment to less than 45% impairment to demonstrate improved functional mobility []  []  []     2. Reduce B hip pain levels to 3/10 or less with all daily activity including heavier household chores and grocery shopping.  []  []  []     3. Pt to display ability to ascend/descend a full flight of steps x 2 with reciprocal stepping mechanics to ease access to second story apartment.  []  []  []     4. Pt to ambulate at least 300' independently with appropriate gait mechanics and without increased pain to ease community access.  []  []  []     5. Pt to display ability to maintain B SLS at least 20\" ea to demonstrate improved stability for recreational exercise.  []  []  []                        Patient goals: reduce pain    Rehab Potential:  [x] Good  [] Fair  [] Poor   Suggested Professional Referral:  [x] No  [] Yes:  Barriers to Goal Achievement::  [x] No  [] Yes:  Domestic Concerns:  [x] No  [] Yes:    Pt. Education:  [x] Plans/Goals, Risks/Benefits discussed  [] Home exercise program    Method of Education: [x] Verbal  [] Demo  [] Written  Comprehension of Education:  [x] Verbalizes understanding.  [] Demonstrates understanding.  [] Needs Review.  [] Demonstrates/verbalizes understanding of HEP/Ed previously given.    Treatment Plan:  [x] Therapeutic Exercise   31317  [] Iontophoresis: 4 mg/mL Dexamethasone Sodium Phosphate  mAmin  17508   [x] Therapeutic Activity  05668

## 2024-08-20 ENCOUNTER — TELEPHONE (OUTPATIENT)
Dept: ORTHOPEDIC SURGERY | Age: 64
End: 2024-08-20

## 2024-08-20 ENCOUNTER — OFFICE VISIT (OUTPATIENT)
Dept: ORTHOPEDIC SURGERY | Age: 64
End: 2024-08-20
Payer: COMMERCIAL

## 2024-08-20 VITALS — HEIGHT: 65 IN | RESPIRATION RATE: 16 BRPM | BODY MASS INDEX: 33.66 KG/M2 | WEIGHT: 202 LBS | OXYGEN SATURATION: 100 %

## 2024-08-20 DIAGNOSIS — M25.562 PAIN IN BOTH KNEES, UNSPECIFIED CHRONICITY: Primary | ICD-10-CM

## 2024-08-20 DIAGNOSIS — G89.29 BILATERAL CHRONIC KNEE PAIN: Primary | ICD-10-CM

## 2024-08-20 DIAGNOSIS — M25.562 BILATERAL CHRONIC KNEE PAIN: Primary | ICD-10-CM

## 2024-08-20 DIAGNOSIS — M25.561 BILATERAL CHRONIC KNEE PAIN: Primary | ICD-10-CM

## 2024-08-20 DIAGNOSIS — M25.561 PAIN IN BOTH KNEES, UNSPECIFIED CHRONICITY: Primary | ICD-10-CM

## 2024-08-20 PROCEDURE — 3017F COLORECTAL CA SCREEN DOC REV: CPT

## 2024-08-20 PROCEDURE — G8417 CALC BMI ABV UP PARAM F/U: HCPCS

## 2024-08-20 PROCEDURE — 1111F DSCHRG MED/CURRENT MED MERGE: CPT

## 2024-08-20 PROCEDURE — 1036F TOBACCO NON-USER: CPT

## 2024-08-20 PROCEDURE — 99213 OFFICE O/P EST LOW 20 MIN: CPT

## 2024-08-20 PROCEDURE — G8427 DOCREV CUR MEDS BY ELIG CLIN: HCPCS

## 2024-08-20 NOTE — PROGRESS NOTES
[facility]OP[facility]IC[facility]  [address]  [place] [address]  Dept: [phone]  Dept Fax: [phone]        Ambulatory Follow Up      Subjective:   Agueda Rain is a 63 y.o. year old female who presents to our office today for routine followup regarding her   1. Bilateral chronic knee pain    .    Chief Complaint   Patient presents with    Knee Pain     Bilateral     Hip Pain     Left (fall on 6/6/24)         HPI Agueda Rain  is a 63 y.o. female who presents today in follow for her bilateral knee pain.  The patient was last seen on 8/7/2024 and underwent treatment in the form of bilateral hip greater trochanteric bursa injections.  She states that the injections did provide her with good relief, but that last week and she started to develop pain throughout the left hip again.  She states that the pain is along the groin as well as along the lateral aspect of the hip.  She was hospitalized 2 weeks ago for chest pain and she ended up having a coronary angiograph done.  She feels like the hip pain started back up after being in the hospital.  She did have her initial PT evaluation yesterday and she will start physical therapy next week.    She has had the bilateral knee pain for many years now.  She has had Euflexxa injections in the past and these were done by Dr. Irvin Wong.  She remembers getting good relief from these.  She states that the knee pain is off-and-on and throughout both anterior aspects of the knees.  She mostly notices the pain with walking and going up and down stairs.  She denies any mechanical catching or locking.  She also denies any swelling, redness, or warmth of either knee.  She has continued to take Tylenol arthritis as needed for pain.    Review of Systems   Constitutional:  Negative for chills and fever.   Musculoskeletal:  Positive for arthralgias and gait problem. Negative for

## 2024-08-20 NOTE — TELEPHONE ENCOUNTER
Submitted PA  (scanned in to media) to [facility] for Bilateral Durolane injections.      Waiting for response.

## 2024-08-26 ENCOUNTER — OFFICE VISIT (OUTPATIENT)
Dept: PULMONOLOGY | Age: 64
End: 2024-08-26
Payer: COMMERCIAL

## 2024-08-26 VITALS
DIASTOLIC BLOOD PRESSURE: 77 MMHG | BODY MASS INDEX: 32.3 KG/M2 | HEART RATE: 71 BPM | SYSTOLIC BLOOD PRESSURE: 139 MMHG | HEIGHT: 66 IN | OXYGEN SATURATION: 99 % | WEIGHT: 201 LBS | RESPIRATION RATE: 16 BRPM

## 2024-08-26 DIAGNOSIS — K21.9 HIATAL HERNIA WITH GERD: ICD-10-CM

## 2024-08-26 DIAGNOSIS — Z87.891 PERSONAL HISTORY OF TOBACCO USE: ICD-10-CM

## 2024-08-26 DIAGNOSIS — G47.33 OSA (OBSTRUCTIVE SLEEP APNEA): ICD-10-CM

## 2024-08-26 DIAGNOSIS — K44.9 HIATAL HERNIA WITH GERD: ICD-10-CM

## 2024-08-26 DIAGNOSIS — J43.2 CENTRILOBULAR EMPHYSEMA (HCC): Primary | ICD-10-CM

## 2024-08-26 PROCEDURE — G0296 VISIT TO DETERM LDCT ELIG: HCPCS | Performed by: INTERNAL MEDICINE

## 2024-08-26 PROCEDURE — 3017F COLORECTAL CA SCREEN DOC REV: CPT | Performed by: INTERNAL MEDICINE

## 2024-08-26 PROCEDURE — 3078F DIAST BP <80 MM HG: CPT | Performed by: INTERNAL MEDICINE

## 2024-08-26 PROCEDURE — 3075F SYST BP GE 130 - 139MM HG: CPT | Performed by: INTERNAL MEDICINE

## 2024-08-26 PROCEDURE — 99214 OFFICE O/P EST MOD 30 MIN: CPT | Performed by: INTERNAL MEDICINE

## 2024-08-26 PROCEDURE — G8417 CALC BMI ABV UP PARAM F/U: HCPCS | Performed by: INTERNAL MEDICINE

## 2024-08-26 PROCEDURE — 3023F SPIROM DOC REV: CPT | Performed by: INTERNAL MEDICINE

## 2024-08-26 PROCEDURE — 1036F TOBACCO NON-USER: CPT | Performed by: INTERNAL MEDICINE

## 2024-08-26 PROCEDURE — 1111F DSCHRG MED/CURRENT MED MERGE: CPT | Performed by: INTERNAL MEDICINE

## 2024-08-26 PROCEDURE — G8427 DOCREV CUR MEDS BY ELIG CLIN: HCPCS | Performed by: INTERNAL MEDICINE

## 2024-08-26 NOTE — PROGRESS NOTES
Multiple Vitamins-Minerals (THERAPEUTIC MULTIVITAMIN-MINERALS) tablet Take 1 tablet by mouth daily   Yes Provider, MD Ava   Misc Natural Products (BEET ROOT) 500 MG CAPS Take 1 tablet by mouth daily 1 DAILY AM   Yes Provider, MD Ava   cetirizine (ZYRTEC) 10 MG tablet Take 1 tablet by mouth daily as needed for Allergies 4/3/22  Yes Provider, MD Ava        Review of Systems -  General ROS: negative for - chills, fatigue, fever or weight loss  ENT ROS: negative for - headaches, oral lesions or sore throat  Cardiovascular ROS: no chest pain , orthopnea or pnd   Gastrointestinal ROS: no abdominal pain, change in bowel habits, or black or bloody stools  Skin - no rash   Neuro - no blurry vision , no loc . No focal weakness   msk - no jt tenderness or swelling    Vascular - no claudication , rest completed and negative   Lymphatic - complete and negative   Hematology - oncology - complete and negative   Allergy immunology - complete and negative    no burning or hematuria      Vitals:  /77 (Site: Right Upper Arm)   Pulse 71   Resp 16   Ht 1.664 m (5' 5.5\")   Wt 91.2 kg (201 lb)   SpO2 99% Comment: room air at rest  BMI 32.94 kg/m²     PHYSICAL EXAM:  Head and neck atraumatic, normocephalic    Lymph nodes-no cervical, supraclavicular lymphadenopathy    Neck-no JVP elevation    Lungs - Ventilating all lobes without any rales, rhonchi, wheezes or dullness.  No bronchial breath sounds.  Chest expansion equal bilaterally    CVS- S1, S2 regular.  No S3 no S4, no murmurs    Abdomen-nontender, nondistended.  Bowel sounds are present.  No organomegaly    Lower extremity-no edema    Upper extremity-no edema    Neurological-grossly normal cranial nerves.  No overt motor deficit                                                                   IMPRESSION:     Diagnosis Orders   1. Centrilobular emphysema (HCC)        2. Personal history of tobacco use  KS VISIT TO DISCUSS LUNG CA SCREEN W LDCT  20 pack-year smoking history. She has never used smokeless tobacco.. Discussed with patient the risks and benefits of screening, including over-diagnosis, false positive rate, and total radiation exposure.  The patient currently exhibits no signs or symptoms suggestive of lung cancer.  Discussed with patient the importance of compliance with yearly annual lung cancer screenings and willingness to undergo diagnosis and treatment if screening scan is positive.  In addition, the patient was counseled regarding the importance of remaining smoke free and/or total smoking cessation.

## 2024-08-26 NOTE — PATIENT INSTRUCTIONS
Learning About Lung Cancer Screening  What is screening for lung cancer?     Lung cancer screening is a way to find some lung cancers early, before a person has any symptoms of the cancer.  Lung cancer screening may help those who have the highest risk for lung cancer--people age 50 and older who are or were heavy smokers. For most people, who aren't at increased risk, screening for lung cancer probably isn't helpful.  Screening won't prevent cancer. And it may not find all lung cancers. Lung cancer screening may lower the risk of dying from lung cancer in a small number of people.  How is it done?  Lung cancer screening is done with a low-dose CT (computed tomography) scan. A CT scan uses X-rays, or radiation, to make detailed pictures of your body. Experts recommend that screening be done in medical centers that focus on finding and treating lung cancer.  Who is screening recommended for?  Lung cancer screening is recommended for people age 50 and older who are or were heavy smokers. That means people with a smoking history of at least 20 pack years. A pack year is a way to measure how heavy a smoker you are or were.  To figure out your pack years, multiply how many packs a day on average (assuming 20 cigarettes per pack) you have smoked by how many years you have smoked. For example:  If you smoked 1 pack a day for 20 years, that's 1 times 20. So you have a smoking history of 20 pack years.  If you smoked 2 packs a day for 10 years, that's 2 times 10. So you have a smoking history of 20 pack years.  Experts agree that screening is for people who have a high risk of lung cancer. But experts don't agree on what high risk means. Some say people age 50 or older with at least a 20-pack-year smoking history are high risk. Others say it's people age 55 or older with a 30-pack-year history.  To see if you could benefit from screening, first find out if you are at high risk for lung cancer. Your doctor can help you  decide your lung cancer risk.  What are the risks of screening?  CT screening for lung cancer isn't perfect. It can show an abnormal result when it turns out there wasn't any cancer. This is called a false-positive result. This means you may need more tests to make sure you don't have cancer. These tests can be harmful and cause a lot of worry.  These tests may include more CT scans and invasive testing like a lung biopsy. In a biopsy, the doctor takes a sample of tissue from inside your lung so it can be looked at under a microscope. A biopsy is the only way to tell if you have lung cancer. If the biopsy finds cancer, you and your doctor will have to decide how or whether to treat it.  Some lung cancers found on CT scans are harmless and would not have caused a problem if they had not been found through screening. But because doctors can't tell which ones will turn out to be harmless, most will be treated. This means that you may get treatment--including surgery, radiation, or chemotherapy--that you don't need.  There is a risk of damage to cells or tissue from being exposed to radiation, including the small amounts used in CTs, X-rays, and other medical tests. Over time, exposure to radiation may cause cancer and other health problems. But in most cases, the risk of getting cancer from being exposed to small amounts of radiation is low. It's not a reason to avoid these tests for most people.  What are the benefits of screening?  Your scan may be normal (negative).  For some people who are at higher risk, screening lowers the chance of dying of lung cancer. How much and how long you smoked helps to determine your risk level. Screening can find some cancers early, when treatment may be more likely to work.  What happens after screening?  The results of your CT scan will be sent to your doctor. Someone from your care team will explain the results of your scan and answer any questions you may have. If you need any

## 2024-08-27 ENCOUNTER — TELEPHONE (OUTPATIENT)
Dept: ORTHOPEDIC SURGERY | Age: 64
End: 2024-08-27

## 2024-08-27 NOTE — TELEPHONE ENCOUNTER
Pt states she received a denial letter from Medicaid for \"hyaluronate sodium, stabilized\" for request dated 8/20.    Prior Authorization #: [id number]    Please call her to discuss next steps.

## 2024-08-28 ENCOUNTER — HOSPITAL ENCOUNTER (OUTPATIENT)
Dept: PHYSICAL THERAPY | Facility: CLINIC | Age: 64
Setting detail: THERAPIES SERIES
Discharge: HOME | End: 2024-08-28
Payer: COMMERCIAL

## 2024-08-28 PROCEDURE — 97110 THERAPEUTIC EXERCISES: CPT

## 2024-08-28 NOTE — FLOWSHEET NOTE
[] [facility]  Outpatient Rehabilitation &  Therapy  [address]  [phone]  [phone] [x] [facility]  Outpatient Rehabilitation &  Therapy  [address]  P: [phone]  F: [phone] [] [facility]  Outpatient Rehabilitation &  Therapy  [address]  P: [phone]  F: [phone] [] [facility]  Outpatient Rehabilitation &  Therapy  [address]  [phone]  [phone] [] [facility]  Outpatient Rehabilitation &  Therapy  [address]   P: [phone]  F: [phone]  [] [facility]  Outpatient Rehabilitation &  Therapy  [address]  P: [phone]  F: [phone] [] [facility]  Outpatient Rehabilitation &  Therapy  [address]  P: [phone]  F: [phone] [] [facility]  Outpatient Rehabilitation &  Therapy  [address]  P: [phone]  F: [phone] [] [facility]  Outpatient Rehabilitation &  Therapy  [address]  P: [phone]  F: [phone]  [] [facility] Outpatient Rehabilitation &  Therapy  [address]  P: [phone]  F: [phone]     Physical Therapy Daily Treatment Note    Date:  2024  Patient Name:  Agueda Rain    :  1960  MRN: [id number]  Physician: Sruthi Dorsey PA-C                                          Insurance: [facility] (auth after 30v per kenna year)  Medical Diagnosis:   M70.62 (ICD-10-CM) - Greater trochanteric bursitis of left hip   M25.551, M25.552 (ICD-10-CM) - Bilateral hip pain   M70.61 (ICD-10-CM) - Greater trochanteric bursitis of right hip   Rehab Codes: M70.62, M25.551, M70.61, M62.81, R26.89  Onset date: 24                             Next 's appt.: 24  Visit# / total visits:      Cancels/No Shows:     Subjective:

## 2024-08-30 ENCOUNTER — HOSPITAL ENCOUNTER (OUTPATIENT)
Dept: PHYSICAL THERAPY | Facility: CLINIC | Age: 64
Setting detail: THERAPIES SERIES
Discharge: HOME | End: 2024-08-30
Payer: COMMERCIAL

## 2024-08-30 PROCEDURE — 97110 THERAPEUTIC EXERCISES: CPT

## 2024-08-30 NOTE — FLOWSHEET NOTE
[x] [facility]  Outpatient Rehabilitation & Therapy  [address]  P: [phone]  F: [phone]     Physical Therapy Daily Treatment Note    Date:  2024  Patient Name:  Agueda Rain    :  1960  MRN: [id number]  Physician: Sruthi Dorsey PA-C                                          Insurance: [facility] (auth after 30v per kenna year)  Medical Diagnosis:   M70.62 (ICD-10-CM) - Greater trochanteric bursitis of left hip   M25.551, M25.552 (ICD-10-CM) - Bilateral hip pain   M70.61 (ICD-10-CM) - Greater trochanteric bursitis of right hip   Rehab Codes: M70.62, M25.551, M70.61, M62.81, R26.89  Onset date: 24                             Next 's appt.: 24  Visit# / total visits: 3/12     Cancels/No Shows:     Subjective:    Pain:  [x] Yes  [] No Location: LBP  Pain Rating: (0-10 scale) 5/10  Pain altered Tx:  [x] No  [] Yes  Action:  Comments:Presents feeling achy this date. Took tylenol last night, states she's not in as much pain as last time. Achy after last session, states she's been doing her exercises but they hurt a little bit. She feels better while she's exercising but gets tight a little while afterwards.     Objective:  Modalities:   Precautions:  Exercises:  Exercise    BILAT    Reps/ Time Weight/ Level Comments    nu step 5'                   supine           LTR  10x5\"        SKTC 5x10\" ea       Piriformis stretch 2 way 2x30\"Ea     PPT 10x5\"     Hip add stretch 3x30\"      ITB stretch  2x30\"   Attempted stretch, unable to complete d/t incr in pain    HS stretch 3x30\" ea        hip flexor stretch 2x30\"         bridge  x     ppt first but pain present in LB    iso ball press 15x5\"        Single leg clams  2x10 ea  lime  Added 8/30   Knee ext + hip add  x10 ea    Added 8/30 - pt in hooklying, playground ball between knees, instructed to alternate kicking knees straight while holding ball in place               prone          hip flexor stretch   2x30\"     previously given.     Plan: [x] Continue current frequency toward long and short term goals.    [] Specific Instructions for subsequent treatments:       Time In: 10:31 am            Time Out: 11:33 am    Electronically signed by:  LEILANI Silver

## 2024-09-03 ENCOUNTER — HOSPITAL ENCOUNTER (OUTPATIENT)
Dept: CT IMAGING | Age: 64
Discharge: HOME | End: 2024-09-05
Attending: INTERNAL MEDICINE
Payer: COMMERCIAL

## 2024-09-03 DIAGNOSIS — Z87.891 PERSONAL HISTORY OF TOBACCO USE: ICD-10-CM

## 2024-09-03 PROCEDURE — 71271 CT THORAX LUNG CANCER SCR C-: CPT

## 2024-09-03 NOTE — TELEPHONE ENCOUNTER
Gel Injection Prior Auth  Drug: Durolane  Laterality: Bilateral  Prior Auth Required:  Not covered benefit  Call Ref or Auth #:Not covered benefit  Spoke with: Carlyn    Gel Injection Prior Auth  Drug: Synvisc One (preferred Drug)  Laterality: Bilateral   Prior Auth Required: Yes (form and clinicals faxed and scanned into media  Call Ref or Auth #:[id number]  Spoke with: Carlyn    Please call and notify patient and schedule accordingly.  Thank you.

## 2024-09-03 NOTE — TELEPHONE ENCOUNTER
Left VM for patient.     Please inform patient that we are still in the PA process of getting her injections covered. Will let her know the outcome once we receive word from her insurance.

## 2024-09-04 ENCOUNTER — HOSPITAL ENCOUNTER (OUTPATIENT)
Dept: PHYSICAL THERAPY | Facility: CLINIC | Age: 64
Setting detail: THERAPIES SERIES
Discharge: HOME | End: 2024-09-04
Payer: COMMERCIAL

## 2024-09-04 NOTE — FLOWSHEET NOTE
[] [facility]  Outpatient Rehabilitation &  Therapy  [address]  [phone]  [phone] [x] [facility]  Outpatient Rehabilitation &  Therapy  [address]  P: [phone]  F: [phone] [] [facility]  Outpatient Rehabilitation &  Therapy  [address]  P: [phone]  F: [phone] [] [facility]  Outpatient Rehabilitation &  Therapy  [address]  [phone]  [phone] [] [facility]  Outpatient Rehabilitation &  Therapy  [address]   P: [phone]  F: [phone]  [] [facility]  Outpatient Rehabilitation &  Therapy  [address]  P: [phone]  F: [phone] [] [facility]  Outpatient Rehabilitation &  Therapy  [address]  P: [phone]  F: [phone] [] [facility]  Outpatient Rehabilitation &  Therapy  [address]  P: [phone]  F: [phone] [] [facility]  Outpatient Rehabilitation &  Therapy  [address]  P: [phone]  F: [phone]  [] [facility] Outpatient Rehabilitation &  Therapy  [address]  P: [phone]  F: [phone]     Therapy Cancel/No Show note    Date: 2024  Patient: Agueda Rain  : 1960  MRN: [id number]    Cancels/No Shows to date: 0    For today's appointment patient:    [x]  Cancelled    [] Rescheduled appointment    [] No-show     Reason given by patient:    []  Patient ill    []  Conflicting appointment    [] No transportation      [] Conflict with work    [] No reason given    [] Weather related    [] COVID-19    [x] Other:      Comments:  forgot      [x] Next appointment was confirmed    Electronically signed by: Stephania Anderson, PT

## 2024-09-06 ENCOUNTER — HOSPITAL ENCOUNTER (OUTPATIENT)
Dept: PHYSICAL THERAPY | Facility: CLINIC | Age: 64
Setting detail: THERAPIES SERIES
Discharge: HOME | End: 2024-09-06
Payer: COMMERCIAL

## 2024-09-06 PROCEDURE — 97110 THERAPEUTIC EXERCISES: CPT

## 2024-09-06 PROCEDURE — 97140 MANUAL THERAPY 1/> REGIONS: CPT

## 2024-09-06 NOTE — FLOWSHEET NOTE
[x] [facility]  Outpatient Rehabilitation & Therapy  [address]  P: [phone]  F: [phone]     Physical Therapy Daily Treatment Note    Date:  2024  Patient Name:  Agueda Rain    :  1960  MRN: [id number]  Physician: Sruthi Dorsey PA-C                                          Insurance: [facility] (auth after 30v per kenna year)  Medical Diagnosis:   M70.62 (ICD-10-CM) - Greater trochanteric bursitis of left hip   M25.551, M25.552 (ICD-10-CM) - Bilateral hip pain   M70.61 (ICD-10-CM) - Greater trochanteric bursitis of right hip   Rehab Codes: M70.62, M25.551, M70.61, M62.81, R26.89  Onset date: 24                             Next 's appt.: 24  Visit# / total visits:      Cancels/No Shows:     Subjective:    Pain:  [x] Yes  [] No Location: LBP  Pain Rating: (0-10 scale) 7/10  Pain altered Tx:  [] No  [x] Yes  Action:added manual   Comments: Pt arrives with increased ache through R hip over the past week, unsure of cause. Feels temporary relief with stretching/ HEP and has been trying to complete exercises at home morning and night    Objective:  Modalities:   Precautions:  Exercises:  Exercise    BILAT    Reps/ Time Weight/ Level Comments    nu step 5'                   supine           LTR  10x5\"        SKTC 5x10\" ea       Piriformis stretch 2 way 2x30\"Ea     PPT 10x5\"     Hip add stretch 3x30\"      ITB stretch  2x30\"   Attempted stretch, unable to complete d/t incr in pain    HS stretch 3x30\" ea        hip flexor stretch 2x30\"         bridge  x     ppt first but pain present in LB    iso ball press 15x5\"        Single leg clams  2x10 ea  lime  Added 830   Hooklying clamshell 10x lime New    Knee ext + hip add  x10 ea    Added  - pt in hooklying, playground ball between knees, instructed to alternate kicking knees straight while holding ball in place   TA+ march 10xea A New    TA+ SLR 10xea A New          prone          hip flexor

## 2024-09-11 ENCOUNTER — HOSPITAL ENCOUNTER (OUTPATIENT)
Dept: PHYSICAL THERAPY | Facility: CLINIC | Age: 64
Setting detail: THERAPIES SERIES
Discharge: HOME | End: 2024-09-11
Payer: COMMERCIAL

## 2024-09-11 PROCEDURE — 97110 THERAPEUTIC EXERCISES: CPT

## 2024-09-13 ENCOUNTER — HOSPITAL ENCOUNTER (OUTPATIENT)
Dept: PHYSICAL THERAPY | Facility: CLINIC | Age: 64
Setting detail: THERAPIES SERIES
Discharge: HOME | End: 2024-09-13
Payer: COMMERCIAL

## 2024-09-13 PROCEDURE — 97110 THERAPEUTIC EXERCISES: CPT

## 2024-09-16 ENCOUNTER — OFFICE VISIT (OUTPATIENT)
Dept: FAMILY MEDICINE CLINIC | Age: 64
End: 2024-09-16
Payer: COMMERCIAL

## 2024-09-16 VITALS
DIASTOLIC BLOOD PRESSURE: 94 MMHG | TEMPERATURE: 97.9 F | HEART RATE: 72 BPM | WEIGHT: 203.8 LBS | SYSTOLIC BLOOD PRESSURE: 140 MMHG | BODY MASS INDEX: 33.4 KG/M2 | OXYGEN SATURATION: 97 % | RESPIRATION RATE: 16 BRPM

## 2024-09-16 DIAGNOSIS — E78.5 DYSLIPIDEMIA: ICD-10-CM

## 2024-09-16 DIAGNOSIS — E66.9 CLASS 1 OBESITY WITH SERIOUS COMORBIDITY AND BODY MASS INDEX (BMI) OF 33.0 TO 33.9 IN ADULT, UNSPECIFIED OBESITY TYPE: ICD-10-CM

## 2024-09-16 DIAGNOSIS — I10 ESSENTIAL HYPERTENSION: Primary | ICD-10-CM

## 2024-09-16 PROCEDURE — 3017F COLORECTAL CA SCREEN DOC REV: CPT | Performed by: NURSE PRACTITIONER

## 2024-09-16 PROCEDURE — 3077F SYST BP >= 140 MM HG: CPT | Performed by: NURSE PRACTITIONER

## 2024-09-16 PROCEDURE — G8427 DOCREV CUR MEDS BY ELIG CLIN: HCPCS | Performed by: NURSE PRACTITIONER

## 2024-09-16 PROCEDURE — G8417 CALC BMI ABV UP PARAM F/U: HCPCS | Performed by: NURSE PRACTITIONER

## 2024-09-16 PROCEDURE — 3080F DIAST BP >= 90 MM HG: CPT | Performed by: NURSE PRACTITIONER

## 2024-09-16 PROCEDURE — 99214 OFFICE O/P EST MOD 30 MIN: CPT | Performed by: NURSE PRACTITIONER

## 2024-09-16 PROCEDURE — 1036F TOBACCO NON-USER: CPT | Performed by: NURSE PRACTITIONER

## 2024-09-16 RX ORDER — HYDRALAZINE HYDROCHLORIDE 10 MG/1
10 TABLET, FILM COATED ORAL 2 TIMES DAILY
Qty: 60 TABLET | Refills: 0 | Status: SHIPPED | OUTPATIENT
Start: 2024-09-16 | End: 2025-09-16

## 2024-09-16 ASSESSMENT — ENCOUNTER SYMPTOMS
WHEEZING: 0
ABDOMINAL PAIN: 0
VOMITING: 0
NAUSEA: 0
SHORTNESS OF BREATH: 0

## 2024-09-16 ASSESSMENT — PATIENT HEALTH QUESTIONNAIRE - PHQ9
SUM OF ALL RESPONSES TO PHQ QUESTIONS 1-9: 0
SUM OF ALL RESPONSES TO PHQ9 QUESTIONS 1 & 2: 0
SUM OF ALL RESPONSES TO PHQ QUESTIONS 1-9: 0
2. FEELING DOWN, DEPRESSED OR HOPELESS: NOT AT ALL
1. LITTLE INTEREST OR PLEASURE IN DOING THINGS: NOT AT ALL

## 2024-09-17 ENCOUNTER — PROCEDURE VISIT (OUTPATIENT)
Dept: ORTHOPEDIC SURGERY | Age: 64
End: 2024-09-17
Payer: COMMERCIAL

## 2024-09-17 VITALS — OXYGEN SATURATION: 98 % | WEIGHT: 203 LBS | RESPIRATION RATE: 15 BRPM | BODY MASS INDEX: 32.62 KG/M2 | HEIGHT: 66 IN

## 2024-09-17 DIAGNOSIS — M70.61 GREATER TROCHANTERIC BURSITIS OF RIGHT HIP: ICD-10-CM

## 2024-09-17 DIAGNOSIS — M70.62 GREATER TROCHANTERIC BURSITIS OF LEFT HIP: Primary | ICD-10-CM

## 2024-09-17 PROCEDURE — 99213 OFFICE O/P EST LOW 20 MIN: CPT

## 2024-09-17 PROCEDURE — G8427 DOCREV CUR MEDS BY ELIG CLIN: HCPCS

## 2024-09-17 PROCEDURE — G8417 CALC BMI ABV UP PARAM F/U: HCPCS

## 2024-09-17 PROCEDURE — 1036F TOBACCO NON-USER: CPT

## 2024-09-17 PROCEDURE — 3017F COLORECTAL CA SCREEN DOC REV: CPT

## 2024-09-17 RX ORDER — METHYLPREDNISOLONE 4 MG
TABLET, DOSE PACK ORAL
Qty: 1 KIT | Refills: 0 | Status: SHIPPED | OUTPATIENT
Start: 2024-09-17 | End: 2024-09-23

## 2024-09-17 ASSESSMENT — ENCOUNTER SYMPTOMS
BACK PAIN: 1
COLOR CHANGE: 0

## 2024-09-18 ENCOUNTER — HOSPITAL ENCOUNTER (OUTPATIENT)
Dept: PHYSICAL THERAPY | Facility: CLINIC | Age: 64
Setting detail: THERAPIES SERIES
Discharge: HOME | End: 2024-09-18
Payer: COMMERCIAL

## 2024-09-18 DIAGNOSIS — M70.62 GREATER TROCHANTERIC BURSITIS OF LEFT HIP: Primary | ICD-10-CM

## 2024-09-18 DIAGNOSIS — M70.61 GREATER TROCHANTERIC BURSITIS OF RIGHT HIP: ICD-10-CM

## 2024-09-18 PROCEDURE — 97110 THERAPEUTIC EXERCISES: CPT

## 2024-09-20 ENCOUNTER — HOSPITAL ENCOUNTER (OUTPATIENT)
Dept: PHYSICAL THERAPY | Facility: CLINIC | Age: 64
Setting detail: THERAPIES SERIES
Discharge: HOME | End: 2024-09-20
Payer: COMMERCIAL

## 2024-09-20 PROCEDURE — 97110 THERAPEUTIC EXERCISES: CPT

## 2024-09-24 ENCOUNTER — HOSPITAL ENCOUNTER (OUTPATIENT)
Dept: PHYSICAL THERAPY | Facility: CLINIC | Age: 64
Setting detail: THERAPIES SERIES
Discharge: HOME | End: 2024-09-24
Payer: COMMERCIAL

## 2024-09-24 PROCEDURE — 97110 THERAPEUTIC EXERCISES: CPT

## 2024-09-27 ENCOUNTER — HOSPITAL ENCOUNTER (OUTPATIENT)
Dept: PHYSICAL THERAPY | Facility: CLINIC | Age: 64
Setting detail: THERAPIES SERIES
Discharge: HOME | End: 2024-09-27
Payer: COMMERCIAL

## 2024-09-27 PROCEDURE — 97110 THERAPEUTIC EXERCISES: CPT

## 2024-10-04 ENCOUNTER — HOSPITAL ENCOUNTER (OUTPATIENT)
Dept: PHYSICAL THERAPY | Facility: CLINIC | Age: 64
Setting detail: THERAPIES SERIES
Discharge: HOME | End: 2024-10-04
Payer: COMMERCIAL

## 2024-10-04 ENCOUNTER — PATIENT MESSAGE (OUTPATIENT)
Dept: ORTHOPEDIC SURGERY | Age: 64
End: 2024-10-04

## 2024-10-04 PROCEDURE — 97110 THERAPEUTIC EXERCISES: CPT

## 2024-10-04 NOTE — FLOWSHEET NOTE
[x] [facility]  Outpatient Rehabilitation & Therapy  [address]  P: [phone]  F: [phone]     Physical Therapy Daily Treatment Note    Date:  10/4/2024  Patient Name:  Agueda Rain    :  1960  MRN: [id number]  Physician: Sruthi Dorsey PA-C                                          Insurance: [facility] (auth after 30v per kenna year)  Medical Diagnosis:   M70.62 (ICD-10-CM) - Greater trochanteric bursitis of left hip   M25.551, M25.552 (ICD-10-CM) - Bilateral hip pain   M70.61 (ICD-10-CM) - Greater trochanteric bursitis of right hip   Rehab Codes: M70.62, M25.551, M70.61, M62.81, R26.89  Onset date: 24                             Next 's appt.: 10/15  Visit# / total visits:      Cancels/No Shows: 0    Subjective:    Pain:  [x] Yes  [] No Location: LBP ; B hips Pain Rating: (0-10 scale) 3-4/10 B hips, 7/10 low back  Pain altered Tx:  [x] No  [] Yes  Action:    Comments: Pt arrives with higher pain levels in low back, unsure of cause of increased pain. Less pain through B hips today. Was a little sore after last session.         Objective:  Modalities:   Precautions:  Exercises: Bolded completed   Exercise    BILAT    Reps/ Time Weight/ Level Comments    nu step 5'   L2      recumbent bike 6'   New           supine           LTR 1'        SKTC 2x30\" ea    opp leg extended   Piriformis stretch 2 way 2x30\"Ea     PPT 20x5\"     Hip add stretch 2x30\"      ITB stretch  2x30\"   Attempted stretch, unable to complete d/t incr in pain    HS stretch 2x30\" ea        hip flexor stretch 2x30\"         bridge  x     ppt first but pain present in LB   iso ball press 15x5\"        DKTC on ball 15x     Single leg clams 2x10 ea  lime  Added    Hooklying clamshell 15x lime New    Knee ext + hip add  x10 ea    Added  - pt in hooklying, playground ball between knees, instructed to alternate kicking knees straight while holding ball in place   TA+ march 10xea A

## 2024-10-07 NOTE — TELEPHONE ENCOUNTER
Patient was seen on 9/17, and called in on 9/18 asking for further advice. On that message, you stated we could order her a walking cane (which was done). She is now needing explanation as to why she needs it because her insurance will not cover it otherwise. Are you able to addend her note, or create an addendum expressing why she would benefit from the cane? If you can let me know when this has been completed so I can fax it over to the DME location she is using. Thank you.

## 2024-10-09 ENCOUNTER — OFFICE VISIT (OUTPATIENT)
Dept: GASTROENTEROLOGY | Age: 64
End: 2024-10-09
Payer: COMMERCIAL

## 2024-10-09 ENCOUNTER — TELEPHONE (OUTPATIENT)
Dept: GASTROENTEROLOGY | Age: 64
End: 2024-10-09

## 2024-10-09 VITALS
DIASTOLIC BLOOD PRESSURE: 69 MMHG | SYSTOLIC BLOOD PRESSURE: 139 MMHG | HEIGHT: 66 IN | BODY MASS INDEX: 32.47 KG/M2 | WEIGHT: 202 LBS | HEART RATE: 69 BPM

## 2024-10-09 DIAGNOSIS — D12.5 ADENOMATOUS POLYP OF SIGMOID COLON: Primary | ICD-10-CM

## 2024-10-09 DIAGNOSIS — Z86.19 HISTORY OF HEPATITIS C: ICD-10-CM

## 2024-10-09 PROCEDURE — G8484 FLU IMMUNIZE NO ADMIN: HCPCS | Performed by: INTERNAL MEDICINE

## 2024-10-09 PROCEDURE — G8417 CALC BMI ABV UP PARAM F/U: HCPCS | Performed by: INTERNAL MEDICINE

## 2024-10-09 PROCEDURE — 3075F SYST BP GE 130 - 139MM HG: CPT | Performed by: INTERNAL MEDICINE

## 2024-10-09 PROCEDURE — G8427 DOCREV CUR MEDS BY ELIG CLIN: HCPCS | Performed by: INTERNAL MEDICINE

## 2024-10-09 PROCEDURE — 1036F TOBACCO NON-USER: CPT | Performed by: INTERNAL MEDICINE

## 2024-10-09 PROCEDURE — 3078F DIAST BP <80 MM HG: CPT | Performed by: INTERNAL MEDICINE

## 2024-10-09 PROCEDURE — 3017F COLORECTAL CA SCREEN DOC REV: CPT | Performed by: INTERNAL MEDICINE

## 2024-10-09 PROCEDURE — 99214 OFFICE O/P EST MOD 30 MIN: CPT | Performed by: INTERNAL MEDICINE

## 2024-10-09 RX ORDER — POLYETHYLENE GLYCOL 3350 17 G/17G
POWDER, FOR SOLUTION ORAL
Qty: 255 G | Refills: 0 | Status: SHIPPED | OUTPATIENT
Start: 2024-10-09

## 2024-10-09 NOTE — PROGRESS NOTES
GI CLINIC FOLLOW UP    INTERVAL HISTORY:   No referring provider defined for this encounter.    No chief complaint on file.          HISTORY OF PRESENT ILLNESS: Ms.Donna SITA Rain is a 63 y.o. female , referred for evaluation of GERD.    She has past medical history of chronic hepatitis C, received ribavirin interferon, maintained SVR for several years. She wants to know if her liver is okay.  Also has h/o- polyps and is due for surveillance colonoscopy.    No c/o- nausea, vomiting, fever, loss of appetite, weight loss, bloating, bleeding IN, diarrhea, nocturnal diarrhea, tenesmus        Past Medical,Family, and Social History reviewed and does not contribute to the patient presentingcondition.    I did review all the labs results available for the labs which were ordered by the primary care physician, and the other consultants, we search on [employer] at [facility] and all the available care everywhere epic    I did review all the imaging studies of the abdomen available on EMR, ordered by the primary care physician and the other consultant    I did review all the pathology from the biopsies done on the previous endoscopies    Patient's PMH/PSH,SH,PSYCH Hx, MEDs, ALLERGIES, and ROS were all reviewed and updated in the appropriate sections.    PAST MEDICAL HISTORY:  Past Medical History:   Diagnosis Date    Allergic rhinitis due to pollen     Bilateral primary osteoarthritis of hip     Bilateral primary osteoarthritis of knee     Chronic sinusitis     Essential hypertension 07/27/2020    Greater trochanteric bursitis     Headache     Hematuria     Hepatitis-C     History of colon polyps     Hypertension     Lung disease     Migraine     Mitral valve regurgitation     Obesity (BMI 30-39.9) 07/27/2020    AURELIANO on CPAP 12/04/2020    Osteopenia     Other intervertebral disc degeneration, lumbosacral region     Stage 3a chronic kidney disease (HCC)     Vitamin D deficiency        Past Surgical History:   Procedure Laterality

## 2024-10-09 NOTE — TELEPHONE ENCOUNTER
Procedure scheduled/Dr Guaman  Procedure:Colonoscopy.  Dx: Adenomatous polyp of sigmoid colon.  Date:12/3/24.  Time:8:30 AM/ 6:30 AM Arrival.  Hospital:[facility] phone call:TBD.  Bowel Prep instructions given:Miralax/ Dulcolax.  In office/via phone: Office.  Clearance needed:N/A.

## 2024-10-14 ENCOUNTER — HOSPITAL ENCOUNTER (OUTPATIENT)
Dept: ULTRASOUND IMAGING | Age: 64
Discharge: HOME | End: 2024-10-16
Attending: INTERNAL MEDICINE
Payer: COMMERCIAL

## 2024-10-14 DIAGNOSIS — Z86.19 HISTORY OF HEPATITIS C: ICD-10-CM

## 2024-10-14 PROCEDURE — 76705 ECHO EXAM OF ABDOMEN: CPT

## 2024-10-15 ENCOUNTER — OFFICE VISIT (OUTPATIENT)
Dept: ORTHOPEDIC SURGERY | Age: 64
End: 2024-10-15
Payer: COMMERCIAL

## 2024-10-15 VITALS — WEIGHT: 202 LBS | BODY MASS INDEX: 32.47 KG/M2 | HEIGHT: 66 IN | RESPIRATION RATE: 15 BRPM | OXYGEN SATURATION: 97 %

## 2024-10-15 DIAGNOSIS — M25.552 BILATERAL HIP PAIN: Primary | ICD-10-CM

## 2024-10-15 DIAGNOSIS — M54.41 CHRONIC RIGHT-SIDED LOW BACK PAIN WITH RIGHT-SIDED SCIATICA: ICD-10-CM

## 2024-10-15 DIAGNOSIS — M25.551 BILATERAL HIP PAIN: Primary | ICD-10-CM

## 2024-10-15 DIAGNOSIS — G89.29 CHRONIC RIGHT-SIDED LOW BACK PAIN WITH RIGHT-SIDED SCIATICA: ICD-10-CM

## 2024-10-15 PROBLEM — K21.9 GASTRIC REFLUX: Status: ACTIVE | Noted: 2021-11-17

## 2024-10-15 PROCEDURE — G8484 FLU IMMUNIZE NO ADMIN: HCPCS

## 2024-10-15 PROCEDURE — G8427 DOCREV CUR MEDS BY ELIG CLIN: HCPCS

## 2024-10-15 PROCEDURE — 3017F COLORECTAL CA SCREEN DOC REV: CPT

## 2024-10-15 PROCEDURE — 99213 OFFICE O/P EST LOW 20 MIN: CPT

## 2024-10-15 PROCEDURE — G8417 CALC BMI ABV UP PARAM F/U: HCPCS

## 2024-10-15 PROCEDURE — 1036F TOBACCO NON-USER: CPT

## 2024-10-15 ASSESSMENT — ENCOUNTER SYMPTOMS
COLOR CHANGE: 0
BACK PAIN: 1

## 2024-10-15 NOTE — PROGRESS NOTES
[facility] ORTHOPEDICS AND SPORTS MEDICINE  [address]  [place] [address]  Dept: [phone]  Dept Fax: [phone]        Ambulatory Follow Up      Subjective:   Agueda Rain is a 63 y.o. year old female who presents to our office today for routine followup regarding her   1. Bilateral hip pain    2. Chronic right-sided low back pain with right-sided sciatica    .    Chief Complaint   Patient presents with    Hip Pain     Bilateral hip pain       Date of Injury: no known injuries.     HPI Agueda Rain  is a 63 y.o. female who presents today for a follow-up of her bilateral hip pain.  She has been in physical therapy for both hips and she has found some improvement in her strength with this.  She also has had bilateral greater trochanteric bursa injections which were performed on 8/7/2024 and these provided the patient with temporary relief.  She also has had a Medrol Dosepak with minimal relief.  She describes her pain as being primarily throughout the groin of the right hip and she also has pain along the right side of the lower back that radiates through the buttock and down the lateral aspect of the right thigh.  She denies any numbness or paresthesias down either leg.  She mostly notices her pain with standing up from a seated position, walking, and laying onto either side at night.  She has continued to experience weakness with hip flexion of the right hip.    Review of Systems   Constitutional:  Negative for chills and fever.   Musculoskeletal:  Positive for arthralgias, back pain and gait problem. Negative for joint swelling.   Skin:  Negative for color change and rash.   Neurological:  Positive for weakness. Negative for numbness.         Objective :   Resp 15   Ht 1.664 m (5' 5.5\")   Wt 91.6 kg (202 lb)   SpO2 97%   BMI 33.10 kg/m²  Body mass index is 33.1 kg/m².  General: Agueda Rain is a 63 y.o.

## 2024-10-15 NOTE — PATIENT INSTRUCTIONS
PATIENTIQ:  PatientIQ helps [facility] stay in touch with you to know how you're feeling, and provides education and care instructions to you at various time points.   Your answers help your care team track your progress to provide the best care possible. PatientIQ will contact you pre-op and post-op via email or text with:  Educational Videos and Care Instructions  Questionnaires About How You're Feeling    Your participation provides you valuable education and helps [facility] continue to provide quality care to all patients. Thank you

## 2024-10-15 NOTE — TELEPHONE ENCOUNTER
Called Jade Kellogg (Ph #: [phone]) and [facility] for the statues for the DME walking cane order that was placed. Patient was in today and was asking for a update.

## 2024-10-16 ENCOUNTER — OFFICE VISIT (OUTPATIENT)
Dept: FAMILY MEDICINE CLINIC | Age: 64
End: 2024-10-16
Payer: COMMERCIAL

## 2024-10-16 VITALS
BODY MASS INDEX: 33.59 KG/M2 | TEMPERATURE: 98.4 F | OXYGEN SATURATION: 96 % | HEART RATE: 64 BPM | SYSTOLIC BLOOD PRESSURE: 136 MMHG | WEIGHT: 205 LBS | RESPIRATION RATE: 14 BRPM | DIASTOLIC BLOOD PRESSURE: 62 MMHG

## 2024-10-16 DIAGNOSIS — I10 ESSENTIAL HYPERTENSION: Primary | ICD-10-CM

## 2024-10-16 DIAGNOSIS — Z12.31 SCREENING MAMMOGRAM FOR BREAST CANCER: ICD-10-CM

## 2024-10-16 DIAGNOSIS — R00.2 PALPITATIONS: ICD-10-CM

## 2024-10-16 PROCEDURE — G8417 CALC BMI ABV UP PARAM F/U: HCPCS | Performed by: NURSE PRACTITIONER

## 2024-10-16 PROCEDURE — 1036F TOBACCO NON-USER: CPT | Performed by: NURSE PRACTITIONER

## 2024-10-16 PROCEDURE — G8427 DOCREV CUR MEDS BY ELIG CLIN: HCPCS | Performed by: NURSE PRACTITIONER

## 2024-10-16 PROCEDURE — G8484 FLU IMMUNIZE NO ADMIN: HCPCS | Performed by: NURSE PRACTITIONER

## 2024-10-16 PROCEDURE — 3075F SYST BP GE 130 - 139MM HG: CPT | Performed by: NURSE PRACTITIONER

## 2024-10-16 PROCEDURE — 3017F COLORECTAL CA SCREEN DOC REV: CPT | Performed by: NURSE PRACTITIONER

## 2024-10-16 PROCEDURE — 3078F DIAST BP <80 MM HG: CPT | Performed by: NURSE PRACTITIONER

## 2024-10-16 PROCEDURE — 99214 OFFICE O/P EST MOD 30 MIN: CPT | Performed by: NURSE PRACTITIONER

## 2024-10-16 RX ORDER — CARVEDILOL 3.12 MG/1
3.12 TABLET ORAL 2 TIMES DAILY
Qty: 60 TABLET | Refills: 2 | Status: SHIPPED | OUTPATIENT
Start: 2024-10-16

## 2024-10-16 RX ORDER — SOLIFENACIN SUCCINATE 5 MG/1
5 TABLET, FILM COATED ORAL EVERY MORNING
COMMUNITY

## 2024-10-16 ASSESSMENT — ENCOUNTER SYMPTOMS
SHORTNESS OF BREATH: 0
NAUSEA: 0
ABDOMINAL PAIN: 0
VOMITING: 0
WHEEZING: 0

## 2024-10-16 ASSESSMENT — PATIENT HEALTH QUESTIONNAIRE - PHQ9
SUM OF ALL RESPONSES TO PHQ QUESTIONS 1-9: 0
SUM OF ALL RESPONSES TO PHQ9 QUESTIONS 1 & 2: 0
2. FEELING DOWN, DEPRESSED OR HOPELESS: NOT AT ALL
SUM OF ALL RESPONSES TO PHQ QUESTIONS 1-9: 0
SUM OF ALL RESPONSES TO PHQ QUESTIONS 1-9: 0
1. LITTLE INTEREST OR PLEASURE IN DOING THINGS: NOT AT ALL
SUM OF ALL RESPONSES TO PHQ QUESTIONS 1-9: 0

## 2024-10-16 NOTE — PROGRESS NOTES
Agueda Rain (:  1960) is a 63 y.o. female,Established patient, here for evaluation of the following chief complaint(s):  Hypertension (Medication 1 month F/U. The patient has brought in home BP readings. ), Cholesterol Problem, vitamin d deficiency, and Health Maintenance (Flu vaccine - patient is allergic to eggs and will pursue an Egg free version of the vaccine., Mammogram - pended, patient is due for HIV screening, Pneumonia, Shingles and RSV vaccines)         Assessment & Plan  Essential hypertension   Chronic, not at goal (unstable), start low dose coreg  Keep bp log and follow up as scheduled   Orders:    carvedilol (COREG) 3.125 MG tablet; Take 1 tablet by mouth 2 times daily    Palpitations   Chronic, not at goal (unstable), start low dose coreg  Follow up with cardiologist as scheduled     Orders:    carvedilol (COREG) 3.125 MG tablet; Take 1 tablet by mouth 2 times daily    Screening mammogram for breast cancer       Orders:    [facility]IER DIGITAL SCREEN W OR WO CAD BILATERAL; Future      Return for as scheduled .       Subjective   Visit Information    Have you changed or started any medications since your last visit including any over-the-counter medicines, vitamins, or herbal medicines? yes - vesicare   Are you having any side effects from any of your medications? -  no  Have you stopped taking any of your medications? Is so, why? -  yes - see med list    Have you seen any other physician or provider since your last visit? Yes - Records Obtained  Have you had any other diagnostic tests since your last visit? Yes - Records Obtained  Have you been seen in the emergency room and/or had an admission to a hospital since we last saw you? No  Have you had your routine dental cleaning in the past 6 months? no    Have you activated your [employer] account? If not, what are your barriers? Yes     Patient Care Team:  Jessee Pitts MD as PCP - General (Family Medicine)  Jessee Pitts MD as

## 2024-10-16 NOTE — ASSESSMENT & PLAN NOTE
Chronic, not at goal (unstable), start low dose coreg  Keep bp log and follow up as scheduled   Orders:    carvedilol (COREG) 3.125 MG tablet; Take 1 tablet by mouth 2 times daily

## 2024-10-17 NOTE — TELEPHONE ENCOUNTER
Patient was called and patient stated she went to [facility] and was able to get her cane.

## 2024-10-18 ENCOUNTER — HOSPITAL ENCOUNTER (OUTPATIENT)
Dept: PHYSICAL THERAPY | Facility: CLINIC | Age: 64
Setting detail: THERAPIES SERIES
Discharge: HOME | End: 2024-10-18
Payer: COMMERCIAL

## 2024-10-18 PROCEDURE — 97110 THERAPEUTIC EXERCISES: CPT

## 2024-10-18 PROCEDURE — 97116 GAIT TRAINING THERAPY: CPT

## 2024-10-18 NOTE — FLOWSHEET NOTE
in gait, function, and stability however continues to be limited by L hip ER ROM and limited hip abd/ext strength with pain increasing to 8/10 max. Updated HEP and encouraged compliance at least 3x weekly upon discharge. Pt verbalizes understanding and is agreeable to discharge to independent HEP at this time.     [] No change.     [] Other:  [] Patient would continue to benefit from skilled physical therapy services in order to: reduce pain, improve mobility, improve global LE strength and stability to ease difficulty with all daily activities and improve quality of life.       Problems:    [x] ? Pain up to 10/10 pain B hips to knee  [x] ? ROM reduced B hip mobility globally  [x] ? Strength impaired B hip and knee strength globally  [x] ? Function impaired function per LEFI score of 77.4%  [x] Other impaired gait, impaired SLS        Goals  MET NOT MET ON-  GOING  Details   Date Addressed: assessed by primary PT 9/18           STG: To be met in 6 treatments            1. ? Pain: Decrease B hip pain levels to 6/10 or less to ease ADL progression. [x]  []  []      2. ? ROM: Increase B hip IR/ER AROM to at least 35 degrees to reduce difficulty with ADLs such as LE dressing. []  []  [x]      3. ? Strength: Increase B hip strength to 4+/5 throughout and knee strength to 5/5 to ease functional limitations and mobility  []  []  [x]      4. Independent with Home Exercise Programs with ability to demonstrate exercises without cueing for technique.  [x]  []  []      5. Pt to demonstrate improved functional strength with ability to perform 5x STS without UE support and equal bilateral weight shift.  [x]  []  []                  Date Addressed: Reassessed by primary PT 10/18           LTG: To be met in 12 treatments           1. Improve score on assessment tool LEFI from 77.4% impairment to less than 45% impairment to demonstrate improved functional mobility []  []  [x]   58.4%   2. Reduce B hip pain levels to 3/10 or less

## 2024-10-18 NOTE — DISCHARGE SUMMARY
[] [facility]  Outpatient Rehabilitation &  Therapy  [address]  [phone]  [phone] [x] [facility]  Outpatient Rehabilitation &  Therapy  [address]  P: [phone]  F: [phone] [] [facility]  Outpatient Rehabilitation &  Therapy  [address]  P: [phone]  F: [phone] [] [facility]  Outpatient Rehabilitation &  Therapy  [address]  [phone]  [phone] [] [facility]  Outpatient Rehabilitation &  Therapy  [address]   P: [phone]  F: [phone]  [] [facility]  Outpatient Rehabilitation &  Therapy  [address]  P: [phone]  F: [phone] [] [facility]  Outpatient Rehabilitation &  Therapy  [address]  P: [phone]  F: [phone] [] [facility]  Outpatient Rehabilitation &  Therapy  [address]  P: [phone]  F: [phone] [] [facility]  Outpatient Rehabilitation &  Therapy  [address]  P: [phone]  F: [phone]  [] [facility] Outpatient Rehabilitation &  Therapy  [address]  P: [phone]  F: [phone]     Physical Therapy Discharge Note    Date: 10/18/2024      Patient: Agueda Rain  : 1960  MRN: [id number]    Physician: Sruthi Dorsey PA-C                                          Insurance: [facility] (auth after 30v per kenna year)  Medical Diagnosis:   M70.62 (ICD-10-CM) - Greater trochanteric bursitis of left hip   M25.551, M25.552 (ICD-10-CM) - Bilateral hip pain   M70.61 (ICD-10-CM) - Greater trochanteric bursitis of right hip   Rehab Codes: M70.62, M25.551, M70.61, M62.81, R26.89  Onset date: 24                             Next 's appt.: 10/15  Visit# / total visits:                                 Cancels/No Shows:

## 2024-10-25 ENCOUNTER — TELEPHONE (OUTPATIENT)
Dept: GASTROENTEROLOGY | Age: 64
End: 2024-10-25

## 2024-10-29 ENCOUNTER — INITIAL CONSULT (OUTPATIENT)
Dept: PAIN MANAGEMENT | Age: 64
End: 2024-10-29
Payer: COMMERCIAL

## 2024-10-29 VITALS — HEIGHT: 65 IN | BODY MASS INDEX: 34.16 KG/M2 | WEIGHT: 205 LBS

## 2024-10-29 DIAGNOSIS — G47.33 OSA ON CPAP: ICD-10-CM

## 2024-10-29 DIAGNOSIS — M25.552 CHRONIC PAIN OF BOTH HIPS: ICD-10-CM

## 2024-10-29 DIAGNOSIS — M54.41 CHRONIC BILATERAL LOW BACK PAIN WITH RIGHT-SIDED SCIATICA: Primary | ICD-10-CM

## 2024-10-29 DIAGNOSIS — M25.551 CHRONIC PAIN OF BOTH HIPS: ICD-10-CM

## 2024-10-29 DIAGNOSIS — G89.29 CHRONIC BILATERAL LOW BACK PAIN WITH RIGHT-SIDED SCIATICA: Primary | ICD-10-CM

## 2024-10-29 DIAGNOSIS — G89.29 CHRONIC PAIN OF BOTH HIPS: ICD-10-CM

## 2024-10-29 PROCEDURE — 99203 OFFICE O/P NEW LOW 30 MIN: CPT | Performed by: ANESTHESIOLOGY

## 2024-10-29 PROCEDURE — G8484 FLU IMMUNIZE NO ADMIN: HCPCS | Performed by: ANESTHESIOLOGY

## 2024-10-29 PROCEDURE — 3017F COLORECTAL CA SCREEN DOC REV: CPT | Performed by: ANESTHESIOLOGY

## 2024-10-29 PROCEDURE — G8427 DOCREV CUR MEDS BY ELIG CLIN: HCPCS | Performed by: ANESTHESIOLOGY

## 2024-10-29 PROCEDURE — G8417 CALC BMI ABV UP PARAM F/U: HCPCS | Performed by: ANESTHESIOLOGY

## 2024-10-29 PROCEDURE — 1036F TOBACCO NON-USER: CPT | Performed by: ANESTHESIOLOGY

## 2024-10-29 RX ORDER — RIZATRIPTAN BENZOATE 10 MG/1
10 TABLET, ORALLY DISINTEGRATING ORAL
COMMUNITY

## 2024-10-29 ASSESSMENT — ENCOUNTER SYMPTOMS
ALLERGIC/IMMUNOLOGIC NEGATIVE: 1
DIARRHEA: 0
RESPIRATORY NEGATIVE: 1
SORE THROAT: 0
SHORTNESS OF BREATH: 0
VOMITING: 0
GASTROINTESTINAL NEGATIVE: 1
BACK PAIN: 1
EYES NEGATIVE: 1
CONSTIPATION: 0
CHEST TIGHTNESS: 0
EYE PAIN: 0
NAUSEA: 0

## 2024-10-29 NOTE — PROGRESS NOTES
The patient is a 63 y.o.Non- / non  female.    Chief Complaint   Patient presents with    Consultation    Hip Pain    Back Pain        Pain History  Pain score today: 5  1. Location: Bilateral Hips, Lumbar Spine     2. Radiation: right leg to the knee cap   3. Character: aching, throbbing, nagging, stinging    5. Duration: comes and goes   6. Onset: 2021  7. Did an injury cause pain: no   8. Aggravating factors: sometimes nothing brings on the pain   9. Alleviating factors: exercises   10. Associated symptoms (numbness / tingling / weakness): tingling   -Where at: right hip to the left hip   -Down into finger tips or toes (specify which finger or toes): n/a   -constant or intermitting:  n/a   11. Red Flags: (weight loss / chills / loss of bladder or bowel control): no      Previous management history  1. Previous diagnostic workup: (Imaging/EMG)   CT, MRI, or Xray: XR   What part of the body:lumbar    What facility did they have it at:  [facility]   What year or specific date: 01/06/2023  EMG:  no      2. Previous non interventional treatments tried:  chiropractor or physical therapy: PT   What part of the body: Bilateral Hip   What facility was it done at: [facility]   How long ago was it last tried: 08/19/2024- 10/4/2024  Did it work: Yes   Did they complete it: yes      3. Previous Medications tried  NSAID's: Tylenol   Neurontin: yes   Lyrica: no   Trycyclic antidepressant (Ellavil / Pamelor ): no   Cymbalta: no   Opioids (Ultram / Vicodin / Percocet / Morphine / Dilaudid / Oramorph/ Fentanyl etc.): no   Last Pain medication taken (name of med and date): Tylenol 10/26/2024     4. Previous Interventional pain procedures tried:  What kind of injection: Steriod Injections   Who did the injection: Dr. Wong With Orthopedic   did the injection help: Yes    Last time injection was done: May 2024     5. Previous surgeries for pain  What part of the body did they have the surgery: no   What physician did the

## 2024-10-30 ENCOUNTER — PATIENT MESSAGE (OUTPATIENT)
Dept: ORTHOPEDIC SURGERY | Age: 64
End: 2024-10-30

## 2024-10-30 NOTE — TELEPHONE ENCOUNTER
Patient was called and informed to call the radiology department at [phone] to speak to someone about the authorization for MRI. Patient voiced understanding and said she will call there.

## 2024-11-01 ENCOUNTER — TELEPHONE (OUTPATIENT)
Dept: ADMINISTRATIVE | Age: 64
End: 2024-11-01

## 2024-11-01 NOTE — TELEPHONE ENCOUNTER
Patient is scheduled on 11/4 for MRI Lumbar and her insurance is requesting a peer to peer review for approval. Please contact Faith at [phone] with tracking # [id number], thank you

## 2024-11-04 ENCOUNTER — HOSPITAL ENCOUNTER (OUTPATIENT)
Dept: MRI IMAGING | Age: 64
Discharge: HOME | End: 2024-11-06
Payer: COMMERCIAL

## 2024-11-04 DIAGNOSIS — M54.41 CHRONIC RIGHT-SIDED LOW BACK PAIN WITH RIGHT-SIDED SCIATICA: ICD-10-CM

## 2024-11-04 DIAGNOSIS — M25.552 BILATERAL HIP PAIN: ICD-10-CM

## 2024-11-04 DIAGNOSIS — M25.551 BILATERAL HIP PAIN: ICD-10-CM

## 2024-11-04 DIAGNOSIS — G89.29 CHRONIC RIGHT-SIDED LOW BACK PAIN WITH RIGHT-SIDED SCIATICA: ICD-10-CM

## 2024-11-04 PROCEDURE — 72148 MRI LUMBAR SPINE W/O DYE: CPT

## 2024-11-04 PROCEDURE — 72195 MRI PELVIS W/O DYE: CPT

## 2024-11-04 NOTE — TELEPHONE ENCOUNTER
Called  [phone] with tracking # [id number] to schedule a peer to peer and was informed that they don't schedule Radiology peer to peer and Provider should call when they want to do peer to peer. Ref # [id number]

## 2024-11-05 ENCOUNTER — TELEPHONE (OUTPATIENT)
Dept: ORTHOPEDIC SURGERY | Age: 64
End: 2024-11-05

## 2024-11-05 NOTE — TELEPHONE ENCOUNTER
Please inform patient that the insurance company is not approving the MRI of the lumbar spine due to the patient not having formal physical therapy specifically for the back. She will need either 6 weeks of documented formal PT or 6 weeks of documented home PT for the back. I would advise that she still follow up with pain management as planned.

## 2024-11-07 ENCOUNTER — TELEPHONE (OUTPATIENT)
Dept: ORTHOPEDIC SURGERY | Age: 64
End: 2024-11-07

## 2024-11-07 NOTE — TELEPHONE ENCOUNTER
I called the patient and spoke to her about the MRI results of the lumbar spine and pelvis. She is going to follow up with Dr. Covarrubias next week as planned.    She is upset about the MRI of the lumbar spine being denied by insurance. She does not understand why radiology did the MRI if it had not been approved. I will be contacting my  in regard to this issue.

## 2024-11-07 NOTE — TELEPHONE ENCOUNTER
Patient called for MRI results. Patient was told that MRI hadnt been reviewed and that we will reach out to her when it is.

## 2024-11-07 NOTE — TELEPHONE ENCOUNTER
Patient was informed that MRI was denied. Patient voiced understanding. She states that she was told by Auth department at [facility] she should do the MRI without the Auth and \"they would take care of it\"

## 2024-11-12 ENCOUNTER — OFFICE VISIT (OUTPATIENT)
Dept: PAIN MANAGEMENT | Age: 64
End: 2024-11-12
Payer: COMMERCIAL

## 2024-11-12 VITALS — BODY MASS INDEX: 34.16 KG/M2 | HEIGHT: 65 IN | WEIGHT: 205 LBS

## 2024-11-12 DIAGNOSIS — G47.33 OSA ON CPAP: ICD-10-CM

## 2024-11-12 DIAGNOSIS — Z86.19 HISTORY OF HEPATITIS C: ICD-10-CM

## 2024-11-12 DIAGNOSIS — E66.811 CLASS 1 OBESITY WITH SERIOUS COMORBIDITY AND BODY MASS INDEX (BMI) OF 33.0 TO 33.9 IN ADULT, UNSPECIFIED OBESITY TYPE: ICD-10-CM

## 2024-11-12 DIAGNOSIS — M54.51 VERTEBROGENIC LOW BACK PAIN: ICD-10-CM

## 2024-11-12 DIAGNOSIS — R93.7 ABNORMAL MRI, LUMBAR SPINE: ICD-10-CM

## 2024-11-12 DIAGNOSIS — M48.062 SPINAL STENOSIS OF LUMBAR REGION WITH NEUROGENIC CLAUDICATION: Primary | ICD-10-CM

## 2024-11-12 DIAGNOSIS — M47.817 LUMBOSACRAL SPONDYLOSIS WITHOUT MYELOPATHY: ICD-10-CM

## 2024-11-12 PROCEDURE — 99214 OFFICE O/P EST MOD 30 MIN: CPT | Performed by: ANESTHESIOLOGY

## 2024-11-12 PROCEDURE — G8484 FLU IMMUNIZE NO ADMIN: HCPCS | Performed by: ANESTHESIOLOGY

## 2024-11-12 PROCEDURE — G8417 CALC BMI ABV UP PARAM F/U: HCPCS | Performed by: ANESTHESIOLOGY

## 2024-11-12 PROCEDURE — G8427 DOCREV CUR MEDS BY ELIG CLIN: HCPCS | Performed by: ANESTHESIOLOGY

## 2024-11-12 PROCEDURE — 3017F COLORECTAL CA SCREEN DOC REV: CPT | Performed by: ANESTHESIOLOGY

## 2024-11-12 PROCEDURE — 1036F TOBACCO NON-USER: CPT | Performed by: ANESTHESIOLOGY

## 2024-11-12 ASSESSMENT — ENCOUNTER SYMPTOMS
CONSTIPATION: 0
VOMITING: 0
NAUSEA: 0
BACK PAIN: 1
SINUS PRESSURE: 0
RESPIRATORY NEGATIVE: 1
SHORTNESS OF BREATH: 0
GASTROINTESTINAL NEGATIVE: 1
CHEST TIGHTNESS: 0
DIARRHEA: 0

## 2024-11-12 NOTE — PROGRESS NOTES
History   Problem Relation Age of Onset    High Blood Pressure Mother     Other Mother         ALZHEIMERS    Cancer Father         BONE AND PROSTRATE    Prostate Cancer Brother        Allergies   Allergen Reactions    Banana     Egg-Derived Products Other (See Comments)    Penicillin G Other (See Comments)    Penicillins Hives and Other (See Comments)    Amlodipine Swelling    Losartan Headaches    Seasonal Other (See Comments)     seasonal       There were no vitals filed for this visit.    Current Outpatient Medications   Medication Sig Dispense Refill    rizatriptan (MAXALT-MLT) 10 MG disintegrating tablet Take 1 tablet by mouth once as needed      solifenacin (VESICARE) 5 MG tablet Take 1 tablet by mouth every morning      carvedilol (COREG) 3.125 MG tablet Take 1 tablet by mouth 2 times daily 60 tablet 2    polyethylene glycol (GLYCOLAX) 17 GM/SCOOP powder Dispense 4 Dulcolax tablets with this prescription.  Use as directed by following your patient instructions given by your physician. 255 g 0    ipratropium 0.5 mg-albuterol 2.5 mg (DUONEB) 0.5-2.5 (3) MG/3ML SOLN nebulizer solution Inhale 3 mLs into the lungs 4 times daily as needed for Shortness of Breath or Other (wheezing) 360 mL 0    fluticasone (FLONASE) 50 MCG/ACT nasal spray 1 spray by Each Nostril route daily as needed for Rhinitis      famotidine (PEPCID) 40 MG tablet Take 1 tablet by mouth daily as needed (heartburn)      latanoprost (XALATAN) 0.005 % ophthalmic solution Place 1 drop into both eyes nightly      Multiple Vitamins-Minerals (THERAPEUTIC MULTIVITAMIN-MINERALS) tablet Take 1 tablet by mouth daily      Misc Natural Products (BEET ROOT) 500 MG CAPS Take 1 tablet by mouth daily 1 DAILY AM      cetirizine (ZYRTEC) 10 MG tablet Take 1 tablet by mouth daily as needed for Allergies       No current facility-administered medications for this visit.       Review of Systems      Objective:  Vital signs: (most recent): There were no vitals taken 
Sunita COVINGTON                                          Insurance: [facility] (auth after 30v per kenna year)  Medical Diagnosis:   M70.62 (ICD-10-CM) - Greater trochanteric bursitis of left hip   M25.551, M25.552 (ICD-10-CM) - Bilateral hip pain   M70.61 (ICD-10-CM) - Greater trochanteric bursitis of right hip   Rehab Codes: M70.62, M25.551, M70.61, M62.81, R26.89  Onset date: 6/7/24                             Next Dr's appt.: 10/15  Visit# / total visits: 12/12                                Cancels/No Shows: 0  Date of initial visit: 08/19/24                Date of final visit: 10/18/24    1. Spinal stenosis of lumbar region with neurogenic claudication    2. AURELIANO on CPAP    3. Lumbosacral spondylosis without myelopathy    4. Vertebrogenic low back pain    5. History of hepatitis C    6. Class 1 obesity with serious comorbidity and body mass index (BMI) of 33.0 to 33.9 in adult, unspecified obesity type    7. Abnormal MRI, lumbar spine        Discussed with patient that there are likely multiple etiologies for pain  At this time lumbar radicular symptom appears to be the chief issue therefore discussed lumbar epidural steroid injection  Patient have transportation issues    May consider evaluation of facet joint or Intracept procedure in future      Orders Placed This Encounter   Procedures    Lumbar Epidural Steroid Injection/Caudal      No orders of the defined types were placed in this encounter.           Electronically signed by Raghu Covarrubias MD on 11/12/2024 at 9:56 AM

## 2024-11-15 ENCOUNTER — TELEPHONE (OUTPATIENT)
Dept: FAMILY MEDICINE CLINIC | Age: 64
End: 2024-11-15

## 2024-11-15 ENCOUNTER — TELEPHONE (OUTPATIENT)
Dept: PAIN MANAGEMENT | Age: 64
End: 2024-11-15

## 2024-11-15 NOTE — TELEPHONE ENCOUNTER
----- Message from Tori OSORIO sent at 11/15/2024 12:25 PM EST -----  Regarding: E[facility] Appointment Request  E[facility] Appointment Request    Patient needs appointment for E[facility] Appointment Type: Annual Visit.    Patient Requested Dates(s):Any day next week before 11/21/2024  Patient Requested Time:Any time 1:00PM  Provider Name:Jessee Pitts MD    Reason for Appointment Request: Other Requesting to reschedule is there is any cancellation for her appointment on 11/21/2024 because there will be a bad weather on this day as per news.   --------------------------------------------------------------------------------------------------------------------------    Relationship to Patient: Self     Call Back Information: OK to respond with electronic message via [facility] portal (only for patients who have registered [facility] account)    Preferred Call Back Number: Phone [phone]

## 2024-11-15 NOTE — TELEPHONE ENCOUNTER
Left voice message for patient. Notified that she can have the Lumbar epidural injection with out sedation or must have a  if having the sedation. Was told to call back with her decision.

## 2024-11-19 PROBLEM — G43.909 MIGRAINE HEADACHE: Status: ACTIVE | Noted: 2017-09-07

## 2024-11-19 PROBLEM — K59.00 CONSTIPATION: Status: ACTIVE | Noted: 2024-11-19

## 2024-11-19 PROBLEM — R63.0 LOSS OF APPETITE: Status: ACTIVE | Noted: 2017-09-07

## 2024-11-19 PROBLEM — G56.02 CARPAL TUNNEL SYNDROME OF LEFT WRIST: Status: ACTIVE | Noted: 2019-03-28

## 2024-11-19 PROBLEM — R00.2 PALPITATIONS: Status: ACTIVE | Noted: 2024-10-29

## 2024-11-19 PROBLEM — O03.9 SPONTANEOUS ABORTION: Status: ACTIVE | Noted: 2017-09-07

## 2024-11-19 PROBLEM — N39.46 MIXED STRESS AND URGE URINARY INCONTINENCE: Status: ACTIVE | Noted: 2024-10-11

## 2024-11-19 RX ORDER — AZELASTINE HYDROCHLORIDE 137 UG/1
1 SPRAY, METERED NASAL
COMMUNITY
Start: 2024-02-14

## 2024-11-19 RX ORDER — AMMONIUM LACTATE 12 G/100G
CREAM TOPICAL
COMMUNITY
Start: 2024-11-01

## 2024-11-19 RX ORDER — HYDRALAZINE HYDROCHLORIDE 10 MG/1
10 TABLET, FILM COATED ORAL
COMMUNITY
Start: 2024-09-16 | End: 2024-11-20

## 2024-11-19 RX ORDER — TOBRAMYCIN/DEXAMETHASONE 0.3 %-0.1%
OINTMENT (GRAM) OPHTHALMIC (EYE)
COMMUNITY
Start: 2024-11-05

## 2024-11-20 ENCOUNTER — OFFICE VISIT (OUTPATIENT)
Dept: FAMILY MEDICINE CLINIC | Age: 64
End: 2024-11-20
Payer: COMMERCIAL

## 2024-11-20 VITALS
RESPIRATION RATE: 14 BRPM | SYSTOLIC BLOOD PRESSURE: 132 MMHG | HEART RATE: 67 BPM | OXYGEN SATURATION: 98 % | BODY MASS INDEX: 31.86 KG/M2 | WEIGHT: 203 LBS | HEIGHT: 67 IN | DIASTOLIC BLOOD PRESSURE: 68 MMHG

## 2024-11-20 DIAGNOSIS — Z00.00 ENCOUNTER FOR WELL ADULT EXAM WITHOUT ABNORMAL FINDINGS: Primary | ICD-10-CM

## 2024-11-20 PROCEDURE — 99396 PREV VISIT EST AGE 40-64: CPT | Performed by: NURSE PRACTITIONER

## 2024-11-20 PROCEDURE — 3075F SYST BP GE 130 - 139MM HG: CPT | Performed by: NURSE PRACTITIONER

## 2024-11-20 PROCEDURE — G8484 FLU IMMUNIZE NO ADMIN: HCPCS | Performed by: NURSE PRACTITIONER

## 2024-11-20 PROCEDURE — 3078F DIAST BP <80 MM HG: CPT | Performed by: NURSE PRACTITIONER

## 2024-11-20 SDOH — ECONOMIC STABILITY: FOOD INSECURITY: WITHIN THE PAST 12 MONTHS, THE FOOD YOU BOUGHT JUST DIDN'T LAST AND YOU DIDN'T HAVE MONEY TO GET MORE.: NEVER TRUE

## 2024-11-20 SDOH — ECONOMIC STABILITY: FOOD INSECURITY: WITHIN THE PAST 12 MONTHS, YOU WORRIED THAT YOUR FOOD WOULD RUN OUT BEFORE YOU GOT MONEY TO BUY MORE.: NEVER TRUE

## 2024-11-20 SDOH — ECONOMIC STABILITY: INCOME INSECURITY: HOW HARD IS IT FOR YOU TO PAY FOR THE VERY BASICS LIKE FOOD, HOUSING, MEDICAL CARE, AND HEATING?: NOT HARD AT ALL

## 2024-11-20 ASSESSMENT — PATIENT HEALTH QUESTIONNAIRE - PHQ9
SUM OF ALL RESPONSES TO PHQ QUESTIONS 1-9: 0
SUM OF ALL RESPONSES TO PHQ QUESTIONS 1-9: 0
SUM OF ALL RESPONSES TO PHQ9 QUESTIONS 1 & 2: 0
2. FEELING DOWN, DEPRESSED OR HOPELESS: NOT AT ALL
SUM OF ALL RESPONSES TO PHQ QUESTIONS 1-9: 0
1. LITTLE INTEREST OR PLEASURE IN DOING THINGS: NOT AT ALL
SUM OF ALL RESPONSES TO PHQ QUESTIONS 1-9: 0

## 2024-11-20 ASSESSMENT — ENCOUNTER SYMPTOMS
ABDOMINAL PAIN: 0
BLOOD IN STOOL: 0
RHINORRHEA: 1
SHORTNESS OF BREATH: 0

## 2024-11-20 NOTE — PROGRESS NOTES
bursitis     Headache     Hematuria     Hepatitis-C     History of colon polyps     Hypertension     Lung disease     Migraine     Mitral valve regurgitation     Obesity (BMI 30-39.9) 2020    AURELIANO on CPAP 2020    Osteopenia     Other intervertebral disc degeneration, lumbosacral region     Stage 3a chronic kidney disease (HCC)     Vitamin D deficiency      Past Surgical History:   Procedure Laterality Date    CARDIAC PROCEDURE N/A 2024    Left heart cath / coronary angiography performed by Guru Szymanski MD at [facility] CARDIAC CATH LAB    CARPAL TUNNEL RELEASE Bilateral     COLONOSCOPY  2017    TUBULAR ADENOMA    COLONOSCOPY N/A 10/04/2021    COLONOSCOPY POLYPECTOMY SNARE/COLD BIOPSY performed by Roderick Stone MD at [facility]    COLONOSCOPY  10/04/2021    COLONOSCOPY WITH BIOPSY performed by Rdoerick Stone MD at [facility]    CYSTOSCOPY  2016    with bladder biopsy    HYSTERECTOMY, TOTAL ABDOMINAL (CERVIX REMOVED)  2017    OVARY REMOVAL      UPPER GASTROINTESTINAL ENDOSCOPY N/A 2022    EGD BIOPSY performed by Roderick Stone MD at [facility]     Family History   Problem Relation Age of Onset    High Blood Pressure Mother     Other Mother         ALZHEIMERS    Cancer Father         BONE AND PROSTRATE    Prostate Cancer Brother      Social History     Tobacco Use    Smoking status: Former     Current packs/day: 0.00     Average packs/day: 1 pack/day for 20.0 years (20.0 ttl pk-yrs)     Types: Cigarettes     Start date: 10/27/1999     Quit date: 10/27/2019     Years since quittin.0    Smokeless tobacco: Never    Tobacco comments:     Over the past years of my early 20's I was a on & off again smoker due to stress in my life.   Vaping Use    Vaping status: Never Used   Substance Use Topics    Alcohol use: Never    Drug use: Never           Objective    Vital Signs  /68   Pulse 67   Resp 14   Ht 1.702 m (5' 7\")   Wt 92.1 kg (203 lb)   SpO2 98%   BMI 31.79 kg/m²

## 2024-11-22 ENCOUNTER — HOSPITAL ENCOUNTER (OUTPATIENT)
Dept: PREADMISSION TESTING | Age: 64
Discharge: HOME | End: 2024-11-26

## 2024-11-22 VITALS — HEIGHT: 67 IN | WEIGHT: 203 LBS | BODY MASS INDEX: 31.86 KG/M2

## 2024-11-22 NOTE — PROGRESS NOTES
Pre-op Instructions For Out-Patient Endoscopy Surgery    Medication Instructions:  Please stop herbs and any supplements now (includes vitamins and minerals).    For these medications:  Dulaglutide (Trulicity), Exenatide (Byetta and Bydureon, Liraglutide (Victoza), Lixisenatide (Adlyxin), Semaglutide (Ozempic and Rybelsus), Tirzepatide (Mounjaro)- Stop 1 week prior if taking weekly or 1 day prior if taking every 12 hours or daily.     Please contact your surgeon and prescribing physician for pre-op instructions for any blood thinners. DICLOFENAC    If you have inhalers/aerosol treatments at home, please use them the morning of your surgery and bring the inhalers with you to the hospital.    Please take the following medications the morning of your surgery with a sip of water:    carvedilol    Surgery Instructions:  After midnight before surgery:  Do not eat or drink anything, including water, mints, gum, and hard candy.  You may brush your teeth without swallowing.  No smoking, chewing tobacco, or street drugs.    Please shower or bathe before surgery.       Please do not wear any cologne, lotion, powder, jewelry, piercings, perfume, makeup, nail polish, hair accessories, or hair spray on the day of surgery.  Wear loose comfortable clothing.    Leave your valuables at home but bring a payment source for any after-surgery prescriptions you plan to fill at [facility].  Bring a storage case for any glasses/contacts.    An adult who is responsible for you MUST drive you home and should be with you for the first 24 hours after surgery.     The Day of Surgery:  Arrive at [facility] Surgery Entrance at the time directed by your surgeon and check in at the desk.     If you have a living will or healthcare power of , please bring a copy.    You will be taken to the pre-op holding area where you will be prepared for surgery.  A physical assessment will be performed by a nurse practitioner or

## 2024-11-29 NOTE — PRE-PROCEDURE INSTRUCTIONS
No answer, left message ?                             Unable to leave message ?    When were you told to arrive at hospital ?  0730    Do you have a  ? NO, SHE IS SCHEDULED AS A LOCAL    Are you on any blood thinners ?  NO                   If yes when did you stop taking ?    Do you have your prep Rx filled and instruction ?  YES    Nothing to eat the day before , only clear liquids.    Are you experiencing any covid symptoms ? NO    Do you have any infections or rash we should be aware of ? NO      Do you have the Hibiclens soap to use the night before and the morning of surgery ?    Nothing to eat or drink after midnight, only a sip of water to take any medication instructed to take the night before.  Wear comfortable clothing, leave any valuables at home, remove any jewelry and body piercing .

## 2024-12-03 ENCOUNTER — HOSPITAL ENCOUNTER (OUTPATIENT)
Age: 64
Setting detail: OUTPATIENT SURGERY
Discharge: HOME | End: 2024-12-03
Attending: INTERNAL MEDICINE | Admitting: INTERNAL MEDICINE
Payer: COMMERCIAL

## 2024-12-03 VITALS
HEART RATE: 58 BPM | RESPIRATION RATE: 16 BRPM | HEIGHT: 66 IN | BODY MASS INDEX: 32.62 KG/M2 | TEMPERATURE: 96.7 F | DIASTOLIC BLOOD PRESSURE: 74 MMHG | SYSTOLIC BLOOD PRESSURE: 125 MMHG | WEIGHT: 203 LBS | OXYGEN SATURATION: 100 %

## 2024-12-03 DIAGNOSIS — D12.5 ADENOMATOUS POLYP OF SIGMOID COLON: ICD-10-CM

## 2024-12-03 PROCEDURE — 45380 COLONOSCOPY AND BIOPSY: CPT | Performed by: INTERNAL MEDICINE

## 2024-12-03 PROCEDURE — 7100000011 HC PHASE II RECOVERY - ADDTL 15 MIN: Performed by: INTERNAL MEDICINE

## 2024-12-03 PROCEDURE — 3609010300 HC COLONOSCOPY W/BIOPSY SINGLE/MULTIPLE: Performed by: INTERNAL MEDICINE

## 2024-12-03 PROCEDURE — 2709999900 HC NON-CHARGEABLE SUPPLY: Performed by: INTERNAL MEDICINE

## 2024-12-03 PROCEDURE — 7100000010 HC PHASE II RECOVERY - FIRST 15 MIN: Performed by: INTERNAL MEDICINE

## 2024-12-03 PROCEDURE — 88305 TISSUE EXAM BY PATHOLOGIST: CPT

## 2024-12-03 RX ORDER — SODIUM CHLORIDE 0.9 % (FLUSH) 0.9 %
5-40 SYRINGE (ML) INJECTION EVERY 12 HOURS SCHEDULED
Status: DISCONTINUED | OUTPATIENT
Start: 2024-12-03 | End: 2024-12-03 | Stop reason: HOSPADM

## 2024-12-03 RX ORDER — LIDOCAINE HYDROCHLORIDE 10 MG/ML
1 INJECTION, SOLUTION EPIDURAL; INFILTRATION; INTRACAUDAL; PERINEURAL
Status: DISCONTINUED | OUTPATIENT
Start: 2024-12-03 | End: 2024-12-03 | Stop reason: HOSPADM

## 2024-12-03 RX ORDER — SODIUM CHLORIDE 9 MG/ML
INJECTION, SOLUTION INTRAVENOUS PRN
Status: DISCONTINUED | OUTPATIENT
Start: 2024-12-03 | End: 2024-12-03 | Stop reason: HOSPADM

## 2024-12-03 RX ORDER — SODIUM CHLORIDE 9 MG/ML
INJECTION, SOLUTION INTRAVENOUS CONTINUOUS
Status: DISCONTINUED | OUTPATIENT
Start: 2024-12-03 | End: 2024-12-03 | Stop reason: HOSPADM

## 2024-12-03 RX ORDER — SODIUM CHLORIDE 0.9 % (FLUSH) 0.9 %
5-40 SYRINGE (ML) INJECTION PRN
Status: DISCONTINUED | OUTPATIENT
Start: 2024-12-03 | End: 2024-12-03 | Stop reason: HOSPADM

## 2024-12-03 ASSESSMENT — PAIN - FUNCTIONAL ASSESSMENT
PAIN_FUNCTIONAL_ASSESSMENT: NONE - DENIES PAIN
PAIN_FUNCTIONAL_ASSESSMENT: 0-10

## 2024-12-03 ASSESSMENT — ENCOUNTER SYMPTOMS
ABDOMINAL PAIN: 0
SINUS PRESSURE: 0
NAUSEA: 0
SHORTNESS OF BREATH: 0

## 2024-12-03 NOTE — OP NOTE
polypectomy    Recommendations/Plan:   Lifestyle and dietary modifications as discussed  F/U Biopsies  F/U In Office Yes  Repeat colonoscopy in 7 years    Electronically signed by Coy Guaman MD  on 12/3/2024 at 9:27 AM

## 2024-12-03 NOTE — DISCHARGE INSTRUCTIONS
DISCHARGE INSTRUCTIONS FOR COLONOSCOPY    In order to continue your care at home, please follow the instructions below.    For General Anesthesia:  Do not drink any alcoholic beverages or make any legal or important decisions for 24 hours.    Do not drive or operate machinery for 24 hours.  You may return to work after 24 hours.        Diet    Drink plenty of fluids after surgery, unless you are on a fluid restriction.   After general anesthesia, start out eating lightly (broth, soup, bread, etc.) advancing as tolerated to your usual diet.  Try to avoid spicy or greasy/fatty foods for 24 hours.   Avoid milk/milk product for several hours.       Medications  Take medications as ordered by your surgeon.    Activities  Limit your activities for 24 hours.  Walk around to help pass gas.  You may shower.    Call your surgeon for the following:  If you have abdominal pain that is not relieved by passing gas.   For an oral temperature (by mouth) is 101 degrees or higher, chills or excessive sweating  Persistent nausea or vomiting  Rectal bleeding (may be red, maroon, or black) or change in your bowel habits.  For any questions or concerns you may have.    Repeat colonoscopy in 7 years

## 2024-12-03 NOTE — H&P
HISTORY and PHYSICAL  [facility]       NAME:  Agueda Rain  MRN: [id number]   YOB: 1960   Date: 12/3/2024   Age: 64 y.o.  Gender: female       COMPLAINT AND PRESENT HISTORY:       Agueda Rain is 64 y.o.,   female, having a Diagnostic Colonoscopy, for hx of: Pre-Op Diagnosis Codes:      * Adenomatous polyp of sigmoid colon      Prior Colonoscopy was done last in October 2021 with polypectomy.     Patient has hx of Colon Polyps.      Denies abdominal pain including bloating/gas.    No changes in bowel habits.    No diarrhea, constipation.  Pt states that she does see some blood in stools secondary from straining.  No  black tarry stools.   Pt has hx of hemorrhoids.      No changes in appetite and unintended weight loss. Denies any nausea or vomiting.   Pt reports having reduced  heartburn, indigestion or acid reflux.   Pt is taking pepcid prn.   Pt denies Family Hx of colon cancer.    Medical history reviewed:  MVP, AURELIANO on CPAP, mitral valve regurg, HEP C, CKD3   Patient voices feeling well today. Denies any recent fever or chills, pt has hx of chest pain/pressure, palpitations,  non present . Hx of cardiac cath no blockages.   Pt reports no  SOB.     Patient has been NPO since midnight. No blood thinners in the past  5-7 days.(Voltaren)    Patient states  has completed and followed prescribed prep with clear outcome.      Patient denies any personal or family problems with anesthesia.  Pt states that she will be having procedure done locally since no person available to take her home.  This is the first time for this.     RECENT LABS, IMAGING AND TESTING     Lab Results   Component Value Date    WBC 9.8 08/13/2024    RBC 5.27 (H) 08/13/2024    HGB 15.0 08/13/2024    HCT 46.3 08/13/2024    MCV 87.9 08/13/2024    MCH 28.5 08/13/2024    MCHC 32.4 08/13/2024    RDW 13.2 08/13/2024     08/13/2024    MPV 9.6 08/13/2024        Lab Results   Component Value Date      History    Marital status: Single   Tobacco Use    Smoking status: Former     Current packs/day: 0.00     Average packs/day: 1 pack/day for 20.0 years (20.0 ttl pk-yrs)     Types: Cigarettes     Start date: 10/27/1999     Quit date: 10/27/2019     Years since quittin.1    Smokeless tobacco: Never    Tobacco comments:     Over the past years of my early 20's I was a on & off again smoker due to stress in my life.   Vaping Use    Vaping status: Never Used   Substance and Sexual Activity    Alcohol use: Never    Drug use: Never    Sexual activity: Not Currently   Social History Narrative    Agueda has experienced a financial strain with resourcestrain: Food on 2020. Potential community resources and services have been provided in patient instructions by Faith Pitts.                 Social Determinants of Health     Financial Resource Strain: Low Risk  (2024)    Overall Financial Resource Strain (CARDIA)     Difficulty of Paying Living Expenses: Not hard at all   Food Insecurity: No Food Insecurity (2024)    Hunger Vital Sign     Worried About Running Out of Food in the Last Year: Never true     Ran Out of Food in the Last Year: Never true   Transportation Needs: No Transportation Needs (2024)    PRAPARE - Transportation     Lack of Transportation (Medical): No     Lack of Transportation (Non-Medical): No   Physical Activity: Insufficiently Active (2024)    Exercise Vital Sign     Days of Exercise per Week: 2 days     Minutes of Exercise per Session: 10 min   Housing Stability: Low Risk  (2024)    Housing Stability Vital Sign     Unable to Pay for Housing in the Last Year: No     Number of Times Moved in the Last Year: 1     Homeless in the Last Year: No           REVIEW OF SYSTEMS      Allergies   Allergen Reactions    Banana     Egg-Derived Products Other (See Comments)    Penicillin G Other (See Comments)    Penicillins Hives and Other (See Comments)    Seasonal Other (See

## 2024-12-05 ENCOUNTER — TELEPHONE (OUTPATIENT)
Dept: PULMONOLOGY | Age: 64
End: 2024-12-05

## 2024-12-05 DIAGNOSIS — I10 ESSENTIAL HYPERTENSION: ICD-10-CM

## 2024-12-05 DIAGNOSIS — G47.33 OSA (OBSTRUCTIVE SLEEP APNEA): Primary | ICD-10-CM

## 2024-12-05 DIAGNOSIS — R00.2 PALPITATIONS: ICD-10-CM

## 2024-12-05 LAB — SURGICAL PATHOLOGY REPORT: NORMAL

## 2024-12-05 RX ORDER — CARVEDILOL 3.12 MG/1
3.12 TABLET ORAL 2 TIMES DAILY
Qty: 60 TABLET | Refills: 3 | Status: SHIPPED | OUTPATIENT
Start: 2024-12-05

## 2024-12-06 RX ORDER — POLYETHYLENE GLYCOL 3350 17 G/17G
POWDER ORAL
Qty: 255 G | Refills: 10 | Status: SHIPPED | OUTPATIENT
Start: 2024-12-06

## 2024-12-10 ENCOUNTER — PATIENT MESSAGE (OUTPATIENT)
Dept: PULMONOLOGY | Age: 64
End: 2024-12-10

## 2024-12-10 DIAGNOSIS — J43.2 CENTRILOBULAR EMPHYSEMA (HCC): ICD-10-CM

## 2024-12-10 DIAGNOSIS — J21.8 ACUTE BRONCHIOLITIS DUE TO OTHER SPECIFIED ORGANISMS: ICD-10-CM

## 2024-12-10 RX ORDER — IPRATROPIUM BROMIDE AND ALBUTEROL SULFATE 2.5; .5 MG/3ML; MG/3ML
1 SOLUTION RESPIRATORY (INHALATION) EVERY 6 HOURS PRN
Qty: 360 ML | Refills: 7 | Status: SHIPPED | OUTPATIENT
Start: 2024-12-10 | End: 2025-08-07

## 2024-12-10 NOTE — TELEPHONE ENCOUNTER
LAST VISIT: 8/26/24  NEXT VISIT: 8/25/25    Per last dictation DuoNeb as needed. RX pended for this. Please advise if anything further is needed. Thank you.    No

## 2024-12-11 DIAGNOSIS — D12.5 ADENOMATOUS POLYP OF SIGMOID COLON: ICD-10-CM

## 2024-12-17 ENCOUNTER — OFFICE VISIT (OUTPATIENT)
Dept: GASTROENTEROLOGY | Age: 64
End: 2024-12-17
Payer: COMMERCIAL

## 2024-12-17 VITALS
DIASTOLIC BLOOD PRESSURE: 83 MMHG | WEIGHT: 210 LBS | HEART RATE: 67 BPM | HEIGHT: 66 IN | SYSTOLIC BLOOD PRESSURE: 138 MMHG | BODY MASS INDEX: 33.75 KG/M2

## 2024-12-17 DIAGNOSIS — K64.1 GRADE II HEMORRHOIDS: ICD-10-CM

## 2024-12-17 DIAGNOSIS — K21.9 GASTROESOPHAGEAL REFLUX DISEASE WITHOUT ESOPHAGITIS: Primary | ICD-10-CM

## 2024-12-17 PROCEDURE — G8427 DOCREV CUR MEDS BY ELIG CLIN: HCPCS | Performed by: INTERNAL MEDICINE

## 2024-12-17 PROCEDURE — 1036F TOBACCO NON-USER: CPT | Performed by: INTERNAL MEDICINE

## 2024-12-17 PROCEDURE — G8484 FLU IMMUNIZE NO ADMIN: HCPCS | Performed by: INTERNAL MEDICINE

## 2024-12-17 PROCEDURE — 3079F DIAST BP 80-89 MM HG: CPT | Performed by: INTERNAL MEDICINE

## 2024-12-17 PROCEDURE — 3075F SYST BP GE 130 - 139MM HG: CPT | Performed by: INTERNAL MEDICINE

## 2024-12-17 PROCEDURE — 3017F COLORECTAL CA SCREEN DOC REV: CPT | Performed by: INTERNAL MEDICINE

## 2024-12-17 PROCEDURE — G8417 CALC BMI ABV UP PARAM F/U: HCPCS | Performed by: INTERNAL MEDICINE

## 2024-12-17 PROCEDURE — 99214 OFFICE O/P EST MOD 30 MIN: CPT | Performed by: INTERNAL MEDICINE

## 2024-12-17 RX ORDER — POLYETHYLENE GLYCOL 3350 17 G/17G
17 POWDER ORAL DAILY
Qty: 850 G | Refills: 3 | Status: SHIPPED | OUTPATIENT
Start: 2024-12-17

## 2024-12-17 NOTE — PROGRESS NOTES
GI CLINIC FOLLOW UP    INTERVAL HISTORY:   No referring provider defined for this encounter.    No chief complaint on file.          HISTORY OF PRESENT ILLNESS: Ms.Donna SITA Rain is a 64 y.o. female , referred for evaluation of GERD.    She has past medical history of chronic hepatitis C, received ribavirin interferon, maintained SVR for several years.   Also had surveillance colonoscopy which showed diminutive polyp (tubular adenoma).  Her liver US is normal  Her LFT's in March 2024 was normal.  She is c/o- intermittent hematochezia during BM, especially when she pushes hard    Past Medical,Family, and Social History reviewed and does contribute to the patient presentingcondition.    I did review all the labs results available for the labs which were ordered by the primary care physician, and the other consultants, we search on [employer] at [facility] and all the available care everywhere epic    I did review all the imaging studies of the abdomen available on EMR, ordered by the primary care physician and the other consultant    I did review all the pathology from the biopsies done on the previous endoscopies    Patient's PMH/PSH,SH,PSYCH Hx, MEDs, ALLERGIES, and ROS were all reviewed and updated in the appropriate sections.    PAST MEDICAL HISTORY:  Past Medical History:   Diagnosis Date    Allergic rhinitis due to pollen     Bilateral primary osteoarthritis of hip     Bilateral primary osteoarthritis of knee     Chronic sinusitis     Essential hypertension 07/27/2020    Greater trochanteric bursitis     Headache     Hematuria     Hepatitis-C     History of colon polyps     Hypertension     Lung disease     Migraine     Mitral valve regurgitation     Obesity (BMI 30-39.9) 07/27/2020    AURELIANO on CPAP 12/04/2020    Osteopenia     Other intervertebral disc degeneration, lumbosacral region     Stage 3a chronic kidney disease (HCC)     Vitamin D deficiency        Past Surgical History:   Procedure Laterality Date

## 2024-12-20 ENCOUNTER — TELEPHONE (OUTPATIENT)
Dept: GASTROENTEROLOGY | Age: 64
End: 2024-12-20

## 2024-12-20 DIAGNOSIS — K64.1 GRADE II HEMORRHOIDS: Primary | ICD-10-CM

## 2024-12-20 RX ORDER — METHYLCELLULOSE 2 G/19G
POWDER, FOR SOLUTION ORAL
Qty: 854 G | Refills: 2 | Status: SHIPPED | OUTPATIENT
Start: 2024-12-20

## 2024-12-31 ENCOUNTER — OFFICE VISIT (OUTPATIENT)
Dept: PAIN MANAGEMENT | Age: 64
End: 2024-12-31
Payer: COMMERCIAL

## 2024-12-31 VITALS — BODY MASS INDEX: 33.75 KG/M2 | HEIGHT: 66 IN | WEIGHT: 210 LBS

## 2024-12-31 DIAGNOSIS — G89.29 CHRONIC PAIN OF BOTH HIPS: ICD-10-CM

## 2024-12-31 DIAGNOSIS — M25.552 CHRONIC PAIN OF BOTH HIPS: ICD-10-CM

## 2024-12-31 DIAGNOSIS — M47.817 LUMBOSACRAL SPONDYLOSIS WITHOUT MYELOPATHY: ICD-10-CM

## 2024-12-31 DIAGNOSIS — M25.551 CHRONIC PAIN OF BOTH HIPS: ICD-10-CM

## 2024-12-31 DIAGNOSIS — G47.33 OSA ON CPAP: ICD-10-CM

## 2024-12-31 DIAGNOSIS — M48.062 SPINAL STENOSIS OF LUMBAR REGION WITH NEUROGENIC CLAUDICATION: Primary | ICD-10-CM

## 2024-12-31 DIAGNOSIS — M54.51 VERTEBROGENIC LOW BACK PAIN: ICD-10-CM

## 2024-12-31 PROCEDURE — 3017F COLORECTAL CA SCREEN DOC REV: CPT | Performed by: ANESTHESIOLOGY

## 2024-12-31 PROCEDURE — G8417 CALC BMI ABV UP PARAM F/U: HCPCS | Performed by: ANESTHESIOLOGY

## 2024-12-31 PROCEDURE — G8427 DOCREV CUR MEDS BY ELIG CLIN: HCPCS | Performed by: ANESTHESIOLOGY

## 2024-12-31 PROCEDURE — 99214 OFFICE O/P EST MOD 30 MIN: CPT | Performed by: ANESTHESIOLOGY

## 2024-12-31 PROCEDURE — 1036F TOBACCO NON-USER: CPT | Performed by: ANESTHESIOLOGY

## 2024-12-31 PROCEDURE — G8484 FLU IMMUNIZE NO ADMIN: HCPCS | Performed by: ANESTHESIOLOGY

## 2024-12-31 ASSESSMENT — ENCOUNTER SYMPTOMS
EYE ITCHING: 1
RESPIRATORY NEGATIVE: 1
BACK PAIN: 1

## 2024-12-31 NOTE — PROGRESS NOTES
The patient is a 64 y.o.Non- / non  female.    Chief Complaint   Patient presents with    Back Pain     1 month f/u        Back Pain  This is a chronic problem. The current episode started more than 1 year ago. The problem occurs intermittently. The problem is unchanged. The pain is present in the lumbar spine. The quality of the pain is described as aching and burning (throbbing). The pain radiates to the right knee and right thigh. The pain is at a severity of 8/10. The pain is moderate. The pain is The same all the time. The symptoms are aggravated by sitting and position (walking). Associated symptoms include leg pain, numbness and weakness. Pertinent negatives include no fever, headaches or tingling. She has tried heat, ice and bed rest for the symptoms. The treatment provided mild relief.     Patient is here today for: back pain  Pain level: 8  Character: aching, burning  Radiating: right hip down into calf  Weakness or numbness: yes  Aggravating Factors: sitting, position  Alleviating Factors: ice, heat, tylenol  Constant or intermitting: intermittent   Bladder/bowel loss: no      Past Medical History:   Diagnosis Date    Allergic rhinitis due to pollen     Bilateral primary osteoarthritis of hip     Bilateral primary osteoarthritis of knee     Chronic sinusitis     Essential hypertension 07/27/2020    Greater trochanteric bursitis     Headache     Hematuria     Hepatitis-C     History of colon polyps     Hypertension     Lung disease     Migraine     Mitral valve regurgitation     Obesity (BMI 30-39.9) 07/27/2020    AURELIANO on CPAP 12/04/2020    Osteopenia     Other intervertebral disc degeneration, lumbosacral region     Stage 3a chronic kidney disease (HCC)     Vitamin D deficiency         Past Surgical History:   Procedure Laterality Date    CARDIAC PROCEDURE N/A 08/13/2024    Left heart cath / coronary angiography performed by Guru Szymanski MD at [facility] [place] CARDIAC CATH LAB    CARPAL TUNNEL

## 2025-01-02 ENCOUNTER — PATIENT MESSAGE (OUTPATIENT)
Dept: ORTHOPEDIC SURGERY | Age: 65
End: 2025-01-02

## 2025-01-02 DIAGNOSIS — G89.29 CHRONIC RIGHT-SIDED LOW BACK PAIN WITH RIGHT-SIDED SCIATICA: Primary | ICD-10-CM

## 2025-01-02 DIAGNOSIS — M70.61 GREATER TROCHANTERIC BURSITIS OF RIGHT HIP: ICD-10-CM

## 2025-01-02 DIAGNOSIS — M70.62 GREATER TROCHANTERIC BURSITIS OF LEFT HIP: ICD-10-CM

## 2025-01-02 DIAGNOSIS — M54.41 CHRONIC RIGHT-SIDED LOW BACK PAIN WITH RIGHT-SIDED SCIATICA: Primary | ICD-10-CM

## 2025-01-02 NOTE — TELEPHONE ENCOUNTER
Patient was called she states that she seen Dr Covarrubias-pain management and she isnt happy. She said she doesn't feel like he listened to her about her symptoms and she said he didn't review the MRI that were done. She said she voiced her concerns about her hip pain and she said he said he doesn't do hips and she should see ortho. She was given the recommendation of a epidural for her back pain. But she didn't schedule it because she is nervous since she felt \"blown off\". Her question is if she could get a referral to Neuro because she thinks that's what she needs. She is concerned because she doesn't feel like the pain management Dr reviewed the MRI.

## 2025-01-03 NOTE — TELEPHONE ENCOUNTER
Patient was informed  that the MRI of the hips demonstrated mild arthritis in both hips. She was informed that we are sorry about her bad experience. If she would like a second opinion, We would be happy to refer her to another specialist. We can refer her to Dr. Zhu or his NP Betty Horne who are in neurosurgery.    She would like to have the second opinion.

## 2025-01-06 ENCOUNTER — TELEPHONE (OUTPATIENT)
Dept: PAIN MANAGEMENT | Age: 65
End: 2025-01-06

## 2025-01-06 NOTE — TELEPHONE ENCOUNTER
Patient is requesting a call regarding cancelling her upcoming procedure and can be reached at [phone]. Okay to leave a message.

## 2025-01-21 ENCOUNTER — TELEPHONE (OUTPATIENT)
Dept: FAMILY MEDICINE CLINIC | Age: 65
End: 2025-01-21

## 2025-01-21 NOTE — TELEPHONE ENCOUNTER
Patient called C/O coughing and blowing green, sinus headache left side at night.  She was directed to Walk In to be evaluated and treated.  Patient agreed.

## 2025-01-22 ENCOUNTER — TELEPHONE (OUTPATIENT)
Dept: ORTHOPEDIC SURGERY | Age: 65
End: 2025-01-22

## 2025-01-22 ENCOUNTER — PATIENT MESSAGE (OUTPATIENT)
Dept: ORTHOPEDIC SURGERY | Age: 65
End: 2025-01-22

## 2025-01-22 NOTE — TELEPHONE ENCOUNTER
Patient was called and LVM. She should receive notification on the the appeal for the MRI in the mail. This may take awhile.  As far as the appt with Neurosurgeon appt goes. Its recommended to keep the appt with the NP for tomorrow. If patient requires to see the Dr then she will be able to get an appt sooner since she is established

## 2025-01-23 ENCOUNTER — HOSPITAL ENCOUNTER (OUTPATIENT)
Age: 65
Discharge: HOME | End: 2025-01-25
Payer: COMMERCIAL

## 2025-01-23 ENCOUNTER — OFFICE VISIT (OUTPATIENT)
Dept: NEUROSURGERY | Age: 65
End: 2025-01-23
Payer: COMMERCIAL

## 2025-01-23 ENCOUNTER — HOSPITAL ENCOUNTER (OUTPATIENT)
Dept: GENERAL RADIOLOGY | Age: 65
Discharge: HOME | End: 2025-01-25
Payer: COMMERCIAL

## 2025-01-23 VITALS
DIASTOLIC BLOOD PRESSURE: 64 MMHG | BODY MASS INDEX: 32.95 KG/M2 | HEART RATE: 67 BPM | HEIGHT: 66 IN | WEIGHT: 205 LBS | SYSTOLIC BLOOD PRESSURE: 127 MMHG

## 2025-01-23 DIAGNOSIS — M47.26 OTHER SPONDYLOSIS WITH RADICULOPATHY, LUMBAR REGION: ICD-10-CM

## 2025-01-23 DIAGNOSIS — M53.9 MULTILEVEL DEGENERATIVE DISC DISEASE: ICD-10-CM

## 2025-01-23 DIAGNOSIS — M47.26 OTHER SPONDYLOSIS WITH RADICULOPATHY, LUMBAR REGION: Primary | ICD-10-CM

## 2025-01-23 DIAGNOSIS — M48.061 LUMBAR STENOSIS WITHOUT NEUROGENIC CLAUDICATION: ICD-10-CM

## 2025-01-23 PROCEDURE — G8417 CALC BMI ABV UP PARAM F/U: HCPCS

## 2025-01-23 PROCEDURE — 3074F SYST BP LT 130 MM HG: CPT

## 2025-01-23 PROCEDURE — 72082 X-RAY EXAM ENTIRE SPI 2/3 VW: CPT

## 2025-01-23 PROCEDURE — 3078F DIAST BP <80 MM HG: CPT

## 2025-01-23 PROCEDURE — 72110 X-RAY EXAM L-2 SPINE 4/>VWS: CPT

## 2025-01-23 PROCEDURE — 3017F COLORECTAL CA SCREEN DOC REV: CPT

## 2025-01-23 PROCEDURE — 99204 OFFICE O/P NEW MOD 45 MIN: CPT

## 2025-01-23 PROCEDURE — G8427 DOCREV CUR MEDS BY ELIG CLIN: HCPCS

## 2025-01-23 PROCEDURE — 1036F TOBACCO NON-USER: CPT

## 2025-01-23 RX ORDER — TIMOLOL MALEATE 2.5 MG/ML
SOLUTION/ DROPS OPHTHALMIC
COMMUNITY
Start: 2025-01-03

## 2025-01-23 RX ORDER — MIRABEGRON 25 MG/1
25 TABLET, FILM COATED, EXTENDED RELEASE ORAL DAILY
COMMUNITY
Start: 2025-01-17

## 2025-01-23 NOTE — PROGRESS NOTES
[facility] NEUROSURGERY CENTER [place]  [address]  [id number]  [address]OUND FLOOR, [address]  [facility] [address]  Dept: [phone]    Patient:  Agueda Rain  YOB: 1960  Date: 1/23/25    The patient is a 64 y.o. female who presents today for consult of the following problems:     Chief Complaint   Patient presents with    New Patient     Referral for Chronic right-sided low back pain with right-sided sciatica         HPI:     Agueda Rain is a 64 y.o. female on whom neurosurgical consultation was requested by Jessee Pitts MD for management of radiating lower back pain.    the lower back to the right anterior thigh, stopping at the knee. In June, she fell on her left hip, which led to left hip pain. She completed physical therapy for the hip pain, and a cortisone injection was administered; however, a week later, she developed burning and throbbing pain in both hips where the injections were given. Over the past week, her back pain has worsened, and she now also experiences bilateral groin pain. The pain is aggravated by walking, worsening after about 30 minutes, at which point her left leg may give out. Before starting physical therapy, she was unable to lift her right leg at the hip joint. The pain is alleviated with rest, but her bilateral groins feel inflamed. She was being treated for overactive bladder with solifenacin but discontinued the medication due to constipation. She denies bladder or bowel incontinence, saddle anesthesia, or any new weakness.    Pain management is recommending Lumbar Epidural Steroid Injection/Caudal   12 visits for bilateral hip pain      History:     Past Medical History:   Diagnosis Date    Allergic rhinitis due to pollen     Bilateral primary osteoarthritis of hip     Bilateral primary osteoarthritis of knee     Chronic sinusitis     Essential hypertension 07/27/2020    Greater

## 2025-01-24 ENCOUNTER — TELEPHONE (OUTPATIENT)
Dept: NEUROSURGERY | Age: 65
End: 2025-01-24

## 2025-01-24 NOTE — TELEPHONE ENCOUNTER
Patient no longer wishes to [facility] to [facility]. [facility] [address]   Thank you!

## 2025-02-03 ENCOUNTER — HOSPITAL ENCOUNTER (OUTPATIENT)
Dept: PHYSICAL THERAPY | Facility: CLINIC | Age: 65
Setting detail: THERAPIES SERIES
Discharge: HOME | End: 2025-02-03
Payer: COMMERCIAL

## 2025-02-03 PROCEDURE — 97161 PT EVAL LOW COMPLEX 20 MIN: CPT

## 2025-02-03 NOTE — CONSULTS
[] [facility]  Outpatient Rehabilitation &  Therapy  [address]  [phone]  [phone] [x] [facility]  Outpatient Rehabilitation &  Therapy  [address]  P: [phone]  F: [phone] [] [facility]  Outpatient Rehabilitation &  Therapy  [address]  P: [phone]  F: [phone] [] [facility]  Outpatient Rehabilitation &  Therapy  [address]  [phone]  [phone] [] [facility]  Outpatient Rehabilitation &  Therapy  [address]   P: [phone]  F: [phone]  [] [facility]  Outpatient Rehabilitation &  Therapy  [address]  P: [phone]  F: [phone] [] [facility]  Outpatient Rehabilitation &  Therapy  [address]  P: [phone]  F: [phone] [] [facility]  Outpatient Rehabilitation &  Therapy  [address]  P: [phone]  F: [phone] [] [facility]  Outpatient Rehabilitation &  Therapy  [address]  P: [phone]  F: [phone]  [] [facility] Outpatient Rehabilitation &  Therapy  [address]  P: [phone]  F: [phone]     Physical Therapy Spine Evaluation    Date:  2/3/2025  Patient: Agueda Rain  : 1960  MRN: [id number]  Physician: Kate Vallejo APRN-FLIPN   Insurance: [facility] (Auth after 8)  Medical Diagnosis:   M47.26 (ICD-10-CM) - Other spondylosis with radiculopathy, lumbar region   M53.9 (ICD-10-CM) - Multilevel degenerative disc disease   M48.061 (ICD-10-CM) - Lumbar stenosis without neurogenic claudication   Rehab Codes: M25.551, M25.552, M25.651, M25.652,M79.604, M79.605  Onset Date: 25 (script)  Next 's appt.: 3/24/25    Subjective:   CC: Low back pain  HPI: Pt is a 64 year old female presenting to

## 2025-02-10 ENCOUNTER — HOSPITAL ENCOUNTER (OUTPATIENT)
Dept: PHYSICAL THERAPY | Facility: CLINIC | Age: 65
Setting detail: THERAPIES SERIES
Discharge: HOME | End: 2025-02-10
Payer: COMMERCIAL

## 2025-02-10 PROCEDURE — 97110 THERAPEUTIC EXERCISES: CPT

## 2025-02-10 NOTE — FLOWSHEET NOTE
Progress note for Medicare patient due at visit 10     Cancels/No Shows: 0/0    Subjective:    Pain:  [x] Yes  [] No Location: Low back/B hip N/A Pain Rating: (0-10 scale) 8-9/10  Pain altered Tx:  [] No  [x] Yes  Action:MHP to Low Back  Comments: Pt reports she is having a bad day today. She reports pretty significant pain. She states she was sore following the initial eval.     Objective:  Modalities: MHP to LB with supine exercises  Precautions [] No  [] Yes:  Exercises:  Exercise Reps/ Time Weight/ Level Comments    NuStep  5 min                 Supine       LTR  2 min     PPT 10x3\"     PPT with march 10x ea     SKTC 10x3s       Hip ADD 10x3s           Seated       Calf stretch 2x30s     HS Stretch 2x30s      Piriformis stretch 2x30s                Manual Hooklying Traction  6 min       Other: Unable to give out HEP due to time constraints will give next session     Specific Instructions for next treatment:  Lumbar/bilateral hip ROM  Hooklying lumbar traction/mechanical if tolerated   Ice/heat as needed   Global hip strengthening        Treatment Charges: Mins Units   [x]  Modalities- MHP  15  -   [x]  Ther Exercise 48 3   []  Neuromuscular Re-ed     []  Gait Training     []  Manual Therapy     []  Ther Activities     []  Aquatics     []  Vasocompression     []  Cervical Traction     []  Other     Total Billable time 48 min 3    5 min unbilled for warm-up    Assessment: [x] Progressing toward goals. Pt began session with NuStep to increase blood flow and ROM prior to exercises. With pt reporting high pain levels, MHP was utilized to decrease low back muscular tension. Pt completed exercises as listed above with good tolerance. Pt was instructed on HEP, and handout was given.     [] No change.     [] Other:  [x] Patient would continue to benefit from skilled physical therapy services in order to: decrease pain/radicular symptoms, improve lumbar ROM, BLE joint mobility, increase walking tolerance, and improve gait

## 2025-02-13 ENCOUNTER — HOSPITAL ENCOUNTER (OUTPATIENT)
Dept: PHYSICAL THERAPY | Facility: CLINIC | Age: 65
Setting detail: THERAPIES SERIES
Discharge: HOME | End: 2025-02-13
Payer: COMMERCIAL

## 2025-02-13 NOTE — FLOWSHEET NOTE
[] [facility]  Outpatient Rehabilitation &  Therapy  [address]  [phone]  [phone] [] [facility]  Outpatient Rehabilitation &  Therapy  [address]  P: [phone]  F: [phone] [] [facility]  Outpatient Rehabilitation &  Therapy  [address]  P: [phone]  F: [phone] [] [facility]  Outpatient Rehabilitation &  Therapy  [address]  [phone]  [phone] [] [facility]  Outpatient Rehabilitation &  Therapy  [address]   P: [phone]  F: [phone]  [] [facility]  Outpatient Rehabilitation &  Therapy  [address]  P: [phone]  F: [phone] [] [facility]  Outpatient Rehabilitation &  Therapy  [address]  P: [phone]  F: [phone] [] [facility]  Outpatient Rehabilitation &  Therapy  [address]  P: [phone]  F: [phone] [] [facility]  Outpatient Rehabilitation &  Therapy  [address]  P: [phone]  F: [phone]  [] [facility] Outpatient Rehabilitation &  Therapy  [address]  P: [phone]  F: [phone]     Therapy Cancel/No Show note    Date: 2025  Patient: Agueda Rain  : 1960  MRN: [id number]    Cancels/No Shows to date: 0    For today's appointment patient:    [x]  Cancelled    [] Rescheduled appointment    [] No-show     Reason given by patient:    []  Patient ill    []  Conflicting appointment    [] No transportation      [] Conflict with work    [] No reason given    [] Weather related    [] COVID-19    [x] Other:      Comments:  Getting hip injection will call back to reschedule.       [] Next appointment was confirmed    Electronically signed by: Stephania Daly PT

## 2025-02-17 ENCOUNTER — HOSPITAL ENCOUNTER (OUTPATIENT)
Dept: PHYSICAL THERAPY | Facility: CLINIC | Age: 65
Setting detail: THERAPIES SERIES
Discharge: HOME | End: 2025-02-17
Payer: COMMERCIAL

## 2025-02-17 PROCEDURE — 97110 THERAPEUTIC EXERCISES: CPT

## 2025-02-26 ENCOUNTER — APPOINTMENT (OUTPATIENT)
Dept: PHYSICAL THERAPY | Facility: CLINIC | Age: 65
End: 2025-02-26
Payer: COMMERCIAL

## 2025-03-07 ENCOUNTER — HOSPITAL ENCOUNTER (OUTPATIENT)
Dept: PHYSICAL THERAPY | Facility: CLINIC | Age: 65
Setting detail: THERAPIES SERIES
Discharge: HOME | End: 2025-03-07
Payer: COMMERCIAL

## 2025-03-07 PROCEDURE — 97110 THERAPEUTIC EXERCISES: CPT

## 2025-03-07 NOTE — FLOWSHEET NOTE
[] [facility]  Outpatient Rehabilitation &  Therapy  [address]  [phone]  [phone] [x] [facility]  Outpatient Rehabilitation &  Therapy  [address]  P: [phone]  F: [phone] [] [facility]  Outpatient Rehabilitation &  Therapy  [address]  P: [phone]  F: [phone] [] [facility]  Outpatient Rehabilitation &  Therapy  [address]  [phone]  [phone] [] [facility]  Outpatient Rehabilitation &  Therapy  [address]   P: [phone]  F: [phone]  [] [facility]  Outpatient Rehabilitation &  Therapy  [address]  P: [phone]  F: [phone] [] [facility]  Outpatient Rehabilitation &  Therapy  [address]  P: [phone]  F: [phone] [] [facility]  Outpatient Rehabilitation &  Therapy  [address]  P: [phone]  F: [phone] [] [facility]  Outpatient Rehabilitation &  Therapy  [address]  P: [phone]  F: [phone]  [] [facility] Outpatient Rehabilitation &  Therapy  [address]  P: [phone]  F: [phone]     Physical Therapy Daily Treatment Note    Date:  3/7/2025  Patient Name:  Agudea Rain    :  1960 MRN: [id number]  Physician: Kate Vallejo APRN-FLIPN                                  Insurance: [facility] (Auth after 8)  Medical Diagnosis:   M47.26 (ICD-10-CM) - Other spondylosis with radiculopathy, lumbar region   M53.9 (ICD-10-CM) - Multilevel degenerative disc disease   M48.061 (ICD-10-CM) - Lumbar stenosis without neurogenic claudication   Rehab Codes: M25.551, M25.552, M25.651, M25.652,M79.604, M79.605  Onset Date: 25 (script)             Next 's appt.: 3/24/25  Visit# / total visits: ;

## 2025-03-07 NOTE — PROGRESS NOTES
[x]      3. Pt to demonstrate the ability to complete the 5 times sit to stand and timed up and go in less than 13 seconds demonstrating improved balance and global B LE strength for ease of community navigation []  []  []      4. Pt will demonstrate level 4 on the sahrmann core stability test for ease of lifting tasks such as doing the laundry []  []  []      5. Pt will demonstrate the ability to lift >20 pounds from the floor to waist height for ease of carrying items []  []  []           Treatment Plan:  [x] Therapeutic Exercise   79611  [] Iontophoresis: 4 mg/mL Dexamethasone Sodium Phosphate  mAmin  02039   [x] Therapeutic Activity  84866 [] Vasopneumatic cold with compression  64859    [x] Gait Training   80955 [] Ultrasound   13554   [x] Neuromuscular Re-education  29680 [] Electrical Stimulation Unattended  82187   [x] Manual Therapy  06279 [] Electrical Stimulation Attended  46645   [x] Instruction in HEP  [x] Lumbar/Cervical Traction  33983   [] Aquatic Therapy   70566 [x] Cold/hotpack    [] Massage   27737      [] Dry Needling, 1 or 2 muscles  12624   [] Biofeedback, first 15 minutes   91249  [] Biofeedback, additional 15 minutes   06599 [] Dry Needling, 3 or more muscles  71251       Patient Status:     [x] Continue per initial plan of care.    [] Additional visits necessary.    [] Other:     Requested Frequency/Duration: 2 times per week for 12 treatments.        Electronically signed by Stephania Daly PT on 3/7/2025 at 1:22 PM      If you have any questions or concerns, please don't hesitate to call.  Thank you for your referral.    Physician Signature:________________________________Date:__________________  By signing above or cosigning this note, I have reviewed this plan of care and certify a need for medically necessary rehabilitation services.     *PLEASE SIGN ABOVE AND FAX BACK ALL PAGES*

## 2025-03-11 ENCOUNTER — HOSPITAL ENCOUNTER (OUTPATIENT)
Dept: PHYSICAL THERAPY | Facility: CLINIC | Age: 65
Setting detail: THERAPIES SERIES
Discharge: HOME | End: 2025-03-11
Payer: COMMERCIAL

## 2025-03-11 PROCEDURE — 97110 THERAPEUTIC EXERCISES: CPT

## 2025-03-11 NOTE — FLOWSHEET NOTE
[] [facility]  Outpatient Rehabilitation &  Therapy  [address]  [phone]  [phone] [x] [facility]  Outpatient Rehabilitation &  Therapy  [address]  P: [phone]  F: [phone] [] [facility]  Outpatient Rehabilitation &  Therapy  [address]  P: [phone]  F: [phone] [] [facility]  Outpatient Rehabilitation &  Therapy  [address]  [phone]  [phone] [] [facility]  Outpatient Rehabilitation &  Therapy  [address]   P: [phone]  F: [phone]  [] [facility]  Outpatient Rehabilitation &  Therapy  [address]  P: [phone]  F: [phone] [] [facility]  Outpatient Rehabilitation &  Therapy  [address]  P: [phone]  F: [phone] [] [facility]  Outpatient Rehabilitation &  Therapy  [address]  P: [phone]  F: [phone] [] [facility]  Outpatient Rehabilitation &  Therapy  [address]  P: [phone]  F: [phone]  [] [facility] Outpatient Rehabilitation &  Therapy  [address]  P: [phone]  F: [phone]     Physical Therapy Daily Treatment Note    Date:  3/11/2025  Patient Name:  Agueda Rain    :  1960 MRN: [id number]  Physician: Kate Vallejo APRN-FLIPN                                  Insurance: Count includes the [facility] (Auth after 8)  Medical Diagnosis:   M47.26 (ICD-10-CM) - Other spondylosis with radiculopathy, lumbar region   M53.9 (ICD-10-CM) - Multilevel degenerative disc disease   M48.061 (ICD-10-CM) - Lumbar stenosis without neurogenic claudication   Rehab Codes: M25.551, M25.552, M25.651, M25.652,M79.604, M79.605  Onset Date: 25 (script)             Next 's appt.: 3/24/25  Visit# / total visits: ;

## 2025-03-13 ENCOUNTER — HOSPITAL ENCOUNTER (OUTPATIENT)
Dept: PHYSICAL THERAPY | Facility: CLINIC | Age: 65
Setting detail: THERAPIES SERIES
Discharge: HOME | End: 2025-03-13
Payer: COMMERCIAL

## 2025-03-13 PROCEDURE — 97110 THERAPEUTIC EXERCISES: CPT

## 2025-03-13 NOTE — FLOWSHEET NOTE
[] [facility]  Outpatient Rehabilitation &  Therapy  [address]  [phone]  [phone] [x] [facility]  Outpatient Rehabilitation &  Therapy  [address]  P: [phone]  F: [phone] [] [facility]  Outpatient Rehabilitation &  Therapy  [address]  P: [phone]  F: [phone] [] [facility]  Outpatient Rehabilitation &  Therapy  [address]  [phone]  [phone] [] [facility]  Outpatient Rehabilitation &  Therapy  [address]   P: [phone]  F: [phone]  [] [facility]  Outpatient Rehabilitation &  Therapy  [address]  P: [phone]  F: [phone] [] [facility]  Outpatient Rehabilitation &  Therapy  [address]  P: [phone]  F: [phone] [] [facility]  Outpatient Rehabilitation &  Therapy  [address]  P: [phone]  F: [phone] [] [facility]  Outpatient Rehabilitation &  Therapy  [address]  P: [phone]  F: [phone]  [] [facility] Outpatient Rehabilitation &  Therapy  [address]  P: [phone]  F: [phone]     Physical Therapy Daily Treatment Note    Date:  3/13/2025  Patient Name:  Agueda Rain    :  1960 MRN: [id number]  Physician: Kate Vallejo APRN-FLIPN                                  Insurance: [facility] (Auth after 8)  Medical Diagnosis:   M47.26 (ICD-10-CM) - Other spondylosis with radiculopathy, lumbar region   M53.9 (ICD-10-CM) - Multilevel degenerative disc disease   M48.061 (ICD-10-CM) - Lumbar stenosis without neurogenic claudication   Rehab Codes: M25.551, M25.552, M25.651, M25.652,M79.604, M79.605  Onset Date: 25 (script)             Next 's appt.: 3/24/25  Visit# / total visits: ;

## 2025-03-17 ENCOUNTER — HOSPITAL ENCOUNTER (OUTPATIENT)
Dept: PHYSICAL THERAPY | Facility: CLINIC | Age: 65
Setting detail: THERAPIES SERIES
Discharge: HOME | End: 2025-03-17
Payer: COMMERCIAL

## 2025-03-17 NOTE — FLOWSHEET NOTE
[] [facility]  Outpatient Rehabilitation &  Therapy  [address]  [phone]  [phone] [x] [facility]  Outpatient Rehabilitation &  Therapy  [address]  P: [phone]  F: [phone] [] [facility]  Outpatient Rehabilitation &  Therapy  [address]  P: [phone]  F: [phone] [] [facility]  Outpatient Rehabilitation &  Therapy  [address]  [phone]  [phone] [] [facility]  Outpatient Rehabilitation &  Therapy  [address]   P: [phone]  F: [phone]  [] [facility]  Outpatient Rehabilitation &  Therapy  [address]  P: [phone]  F: [phone] [] [facility]  Outpatient Rehabilitation &  Therapy  [address]  P: [phone]  F: [phone] [] [facility]  Outpatient Rehabilitation &  Therapy  [address]  P: [phone]  F: [phone] [] [facility]  Outpatient Rehabilitation &  Therapy  [address]  P: [phone]  F: [phone]  [] [facility] Outpatient Rehabilitation &  Therapy  [address]  P: [phone]  F: [phone]     Therapy Cancel/No Show note    Date: 3/17/2025  Patient: Agueda Rain  : 1960  MRN: [id number]    Cancels/No Shows to date:     For today's appointment patient:    [x]  Cancelled    [] Rescheduled appointment    [] No-show     Reason given by patient:    []  Patient ill    []  Conflicting appointment    [] No transportation      [] Conflict with work    [] No reason given    [] Weather related    [] COVID-19    [x] Other:      Comments:  L hip pain and LBP aggravated.      [x] Next appointment was confirmed    Electronically signed by: Tony Cannon PTA      
negative - no pain, no limited range of motion

## 2025-03-23 ASSESSMENT — PATIENT HEALTH QUESTIONNAIRE - PHQ9
SUM OF ALL RESPONSES TO PHQ QUESTIONS 1-9: 0
2. FEELING DOWN, DEPRESSED OR HOPELESS: NOT AT ALL
1. LITTLE INTEREST OR PLEASURE IN DOING THINGS: NOT AT ALL
SUM OF ALL RESPONSES TO PHQ9 QUESTIONS 1 & 2: 0
SUM OF ALL RESPONSES TO PHQ QUESTIONS 1-9: 0
1. LITTLE INTEREST OR PLEASURE IN DOING THINGS: NOT AT ALL
2. FEELING DOWN, DEPRESSED OR HOPELESS: NOT AT ALL

## 2025-03-24 ENCOUNTER — OFFICE VISIT (OUTPATIENT)
Dept: FAMILY MEDICINE CLINIC | Age: 65
End: 2025-03-24
Payer: COMMERCIAL

## 2025-03-24 VITALS
OXYGEN SATURATION: 96 % | DIASTOLIC BLOOD PRESSURE: 72 MMHG | BODY MASS INDEX: 32.86 KG/M2 | HEART RATE: 60 BPM | SYSTOLIC BLOOD PRESSURE: 138 MMHG | RESPIRATION RATE: 16 BRPM | WEIGHT: 203.6 LBS | TEMPERATURE: 98.3 F

## 2025-03-24 DIAGNOSIS — E66.811 CLASS 1 OBESITY WITH SERIOUS COMORBIDITY AND BODY MASS INDEX (BMI) OF 33.0 TO 33.9 IN ADULT, UNSPECIFIED OBESITY TYPE: ICD-10-CM

## 2025-03-24 DIAGNOSIS — R00.2 PALPITATIONS: ICD-10-CM

## 2025-03-24 DIAGNOSIS — I10 ESSENTIAL HYPERTENSION: Primary | ICD-10-CM

## 2025-03-24 DIAGNOSIS — E55.9 VITAMIN D DEFICIENCY: ICD-10-CM

## 2025-03-24 DIAGNOSIS — E78.5 DYSLIPIDEMIA: ICD-10-CM

## 2025-03-24 DIAGNOSIS — I10 ESSENTIAL HYPERTENSION: ICD-10-CM

## 2025-03-24 PROCEDURE — 3075F SYST BP GE 130 - 139MM HG: CPT | Performed by: NURSE PRACTITIONER

## 2025-03-24 PROCEDURE — G8427 DOCREV CUR MEDS BY ELIG CLIN: HCPCS | Performed by: NURSE PRACTITIONER

## 2025-03-24 PROCEDURE — G8417 CALC BMI ABV UP PARAM F/U: HCPCS | Performed by: NURSE PRACTITIONER

## 2025-03-24 PROCEDURE — 3078F DIAST BP <80 MM HG: CPT | Performed by: NURSE PRACTITIONER

## 2025-03-24 PROCEDURE — 99214 OFFICE O/P EST MOD 30 MIN: CPT | Performed by: NURSE PRACTITIONER

## 2025-03-24 PROCEDURE — 3017F COLORECTAL CA SCREEN DOC REV: CPT | Performed by: NURSE PRACTITIONER

## 2025-03-24 PROCEDURE — 1036F TOBACCO NON-USER: CPT | Performed by: NURSE PRACTITIONER

## 2025-03-24 RX ORDER — NEOMYCIN SULFATE, POLYMYXIN B SULFATE AND DEXAMETHASONE 3.5; 10000; 1 MG/ML; [USP'U]/ML; MG/ML
SUSPENSION/ DROPS OPHTHALMIC
COMMUNITY
Start: 2025-03-13 | End: 2025-03-24 | Stop reason: ALTCHOICE

## 2025-03-24 RX ORDER — CARBOXYMETHYLCELLULOSE SODIUM, GLYCERIN, POLYSORBATE 80 5; 10; 5 MG/ML; MG/ML; MG/ML
SOLUTION/ DROPS OPHTHALMIC
COMMUNITY
Start: 2025-03-03 | End: 2025-03-24 | Stop reason: SDUPTHER

## 2025-03-24 RX ORDER — MONTELUKAST SODIUM 10 MG/1
10 TABLET ORAL DAILY
COMMUNITY
Start: 2025-01-24

## 2025-03-24 RX ORDER — LOTEPREDNOL ETABONATE 5 MG/ML
SUSPENSION/ DROPS OPHTHALMIC
COMMUNITY
Start: 2025-03-03

## 2025-03-24 RX ORDER — TIZANIDINE 2 MG/1
TABLET ORAL
COMMUNITY
Start: 2025-02-20

## 2025-03-24 RX ORDER — NICOTINE POLACRILEX 2 MG
LOZENGE BUCCAL
COMMUNITY
Start: 2025-01-29

## 2025-03-24 RX ORDER — CARVEDILOL 3.12 MG/1
3.12 TABLET ORAL 2 TIMES DAILY
Qty: 60 TABLET | Refills: 3 | Status: SHIPPED | OUTPATIENT
Start: 2025-03-24

## 2025-03-24 RX ORDER — CARBOXYMETHYLCELLULOSE SODIUM, GLYCERIN, AND POLYSORBATE 80 5; 10; 5 MG/ML; MG/ML; MG/ML
SOLUTION/ DROPS OPHTHALMIC
COMMUNITY
Start: 2025-02-06

## 2025-03-24 RX ORDER — BIMATOPROST 0.1 MG/ML
SOLUTION/ DROPS OPHTHALMIC
COMMUNITY
Start: 2025-02-14 | End: 2025-03-24 | Stop reason: SINTOL

## 2025-03-24 RX ORDER — TIMOLOL MALEATE 5 MG/ML
SOLUTION/ DROPS OPHTHALMIC
COMMUNITY
Start: 2025-02-21 | End: 2025-03-24 | Stop reason: SDUPTHER

## 2025-03-24 RX ORDER — CLOTRIMAZOLE AND BETAMETHASONE DIPROPIONATE 10; .64 MG/G; MG/G
CREAM TOPICAL
COMMUNITY
Start: 2025-01-30

## 2025-03-24 RX ORDER — CYCLOBENZAPRINE HCL 10 MG
10 TABLET ORAL NIGHTLY
COMMUNITY
Start: 2025-02-06 | End: 2025-03-24 | Stop reason: CLARIF

## 2025-03-24 ASSESSMENT — ENCOUNTER SYMPTOMS
VOMITING: 0
CHEST TIGHTNESS: 0
WHEEZING: 0
BACK PAIN: 1
SHORTNESS OF BREATH: 0
NAUSEA: 0
ABDOMINAL PAIN: 0

## 2025-03-24 NOTE — PROGRESS NOTES
[facility]     Date of Visit:  3/24/2025  Patient Name: Agueda Rain   Patient :  1960     CHIEF COMPLAINT:     Agueda Rain is a 64 y.o. female who presents today for an general visit to be evaluated for the following condition(s):  Chief Complaint   Patient presents with    Hypertension     4 month F/U. The patient brought in a BP log    Cholesterol Problem    Health Maintenance     The patient is due for HIV screening and a Pneumovax 23 vaccine    Back Pain     Mid back pain - kidney area, mainly left side. The discomfort is only at night laying in bed, a \"throbbing\" pain. Asking for lab orders to see how her kidneys are.       HISTORY OF PRESENT ILLNESS:     Visit Information    Have you changed or started any medications since your last visit including any over-the-counter medicines, vitamins, or herbal medicines? yes - eye drops   Are you having any side effects from any of your medications? -  no  Have you stopped taking any of your medications? Is so, why? -  no    Have you seen any other physician or provider since your last visit? Yes - Records Obtained  Have you had any other diagnostic tests since your last visit? Yes - Records Obtained  Have you been seen in the emergency room and/or had an admission to a hospital since we last saw you? No  Have you had your routine dental cleaning in the past 6 months? yes     Have you activated your [employer] account? If not, what are your barriers? Yes     Patient Care Team:  Jessee Pitts MD as PCP - General (Family Medicine)  [facility]  Roderick Stone MD as Consulting Physician (Gastroenterology)  Diogo Atkins MD as Consulting Physician (Pulmonary Disease)    Medical History Review  Past Medical, Family, and Social History reviewed and does contribute to the patient presenting condition    Health Maintenance   Topic Date Due    HIV screen  Never done    Pneumococcal 50+ years Vaccine

## 2025-03-27 ENCOUNTER — HOSPITAL ENCOUNTER (OUTPATIENT)
Dept: PHYSICAL THERAPY | Facility: CLINIC | Age: 65
Setting detail: THERAPIES SERIES
Discharge: HOME | End: 2025-03-27
Payer: COMMERCIAL

## 2025-03-27 NOTE — FLOWSHEET NOTE
[] [facility]  Outpatient Rehabilitation &  Therapy  [address]  [phone]  [phone] [x] [facility]  Outpatient Rehabilitation &  Therapy  [address]  P: [phone]  F: [phone] [] [facility]  Outpatient Rehabilitation &  Therapy  [address]  P: [phone]  F: [phone] [] [facility]  Outpatient Rehabilitation &  Therapy  [address]  [phone]  [phone] [] [facility]  Outpatient Rehabilitation &  Therapy  [address]   P: [phone]  F: [phone]  [] [facility]  Outpatient Rehabilitation &  Therapy  [address]  P: [phone]  F: [phone] [] [facility]  Outpatient Rehabilitation &  Therapy  [address]  P: [phone]  F: [phone] [] [facility]  Outpatient Rehabilitation &  Therapy  [address]  P: [phone]  F: [phone] [] [facility]  Outpatient Rehabilitation &  Therapy  [address]  P: [phone]  F: [phone]  [] [facility] Outpatient Rehabilitation &  Therapy  [address]  P: [phone]  F: [phone]     Therapy Cancel/No Show note    Date: 3/27/2025  Patient: Agueda Rain  : 1960  MRN: [id number]    Cancels/No Shows to date: 3/1    For today's appointment patient:    [x]  Cancelled    [] Rescheduled appointment    [] No-show     Reason given by patient:    []  Patient ill    []  Conflicting appointment    [] No transportation      [] Conflict with work    [] No reason given    [] Weather related    [] COVID-19    [x] Other:      Comments:  Pt reports she has been in a lot of pain following her last 2 physical therapy appointments. She reports she feels like she is getting worse. Pt follows up with the doctor soon and would like

## 2025-03-29 ENCOUNTER — HOSPITAL ENCOUNTER (OUTPATIENT)
Age: 65
Discharge: HOME | End: 2025-03-29
Payer: COMMERCIAL

## 2025-03-29 DIAGNOSIS — E78.5 DYSLIPIDEMIA: ICD-10-CM

## 2025-03-29 DIAGNOSIS — I10 ESSENTIAL HYPERTENSION: ICD-10-CM

## 2025-03-29 DIAGNOSIS — E55.9 VITAMIN D DEFICIENCY: ICD-10-CM

## 2025-03-29 LAB
25(OH)D3 SERPL-MCNC: 32 NG/ML (ref 30–100)
ALBUMIN SERPL-MCNC: 4.1 G/DL (ref 3.5–5.2)
ALBUMIN/GLOB SERPL: 1.2 {RATIO} (ref 1–2.5)
ALP SERPL-CCNC: 80 U/L (ref 35–104)
ALT SERPL-CCNC: 10 U/L (ref 10–35)
ANION GAP SERPL CALCULATED.3IONS-SCNC: 10 MMOL/L (ref 9–16)
AST SERPL-CCNC: 20 U/L (ref 10–35)
BILIRUB SERPL-MCNC: 0.4 MG/DL (ref 0–1.2)
BUN SERPL-MCNC: 9 MG/DL (ref 8–23)
CALCIUM SERPL-MCNC: 9.4 MG/DL (ref 8.6–10.4)
CHLORIDE SERPL-SCNC: 106 MMOL/L (ref 98–107)
CHOLEST SERPL-MCNC: 200 MG/DL (ref 0–199)
CHOLESTEROL/HDL RATIO: 4.1
CO2 SERPL-SCNC: 26 MMOL/L (ref 20–31)
CREAT SERPL-MCNC: 0.9 MG/DL (ref 0.6–0.9)
GFR, ESTIMATED: 71 ML/MIN/1.73M2
GLUCOSE SERPL-MCNC: 81 MG/DL (ref 74–99)
HDLC SERPL-MCNC: 49 MG/DL
LDLC SERPL CALC-MCNC: 132 MG/DL (ref 0–100)
POTASSIUM SERPL-SCNC: 4 MMOL/L (ref 3.7–5.3)
PROT SERPL-MCNC: 7.5 G/DL (ref 6.6–8.7)
SODIUM SERPL-SCNC: 142 MMOL/L (ref 136–145)
TRIGL SERPL-MCNC: 97 MG/DL
VLDLC SERPL CALC-MCNC: 19 MG/DL (ref 1–30)

## 2025-03-29 PROCEDURE — 80053 COMPREHEN METABOLIC PANEL: CPT

## 2025-03-29 PROCEDURE — 80061 LIPID PANEL: CPT

## 2025-03-29 PROCEDURE — 82306 VITAMIN D 25 HYDROXY: CPT

## 2025-03-29 PROCEDURE — 36415 COLL VENOUS BLD VENIPUNCTURE: CPT

## 2025-03-31 ENCOUNTER — RESULTS FOLLOW-UP (OUTPATIENT)
Dept: FAMILY MEDICINE CLINIC | Age: 65
End: 2025-03-31

## 2025-04-01 DIAGNOSIS — R10.12 LUQ ABDOMINAL PAIN: Primary | ICD-10-CM

## 2025-04-02 ENCOUNTER — HOSPITAL ENCOUNTER (OUTPATIENT)
Age: 65
Discharge: HOME | End: 2025-04-02
Payer: COMMERCIAL

## 2025-04-02 DIAGNOSIS — R10.12 LUQ ABDOMINAL PAIN: ICD-10-CM

## 2025-04-02 LAB
BACTERIA URNS QL MICRO: NORMAL
BASOPHILS # BLD: 0.03 K/UL (ref 0–0.2)
BASOPHILS NFR BLD: 1 % (ref 0–2)
BILIRUB UR QL STRIP: NEGATIVE
CASTS #/AREA URNS LPF: NORMAL /LPF (ref 0–8)
CLARITY UR: CLEAR
COLOR UR: YELLOW
EOSINOPHIL # BLD: 0.24 K/UL (ref 0–0.44)
EOSINOPHILS RELATIVE PERCENT: 4 % (ref 1–4)
EPI CELLS #/AREA URNS HPF: NORMAL /HPF (ref 0–5)
ERYTHROCYTE [DISTWIDTH] IN BLOOD BY AUTOMATED COUNT: 13.4 % (ref 11.8–14.4)
GLUCOSE UR STRIP-MCNC: NEGATIVE MG/DL
HCT VFR BLD AUTO: 42.1 % (ref 36.3–47.1)
HGB BLD-MCNC: 13.6 G/DL (ref 11.9–15.1)
HGB UR QL STRIP.AUTO: ABNORMAL
IMM GRANULOCYTES # BLD AUTO: <0.03 K/UL (ref 0–0.3)
IMM GRANULOCYTES NFR BLD: 0 %
KETONES UR STRIP-MCNC: NEGATIVE MG/DL
LEUKOCYTE ESTERASE UR QL STRIP: NEGATIVE
LIPASE SERPL-CCNC: 32 U/L (ref 13–60)
LYMPHOCYTES NFR BLD: 2.67 K/UL (ref 1.1–3.7)
LYMPHOCYTES RELATIVE PERCENT: 47 % (ref 24–43)
MCH RBC QN AUTO: 27.1 PG (ref 25.2–33.5)
MCHC RBC AUTO-ENTMCNC: 32.3 G/DL (ref 28.4–34.8)
MCV RBC AUTO: 84 FL (ref 82.6–102.9)
MONOCYTES NFR BLD: 0.43 K/UL (ref 0.1–1.2)
MONOCYTES NFR BLD: 8 % (ref 3–12)
NEUTROPHILS NFR BLD: 40 % (ref 36–65)
NEUTS SEG NFR BLD: 2.2 K/UL (ref 1.5–8.1)
NITRITE UR QL STRIP: NEGATIVE
NRBC BLD-RTO: 0 PER 100 WBC
PH UR STRIP: 6.5 [PH] (ref 5–8)
PLATELET # BLD AUTO: 299 K/UL (ref 138–453)
PMV BLD AUTO: 9.7 FL (ref 8.1–13.5)
PROT UR STRIP-MCNC: NEGATIVE MG/DL
RBC # BLD AUTO: 5.01 M/UL (ref 3.95–5.11)
RBC #/AREA URNS HPF: NORMAL /HPF (ref 0–4)
SP GR UR STRIP: 1.01 (ref 1–1.03)
UROBILINOGEN UR STRIP-ACNC: NORMAL EU/DL (ref 0–1)
WBC #/AREA URNS HPF: NORMAL /HPF (ref 0–5)
WBC OTHER # BLD: 5.6 K/UL (ref 3.5–11.3)

## 2025-04-02 PROCEDURE — 36415 COLL VENOUS BLD VENIPUNCTURE: CPT

## 2025-04-02 PROCEDURE — 81001 URINALYSIS AUTO W/SCOPE: CPT

## 2025-04-02 PROCEDURE — 83690 ASSAY OF LIPASE: CPT

## 2025-04-02 PROCEDURE — 85025 COMPLETE CBC W/AUTO DIFF WBC: CPT

## 2025-04-03 ENCOUNTER — RESULTS FOLLOW-UP (OUTPATIENT)
Dept: FAMILY MEDICINE CLINIC | Age: 65
End: 2025-04-03

## 2025-04-03 DIAGNOSIS — R31.9 HEMATURIA, UNSPECIFIED TYPE: Primary | ICD-10-CM

## 2025-04-04 ENCOUNTER — HOSPITAL ENCOUNTER (OUTPATIENT)
Dept: GENERAL RADIOLOGY | Facility: CLINIC | Age: 65
Discharge: HOME | End: 2025-04-04
Payer: COMMERCIAL

## 2025-04-04 DIAGNOSIS — R31.9 HEMATURIA, UNSPECIFIED TYPE: ICD-10-CM

## 2025-04-04 PROCEDURE — 74018 RADEX ABDOMEN 1 VIEW: CPT

## 2025-04-10 ENCOUNTER — OFFICE VISIT (OUTPATIENT)
Dept: UROLOGY | Age: 65
End: 2025-04-10
Payer: COMMERCIAL

## 2025-04-10 ENCOUNTER — HOSPITAL ENCOUNTER (OUTPATIENT)
Age: 65
Setting detail: SPECIMEN
Discharge: HOME | End: 2025-04-10

## 2025-04-10 ENCOUNTER — RESULTS FOLLOW-UP (OUTPATIENT)
Dept: FAMILY MEDICINE CLINIC | Age: 65
End: 2025-04-10

## 2025-04-10 ENCOUNTER — PREP FOR PROCEDURE (OUTPATIENT)
Dept: UROLOGY | Age: 65
End: 2025-04-10

## 2025-04-10 VITALS
WEIGHT: 203 LBS | DIASTOLIC BLOOD PRESSURE: 80 MMHG | BODY MASS INDEX: 32.62 KG/M2 | SYSTOLIC BLOOD PRESSURE: 124 MMHG | HEART RATE: 72 BPM | TEMPERATURE: 97.8 F | HEIGHT: 66 IN | OXYGEN SATURATION: 96 %

## 2025-04-10 DIAGNOSIS — R31.29 OTHER MICROSCOPIC HEMATURIA: ICD-10-CM

## 2025-04-10 DIAGNOSIS — R31.29 OTHER MICROSCOPIC HEMATURIA: Primary | ICD-10-CM

## 2025-04-10 PROCEDURE — 1036F TOBACCO NON-USER: CPT | Performed by: UROLOGY

## 2025-04-10 PROCEDURE — 3074F SYST BP LT 130 MM HG: CPT | Performed by: UROLOGY

## 2025-04-10 PROCEDURE — 99204 OFFICE O/P NEW MOD 45 MIN: CPT | Performed by: UROLOGY

## 2025-04-10 PROCEDURE — 3017F COLORECTAL CA SCREEN DOC REV: CPT | Performed by: UROLOGY

## 2025-04-10 PROCEDURE — G8427 DOCREV CUR MEDS BY ELIG CLIN: HCPCS | Performed by: UROLOGY

## 2025-04-10 PROCEDURE — 3079F DIAST BP 80-89 MM HG: CPT | Performed by: UROLOGY

## 2025-04-10 PROCEDURE — G8417 CALC BMI ABV UP PARAM F/U: HCPCS | Performed by: UROLOGY

## 2025-04-10 RX ORDER — METHYLPREDNISOLONE 4 MG
TABLET, DOSE PACK ORAL
COMMUNITY
Start: 2025-04-02

## 2025-04-10 RX ORDER — HYDRALAZINE HYDROCHLORIDE 10 MG/1
TABLET, FILM COATED ORAL
COMMUNITY

## 2025-04-10 RX ORDER — CYCLOBENZAPRINE HCL 10 MG
TABLET ORAL
COMMUNITY
Start: 2025-02-06

## 2025-04-10 ASSESSMENT — ENCOUNTER SYMPTOMS
EYE PAIN: 0
NAUSEA: 0
COUGH: 0
VOMITING: 0
SHORTNESS OF BREATH: 0
WHEEZING: 0
EYE REDNESS: 0
EYES NEGATIVE: 1
ABDOMINAL PAIN: 0
GASTROINTESTINAL NEGATIVE: 1
ALLERGIC/IMMUNOLOGIC NEGATIVE: 1
RESPIRATORY NEGATIVE: 1
BACK PAIN: 0

## 2025-04-10 NOTE — PROGRESS NOTES
Review of Systems   Constitutional: Negative.  Negative for activity change, chills and fever.   HENT: Negative.     Eyes: Negative.  Negative for pain, redness and visual disturbance.   Respiratory: Negative.  Negative for cough, shortness of breath and wheezing.    Cardiovascular: Negative.  Negative for chest pain and leg swelling.   Gastrointestinal: Negative.  Negative for abdominal pain, nausea and vomiting.   Endocrine: Negative.    Genitourinary:  Positive for hematuria. Negative for difficulty urinating, dysuria, enuresis, flank pain, frequency and urgency.   Musculoskeletal: Negative.  Negative for back pain, joint swelling and myalgias.   Skin: Negative.  Negative for rash and wound.   Allergic/Immunologic: Negative.    Neurological: Negative.  Negative for dizziness, tremors and numbness.   Hematological: Negative.  Does not bruise/bleed easily.   Psychiatric/Behavioral: Negative.       
Left heart cath / coronary angiography performed by Guru Szymanski MD at [facility] CARDIAC CATH LAB    CARPAL TUNNEL RELEASE Bilateral     COLONOSCOPY  11/06/2017    TUBULAR ADENOMA    COLONOSCOPY N/A 10/04/2021    COLONOSCOPY POLYPECTOMY SNARE/COLD BIOPSY performed by Roderick Stone MD at [facility] Endoscopy    COLONOSCOPY  10/04/2021    COLONOSCOPY WITH BIOPSY performed by Roderick Stone MD at [facility] Endoscopy    COLONOSCOPY N/A 12/3/2024    COLONOSCOPY BIOPSY performed by Coy Guaman MD at [facility] ENDO    CYSTOSCOPY  06/2016    with bladder biopsy    HYSTERECTOMY, TOTAL ABDOMINAL (CERVIX REMOVED)  06/2017    OVARY REMOVAL      UPPER GASTROINTESTINAL ENDOSCOPY N/A 02/28/2022    EGD BIOPSY performed by Roderick Stone MD at [facility] Endoscopy     Family History   Problem Relation Age of Onset    High Blood Pressure Mother     Other Mother         ALZHEIMERS    Cancer Father         BONE AND PROSTRATE    Prostate Cancer Brother      Outpatient Medications Marked as Taking for the 4/10/25 encounter (Office Visit) with Jonathan Hagan MD   Medication Sig Dispense Refill    cyclobenzaprine (FLEXERIL) 10 MG tablet 1 tablet Orally bedtime for 30 days      diclofenac (VOLTAREN) 50 MG EC tablet TAKE 1 TABLET BY MOUTH DAILY AS NEEDED WITH FOOD      MEDROL, COBY, 4 MG tablet Take by mouth      vitamin D (CHOLECALCIFEROL) 50 MCG (2000 UT) CAPS capsule Take 1 capsule by mouth      clotrimazole-betamethasone (LOTRISONE) 1-0.05 % cream APPLY 1 APPLICATION TOPICALLY IN THE MORNING AND 1 APPLICATION BEFORE BEDTIME      EYE ITCH RELIEF 0.035 % ophthalmic solution INSTILL 1 DROP INTO AFFECTED EYE(S) TWICE DAILY FOR 7 DAYS      montelukast (SINGULAIR) 10 MG tablet Take 1 tablet by mouth daily      tiZANidine (ZANAFLEX) 2 MG tablet TAKE ONE TABLET BY MOUTH EVERY NIGHT AT BEDTIME AS NEEDED. STOP TAKING FLEXERIL.      carvedilol (COREG) 3.125 MG tablet TAKE 1 TABLET BY MOUTH TWICE DAILY 60 tablet 3    mirabegron (MYRBETRIQ) 25 MG TB24 Take 1 tablet by mouth

## 2025-04-11 ENCOUNTER — TELEPHONE (OUTPATIENT)
Dept: UROLOGY | Age: 65
End: 2025-04-11

## 2025-04-11 LAB
MICROORGANISM SPEC CULT: NORMAL
SERVICE CMNT-IMP: NORMAL
SPECIMEN DESCRIPTION: NORMAL

## 2025-04-21 ENCOUNTER — OFFICE VISIT (OUTPATIENT)
Dept: GASTROENTEROLOGY | Age: 65
End: 2025-04-21
Payer: COMMERCIAL

## 2025-04-21 ENCOUNTER — OFFICE VISIT (OUTPATIENT)
Dept: NEUROSURGERY | Age: 65
End: 2025-04-21
Payer: COMMERCIAL

## 2025-04-21 VITALS
WEIGHT: 203 LBS | DIASTOLIC BLOOD PRESSURE: 71 MMHG | BODY MASS INDEX: 32.62 KG/M2 | HEIGHT: 66 IN | OXYGEN SATURATION: 98 % | HEART RATE: 79 BPM | SYSTOLIC BLOOD PRESSURE: 139 MMHG

## 2025-04-21 VITALS
HEIGHT: 70 IN | OXYGEN SATURATION: 100 % | HEART RATE: 79 BPM | SYSTOLIC BLOOD PRESSURE: 146 MMHG | RESPIRATION RATE: 18 BRPM | DIASTOLIC BLOOD PRESSURE: 82 MMHG | BODY MASS INDEX: 28.98 KG/M2 | TEMPERATURE: 98.1 F | WEIGHT: 202.4 LBS

## 2025-04-21 DIAGNOSIS — K59.04 CHRONIC IDIOPATHIC CONSTIPATION: Primary | ICD-10-CM

## 2025-04-21 DIAGNOSIS — M16.0 ARTHROPATHY OF BOTH HIPS: ICD-10-CM

## 2025-04-21 DIAGNOSIS — M47.26 OTHER SPONDYLOSIS WITH RADICULOPATHY, LUMBAR REGION: Primary | ICD-10-CM

## 2025-04-21 PROCEDURE — 99214 OFFICE O/P EST MOD 30 MIN: CPT | Performed by: NEUROLOGICAL SURGERY

## 2025-04-21 PROCEDURE — G8427 DOCREV CUR MEDS BY ELIG CLIN: HCPCS | Performed by: INTERNAL MEDICINE

## 2025-04-21 PROCEDURE — G8417 CALC BMI ABV UP PARAM F/U: HCPCS | Performed by: NEUROLOGICAL SURGERY

## 2025-04-21 PROCEDURE — G8417 CALC BMI ABV UP PARAM F/U: HCPCS | Performed by: INTERNAL MEDICINE

## 2025-04-21 PROCEDURE — 3078F DIAST BP <80 MM HG: CPT | Performed by: NEUROLOGICAL SURGERY

## 2025-04-21 PROCEDURE — 3017F COLORECTAL CA SCREEN DOC REV: CPT | Performed by: INTERNAL MEDICINE

## 2025-04-21 PROCEDURE — G8427 DOCREV CUR MEDS BY ELIG CLIN: HCPCS | Performed by: NEUROLOGICAL SURGERY

## 2025-04-21 PROCEDURE — 3075F SYST BP GE 130 - 139MM HG: CPT | Performed by: NEUROLOGICAL SURGERY

## 2025-04-21 PROCEDURE — 1036F TOBACCO NON-USER: CPT | Performed by: NEUROLOGICAL SURGERY

## 2025-04-21 PROCEDURE — 3017F COLORECTAL CA SCREEN DOC REV: CPT | Performed by: NEUROLOGICAL SURGERY

## 2025-04-21 PROCEDURE — 3077F SYST BP >= 140 MM HG: CPT | Performed by: INTERNAL MEDICINE

## 2025-04-21 PROCEDURE — 3079F DIAST BP 80-89 MM HG: CPT | Performed by: INTERNAL MEDICINE

## 2025-04-21 PROCEDURE — 1036F TOBACCO NON-USER: CPT | Performed by: INTERNAL MEDICINE

## 2025-04-21 PROCEDURE — 99214 OFFICE O/P EST MOD 30 MIN: CPT | Performed by: INTERNAL MEDICINE

## 2025-04-21 RX ORDER — POLYETHYLENE GLYCOL 3350 17 G/17G
17 POWDER, FOR SOLUTION ORAL DAILY
Qty: 850 G | Refills: 3 | Status: SHIPPED | OUTPATIENT
Start: 2025-04-21

## 2025-04-21 RX ORDER — OLOPATADINE HYDROCHLORIDE 7 MG/ML
SOLUTION OPHTHALMIC
COMMUNITY
Start: 2025-04-16

## 2025-04-21 NOTE — PROGRESS NOTES
[facility] NEUROSURGERY [facility]  [address]  [id number]  [address]LO[address]  [facility] [address]  Dept: [phone]    Patient:  Agueda Rain  YOB: 1960  Date: 4/21/25    The patient is a 64 y.o. female who presents today for consult of the following problems:     Chief Complaint   Patient presents with    Other spondylosis with radiculopathy, lumbar region     2 month follow up   Entire Spine Xray   Lumbar Xray flexion/extension   Referral physical therapy              HPI:     Agueda Rain is a 64 y.o. female on whom neurosurgical consultation was requested by Jessee Pitts MD for management of significant bilateral paraspinal axial back pain along with radiation to the right lower extremity approximate L4-L5 dermatomal distribution that terminates at approximate the ankle going along the anterior portion of the tibialis anterior.  In addition she does have significant deep groin pain as well as pain that wraps around that is 10 out of 10.  Did have a fall remotely on her left side.  Has been evaluated by pain management and sports medicine and has reviewed received multiple injections which appear to be in the lower gluteal region as well as within the process.  Overall has had modest relief.  No epidural injections as of yet.  Did go through 4 physical therapy sessions and states that there were some of the exercises that actually exacerbated her pain.  Pain is quite severe and worsened with change in position upright posture and ambulating. Pain is worse with up stairs and going down worsens L hip. .      History:     Past Medical History:   Diagnosis Date    Allergic rhinitis due to pollen     Bilateral primary osteoarthritis of hip     Bilateral primary osteoarthritis of knee     Chronic sinusitis     Essential hypertension 07/27/2020    Greater trochanteric bursitis     Headache     Hematuria

## 2025-04-21 NOTE — PROGRESS NOTES
side effects from the GI medication were reviewed with the patient  We did refill the GI medications            Diet/life style/natural hx /complication of the dx were all explained in details   Past medical, past surgical, social history, psychiatric history, medications or allergies, all reviewed and  updated    Thank you for allowing me to participate in the care of Ms. Rain. For any further questions please do not hesitate to contact me.    I have reviewed and agree with the ROS entered by the MA/RN.           Coy Guaman MD, [facility] Gastroenterology  O: #[phone]

## 2025-04-24 ENCOUNTER — HOSPITAL ENCOUNTER (OUTPATIENT)
Dept: ULTRASOUND IMAGING | Age: 65
Discharge: HOME | End: 2025-04-26
Attending: UROLOGY
Payer: COMMERCIAL

## 2025-04-24 DIAGNOSIS — R31.29 OTHER MICROSCOPIC HEMATURIA: ICD-10-CM

## 2025-04-24 PROCEDURE — 76775 US EXAM ABDO BACK WALL LIM: CPT

## 2025-04-25 ENCOUNTER — TELEPHONE (OUTPATIENT)
Dept: UROLOGY | Age: 65
End: 2025-04-25

## 2025-04-25 NOTE — TELEPHONE ENCOUNTER
Pt called into the office, stated that she was having her US done, pt stated that she had a full bladder and was instructed by the imaging department to empty her bladder. Pt had the test done, but the note says. \"Jessee as an unsuccessful attempt.\" Please advise if pt will need further testing or a repeat.

## 2025-05-02 ENCOUNTER — TELEPHONE (OUTPATIENT)
Dept: FAMILY MEDICINE CLINIC | Age: 65
End: 2025-05-02

## 2025-05-02 DIAGNOSIS — R31.9 HEMATURIA, UNSPECIFIED TYPE: Primary | ICD-10-CM

## 2025-05-02 NOTE — TELEPHONE ENCOUNTER
The patient called stating that she is angry and not impressed with her experience with Dr. Hagan. She stated that the visit was very breif, that the doctor had ordered a test that she was instructed to drink 32 oz of water prior, then made to empty her bladder for the test because she did not have to have a full blader. She has not been contacted about the results, not has she heard back from the office after speaking with someone on Monday 4/28/25. She is scheduled for another procedure 5/13/25 and she does not want to do it. She is asking for another referral to a different urologist.

## 2025-05-07 NOTE — TELEPHONE ENCOUNTER
The patient called again regarding the referral to another Urologist. She has chosen Dr Kit Marshall. The new referral was created and fax with additional information,  office notes, las and imaging results.

## 2025-05-09 ENCOUNTER — TELEPHONE (OUTPATIENT)
Dept: UROLOGY | Age: 65
End: 2025-05-09

## 2025-05-09 NOTE — TELEPHONE ENCOUNTER
Agueda Rain to [facility] Clinical Staff (supporting Jonathan Hagan MD)        5/9/25 11:40 AM  Hi Fabiola-     When we last a spoke on Wednesday, regarding the conversation we ha discussed your office and the staff member, and the radiology Technician jeni, you stated that you would call me Thursday to see if I wanted to follow through with the procedure that is schedule for next week on 5.13.25. I had not heard from you. So I am requesting that you can take me off Dr. Hagan's schedule for that procedure.  Feel free to contact me if you need a verbal answer from me over the phone.      Sincerely,     Agueda Koffi        Spoke with patient, we talked about the issues with radiology, being told that she didn't have need to end up having a full bladder. The US tech didn't relay anything and the prep stated that she needed a full bladder. Patient felt that the office should have been called.  Writer has a call out to [facility] to see if something should have been done differently on our end or if there is a mix up with the AVS summary instructions. Agueda was notified that there is a message out.  Patient also expressed frustration that she called the office after the US was done to explain it to the staff, and no one called her back. Messages were sent to appropriate staff regarding the phone notes and not getting back with patients.   Patient also states that she spoke with Geovanna and let her know what was going on since she didn't receive a call back,

## 2025-05-12 ENCOUNTER — TELEPHONE (OUTPATIENT)
Dept: UROLOGY | Age: 65
End: 2025-05-12

## 2025-05-12 NOTE — TELEPHONE ENCOUNTER
Procedure cancelled, Agueda did not  have a reliable ride post procedure, Left voicemail for patient to call back for reschedule when she is ready

## 2025-07-16 ENCOUNTER — OFFICE VISIT (OUTPATIENT)
Dept: NEUROSURGERY | Age: 65
End: 2025-07-16
Payer: COMMERCIAL

## 2025-07-16 VITALS
OXYGEN SATURATION: 98 % | SYSTOLIC BLOOD PRESSURE: 152 MMHG | HEART RATE: 75 BPM | HEIGHT: 70 IN | BODY MASS INDEX: 29.21 KG/M2 | DIASTOLIC BLOOD PRESSURE: 76 MMHG

## 2025-07-16 DIAGNOSIS — M16.0 ARTHROPATHY OF BOTH HIPS: ICD-10-CM

## 2025-07-16 DIAGNOSIS — M47.26 OTHER SPONDYLOSIS WITH RADICULOPATHY, LUMBAR REGION: Primary | ICD-10-CM

## 2025-07-16 DIAGNOSIS — R00.2 PALPITATIONS: ICD-10-CM

## 2025-07-16 DIAGNOSIS — I10 ESSENTIAL HYPERTENSION: ICD-10-CM

## 2025-07-16 PROCEDURE — 3017F COLORECTAL CA SCREEN DOC REV: CPT | Performed by: NEUROLOGICAL SURGERY

## 2025-07-16 PROCEDURE — G8417 CALC BMI ABV UP PARAM F/U: HCPCS | Performed by: NEUROLOGICAL SURGERY

## 2025-07-16 PROCEDURE — 3077F SYST BP >= 140 MM HG: CPT | Performed by: NEUROLOGICAL SURGERY

## 2025-07-16 PROCEDURE — G8427 DOCREV CUR MEDS BY ELIG CLIN: HCPCS | Performed by: NEUROLOGICAL SURGERY

## 2025-07-16 PROCEDURE — 1036F TOBACCO NON-USER: CPT | Performed by: NEUROLOGICAL SURGERY

## 2025-07-16 PROCEDURE — 99214 OFFICE O/P EST MOD 30 MIN: CPT | Performed by: NEUROLOGICAL SURGERY

## 2025-07-16 PROCEDURE — 3078F DIAST BP <80 MM HG: CPT | Performed by: NEUROLOGICAL SURGERY

## 2025-07-16 RX ORDER — LATANOPROST 50 UG/ML
SOLUTION/ DROPS OPHTHALMIC
COMMUNITY
Start: 2025-07-03

## 2025-07-16 RX ORDER — CARVEDILOL 3.12 MG/1
3.12 TABLET ORAL 2 TIMES DAILY
Qty: 60 TABLET | Refills: 3 | Status: SHIPPED | OUTPATIENT
Start: 2025-07-16

## 2025-07-16 NOTE — PROGRESS NOTES
Nostril route daily as needed for Rhinitis (Patient not taking: Reported on 4/10/2025)       No current facility-administered medications on file prior to visit.     Social History     Tobacco Use    Smoking status: Former     Current packs/day: 0.00     Average packs/day: 1 pack/day for 20.0 years (20.0 ttl pk-yrs)     Types: Cigarettes     Start date: 10/27/1999     Quit date: 10/27/2019     Years since quittin.7    Smokeless tobacco: Never    Tobacco comments:     Over the past years of my early 20's I was a on & off again smoker due to stress in my life.   Vaping Use    Vaping status: Never Used   Substance Use Topics    Alcohol use: Never    Drug use: Never       Allergies   Allergen Reactions    Banana     Egg-Derived Products Other (See Comments)    Penicillin G Other (See Comments)    Penicillins Hives and Other (See Comments)    Environmental/Seasonal Other (See Comments)     seasonal       Review of Systems  ROS: Back pain.  Leg pain.    Physical Exam:      BP (!) 152/76   Pulse 75   Ht 1.773 m (5' 9.8\")   SpO2 98%   BMI 29.21 kg/m²   Estimated body mass index is 29.21 kg/m² as calculated from the following:    Height as of this encounter: 1.773 m (5' 9.8\").    Weight as of 25: 91.8 kg (202 lb 6.4 oz).    General:  Agueda Rain is a 64 y.o. year old female who appears her stated age.   HEENT: Normocephalic atraumatic. Neck supple.  Chest: regular rate; pulses equal. Equal chest rise and fall  Abdomen: Soft nondistended.   Ext: DP equal with good capillary refill  Neuro    Mentation  Appropriate affect   oriented    Cranial Nerves:   Pupils equal and reactive to light  Extraocular motion intact  Face symmetric  No dysarthria  v1-3 sensation symmetric, masseter tone symmetric  Hearing symmetric and intact to finger rub    Sensation:   Slight delay sensation right L4    Motor  L deltoid 5/5; R deltoid 5/5  L biceps 5/5; R biceps 5/5  L triceps 5/5; R triceps 5/5  L wrist extension 5/5; R

## 2025-07-23 RX ORDER — MAGNESIUM GLYCINATE 100 MG
100 TABLET ORAL DAILY
COMMUNITY

## 2025-07-23 RX ORDER — MV-MN/FOLATE 11/M.THISTLE/HERB 72.3MCGDFE
CAPSULE ORAL DAILY
COMMUNITY

## 2025-07-24 ENCOUNTER — OFFICE VISIT (OUTPATIENT)
Dept: FAMILY MEDICINE CLINIC | Age: 65
End: 2025-07-24
Payer: COMMERCIAL

## 2025-07-24 VITALS
OXYGEN SATURATION: 97 % | BODY MASS INDEX: 29.23 KG/M2 | DIASTOLIC BLOOD PRESSURE: 86 MMHG | SYSTOLIC BLOOD PRESSURE: 138 MMHG | HEART RATE: 68 BPM | RESPIRATION RATE: 14 BRPM | WEIGHT: 202.6 LBS | TEMPERATURE: 98.2 F

## 2025-07-24 DIAGNOSIS — R31.9 HEMATURIA, UNSPECIFIED TYPE: ICD-10-CM

## 2025-07-24 DIAGNOSIS — I10 ESSENTIAL HYPERTENSION: Primary | ICD-10-CM

## 2025-07-24 DIAGNOSIS — E78.5 DYSLIPIDEMIA: ICD-10-CM

## 2025-07-24 DIAGNOSIS — R00.2 PALPITATIONS: ICD-10-CM

## 2025-07-24 DIAGNOSIS — I49.9 CARDIAC ARRHYTHMIA, UNSPECIFIED CARDIAC ARRHYTHMIA TYPE: ICD-10-CM

## 2025-07-24 PROCEDURE — 99214 OFFICE O/P EST MOD 30 MIN: CPT | Performed by: NURSE PRACTITIONER

## 2025-07-24 PROCEDURE — 3017F COLORECTAL CA SCREEN DOC REV: CPT | Performed by: NURSE PRACTITIONER

## 2025-07-24 PROCEDURE — G8427 DOCREV CUR MEDS BY ELIG CLIN: HCPCS | Performed by: NURSE PRACTITIONER

## 2025-07-24 PROCEDURE — G8417 CALC BMI ABV UP PARAM F/U: HCPCS | Performed by: NURSE PRACTITIONER

## 2025-07-24 PROCEDURE — 3075F SYST BP GE 130 - 139MM HG: CPT | Performed by: NURSE PRACTITIONER

## 2025-07-24 PROCEDURE — 1036F TOBACCO NON-USER: CPT | Performed by: NURSE PRACTITIONER

## 2025-07-24 PROCEDURE — 3079F DIAST BP 80-89 MM HG: CPT | Performed by: NURSE PRACTITIONER

## 2025-07-24 RX ORDER — CARVEDILOL 3.12 MG/1
3.12 TABLET ORAL 2 TIMES DAILY
Qty: 180 TABLET | Refills: 1 | Status: SHIPPED | OUTPATIENT
Start: 2025-07-24

## 2025-07-24 ASSESSMENT — ENCOUNTER SYMPTOMS
VOMITING: 0
ABDOMINAL PAIN: 0
NAUSEA: 0
WHEEZING: 0
SHORTNESS OF BREATH: 0

## 2025-07-24 ASSESSMENT — PATIENT HEALTH QUESTIONNAIRE - PHQ9
SUM OF ALL RESPONSES TO PHQ QUESTIONS 1-9: 1
1. LITTLE INTEREST OR PLEASURE IN DOING THINGS: NOT AT ALL
2. FEELING DOWN, DEPRESSED OR HOPELESS: SEVERAL DAYS

## 2025-07-24 NOTE — PROGRESS NOTES
[facility]     Date of Visit:  2025  Patient Name: Agueda Rain   Patient :  1960     CHIEF COMPLAINT:     Agueda Rain is a 64 y.o. female who presents today for an general visit to be evaluated for the following condition(s):  Chief Complaint   Patient presents with   • Hypertension   • Cholesterol Problem   • Vitamin D Deficiency   • Health Maintenance     The patient is due for HIV screen and Pneumovax 23 vaccine. REVIEW RECENT LAB RESULTS.        HISTORY OF PRESENT ILLNESS:     Visit Information    Have you changed or started any medications since your last visit including any over-the-counter medicines, vitamins, or herbal medicines? no   Are you having any side effects from any of your medications? -  no  Have you stopped taking any of your medications? Is so, why? -  yes - SEE MED LIST    Have you seen any other physician or provider since your last visit? Yes - Records Obtained  Have you had any other diagnostic tests since your last visit? Yes - Records Obtained  Have you been seen in the emergency room and/or had an admission to a hospital since we last saw you? No  Have you had your routine dental cleaning in the past 6 months? yes     Have you activated your [employer] account? If not, what are your barriers? Yes     Patient Care Team:  Jessee Pitts MD as PCP - General (Family Medicine)  Jessee Pitts MD as PCP - Empaneled Provider  Roderick Stone MD as Consulting Physician (Gastroenterology)  Diogo Atkins MD as Consulting Physician (Pulmonary Disease)    Medical History Review  Past Medical, Family, and Social History reviewed and does contribute to the patient presenting condition    Health Maintenance   Topic Date Due   • HIV screen  Never done   • Pneumococcal 50+ years Vaccine (1 of 2 - PCV) Never done   • COVID-19 Vaccine (3 -  season) 2024   • Flu vaccine (1) 2025   • Lung Cancer Screening &/or Counseling  2025   •

## 2025-07-28 ENCOUNTER — TELEPHONE (OUTPATIENT)
Dept: FAMILY MEDICINE CLINIC | Age: 65
End: 2025-07-28

## 2025-07-28 NOTE — TELEPHONE ENCOUNTER
[facility] Medical Records was contacted and the request for most recent EKG result be faxed to this office was placed.

## 2025-08-06 ENCOUNTER — TELEPHONE (OUTPATIENT)
Dept: PULMONOLOGY | Age: 65
End: 2025-08-06

## 2025-08-06 DIAGNOSIS — Z87.891 PERSONAL HISTORY OF TOBACCO USE: Primary | ICD-10-CM

## 2025-08-18 ENCOUNTER — OFFICE VISIT (OUTPATIENT)
Dept: FAMILY MEDICINE CLINIC | Age: 65
End: 2025-08-18
Payer: COMMERCIAL

## 2025-08-18 VITALS
DIASTOLIC BLOOD PRESSURE: 86 MMHG | SYSTOLIC BLOOD PRESSURE: 178 MMHG | HEART RATE: 74 BPM | TEMPERATURE: 97.1 F | OXYGEN SATURATION: 98 %

## 2025-08-18 DIAGNOSIS — L30.9 DERMATITIS: Primary | ICD-10-CM

## 2025-08-18 PROCEDURE — G8427 DOCREV CUR MEDS BY ELIG CLIN: HCPCS

## 2025-08-18 PROCEDURE — 3077F SYST BP >= 140 MM HG: CPT

## 2025-08-18 PROCEDURE — 3079F DIAST BP 80-89 MM HG: CPT

## 2025-08-18 PROCEDURE — 3017F COLORECTAL CA SCREEN DOC REV: CPT

## 2025-08-18 PROCEDURE — 99213 OFFICE O/P EST LOW 20 MIN: CPT

## 2025-08-18 PROCEDURE — G8417 CALC BMI ABV UP PARAM F/U: HCPCS

## 2025-08-18 PROCEDURE — 1036F TOBACCO NON-USER: CPT

## 2025-08-18 RX ORDER — CLOTRIMAZOLE AND BETAMETHASONE DIPROPIONATE 10; .64 MG/G; MG/G
CREAM TOPICAL
Qty: 45 G | Refills: 0 | Status: SHIPPED | OUTPATIENT
Start: 2025-08-18 | End: 2025-09-30

## 2025-08-18 ASSESSMENT — ENCOUNTER SYMPTOMS
VOMITING: 0
SHORTNESS OF BREATH: 0
RHINORRHEA: 0
COUGH: 0
DIARRHEA: 0
EYE PAIN: 0
SORE THROAT: 0
COLOR CHANGE: 0
NAIL CHANGES: 0

## 2025-09-05 DIAGNOSIS — M47.817 LUMBOSACRAL SPONDYLOSIS WITHOUT MYELOPATHY: Primary | ICD-10-CM

## 2025-09-05 DIAGNOSIS — M48.062 SPINAL STENOSIS OF LUMBAR REGION WITH NEUROGENIC CLAUDICATION: ICD-10-CM

## (undated) DEVICE — CATHETER DIAG SM AD 6FR L100CM 0.038IN COR POLYUR JUDKINS L

## (undated) DEVICE — GLIDESHEATH SLENDER STAINLESS STEEL KIT: Brand: GLIDESHEATH SLENDER

## (undated) DEVICE — TRAP SPEC RETRV CLR PLAS POLYP IN LN SUCT QUIK CTCH

## (undated) DEVICE — FORCEPS BX L240CM JAW DIA22MM ORNG STD CAP W NDL RAD JAW 4

## (undated) DEVICE — 6FR JR4 CORDIS  DIAG CATHETER 100CM

## (undated) DEVICE — SINGLE-USE POLYPECTOMY SNARE: Brand: CAPTIFLEX

## (undated) DEVICE — FORCEPS BX L240CM WRK CHN 2.8MM STD CAP W/ NDL MIC MESH

## (undated) DEVICE — BAND COMPR L24CM REG CLR PLAS HEMSTAT EXT HK AND LOOP RETEN

## (undated) DEVICE — ELECTRODE PT RET AD L9FT HI MOIST COND ADH HYDRGEL CORDED

## (undated) DEVICE — ENDO KIT W/SYRINGE: Brand: MEDLINE INDUSTRIES, INC.

## (undated) DEVICE — CATHETER DIAG AD 6FR L100CM 0.038IN STD COR POLYUR JUDKINS

## (undated) DEVICE — DEFENDO AIR WATER SUCTION AND BIOPSY VALVE KIT FOR  OLYMPUS: Brand: DEFENDO AIR/WATER/SUCTION AND BIOPSY VALVE